# Patient Record
Sex: FEMALE | Race: WHITE | NOT HISPANIC OR LATINO | Employment: UNEMPLOYED | URBAN - METROPOLITAN AREA
[De-identification: names, ages, dates, MRNs, and addresses within clinical notes are randomized per-mention and may not be internally consistent; named-entity substitution may affect disease eponyms.]

---

## 2017-02-23 ENCOUNTER — TELEPHONE (OUTPATIENT)
Dept: NEUROLOGY | Facility: CLINIC | Age: 37
End: 2017-02-23

## 2017-02-23 ENCOUNTER — LAB VISIT (OUTPATIENT)
Dept: LAB | Facility: HOSPITAL | Age: 37
End: 2017-02-23
Attending: NURSE PRACTITIONER
Payer: COMMERCIAL

## 2017-02-23 ENCOUNTER — OFFICE VISIT (OUTPATIENT)
Dept: NEUROLOGY | Facility: CLINIC | Age: 37
End: 2017-02-23
Payer: COMMERCIAL

## 2017-02-23 VITALS
HEIGHT: 67 IN | DIASTOLIC BLOOD PRESSURE: 82 MMHG | BODY MASS INDEX: 39.51 KG/M2 | RESPIRATION RATE: 17 BRPM | HEART RATE: 72 BPM | WEIGHT: 251.75 LBS | SYSTOLIC BLOOD PRESSURE: 118 MMHG

## 2017-02-23 DIAGNOSIS — H47.10 PAPILLEDEMA: Primary | ICD-10-CM

## 2017-02-23 DIAGNOSIS — H47.10 PAPILLEDEMA: ICD-10-CM

## 2017-02-23 LAB
ALBUMIN SERPL BCP-MCNC: 4 G/DL
ALP SERPL-CCNC: 85 U/L
ALT SERPL W/O P-5'-P-CCNC: 15 U/L
ANION GAP SERPL CALC-SCNC: 9 MMOL/L
AST SERPL-CCNC: 12 U/L
BASOPHILS # BLD AUTO: 0.1 K/UL
BASOPHILS NFR BLD: 1.1 %
BILIRUB SERPL-MCNC: 0.6 MG/DL
BUN SERPL-MCNC: 12 MG/DL
CALCIUM SERPL-MCNC: 9.2 MG/DL
CHLORIDE SERPL-SCNC: 104 MMOL/L
CO2 SERPL-SCNC: 25 MMOL/L
CREAT SERPL-MCNC: 0.8 MG/DL
DIFFERENTIAL METHOD: ABNORMAL
EOSINOPHIL # BLD AUTO: 0.3 K/UL
EOSINOPHIL NFR BLD: 3.6 %
ERYTHROCYTE [DISTWIDTH] IN BLOOD BY AUTOMATED COUNT: 14.1 %
EST. GFR  (AFRICAN AMERICAN): >60 ML/MIN/1.73 M^2
EST. GFR  (NON AFRICAN AMERICAN): >60 ML/MIN/1.73 M^2
GLUCOSE SERPL-MCNC: 79 MG/DL
HCT VFR BLD AUTO: 41.4 %
HGB BLD-MCNC: 14.3 G/DL
LYMPHOCYTES # BLD AUTO: 3.6 K/UL
LYMPHOCYTES NFR BLD: 39.5 %
MCH RBC QN AUTO: 31.1 PG
MCHC RBC AUTO-ENTMCNC: 34.5 %
MCV RBC AUTO: 90 FL
MONOCYTES # BLD AUTO: 0.8 K/UL
MONOCYTES NFR BLD: 8.5 %
NEUTROPHILS # BLD AUTO: 4.3 K/UL
NEUTROPHILS NFR BLD: 47.3 %
PLATELET # BLD AUTO: 350 K/UL
PMV BLD AUTO: 9.8 FL
POTASSIUM SERPL-SCNC: 3.6 MMOL/L
PROT SERPL-MCNC: 7.3 G/DL
RBC # BLD AUTO: 4.6 M/UL
SODIUM SERPL-SCNC: 138 MMOL/L
TSH SERPL DL<=0.005 MIU/L-ACNC: 1.87 UIU/ML
WBC # BLD AUTO: 9.1 K/UL

## 2017-02-23 PROCEDURE — 80053 COMPREHEN METABOLIC PANEL: CPT

## 2017-02-23 PROCEDURE — 99204 OFFICE O/P NEW MOD 45 MIN: CPT | Mod: S$GLB,,, | Performed by: NURSE PRACTITIONER

## 2017-02-23 PROCEDURE — 84443 ASSAY THYROID STIM HORMONE: CPT

## 2017-02-23 PROCEDURE — 99999 PR PBB SHADOW E&M-EST. PATIENT-LVL III: CPT | Mod: PBBFAC,,, | Performed by: NURSE PRACTITIONER

## 2017-02-23 PROCEDURE — 1160F RVW MEDS BY RX/DR IN RCRD: CPT | Mod: S$GLB,,, | Performed by: NURSE PRACTITIONER

## 2017-02-23 PROCEDURE — 36415 COLL VENOUS BLD VENIPUNCTURE: CPT

## 2017-02-23 PROCEDURE — 85025 COMPLETE CBC W/AUTO DIFF WBC: CPT

## 2017-02-23 RX ORDER — MINOCYCLINE HYDROCHLORIDE 50 MG/1
50 CAPSULE ORAL 3 TIMES DAILY
COMMUNITY
End: 2017-04-12

## 2017-02-23 NOTE — PROGRESS NOTES
Subjective:      Chief Complaint:  Neurologic Problem (optic nerve swelling)      History of Present Illness  Tabby Veloz is a 36 y.o. female, referred by Dr. Sanjeev Cook, for evaluation of papilledema and headaches. Her medical history is overall unremarkable.     She has had headaches for the past 4-5 months, which occur daily, are located to the bifrontal area, are 5/10 in severity, and are pressure like in quality. Her headaches will last all day unless she takes Aleve; however, her headaches will return the following day. She denies nausea, dizziness, visual disturbances, autonomic complaints, focal weakness, speech difficulty, or paresthesias with her headaches. She also denies photophobia and phonophobia. She denies any aggravating factors.     She denies any visual complaints in general.     She admits to gaining 25 pounds over the past month, after smoking cessation. Her headaches began afterwards.     I have reviewed all of this patient's past medical and surgical histories as well as family and social histories and active allergies and medications as documented in the electronic medical record.    Review of Systems  Review of Systems   Constitutional: Positive for unexpected weight change. Negative for fatigue.   HENT: Negative for ear pain, hearing loss and postnasal drip.    Eyes: Negative for photophobia, pain, redness and visual disturbance.   Respiratory: Negative for shortness of breath.    Cardiovascular: Negative for palpitations and leg swelling.   Gastrointestinal: Negative for nausea.   Musculoskeletal: Negative for neck pain and neck stiffness.   Skin: Negative for rash.   Neurological: Positive for headaches. Negative for dizziness, tremors, speech difficulty, weakness and numbness.   Hematological: Does not bruise/bleed easily.   Psychiatric/Behavioral: Negative for dysphoric mood and sleep disturbance. The patient is not nervous/anxious.        Objective:       Vitals:    02/23/17 1317    BP: 118/82   Pulse: 72   Resp: 17     Exam:  Gen Appearance, well developed/nourished in no apparent distress  CV: 2+ distal pulses with no edema or swelling  Neuro:  MS: Awake, alert, oriented to place, person, time, situation. Sustains attention. Recent/remote memory intact, Language is full to spontaneous speech/repetition/naming/comprehension. Fund of Knowledge is full  CN: papilledema, slightly greater on the right, PERRL, Extraoccular movements and visual fields are full. Normal facial sensation and strength, Hearing symmetric, Tongue and Palate are midline and strong. Shoulder Shrug symmetric and strong.  Motor: Normal bulk, tone, no abnormal movements. 5/5 strength bilateral upper/lower extremities with 2+ reflexes and bilateral plantar response  Sensory: symmetric to light touch, pain, temp, and vibration/proprioception. Romberg negative  Cerebellar: Finger-nose,Heal-shin, Rapid alternating movements intact  Gait: Normal stance, no ataxia    Imaging: none to review  Labs: none to review    Assessment:   Tabby Veloz is a 36 y.o. female, referred by Dr. Sanjeev Cook, for evaluation of papilledema and headaches. Her medical history is overall unremarkable. Her clinical picture is suggestive of pseudotumor; however, I will rule out other causes of her papilledema, before treating her for this.     Plan:   I recommend:   1. MRI Brain with and without contrast to assess for structural abnormalities.   2. MRV to assess for any venous thrombosis.   3. CBC, CMP, TSH to assess for systemic cause.   4. If neuroimaging unremarkable, I will proceed with an LP to assess opening pressure, and if elevated, treat for pseudotumor with Diamox.     Will Call with results of neuroimaging.

## 2017-02-23 NOTE — LETTER
February 23, 2017      Sanjeev Cook Jr., MD  [address]  [address]  [facility] [address]           [facility]  [address] [facility] [address]  Phone: [phone]  Fax: [phone]          Patient: Tabby Veloz   MR Number: [id number]   YOB: 1980   Date of Visit: 2/23/2017       Dear Dr. Sanjeev Cook Jr.:    Thank you for referring Tabby Veloz to me for evaluation. Attached you will find relevant portions of my assessment and plan of care.    If you have questions, please do not hesitate to call me. I look forward to following Tabby Veloz along with you.    Sincerely,    Madhuri Calvin, NP    Enclosure  CC:  No Recipients    If you would like to receive this communication electronically, please contact [email or web address] or [phone] to request more information on [facility] Link access.    For providers and/or their staff who would like to refer a patient to [facility], please contact us through our one-stop-shop provider referral line, [facility], at [phone].    If you feel you have received this communication in error or would no longer like to receive these types of communications, please e-mail [email or web address]

## 2017-02-23 NOTE — MR AVS SNAPSHOT
[facility] [facility]  [address] [facility] [address]  Phone: [phone]  Fax: [phone]                  Tabby Veloz   2017 1:00 PM   Office Visit    Description:  Female : 1980   Provider:  Madhuri Calvin NP   Department:  [facility]           Reason for Visit     Neurologic Problem           Diagnoses this Visit        Comments    Papilledema                To Do List           Goals (5 Years of Data)     None      [facility] On Call     [facility] On Call Nurse Care Line -  Assistance  Registered nurses in the [facility] On Call Center provide clinical advisement, health education, appointment booking, and other advisory services.  Call for this free service at [phone].             Medications           Message regarding Medications     Verify the changes and/or additions to your medication regime listed below are the same as discussed with your clinician today.  If any of these changes or additions are incorrect, please notify your healthcare provider.        STOP taking these medications     docusate sodium (COLACE) 100 MG capsule Take 1 capsule (100 mg total) by mouth 2 (two) times daily.    ferrous sulfate 325 mg (65 mg iron) Tab tablet Take 1 tablet (325 mg total) by mouth 2 (two) times daily.    naproxen (NAPROSYN) 500 MG tablet Take 1 tablet (500 mg total) by mouth every 8 (eight) hours as needed (cramping).           Verify that the below list of medications is an accurate representation of the medications you are currently taking.  If none reported, the list may be blank. If incorrect, please contact your healthcare provider. Carry this list with you in case of emergency.           Current Medications     cetirizine (ZYRTEC) 10 MG tablet Take 10 mg by mouth once daily.    minocycline (MINOCIN,DYNACIN) 50 MG Cap Take 100 mg by mouth 3 (three) times daily.           Clinical Reference Information           Your Vitals Were     BP Pulse Resp  "Height Weight Last Period    118/82 (BP Location: Left arm, Patient Position: Sitting, BP Method: Manual) 72 17 5' 7" (1.702 m) 114.2 kg (251 lb 12.3 oz) 02/01/2017    BMI                39.43 kg/m2          Blood Pressure          Most Recent Value    BP  118/82      Allergies as of 2/23/2017     No Known Allergies      Immunizations Administered on Date of Encounter - 2/23/2017     None      Orders Placed During Today's Visit     Future Labs/Procedures Expected by Expires    CBC auto differential  2/23/2017 4/24/2018    Comprehensive metabolic panel  2/23/2017 2/23/2018    MRI Brain W WO Contrast  2/23/2017 2/23/2018    MRV Brain Without Contrast  2/23/2017 2/23/2018    TSH  2/23/2017 4/24/2018      Smoking Cessation     If you would like to quit smoking:   You may be eligible for free services if you are a [place] resident and started smoking cigarettes before September 1, 1988.  Call the Smoking Cessation Trust (SCT) toll free at (149) 137-0762 or (108) 922-3859.   Call 3-952-QUIT-NOW if you do not meet the above criteria.            Language Assistance Services     ATTENTION: Language assistance services are available, free of charge. Please call [phone].      ATENCIÓN: Si habla español, tiene a rosa disposición servicios gratuitos de asistencia lingüística. Llame al [phone].     CHÚ Ý: N?u b?n nói Ti?ng Vi?t, có các d?ch v? h? tr? ngôn ng? mi?n phí dành cho b?n. G?i s? [phone].         [place] [facility] complies with applicable Federal civil rights laws and does not discriminate on the basis of race, color, national origin, age, disability, or sex.        "

## 2017-03-01 ENCOUNTER — HOSPITAL ENCOUNTER (OUTPATIENT)
Dept: RADIOLOGY | Facility: HOSPITAL | Age: 37
Discharge: HOME | End: 2017-03-01
Attending: NURSE PRACTITIONER
Payer: COMMERCIAL

## 2017-03-01 DIAGNOSIS — H47.10 PAPILLEDEMA: ICD-10-CM

## 2017-03-01 DIAGNOSIS — H47.10 PAPILLEDEMA: Primary | ICD-10-CM

## 2017-03-01 PROCEDURE — 70553 MRI BRAIN STEM W/O & W/DYE: CPT | Mod: 26,,, | Performed by: RADIOLOGY

## 2017-03-01 PROCEDURE — 70553 MRI BRAIN STEM W/O & W/DYE: CPT | Mod: TC

## 2017-03-01 PROCEDURE — 70544 MR ANGIOGRAPHY HEAD W/O DYE: CPT | Mod: 26,,, | Performed by: RADIOLOGY

## 2017-03-01 PROCEDURE — A9585 GADOBUTROL INJECTION: HCPCS | Performed by: NURSE PRACTITIONER

## 2017-03-01 PROCEDURE — 25500020 PHARM REV CODE 255: Performed by: NURSE PRACTITIONER

## 2017-03-01 PROCEDURE — 70544 MR ANGIOGRAPHY HEAD W/O DYE: CPT | Mod: TC

## 2017-03-01 RX ORDER — GADOBUTROL 604.72 MG/ML
10 INJECTION INTRAVENOUS
Status: COMPLETED | OUTPATIENT
Start: 2017-03-01 | End: 2017-03-01

## 2017-03-01 RX ORDER — DIAZEPAM 2 MG/1
2 TABLET ORAL SEE ADMIN INSTRUCTIONS
Qty: 2 TABLET | Refills: 0 | Status: SHIPPED | OUTPATIENT
Start: 2017-03-01 | End: 2017-03-15

## 2017-03-01 RX ADMIN — GADOBUTROL 10 ML: 604.72 INJECTION INTRAVENOUS at 02:03

## 2017-03-09 ENCOUNTER — HOSPITAL ENCOUNTER (OUTPATIENT)
Dept: RADIOLOGY | Facility: HOSPITAL | Age: 37
Discharge: HOME | End: 2017-03-09
Attending: NURSE PRACTITIONER
Payer: COMMERCIAL

## 2017-03-09 DIAGNOSIS — H47.10 PAPILLEDEMA: ICD-10-CM

## 2017-03-09 LAB
CLARITY CSF: CLEAR
COLOR CSF: COLORLESS
GLUCOSE CSF-MCNC: 54 MG/DL
LYMPHOCYTES NFR CSF MANUAL: 88 %
MONOS+MACROS NFR CSF MANUAL: 12 %
PROT CSF-MCNC: 31 MG/DL
RBC # CSF: 1 /CU MM
SPECIMEN VOL CSF: 2.5 ML
WBC # CSF: 1 /CU MM

## 2017-03-09 PROCEDURE — 89051 BODY FLUID CELL COUNT: CPT

## 2017-03-09 PROCEDURE — 84157 ASSAY OF PROTEIN OTHER: CPT

## 2017-03-09 PROCEDURE — 87070 CULTURE OTHR SPECIMN AEROBIC: CPT

## 2017-03-09 PROCEDURE — 62270 DX LMBR SPI PNXR: CPT | Mod: ,,, | Performed by: RADIOLOGY

## 2017-03-09 PROCEDURE — 77003 FLUOROGUIDE FOR SPINE INJECT: CPT | Mod: 26,,, | Performed by: RADIOLOGY

## 2017-03-09 PROCEDURE — 87205 SMEAR GRAM STAIN: CPT

## 2017-03-09 PROCEDURE — 62270 DX LMBR SPI PNXR: CPT | Mod: TC

## 2017-03-09 PROCEDURE — 82945 GLUCOSE OTHER FLUID: CPT

## 2017-03-09 NOTE — H&P
Radiology History & Physical      SUBJECTIVE:     Chief Complaint: Headache    History of Present Illness:  Tabby Veloz is a 36 y.o. female who presents for lumbar puncture.    Past Medical History:   Diagnosis Date    Abnormal Pap smear      Past Surgical History:   Procedure Laterality Date    ABSCESS DRAINAGE      x 3     addenoids      ANKLE ARTHROSCOPY W/ OPEN REPAIR Right     CERVICAL BIOPSY  W/ LOOP ELECTRODE EXCISION      CHOLECYSTECTOMY      tonsilectomy         Home Meds:   Prior to Admission medications    Medication Sig Start Date End Date Taking? Authorizing Provider   cetirizine (ZYRTEC) 10 MG tablet Take 10 mg by mouth once daily.    Historical Provider, MD   diazePAM (VALIUM) 2 MG tablet Take 1 tablet (2 mg total) by mouth As instructed for Anxiety. 45 minutes prior to lumbar puncture; may repeat at time of lumbar puncture if needed. 3/1/17 3/31/17  Madhuri Calvin, NP   minocycline (MINOCIN,DYNACIN) 50 MG Cap Take 100 mg by mouth 3 (three) times daily.    Historical Provider, MD     Anticoagulants/Antiplatelets: no anticoagulation    Allergies: Review of patient's allergies indicates:  No Known Allergies  Sedation History:  no adverse reactions    Review of Systems:   Hematological: no known coagulopathies  Respiratory: no shortness of breath  Cardiovascular: no chest pain  Gastrointestinal: no abdominal pain  Genito-Urinary: no dysuria  Musculoskeletal: negative  Neurological: no TIA or stroke symptoms         OBJECTIVE:     Vital Signs (Most Recent)       Physical Exam:  ASA: III  Mallampati: II    General: no acute distress  Mental Status: alert and oriented to person, place and time  HEENT: normocephalic, atraumatic  Chest: unlabored breathing  Abdomen: nondistended  Extremity: moves all extremities    Laboratory  No results found for: INR    Lab Results   Component Value Date    WBC 9.10 02/23/2017    HGB 14.3 02/23/2017    HCT 41.4 02/23/2017    MCV 90 02/23/2017      02/23/2017      Lab Results   Component Value Date    GLU 79 02/23/2017     02/23/2017    K 3.6 02/23/2017     02/23/2017    CO2 25 02/23/2017    BUN 12 02/23/2017    CREATININE 0.8 02/23/2017    CALCIUM 9.2 02/23/2017    ALT 15 02/23/2017    AST 12 02/23/2017    ALBUMIN 4.0 02/23/2017    BILITOT 0.6 02/23/2017       ASSESSMENT/PLAN:     Sedation Plan: Local anesthesia  Patient will undergo lumbar puncture.    Miguel A Smith MD  PGY-II  Dept of Radiology   Pager: [phone]

## 2017-03-09 NOTE — PROCEDURES
"Radiology Post-Procedure Note    Pre Op Diagnosis: Concern for psuedotumor    Post Op Diagnosis: Same    Procedure: lumbar puncture    Procedure performed by: Devin Morales MD     Written Informed Consent Obtained: Yes    Specimen Removed: 11 mL CSF    Estimated Blood Loss: Minimal    Findings: Following written informed consent and sterile prep and drape, a 22 gauge, 5" spinal needle was inserted at L3-L4 intralaminar space under fluoroscopic surveillance.  11 mL clear CSF removed and sent to the lab for further analysis.  Opening pressure was 46 mmH2O. There were no complications.    Patient tolerated procedure well.    Miguel A Smith MD  PGY-II  Dept of Radiology   Pager: [phone]    "

## 2017-03-14 LAB
CMV SPEC QL SHELL VIAL CULT: NO GROWTH
GRAM STN SPEC: NORMAL

## 2017-03-15 ENCOUNTER — OFFICE VISIT (OUTPATIENT)
Dept: NEUROLOGY | Facility: CLINIC | Age: 37
End: 2017-03-15
Payer: COMMERCIAL

## 2017-03-15 VITALS
DIASTOLIC BLOOD PRESSURE: 74 MMHG | HEART RATE: 84 BPM | BODY MASS INDEX: 38.79 KG/M2 | RESPIRATION RATE: 18 BRPM | HEIGHT: 67 IN | WEIGHT: 247.13 LBS | SYSTOLIC BLOOD PRESSURE: 112 MMHG

## 2017-03-15 DIAGNOSIS — G93.2 PSEUDOTUMOR CEREBRI: ICD-10-CM

## 2017-03-15 DIAGNOSIS — E66.9 OBESITY, UNSPECIFIED OBESITY SEVERITY, UNSPECIFIED OBESITY TYPE: ICD-10-CM

## 2017-03-15 DIAGNOSIS — H47.10 PAPILLEDEMA: Primary | ICD-10-CM

## 2017-03-15 DIAGNOSIS — R51.9 HEADACHE, UNSPECIFIED HEADACHE TYPE: ICD-10-CM

## 2017-03-15 PROCEDURE — 99214 OFFICE O/P EST MOD 30 MIN: CPT | Mod: S$GLB,,, | Performed by: NURSE PRACTITIONER

## 2017-03-15 PROCEDURE — 1160F RVW MEDS BY RX/DR IN RCRD: CPT | Mod: S$GLB,,, | Performed by: NURSE PRACTITIONER

## 2017-03-15 PROCEDURE — 99999 PR PBB SHADOW E&M-EST. PATIENT-LVL III: CPT | Mod: PBBFAC,,, | Performed by: NURSE PRACTITIONER

## 2017-03-15 RX ORDER — ACETAZOLAMIDE 250 MG/1
250 TABLET ORAL 2 TIMES DAILY
Qty: 120 TABLET | Refills: 3 | Status: SHIPPED | OUTPATIENT
Start: 2017-03-15 | End: 2017-07-11 | Stop reason: SDUPTHER

## 2017-03-15 NOTE — MR AVS SNAPSHOT
[facility] [facility]  [address] [facility] [address]  Phone: [phone]  Fax: [phone]                  Tabby Veloz   3/15/2017 2:20 PM   Office Visit    Description:  Female : 1980   Provider:  Madhuri Calvin NP   Department:  [facility] [facility]           Reason for Visit     Follow-up           Diagnoses this Visit        Comments    Papilledema    -  Primary     Pseudotumor cerebri                To Do List           Goals (5 Years of Data)     None      Follow-Up and Disposition     Return in about 4 weeks (around 2017).       These Medications        Disp Refills Start End    acetaZOLAMIDE (DIAMOX) 250 MG tablet 120 tablet 3 3/15/2017 3/15/2018    Take 1 tablet (250 mg total) by mouth 2 (two) times daily. X 2 weeks, then increase to 2 tablets twice a day afterwards - Oral    Pharmacy: [facility]/pharmacy #[id number] - [address] AT [facility] #: [phone]         [facility] On Call     [facility] On Call Nurse Care Line -  Assistance  Registered nurses in the [facility] On Call Center provide clinical advisement, health education, appointment booking, and other advisory services.  Call for this free service at [phone].             Medications           Message regarding Medications     Verify the changes and/or additions to your medication regime listed below are the same as discussed with your clinician today.  If any of these changes or additions are incorrect, please notify your healthcare provider.        START taking these NEW medications        Refills    acetaZOLAMIDE (DIAMOX) 250 MG tablet 3    Sig: Take 1 tablet (250 mg total) by mouth 2 (two) times daily. X 2 weeks, then increase to 2 tablets twice a day afterwards    Class: Normal    Route: Oral      STOP taking these medications     diazePAM (VALIUM) 2 MG tablet Take 1 tablet (2 mg total) by mouth As instructed for Anxiety. 45 minutes prior to  "lumbar puncture; may repeat at time of lumbar puncture if needed.           Verify that the below list of medications is an accurate representation of the medications you are currently taking.  If none reported, the list may be blank. If incorrect, please contact your healthcare provider. Carry this list with you in case of emergency.           Current Medications     cetirizine (ZYRTEC) 10 MG tablet Take 10 mg by mouth once daily.    minocycline (MINOCIN,DYNACIN) 50 MG Cap Take 50 mg by mouth 3 (three) times daily.     acetaZOLAMIDE (DIAMOX) 250 MG tablet Take 1 tablet (250 mg total) by mouth 2 (two) times daily. X 2 weeks, then increase to 2 tablets twice a day afterwards           Clinical Reference Information           Your Vitals Were     BP Pulse Resp Height Weight Last Period    112/74 (BP Location: Left arm, Patient Position: Sitting, BP Method: Manual) 84 18 5' 7" (1.702 m) 112.1 kg (247 lb 2.2 oz) 02/01/2017    BMI                38.71 kg/m2          Blood Pressure          Most Recent Value    BP  112/74      Allergies as of 3/15/2017     No Known Allergies      Immunizations Administered on Date of Encounter - 3/15/2017     None      Smoking Cessation     If you would like to quit smoking:   You may be eligible for free services if you are a [place] resident and started smoking cigarettes before September 1, 1988.  Call the Smoking Cessation Trust ([facility]) toll free at (018) 999-4482 or (596) 753-3819.   Call 1-800-QUIT-NOW if you do not meet the above criteria.            Language Assistance Services     ATTENTION: Language assistance services are available, free of charge. Please call [phone].      ATENCIÓN: Si habla español, tiene a rosa disposición servicios gratuitos de asistencia lingüística. Llame al [phone].     CHÚ Ý: N?u b?n nói Ti?ng Vi?t, có các d?ch v? h? tr? ngôn ng? mi?n phí dành cho b?n. G?i s? [phone].         [place] [facility] complies with applicable " Federal civil rights laws and does not discriminate on the basis of race, color, national origin, age, disability, or sex.

## 2017-03-15 NOTE — PROGRESS NOTES
Subjective:      Chief Complaint:  Follow-up (Discuss test results)      History of Present Illness  Tabby Veloz is a 36 y.o. female, referred by Dr. Sanjeev Cook, for evaluation of papilledema and headaches. Her medical history is overall unremarkable.     She completed a lumbar puncture on 3/9/2017, which demonstrated an opening pressure of 46. She has only had one headache since her lumbar puncture. She was having blurred vision upon bending over prior her LP, which has resolved. She was also experiencing daily headaches prior to her LP.     She denies any visual complaints today. She started a diet program and has lost 4 pounds since her last visit.     I have reviewed all of this patient's past medical and surgical histories as well as family and social histories and active allergies and medications as documented in the electronic medical record.    Review of Systems  Review of Systems   Constitutional: Positive for unexpected weight change. Negative for fatigue.   HENT: Negative for ear pain, hearing loss and postnasal drip.    Eyes: Negative for photophobia, pain, redness and visual disturbance.   Respiratory: Negative for shortness of breath.    Cardiovascular: Negative for palpitations and leg swelling.   Gastrointestinal: Negative for nausea.   Musculoskeletal: Negative for neck pain and neck stiffness.   Skin: Negative for rash.   Neurological: Positive for headaches. Negative for dizziness, tremors, speech difficulty, weakness and numbness.   Hematological: Does not bruise/bleed easily.   Psychiatric/Behavioral: Negative for dysphoric mood and sleep disturbance. The patient is not nervous/anxious.        Objective:       Vitals:    03/15/17 1425   BP: 112/74   Pulse: 84   Resp: 18     Exam:  Gen Appearance, well developed/nourished in no apparent distress  CV: 2+ distal pulses with no edema or swelling  Neuro:  MS: Awake, alert, oriented to place, person, time, situation. Sustains attention.  Recent/remote memory intact, Language is full to spontaneous speech/repetition/naming/comprehension. Fund of Knowledge is full  CN: papilledema, slightly greater on the right, PERRL, Extraoccular movements and visual fields are full. Normal facial sensation and strength, Hearing symmetric, Tongue and Palate are midline and strong. Shoulder Shrug symmetric and strong.  Motor: Normal bulk, tone, no abnormal movements. 5/5 strength bilateral upper/lower extremities with 2+ reflexes and bilateral plantar response  Sensory: symmetric to light touch, pain, temp, and vibration/proprioception. Romberg negative  Cerebellar: Finger-nose,Heal-shin, Rapid alternating movements intact  Gait: Normal stance, no ataxia    Imaging:   MRI Brain 3/1/2017:   Normal MRI brain specifically without evidence for acute infarction or enhancing lesion.    MRV 3/1/2017:  Unremarkable noncontrast MRV specifically without evidence for venous sinus thrombosis.    Labs: CBC, CMP, and TSH unremarkable.     LP 3/9/2017 opening pressure: 46    Assessment:   Tabby Veloz is a 36 y.o. female, referred by Dr. Sanjeev Cook, for evaluation of papilledema and headaches. Her medical history is overall unremarkable.     Her opening pressure was elevated, which is consistent with a diagnosis of pseudotumor cerebri, as her neuroimaging was unremarkable for cause. Her headaches did improve after the LP.     Plan:   I recommend:   1. Start Diamox 250 mg bid x 2 weeks, then increase to 500 mg bid as tolerated.   2. Discussed continued weight loss. She lost 4 pounds since her lat visit with me two weeks ago.   3. Advised to call clinic with any cramping. Check a BMP at her next visit.     Will Call with results of neuroimaging.

## 2017-04-12 ENCOUNTER — LAB VISIT (OUTPATIENT)
Dept: LAB | Facility: HOSPITAL | Age: 37
End: 2017-04-12
Attending: NURSE PRACTITIONER
Payer: COMMERCIAL

## 2017-04-12 ENCOUNTER — OFFICE VISIT (OUTPATIENT)
Dept: NEUROLOGY | Facility: CLINIC | Age: 37
End: 2017-04-12
Payer: COMMERCIAL

## 2017-04-12 VITALS
RESPIRATION RATE: 18 BRPM | HEIGHT: 67 IN | WEIGHT: 240.75 LBS | HEART RATE: 80 BPM | SYSTOLIC BLOOD PRESSURE: 114 MMHG | DIASTOLIC BLOOD PRESSURE: 78 MMHG | BODY MASS INDEX: 37.79 KG/M2

## 2017-04-12 DIAGNOSIS — G93.2 PSEUDOTUMOR CEREBRI: Primary | ICD-10-CM

## 2017-04-12 DIAGNOSIS — G93.2 PSEUDOTUMOR CEREBRI: ICD-10-CM

## 2017-04-12 LAB
ALBUMIN SERPL BCP-MCNC: 3.8 G/DL
ALP SERPL-CCNC: 96 U/L
ALT SERPL W/O P-5'-P-CCNC: 13 U/L
ANION GAP SERPL CALC-SCNC: 7 MMOL/L
AST SERPL-CCNC: 14 U/L
BILIRUB SERPL-MCNC: 0.5 MG/DL
BUN SERPL-MCNC: 14 MG/DL
CALCIUM SERPL-MCNC: 9.2 MG/DL
CHLORIDE SERPL-SCNC: 113 MMOL/L
CO2 SERPL-SCNC: 20 MMOL/L
CREAT SERPL-MCNC: 0.9 MG/DL
EST. GFR  (AFRICAN AMERICAN): >60 ML/MIN/1.73 M^2
EST. GFR  (NON AFRICAN AMERICAN): >60 ML/MIN/1.73 M^2
GLUCOSE SERPL-MCNC: 88 MG/DL
POTASSIUM SERPL-SCNC: 4.8 MMOL/L
PROT SERPL-MCNC: 7.2 G/DL
SODIUM SERPL-SCNC: 140 MMOL/L

## 2017-04-12 PROCEDURE — 36415 COLL VENOUS BLD VENIPUNCTURE: CPT

## 2017-04-12 PROCEDURE — 1160F RVW MEDS BY RX/DR IN RCRD: CPT | Mod: S$GLB,,, | Performed by: NURSE PRACTITIONER

## 2017-04-12 PROCEDURE — 80053 COMPREHEN METABOLIC PANEL: CPT

## 2017-04-12 PROCEDURE — 99999 PR PBB SHADOW E&M-EST. PATIENT-LVL III: CPT | Mod: PBBFAC,,, | Performed by: NURSE PRACTITIONER

## 2017-04-12 PROCEDURE — 99214 OFFICE O/P EST MOD 30 MIN: CPT | Mod: S$GLB,,, | Performed by: NURSE PRACTITIONER

## 2017-04-12 RX ORDER — METFORMIN HYDROCHLORIDE 500 MG/1
500 TABLET ORAL
COMMUNITY
End: 2017-08-10

## 2017-04-12 RX ORDER — MINOCYCLINE HYDROCHLORIDE 100 MG/1
100 TABLET ORAL
COMMUNITY
End: 2018-02-20

## 2017-04-12 NOTE — MR AVS SNAPSHOT
[place] [facility]  [address] [facility] [address]  Phone: [phone]  Fax: [phone]                  Tabby Veloz   2017 2:40 PM   Office Visit    Description:  Female : 1980   Provider:  Madhuri Calvin NP   Department:  [place] [facility]           Reason for Visit     Neurologic Problem           Diagnoses this Visit        Comments    Pseudotumor cerebri    -  Primary            To Do List           Future Appointments        Provider Department Dept Phone    2017 3:40 PM LAB, [facility] [phone]      Goals (5 Years of Data)     None      Follow-Up and Disposition     Return in about 4 months (around 2017).      [facility] On Call     [facility] On Call Nurse Care Line -  Assistance  Unless otherwise directed by your provider, please contact [facility], our nurse care line that is available for  assistance.     Registered nurses in the [facility] On Call Center provide: appointment scheduling, clinical advisement, health education, and other advisory services.  Call: [phone] (toll free)               Medications           Message regarding Medications     Verify the changes and/or additions to your medication regime listed below are the same as discussed with your clinician today.  If any of these changes or additions are incorrect, please notify your healthcare provider.        STOP taking these medications     minocycline (MINOCIN,DYNACIN) 50 MG Cap Take 50 mg by mouth 3 (three) times daily.            Verify that the below list of medications is an accurate representation of the medications you are currently taking.  If none reported, the list may be blank. If incorrect, please contact your healthcare provider. Carry this list with you in case of emergency.           Current Medications     acetaZOLAMIDE (DIAMOX) 250 MG tablet Take 1 tablet (250 mg total) by mouth 2 (two) times  "daily. X 2 weeks, then increase to 2 tablets twice a day afterwards    cetirizine (ZYRTEC) 10 MG tablet Take 10 mg by mouth once daily.    minocycline (DYNACIN) 100 MG tablet Take 100 mg by mouth every 12 (twelve) hours.    metformin (GLUCOPHAGE) 500 MG tablet Take 500 mg by mouth daily with breakfast.           Clinical Reference Information           Your Vitals Were     BP Pulse Resp Height Weight BMI    114/78 (BP Location: Left arm, Patient Position: Sitting, BP Method: Manual) 80 18 5' 7" (1.702 m) 109.2 kg (240 lb 11.9 oz) 37.71 kg/m2      Blood Pressure          Most Recent Value    BP  114/78      Allergies as of 4/12/2017     No Known Allergies      Immunizations Administered on Date of Encounter - 4/12/2017     None      Orders Placed During Today's Visit     Future Labs/Procedures Expected by Expires    Comprehensive metabolic panel  4/12/2017 4/12/2018      Smoking Cessation     If you would like to quit smoking:   You may be eligible for free services if you are a [place] resident and started smoking cigarettes before September 1, 1988.  Call the Smoking Cessation Trust ([facility]) toll free at (998) 710-6124 or (140) 512-8770.   Call 1-800-QUIT-NOW if you do not meet the above criteria.   Contact us via email: [email or web address]   View our website for more information: [email or web address]        Language Assistance Services     ATTENTION: Language assistance services are available, free of charge. Please call [phone].      ATENCIÓN: Si habla español, tiene a rosa disposición servicios gratuitos de asistencia lingüística. Llame al [phone].     CHÚ Ý: N?u b?n nói Ti?ng Vi?t, có các d?ch v? h? tr? ngôn ng? mi?n phí dành cho b?n. G?i s? [phone].         [place] [facility] complies with applicable Federal civil rights laws and does not discriminate on the basis of race, color, national origin, age, disability, or sex.        "

## 2017-04-12 NOTE — PROGRESS NOTES
Subjective:      Chief Complaint:  Neurologic Problem (Hands and feet tingling)      History of Present Illness  Tabby Veloz is a 36 y.o. female, referred by Dr. Sanjeev Cook, for evaluation of papilledema and headaches. Her medical history is overall unremarkable. She completed a lumbar puncture on 3/9/2017, which demonstrated an opening pressure of 46, consistent with a diagnosis of pseudotumor cerebri.     She was placed on Diamox at her last visit, which she is tolerating well, other than some intermittent ringing to her ears and some paresthesias to her hands. She denies any headaches, and only has blurred vision once per week.     She lost 7 pounds since her last visit. She continues on her diet program, which works well for her.     I have reviewed all of this patient's past medical and surgical histories as well as family and social histories and active allergies and medications as documented in the electronic medical record.    Review of Systems  Review of Systems   Constitutional: Negative for fatigue.   HENT: Positive for tinnitus. Negative for ear pain, hearing loss and postnasal drip.    Eyes: Negative for photophobia, pain, redness and visual disturbance.   Respiratory: Negative for shortness of breath.    Cardiovascular: Negative for palpitations and leg swelling.   Gastrointestinal: Negative for nausea.   Musculoskeletal: Negative for neck pain and neck stiffness.   Skin: Negative for rash.   Neurological: Positive for headaches. Negative for dizziness, tremors, speech difficulty, weakness and numbness.        Tingling to hands   Hematological: Does not bruise/bleed easily.   Psychiatric/Behavioral: Negative for dysphoric mood and sleep disturbance. The patient is not nervous/anxious.        Objective:       Vitals:    04/12/17 1451   BP: 114/78   Pulse: 80   Resp: 18     Exam:  Gen Appearance, well developed/nourished in no apparent distress  CV: 2+ distal pulses with no edema or  swelling  Neuro:  MS: Awake, alert, oriented to place, person, time, situation. Sustains attention. Recent/remote memory intact, Language is full to spontaneous speech/repetition/naming/comprehension. Fund of Knowledge is full  CN: papilledema, slightly greater on the right, PERRL, Extraoccular movements and visual fields are full. Normal facial sensation and strength, Hearing symmetric, Tongue and Palate are midline and strong. Shoulder Shrug symmetric and strong.  Motor: Normal bulk, tone, no abnormal movements. 5/5 strength bilateral upper/lower extremities with 2+ reflexes and bilateral plantar response  Sensory: symmetric to light touch, pain, temp, and vibration/proprioception. Romberg negative  Cerebellar: Finger-nose,Heal-shin, Rapid alternating movements intact  Gait: Normal stance, no ataxia    Imaging:   MRI Brain 3/1/2017:   Normal MRI brain specifically without evidence for acute infarction or enhancing lesion.    MRV 3/1/2017:  Unremarkable noncontrast MRV specifically without evidence for venous sinus thrombosis.    Labs: 2/2017 CBC, CMP, and TSH unremarkable.     LP 3/9/2017 opening pressure: 46    Assessment:   Tabby Veloz is a 36 y.o. female, referred by Dr. Sanjeev Cook, for evaluation of papilledema and headaches. Her medical history is overall unremarkable. She completed a lumbar puncture on 3/9/2017, which demonstrated an opening pressure of 46, consistent with a diagnosis of pseudotumor cerebri.     Plan:   I recommend:   1. Continue Diamox for pseudotumor. She does have some intermittent hand paresthesias and tinnitus, both of which are known side effects of Diamox; however, she recently initiated this medication and recently increased her dose. These side effects may resolve with time.   2. Discussed continued weight loss. She lost 11 pounds since she was diagnosed with pseudotumor.   3. Check a CMP at her visit today.   4. Advised to increase hydration while taking Diamox.   5.  Advised to call clinic with worsening of headaches or vision.     Follow up in 4 months.

## 2017-07-11 DIAGNOSIS — H47.10 PAPILLEDEMA: ICD-10-CM

## 2017-07-11 DIAGNOSIS — G93.2 PSEUDOTUMOR CEREBRI: ICD-10-CM

## 2017-07-11 RX ORDER — ACETAZOLAMIDE 250 MG/1
500 TABLET ORAL 2 TIMES DAILY
Qty: 120 TABLET | Refills: 1 | Status: SHIPPED | OUTPATIENT
Start: 2017-07-11 | End: 2017-09-24 | Stop reason: SDUPTHER

## 2017-07-11 NOTE — TELEPHONE ENCOUNTER
----- Message from Yaritza Calvin sent at 2017  3:05 PM CDT -----  Contact: self  Tabby Veloz  MRN: [id number]  : 1980  PCP: Primary Doctor No  Home Phone      [phone]  Work Phone      Not on file.  Mobile          [phone]      MESSAGE: the patient is requesting a refill on acetaZOLAMIDE (DIAMOX) 250 MG tablet to be sent to [facility] in [place].  Phone:[phone]

## 2017-08-03 PROBLEM — K61.1 PERIRECTAL ABSCESS: Status: ACTIVE | Noted: 2017-08-03

## 2017-08-10 ENCOUNTER — OFFICE VISIT (OUTPATIENT)
Dept: NEUROLOGY | Facility: CLINIC | Age: 37
End: 2017-08-10
Payer: COMMERCIAL

## 2017-08-10 ENCOUNTER — LAB VISIT (OUTPATIENT)
Dept: LAB | Facility: HOSPITAL | Age: 37
End: 2017-08-10
Attending: NURSE PRACTITIONER
Payer: COMMERCIAL

## 2017-08-10 VITALS
WEIGHT: 247.25 LBS | SYSTOLIC BLOOD PRESSURE: 114 MMHG | HEART RATE: 84 BPM | RESPIRATION RATE: 18 BRPM | HEIGHT: 67 IN | BODY MASS INDEX: 38.81 KG/M2 | DIASTOLIC BLOOD PRESSURE: 78 MMHG

## 2017-08-10 DIAGNOSIS — G93.2 PSEUDOTUMOR CEREBRI: Primary | ICD-10-CM

## 2017-08-10 DIAGNOSIS — Z79.899 ENCOUNTER FOR LONG-TERM (CURRENT) USE OF MEDICATIONS: ICD-10-CM

## 2017-08-10 DIAGNOSIS — H47.10 PAPILLEDEMA: ICD-10-CM

## 2017-08-10 LAB
ALBUMIN SERPL BCP-MCNC: 3.6 G/DL
ALP SERPL-CCNC: 96 U/L
ALT SERPL W/O P-5'-P-CCNC: 17 U/L
ANION GAP SERPL CALC-SCNC: 8 MMOL/L
AST SERPL-CCNC: 13 U/L
BILIRUB SERPL-MCNC: 0.4 MG/DL
BUN SERPL-MCNC: 12 MG/DL
CALCIUM SERPL-MCNC: 9.3 MG/DL
CHLORIDE SERPL-SCNC: 113 MMOL/L
CO2 SERPL-SCNC: 23 MMOL/L
CREAT SERPL-MCNC: 0.8 MG/DL
EST. GFR  (AFRICAN AMERICAN): >60 ML/MIN/1.73 M^2
EST. GFR  (NON AFRICAN AMERICAN): >60 ML/MIN/1.73 M^2
GLUCOSE SERPL-MCNC: 102 MG/DL
POTASSIUM SERPL-SCNC: 3.8 MMOL/L
PROT SERPL-MCNC: 7.1 G/DL
SODIUM SERPL-SCNC: 144 MMOL/L

## 2017-08-10 PROCEDURE — 99214 OFFICE O/P EST MOD 30 MIN: CPT | Mod: S$GLB,,, | Performed by: NURSE PRACTITIONER

## 2017-08-10 PROCEDURE — 36415 COLL VENOUS BLD VENIPUNCTURE: CPT

## 2017-08-10 PROCEDURE — 80053 COMPREHEN METABOLIC PANEL: CPT

## 2017-08-10 PROCEDURE — 99999 PR PBB SHADOW E&M-EST. PATIENT-LVL III: CPT | Mod: PBBFAC,,, | Performed by: NURSE PRACTITIONER

## 2017-08-10 PROCEDURE — 3008F BODY MASS INDEX DOCD: CPT | Mod: S$GLB,,, | Performed by: NURSE PRACTITIONER

## 2017-08-10 RX ORDER — ECONAZOLE NITRATE 10 MG/G
CREAM TOPICAL
COMMUNITY
Start: 2017-07-14 | End: 2018-02-20

## 2017-08-10 NOTE — PROGRESS NOTES
Subjective:      Chief Complaint:  Neurologic Problem      History of Present Illness  Tabby Veloz is a 36 y.o. female, referred by Dr. Sanjeev Cook, for evaluation of papilledema and headaches. Her medical history is overall unremarkable. She completed a lumbar puncture on 3/9/2017, which demonstrated an opening pressure of 46, consistent with a diagnosis of pseudotumor cerebri.     She denies any current headaches or visual complaints. She continues on Diamox and no longer has hand paresthesias since initiating an over the counter potassium supplement. She continues to have occasional ringing to her ears.     She has no complaints at this visit.     I have reviewed all of this patient's past medical and surgical histories as well as family and social histories and active allergies and medications as documented in the electronic medical record.    Review of Systems  Review of Systems   Constitutional: Negative for fatigue.   HENT: Positive for tinnitus. Negative for ear pain, hearing loss and postnasal drip.    Eyes: Negative for photophobia, pain, redness and visual disturbance.   Respiratory: Negative for shortness of breath.    Cardiovascular: Negative for palpitations and leg swelling.   Gastrointestinal: Negative for nausea.   Musculoskeletal: Negative for neck pain and neck stiffness.   Skin: Negative for rash.   Neurological: Negative for dizziness, tremors, speech difficulty, weakness, numbness and headaches.   Hematological: Does not bruise/bleed easily.   Psychiatric/Behavioral: Negative for dysphoric mood and sleep disturbance. The patient is not nervous/anxious.        Objective:       Vitals:    08/10/17 1430   BP: 114/78   Pulse: 84   Resp: 18     Exam:  Gen Appearance, well developed/nourished in no apparent distress  CV: 2+ distal pulses with no edema or swelling  Neuro:  MS: Awake, alert, oriented to place, person, time, situation. Sustains attention. Recent/remote memory intact,  Language is full to spontaneous speech/repetition/naming/comprehension. Fund of Knowledge is full  CN: no papilledema today, PERRL, Extraoccular movements and visual fields are full. Normal facial sensation and strength, Hearing symmetric, Tongue and Palate are midline and strong. Shoulder Shrug symmetric and strong.  Motor: Normal bulk, tone, no abnormal movements. 5/5 strength bilateral upper/lower extremities with 2+ reflexes and bilateral plantar response  Sensory: symmetric to light touch, pain, temp, and vibration/proprioception. Romberg negative  Cerebellar: Finger-nose,Heal-shin, Rapid alternating movements intact  Gait: Normal stance, no ataxia    Imaging:   MRI Brain 3/1/2017:   Normal MRI brain specifically without evidence for acute infarction or enhancing lesion.    MRV 3/1/2017:  Unremarkable noncontrast MRV specifically without evidence for venous sinus thrombosis.    Labs: 2/2017 CBC, CMP, and TSH unremarkable.     LP 3/9/2017 opening pressure: 46    Assessment:   Tabby Veloz is a 36 y.o. female, referred by Dr. Sanjeev Cook, for evaluation of papilledema and headaches. Her medical history is overall unremarkable. She completed a lumbar puncture on 3/9/2017, which demonstrated an opening pressure of 46, consistent with a diagnosis of pseudotumor cerebri.     Plan:   I recommend:   1. Continue Diamox for pseudotumor. She continues with intermittent tinnitus. Advised to increase fluid intake; however, this is a known side effect of Diamox. Her hand paresthesias resolved with a potassium supplement OTC.   2. Discussed continued weight loss.   3. Check a CMP at her visit today. Last CMP WNL.   4. Advised to call clinic with worsening of headaches or vision.     Follow up in 3 months.

## 2017-09-24 DIAGNOSIS — G93.2 PSEUDOTUMOR CEREBRI: ICD-10-CM

## 2017-09-24 DIAGNOSIS — H47.10 PAPILLEDEMA: ICD-10-CM

## 2017-09-26 RX ORDER — ACETAZOLAMIDE 250 MG/1
500 TABLET ORAL 2 TIMES DAILY
Qty: 120 TABLET | Refills: 2 | Status: SHIPPED | OUTPATIENT
Start: 2017-09-26 | End: 2017-10-19 | Stop reason: SDUPTHER

## 2017-09-28 ENCOUNTER — CLINICAL SUPPORT (OUTPATIENT)
Dept: FAMILY MEDICINE | Facility: CLINIC | Age: 37
End: 2017-09-28
Payer: COMMERCIAL

## 2017-09-28 DIAGNOSIS — Z00.00 ROUTINE GENERAL MEDICAL EXAMINATION AT A HEALTH CARE FACILITY: Primary | ICD-10-CM

## 2017-09-28 PROCEDURE — 80305 DRUG TEST PRSMV DIR OPT OBS: CPT | Mod: S$GLB,,, | Performed by: FAMILY MEDICINE

## 2017-09-28 PROCEDURE — 99201 PR OFFICE/OUTPT VISIT,NEW,LEVL I: CPT | Mod: S$GLB,,, | Performed by: FAMILY MEDICINE

## 2017-10-19 DIAGNOSIS — G93.2 PSEUDOTUMOR CEREBRI: ICD-10-CM

## 2017-10-19 DIAGNOSIS — H47.10 PAPILLEDEMA: ICD-10-CM

## 2017-10-19 RX ORDER — ACETAZOLAMIDE 250 MG/1
500 TABLET ORAL 2 TIMES DAILY
Qty: 360 TABLET | Refills: 1 | Status: SHIPPED | OUTPATIENT
Start: 2017-10-19 | End: 2018-05-18 | Stop reason: SDUPTHER

## 2017-11-06 ENCOUNTER — PATIENT MESSAGE (OUTPATIENT)
Dept: NEUROLOGY | Facility: CLINIC | Age: 37
End: 2017-11-06

## 2017-11-09 ENCOUNTER — PATIENT MESSAGE (OUTPATIENT)
Dept: NEUROLOGY | Facility: CLINIC | Age: 37
End: 2017-11-09

## 2017-11-10 ENCOUNTER — HOSPITAL ENCOUNTER (OUTPATIENT)
Facility: HOSPITAL | Age: 37
Discharge: HOME | End: 2017-11-11
Attending: EMERGENCY MEDICINE | Admitting: SURGERY
Payer: COMMERCIAL

## 2017-11-10 DIAGNOSIS — R10.31 PREGNANCY WITH ABDOMINAL PAIN OF RIGHT LOWER QUADRANT, ANTEPARTUM: Primary | ICD-10-CM

## 2017-11-10 DIAGNOSIS — O26.899 PREGNANCY WITH ABDOMINAL PAIN OF RIGHT LOWER QUADRANT, ANTEPARTUM: Primary | ICD-10-CM

## 2017-11-10 DIAGNOSIS — R10.2 PELVIC PAIN: ICD-10-CM

## 2017-11-10 DIAGNOSIS — R10.9 RIGHT-SIDED ABDOMINAL PAIN OF UNKNOWN CAUSE: ICD-10-CM

## 2017-11-10 LAB
AMORPH CRY UR QL COMP ASSIST: ABNORMAL
ANION GAP SERPL CALC-SCNC: 14 MMOL/L
BACTERIA #/AREA URNS AUTO: ABNORMAL /HPF
BASOPHILS # BLD AUTO: 0.09 K/UL
BASOPHILS NFR BLD: 0.8 %
BILIRUB UR QL STRIP: NEGATIVE
BUN SERPL-MCNC: 8 MG/DL
CALCIUM SERPL-MCNC: 9.4 MG/DL
CHLORIDE SERPL-SCNC: 105 MMOL/L
CLARITY UR REFRACT.AUTO: ABNORMAL
CO2 SERPL-SCNC: 23 MMOL/L
COLOR UR AUTO: YELLOW
CREAT SERPL-MCNC: 0.63 MG/DL
DIFFERENTIAL METHOD: ABNORMAL
EOSINOPHIL # BLD AUTO: 0.1 K/UL
EOSINOPHIL NFR BLD: 1.1 %
ERYTHROCYTE [DISTWIDTH] IN BLOOD BY AUTOMATED COUNT: 13.7 %
EST. GFR  (AFRICAN AMERICAN): >60 ML/MIN/1.73 M^2
EST. GFR  (NON AFRICAN AMERICAN): >60 ML/MIN/1.73 M^2
GLUCOSE SERPL-MCNC: 128 MG/DL
GLUCOSE UR QL STRIP: NEGATIVE
HCT VFR BLD AUTO: 37.3 %
HCT VFR BLD AUTO: 37.3 %
HGB BLD-MCNC: 12.8 G/DL
HGB UR QL STRIP: ABNORMAL
KETONES UR QL STRIP: ABNORMAL
LEUKOCYTE ESTERASE UR QL STRIP: NEGATIVE
LYMPHOCYTES # BLD AUTO: 1.4 K/UL
LYMPHOCYTES NFR BLD: 12.2 %
MCH RBC QN AUTO: 31.3 PG
MCHC RBC AUTO-ENTMCNC: 33.8 G/DL
MCV RBC AUTO: 93 FL
MICROSCOPIC COMMENT: ABNORMAL
MONOCYTES # BLD AUTO: 0.8 K/UL
MONOCYTES NFR BLD: 6.9 %
NEUTROPHILS # BLD AUTO: 8.8 K/UL
NEUTROPHILS NFR BLD: 78.7 %
NITRITE UR QL STRIP: NEGATIVE
PH UR STRIP: 5 [PH] (ref 5–8)
PLATELET # BLD AUTO: 348 K/UL
PMV BLD AUTO: 10.6 FL
POTASSIUM SERPL-SCNC: 3.2 MMOL/L
PROT UR QL STRIP: ABNORMAL
RBC # BLD AUTO: 4.09 M/UL
RBC #/AREA URNS AUTO: 2 /HPF (ref 0–4)
SODIUM SERPL-SCNC: 142 MMOL/L
SP GR UR STRIP: 1.02 (ref 1–1.03)
URN SPEC COLLECT METH UR: ABNORMAL
UROBILINOGEN UR STRIP-ACNC: NEGATIVE EU/DL
WBC # BLD AUTO: 11.19 K/UL
WBC #/AREA URNS AUTO: 0 /HPF (ref 0–5)

## 2017-11-10 PROCEDURE — 25000003 PHARM REV CODE 250: Performed by: EMERGENCY MEDICINE

## 2017-11-10 PROCEDURE — 99285 EMERGENCY DEPT VISIT HI MDM: CPT

## 2017-11-10 PROCEDURE — 80048 BASIC METABOLIC PNL TOTAL CA: CPT

## 2017-11-10 PROCEDURE — 96361 HYDRATE IV INFUSION ADD-ON: CPT

## 2017-11-10 PROCEDURE — 85025 COMPLETE CBC W/AUTO DIFF WBC: CPT

## 2017-11-10 PROCEDURE — G0378 HOSPITAL OBSERVATION PER HR: HCPCS

## 2017-11-10 PROCEDURE — 81000 URINALYSIS NONAUTO W/SCOPE: CPT

## 2017-11-10 PROCEDURE — 96374 THER/PROPH/DIAG INJ IV PUSH: CPT

## 2017-11-10 PROCEDURE — 96375 TX/PRO/DX INJ NEW DRUG ADDON: CPT

## 2017-11-10 PROCEDURE — 63600175 PHARM REV CODE 636 W HCPCS: Performed by: EMERGENCY MEDICINE

## 2017-11-10 PROCEDURE — 96376 TX/PRO/DX INJ SAME DRUG ADON: CPT

## 2017-11-10 RX ORDER — SODIUM CHLORIDE 9 MG/ML
1000 INJECTION, SOLUTION INTRAVENOUS
Status: COMPLETED | OUTPATIENT
Start: 2017-11-10 | End: 2017-11-10

## 2017-11-10 RX ORDER — SODIUM CHLORIDE 9 MG/ML
INJECTION, SOLUTION INTRAVENOUS CONTINUOUS
Status: DISCONTINUED | OUTPATIENT
Start: 2017-11-10 | End: 2017-11-11 | Stop reason: HOSPADM

## 2017-11-10 RX ORDER — MORPHINE SULFATE 10 MG/ML
5 INJECTION INTRAMUSCULAR; INTRAVENOUS; SUBCUTANEOUS
Status: COMPLETED | OUTPATIENT
Start: 2017-11-10 | End: 2017-11-10

## 2017-11-10 RX ORDER — ONDANSETRON 2 MG/ML
4 INJECTION INTRAMUSCULAR; INTRAVENOUS ONCE
Status: COMPLETED | OUTPATIENT
Start: 2017-11-10 | End: 2017-11-10

## 2017-11-10 RX ADMIN — MORPHINE SULFATE 5 MG: 10 INJECTION INTRAVENOUS at 09:11

## 2017-11-10 RX ADMIN — MORPHINE SULFATE 5 MG: 10 INJECTION INTRAVENOUS at 02:11

## 2017-11-10 RX ADMIN — ONDANSETRON 4 MG: 2 INJECTION INTRAMUSCULAR; INTRAVENOUS at 09:11

## 2017-11-10 RX ADMIN — SODIUM CHLORIDE 1000 ML: 0.9 INJECTION, SOLUTION INTRAVENOUS at 09:11

## 2017-11-10 RX ADMIN — SODIUM CHLORIDE: 0.9 INJECTION, SOLUTION INTRAVENOUS at 03:11

## 2017-11-10 NOTE — ED PROVIDER NOTES
Encounter Date: 11/10/2017       History     Chief Complaint   Patient presents with    Abdominal Pain     RLQ pain and vomiting that began this am at 0430. Pt is newly pregnant      This patient is a 37-year-old female.  Presents to the emergency department complaining of right-sided abdominal pain.  She is in her first trimester pregnancy, estimated gestational age 5 weeks.  This morning about 4:30 she woke up, and said she felt like she had a cramp in the urge to urinate in the right side of her abdomen.  She could not urinate, try to go back to sleep, but said that it was a persistent ache.  Then, about 6 AM, although sudden became much more severe, associated with the urge to urinate, but again could not void her bladder at all.  No hematuria.  No vaginal bleeding or discharge.  She has been nauseated and vomited approximately 4 times.  No prior history of kidney stones.  She is  2 para 0101, prior pregnancy was a premature delivery, complicated by premature rupture of membranes, and then delivered at 32 weeks.  No pelvic surgery, has never had a  or appendectomy          Review of patient's allergies indicates:  No Known Allergies  Past Medical History:   Diagnosis Date    Abnormal Pap smear      Past Surgical History:   Procedure Laterality Date    ABSCESS DRAINAGE      x 3     addenoids      ANKLE ARTHROSCOPY W/ OPEN REPAIR Right     CERVICAL BIOPSY  W/ LOOP ELECTRODE EXCISION      CHOLECYSTECTOMY      tonsilectomy       History reviewed. No pertinent family history.  Social History   Substance Use Topics    Smoking status: Current Every Day Smoker     Types: Cigarettes    Smokeless tobacco: Never Used    Alcohol use No     Review of Systems   Constitutional: Negative for chills and fever.   Genitourinary: Positive for difficulty urinating and pelvic pain. Negative for vaginal bleeding and vaginal discharge.   All other systems reviewed and are negative.      Physical Exam      Initial Vitals [11/10/17 0904]   BP Pulse Resp Temp SpO2   129/88 93 20 97.5 °F (36.4 °C) 100 %      MAP       101.67         Physical Exam    Nursing note and vitals reviewed.  Constitutional: She appears well-developed. She is not diaphoretic. No distress.   Obese female in no apparent distress   HENT:   Head: Normocephalic.   Right Ear: External ear normal.   Left Ear: External ear normal.   Nose: Nose normal.   Mouth/Throat: Oropharynx is clear and moist.   Eyes: Conjunctivae are normal. Pupils are equal, round, and reactive to light. No scleral icterus.   Neck: No JVD present.   Cardiovascular: Normal rate, regular rhythm, normal heart sounds and intact distal pulses.   No murmur heard.  Pulmonary/Chest: Breath sounds normal. No stridor. No respiratory distress. She has no wheezes.   Abdominal: Soft. She exhibits no distension. There is tenderness (Tender in the right lower quadrant, no rebound tenderness or guarding).   Genitourinary: There is no rash, tenderness or lesion on the right labia. There is no rash, tenderness or lesion on the left labia. Uterus is not enlarged and not tender. Cervix exhibits no motion tenderness, no discharge and no friability. Right adnexum displays no mass, no tenderness and no fullness. Left adnexum displays no mass, no tenderness and no fullness. No tenderness or bleeding in the vagina. No vaginal discharge found.   Musculoskeletal: She exhibits no edema or tenderness.   Neurological: She is alert. She has normal strength. No sensory deficit.   Skin: Skin is warm and dry. No rash noted.   Psychiatric:   Anxious         ED Course   Procedures  Labs Reviewed   BASIC METABOLIC PANEL - Abnormal; Notable for the following:        Result Value    Potassium 3.2 (*)     Glucose 128 (*)     All other components within normal limits   CBC W/ AUTO DIFFERENTIAL - Abnormal; Notable for the following:     MCH 31.3 (*)     Gran # 8.8 (*)     Gran% 78.7 (*)     Lymph% 12.2 (*)     All  other components within normal limits   URINALYSIS - Abnormal; Notable for the following:     Appearance, UA Cloudy (*)     Protein, UA Trace (*)     Ketones, UA 1+ (*)     Occult Blood UA 3+ (*)     All other components within normal limits   URINALYSIS MICROSCOPIC - Abnormal; Notable for the following:     Bacteria, UA Few (*)     Amorphous, UA Many (*)     All other components within normal limits   HEMATOCRIT   URINALYSIS   CBC W/ AUTO DIFFERENTIAL   BASIC METABOLIC PANEL        Results for orders placed or performed during the hospital encounter of 11/10/17   Hematocrit   Result Value Ref Range    Hematocrit 37.3 37.0 - 48.5 %   Basic metabolic panel   Result Value Ref Range    Sodium 142 136 - 145 mmol/L    Potassium 3.2 (L) 3.5 - 5.1 mmol/L    Chloride 105 95 - 110 mmol/L    CO2 23 23 - 29 mmol/L    Glucose 128 (H) 70 - 110 mg/dL    BUN, Bld 8 7 - 17 mg/dL    Creatinine 0.63 0.50 - 1.40 mg/dL    Calcium 9.4 8.7 - 10.5 mg/dL    Anion Gap 14 8 - 16 mmol/L    eGFR if African American >60.0 >60 mL/min/1.73 m^2    eGFR if non African American >60.0 >60 mL/min/1.73 m^2   CBC auto differential   Result Value Ref Range    WBC 11.19 3.90 - 12.70 K/uL    RBC 4.09 4.00 - 5.40 M/uL    Hemoglobin 12.8 12.0 - 16.0 g/dL    Hematocrit 37.3 37.0 - 48.5 %    MCV 93 82 - 98 fL    MCH 31.3 (H) 27.0 - 31.0 pg    MCHC 33.8 32.0 - 36.0 g/dL    RDW 13.7 11.5 - 14.5 %    Platelets 348 150 - 350 K/uL    MPV 10.6 9.2 - 12.9 fL    Gran # 8.8 (H) 1.8 - 7.7 K/uL    Lymph # 1.4 1.0 - 4.8 K/uL    Mono # 0.8 0.3 - 1.0 K/uL    Eos # 0.1 0.0 - 0.5 K/uL    Baso # 0.09 0.00 - 0.20 K/uL    Gran% 78.7 (H) 38.0 - 73.0 %    Lymph% 12.2 (L) 18.0 - 48.0 %    Mono% 6.9 4.0 - 15.0 %    Eosinophil% 1.1 0.0 - 8.0 %    Basophil% 0.8 0.0 - 1.9 %    Differential Method Automated    Urinalysis   Result Value Ref Range    Specimen UA Urine, Catheterized     Color, UA Yellow Yellow, Straw, Harmony    Appearance, UA Cloudy (A) Clear    pH, UA 5.0 5.0 - 8.0     Specific Gravity, UA 1.025 1.005 - 1.030    Protein, UA Trace (A) Negative    Glucose, UA Negative Negative    Ketones, UA 1+ (A) Negative    Bilirubin (UA) Negative Negative    Occult Blood UA 3+ (A) Negative    Nitrite, UA Negative Negative    Urobilinogen, UA Negative <2.0 EU/dL    Leukocytes, UA Negative Negative   Urinalysis Microscopic   Result Value Ref Range    RBC, UA 2 0 - 4 /hpf    WBC, UA 0 0 - 5 /hpf    Bacteria, UA Few (A) None-Occ /hpf    Amorphous, UA Many (A) None-Moderate    Microscopic Comment SEE COMMENT         Medical Decision Making:   Initial Assessment:   This patient has right lower quadrant abdominal pain.  Started this morning.  Based on her description of crampy pain, radiating into the right flank and abdomen, and associated with the urge to void, it sounded like she was probably experiencing ureteral colic, but she does not have hematuria, and the ultrasound of the kidneys shows only mild hydronephrosis, not convincing evidence of a kidney stone.  She also does not have evidence of pyelonephritis based on her urinalysis.  She has not had a fever associated with this illness.    It does not appear to be gynecologic or obstetric.  She is in her first trimester pregnancy, which she has no tenderness in the adnexa or over the uterus on pelvic exam, and there is normal blood flow to both ovaries, and her intrauterine pregnancy.  This essentially excludes ectopic pregnancy, torsion, ruptured or hemorrhagic cyst says the cause of the pain.    Appendicitis has not been ruled out.  She was given morphine for the pain initially, and had relief, but on reexamination, the morphine is worn off, she has pain again, still no rebound tenderness or guarding, but is tender consistently over the right lower quadrant.    I feel that we prudent to place her in observation status, for serial abdominal examinations.  At this point in time I would try to avoid CT imaging because she is in her first trimester.   If she does have early appendicitis, that her symptoms should progress, and a decision about further imaging or laparoscopy can be made based on whether she improves or not.  I discussed her case with Dr. De Guzman, general surgeon on-call at [place], and he agreed with placing her in observation status on his service.     Imaging Results          US Retroperitoneal Complete (Final result)  Result time 11/10/17 11:06:13   Procedure changed from US Abdomen Pelvis Doppler Study Limited     Final result by HANS Mckeon Sr., MD (11/10/17 11:06:13)                 Impression:           1. There is a mild amount of right hydronephrosis.   2. The left kidney is normal in appearance.  3. The urinary bladder is normal in appearance.       Electronically signed by: HANS MCKEON MD  Date:     11/10/17  Time:    11:06              Narrative:    Complete retroperitoneal ultrasound examination    History:    Unspecified abdominal pain; first trimester in pregnancy     Technique: Multiple static ultrasound images are submitted for interpretation.    Finding: The right kidney measures 12.6 cm in length. There is a mild amount of right hydronephrosis. There is no nephrolithiasis or abnormal perinephric fluid collection. The left kidney is normal in appearance. It measures 12.4 cm in length. There is no hydronephrosis, nephrolithiasis, or abnormal perinephric fluid collection. The urinary bladder is normal in appearance. There is no free fluid visualized within the pelvis.                             US OB Less Than 14 Wks with Transvaginal (xpd) (Final result)  Result time 11/10/17 11:11:44   Procedure changed from US OB Less Than 14 Wks First Gestation     Final result by HANS Mckeon Sr., MD (11/10/17 11:11:44)                 Impression:           1. There is an intrauterine pregnancy with an average fetal heart rate of 104 beats per minute.   2. Based on ultrasound measurements, the calculated gestational age is 6 weeks  and 2 days.  3. I recommend a followup ultrasound examination in 12 weeks for a fetal anatomical survey.      Electronically signed by: HANS RAMON MD  Date:     11/10/17  Time:    11:11              Narrative:    Pregnancy ultrasound examination    Clinical History:     Pregnant 37-year-old female; Pelvic and perineal pain    Technique: Multiple static ultrasound images are submitted for interpretation.    Finding: The maternal uterus measures 7.8 cm in craniocaudal dimension. There is an intrauterine pregnancy with an average fetal heart rate of 104 beats per minute. The gestational sac, yolk sac, and fetal pole are normal in appearance. There is a normal amount of amniotic fluid within the gestational sac. The mean crown-rump length is 0.46 cm. Based on ultrasound measurements, the calculated gestational age is 6 weeks and 2 days.    The maternal ovaries are normal in appearance. The right ovary measures 2.9 cm x 2.0 cm x 1.8 cm. It has arterial flow with a peak systolic velocity of 9 cm/s and a diastolic velocity of 4 cm/s. The left ovary measures 3.4 cm x 1.9 cm x 1.7 cm. It has arterial flow with a peak systolic velocity 25 cm/s and a diastolic velocity of 13 cm/s. The maternal urinary bladder is normal in appearance. There is no significant amount of free fluid visualized within the maternal pelvis.                                           ED Course      Clinical Impression:   The primary encounter diagnosis was Pregnancy with abdominal pain of right lower quadrant, antepartum. Diagnoses of Pelvic pain and Right-sided abdominal pain of unknown cause were also pertinent to this visit.    Disposition:   Disposition: Placed in Observation  Condition: Stable                        Yasir Harmon MD  11/10/17 1811

## 2017-11-11 VITALS
SYSTOLIC BLOOD PRESSURE: 129 MMHG | DIASTOLIC BLOOD PRESSURE: 67 MMHG | HEIGHT: 67 IN | WEIGHT: 215 LBS | TEMPERATURE: 99 F | OXYGEN SATURATION: 97 % | BODY MASS INDEX: 33.74 KG/M2 | HEART RATE: 91 BPM | RESPIRATION RATE: 18 BRPM

## 2017-11-11 LAB
ALBUMIN SERPL BCP-MCNC: 3.1 G/DL
ALP SERPL-CCNC: 72 U/L
ALT SERPL W/O P-5'-P-CCNC: 17 U/L
ANION GAP SERPL CALC-SCNC: 8 MMOL/L
AST SERPL-CCNC: 20 U/L
BASOPHILS # BLD AUTO: 0.06 K/UL
BASOPHILS NFR BLD: 0.7 %
BILIRUB SERPL-MCNC: 0.6 MG/DL
BUN SERPL-MCNC: 5 MG/DL
CALCIUM SERPL-MCNC: 8.4 MG/DL
CHLORIDE SERPL-SCNC: 108 MMOL/L
CO2 SERPL-SCNC: 25 MMOL/L
CREAT SERPL-MCNC: 0.6 MG/DL
DIFFERENTIAL METHOD: ABNORMAL
EOSINOPHIL # BLD AUTO: 0.2 K/UL
EOSINOPHIL NFR BLD: 2.3 %
ERYTHROCYTE [DISTWIDTH] IN BLOOD BY AUTOMATED COUNT: 13.7 %
EST. GFR  (AFRICAN AMERICAN): >60 ML/MIN/1.73 M^2
EST. GFR  (NON AFRICAN AMERICAN): >60 ML/MIN/1.73 M^2
GLUCOSE SERPL-MCNC: 97 MG/DL
HCT VFR BLD AUTO: 34 %
HGB BLD-MCNC: 11.3 G/DL
LYMPHOCYTES # BLD AUTO: 2.1 K/UL
LYMPHOCYTES NFR BLD: 24.7 %
MCH RBC QN AUTO: 30.5 PG
MCHC RBC AUTO-ENTMCNC: 33.2 G/DL
MCV RBC AUTO: 92 FL
MONOCYTES # BLD AUTO: 0.9 K/UL
MONOCYTES NFR BLD: 11.2 %
NEUTROPHILS # BLD AUTO: 5.1 K/UL
NEUTROPHILS NFR BLD: 60.7 %
PLATELET # BLD AUTO: 303 K/UL
PMV BLD AUTO: 9.5 FL
POTASSIUM SERPL-SCNC: 3.3 MMOL/L
PROT SERPL-MCNC: 6 G/DL
RBC # BLD AUTO: 3.71 M/UL
SODIUM SERPL-SCNC: 141 MMOL/L
WBC # BLD AUTO: 8.33 K/UL

## 2017-11-11 PROCEDURE — 90471 IMMUNIZATION ADMIN: CPT | Performed by: SURGERY

## 2017-11-11 PROCEDURE — 80053 COMPREHEN METABOLIC PANEL: CPT

## 2017-11-11 PROCEDURE — 90686 IIV4 VACC NO PRSV 0.5 ML IM: CPT | Performed by: SURGERY

## 2017-11-11 PROCEDURE — 36415 COLL VENOUS BLD VENIPUNCTURE: CPT

## 2017-11-11 PROCEDURE — 25000003 PHARM REV CODE 250: Performed by: STUDENT IN AN ORGANIZED HEALTH CARE EDUCATION/TRAINING PROGRAM

## 2017-11-11 PROCEDURE — 63600175 PHARM REV CODE 636 W HCPCS: Performed by: SURGERY

## 2017-11-11 PROCEDURE — G0378 HOSPITAL OBSERVATION PER HR: HCPCS

## 2017-11-11 PROCEDURE — 85025 COMPLETE CBC W/AUTO DIFF WBC: CPT

## 2017-11-11 RX ORDER — ACETAMINOPHEN 325 MG/1
650 TABLET ORAL
Status: COMPLETED | OUTPATIENT
Start: 2017-11-11 | End: 2017-11-11

## 2017-11-11 RX ADMIN — INFLUENZA A VIRUS A/MICHIGAN/45/2015 X-275 (H1N1) ANTIGEN (FORMALDEHYDE INACTIVATED), INFLUENZA A VIRUS A/HONG KONG/4801/2014 X-263B (H3N2) ANTIGEN (FORMALDEHYDE INACTIVATED), INFLUENZA B VIRUS B/PHUKET/3073/2013 ANTIGEN (FORMALDEHYDE INACTIVATED), AND INFLUENZA B VIRUS B/BRISBANE/60/2008 ANTIGEN (FORMALDEHYDE INACTIVATED) 0.5 ML: 15; 15; 15; 15 INJECTION, SUSPENSION INTRAMUSCULAR at 12:11

## 2017-11-11 RX ADMIN — ACETAMINOPHEN 650 MG: 325 TABLET ORAL at 08:11

## 2017-11-11 NOTE — PLAN OF CARE
Discharge orders noted, no HH or HME ordered.    Future Appointments  Date Time Provider Department Center   11/14/2017 1:30 PM Madhuri Calvin, TONYA [facility] NEURO [facility]       Pt's nurse will go over medications/signs and symptoms prior to discharge       11/11/17 1155   Final Note   Assessment Type Final Discharge Note   Discharge Disposition Home   What phone number can be called within the next 1-3 days to see how you are doing after discharge? [phone]   Hospital Follow Up  Appt(s) scheduled? No  (pt to f/u PRN)   Right Care Referral Info   Post Acute Recommendation No Care     Hetal Hurst, RN Transitional Navigator  [phone]

## 2017-11-11 NOTE — H&P
[facility]  General Surgery  History & Physical    Patient Name: Tabby Veloz  MRN: [id number]  Admission Date: 11/10/2017  Attending Physician: Gege Cavanaugh MD   Primary Care Provider: Abelardo Nielsen NP    Patient information was obtained from patient and ER records.     Subjective:     Chief Complaint/Reason for Admission: Abdominal Pain    History of Present Illness:  Patient is a 37 y.o. female presents with what was said to be right sided abdominal pain. The patient states that she had a cramp like pain in the vicinity of the right quadrant, which resolved. She states the pain came when she was trying to urinate. She has no previous history of kidney stones. She did have a UA that showed some blood and an US that showed mild hydronephrosis. No hematuria. Several episodes of vomiting yesterday that have resolved. Denies fevers and chills. Was transferred here for suspected early appendicitis. Patient is also 6 weeks pregnant.     No current facility-administered medications on file prior to encounter.      Current Outpatient Prescriptions on File Prior to Encounter   Medication Sig    cetirizine (ZYRTEC) 10 MG tablet Take 10 mg by mouth once daily.    PRENATAL VIT CALC,IRON,FOLIC (PRENATAL VITAMIN ORAL) Take by mouth.    acetaZOLAMIDE (DIAMOX) 250 MG tablet Take 2 tablets (500 mg total) by mouth 2 (two) times daily.    econazole nitrate 1 % cream     minocycline (DYNACIN) 100 MG tablet Take 100 mg by mouth every 12 (twelve) hours.    oxycodone-acetaminophen (PERCOCET) 5-325 mg per tablet Take 1 tablet by mouth every 4 (four) hours as needed for Pain.       Review of patient's allergies indicates:  No Known Allergies    Past Medical History:   Diagnosis Date    Abnormal Pap smear      Past Surgical History:   Procedure Laterality Date    ABSCESS DRAINAGE      x 3     addenoids      ANKLE ARTHROSCOPY W/ OPEN REPAIR Right     CERVICAL BIOPSY  W/ LOOP ELECTRODE EXCISION       CHOLECYSTECTOMY      tonsilectomy       Family History     None        Social History Main Topics    Smoking status: Current Every Day Smoker     Types: Cigarettes    Smokeless tobacco: Never Used    Alcohol use No    Drug use: No    Sexual activity: Yes     Partners: Male     Birth control/ protection: None     Review of Systems  Objective:     Vital Signs (Most Recent):  Temp: 98.7 °F (37.1 °C) (11/11/17 0715)  Pulse: 96 (11/11/17 0715)  Resp: 19 (11/11/17 0715)  BP: (!) 120/59 (11/11/17 0715)  SpO2: 97 % (11/11/17 0425) Vital Signs (24h Range):  Temp:  [97.5 °F (36.4 °C)-98.7 °F (37.1 °C)] 98.7 °F (37.1 °C)  Pulse:  [78-96] 96  Resp:  [16-20] 19  SpO2:  [97 %-100 %] 97 %  BP: (115-135)/(57-88) 120/59     Weight: 97.5 kg (215 lb)  Body mass index is 33.67 kg/m².    Physical Exam   General: NAD  HEENT: Normocephalic  Pulmonary: CTAB  Cv: RRR  Abdomen: Soft, nontender, nondistended  Extremities: Full range of motion in all extremities x 4  Skin: Warm and well perfused  Neuro: Alert and oriented x 3    Significant Labs:  CBC:   Recent Labs  Lab 11/11/17  0430   WBC 8.33   RBC 3.71*   HGB 11.3*   HCT 34.0*      MCV 92   MCH 30.5   MCHC 33.2     CMP:   Recent Labs  Lab 11/11/17  0430   GLU 97   CALCIUM 8.4*   ALBUMIN 3.1*   PROT 6.0      K 3.3*   CO2 25      BUN 5*   CREATININE 0.6   ALKPHOS 72   ALT 17   AST 20   BILITOT 0.6       Recent Labs  Lab 11/06/17  1609 11/10/17  1317   COLORU Yellow Yellow   SPECGRAV >=1.030 1.025   PHUR 5.5 5.0   PROTEINUA Negative Trace*   BACTERIA Few* Few*   NITRITE Negative Negative   LEUKOCYTESUR Small* Negative   UROBILINOGEN 0.2 Negative   HYALINECASTS 1  --    Occult blood in urine was 3+.    Significant Diagnostics:  None    Assessment/Plan:     Active Diagnoses:    Diagnosis Date Noted POA    Pregnancy with abdominal pain of right lower quadrant, antepartum [O99.89, R10.31] 11/10/2017 Yes      Problems Resolved During this Admission:    Diagnosis Date  Noted Date Resolved POA     VTE Risk Mitigation         Ordered     Medium Risk of VTE  Once      11/10/17 2011     Place DAVIDE hose  Until discontinued      11/10/17 2011        38 yo F with resolved abdominal pain most likely related to a passed kidney stone  -Patient is doing well this Am; no complaints of pain - does have complaints of a headache which she was given Tylenol for  -If patient tolerates diet, continues to be afebrile, then most likely home today   -Patient knows she is 6 weeks pregnant and states she has proper Ob follow-up    Rosaura Donald MD  General Surgery  [facility]

## 2017-11-11 NOTE — DISCHARGE SUMMARY
[facility]  General Surgery  Discharge Summary      Patient Name: Tabby Veloz  MRN: [id number]  Admission Date: 11/10/2017  Hospital Length of Stay: 0 days  Discharge Date and Time:  11/11/2017 12:02 PM  Attending Physician: Gege Cavanaugh MD   Discharging Provider: Rosaura Donald MD  Primary Care Provider: Abelardo Nielsen NP     HPI: 36 yo F with abdominal pain    * No surgery found *     Hospital Course: Did well following admission to hospital. Most likely pain related to passed kidney stone. Doing well currently with no complaints of pain. Does currently have a migraine; tylenol helped. VSS and afebrile. Tolerating a regular diet. No lab abnormalities. Patient has follow-up with OB for pregnancy.     Consults: None    Significant Diagnostic Studies: Labs:   CMP   Recent Labs  Lab 11/10/17  0934 11/11/17  0430    141   K 3.2* 3.3*    108   CO2 23 25   * 97   BUN 8 5*   CREATININE 0.63 0.6   CALCIUM 9.4 8.4*   PROT  --  6.0   ALBUMIN  --  3.1*   BILITOT  --  0.6   ALKPHOS  --  72   AST  --  20   ALT  --  17   ANIONGAP 14 8   ESTGFRAFRICA >60.0 >60   EGFRNONAA >60.0 >60    and CBC   Recent Labs  Lab 11/10/17  0934 11/11/17  0430   WBC 11.19 8.33   HGB 12.8 11.3*   HCT 37.3  37.3 34.0*    303       Pending Diagnostic Studies:     None        Final Active Diagnoses:    Diagnosis Date Noted POA    PRINCIPAL PROBLEM:  Pregnancy with abdominal pain of right lower quadrant, antepartum [O99.89, R10.31] 11/10/2017 Yes      Problems Resolved During this Admission:    Diagnosis Date Noted Date Resolved POA      Discharged Condition: good    Disposition: Home or Self Care    Follow Up:  Follow-up Information     Abelardo Nielsen NP.    Specialty:  Obstetrics and Gynecology  Why:  As needed  Contact information:  [address] [place] [address] [place] [address] [phone]                 Patient Instructions:     Diet general     Activity as tolerated     Call MD for:  temperature  >100.4     Call MD for:  persistent nausea and vomiting or diarrhea     Call MD for:  severe uncontrolled pain     Call MD for:  redness, tenderness, or signs of infection (pain, swelling, redness, odor or green/yellow discharge around incision site)     Call MD for:  difficulty breathing or increased cough       Medications:  Reconciled Home Medications:   Current Discharge Medication List      CONTINUE these medications which have NOT CHANGED    Details   cetirizine (ZYRTEC) 10 MG tablet Take 10 mg by mouth once daily.      PRENATAL VIT CALC,IRON,FOLIC (PRENATAL VITAMIN ORAL) Take by mouth.      acetaZOLAMIDE (DIAMOX) 250 MG tablet Take 2 tablets (500 mg total) by mouth 2 (two) times daily.  Qty: 360 tablet, Refills: 1    Associated Diagnoses: Pseudotumor cerebri; Papilledema      econazole nitrate 1 % cream       minocycline (DYNACIN) 100 MG tablet Take 100 mg by mouth every 12 (twelve) hours.      oxycodone-acetaminophen (PERCOCET) 5-325 mg per tablet Take 1 tablet by mouth every 4 (four) hours as needed for Pain.  Qty: 30 tablet, Refills: 0             Rosaura Donald MD  General Surgery  [facility]

## 2017-11-11 NOTE — PLAN OF CARE
Problem: Patient Care Overview  Goal: Plan of Care Review  Pt AA&O x 4. No complaints of pain or n/v. Respirations even and unlabored. No evidence of distress noted. Safety maintained. Bed in lowest position, side rails up x 3, call light and personal items within reach. Pt notified to call for assistance if needed. Pt verbalizes understanding. Family at the bedside. Will continue to monitor.

## 2017-11-13 ENCOUNTER — TELEPHONE (OUTPATIENT)
Dept: NEUROLOGY | Facility: CLINIC | Age: 37
End: 2017-11-13

## 2017-11-13 NOTE — TELEPHONE ENCOUNTER
I attempted to call back Dr. Rothman as requested but there was no answer.  Please notify patient: She can stop diamox at this point- I did see her note about Dr. Rothman's concerns. She must re-establish with her eye doc within 2 weeks to monitor her eyes while off this med. Notify us if any headaches or vision changes. Needs follow up with me or Madhuri within 4 weeks  Thanks.

## 2017-11-13 NOTE — TELEPHONE ENCOUNTER
Patient notified, voiced understanding. She states that she will make her eye appointment first then call here to schedule in 4 weeks.

## 2018-02-16 ENCOUNTER — TELEPHONE (OUTPATIENT)
Dept: NEUROLOGY | Facility: CLINIC | Age: 38
End: 2018-02-16

## 2018-02-16 NOTE — TELEPHONE ENCOUNTER
----- Message from Yaritza Calvin sent at 2018  8:05 AM CST -----  Contact: self  Tabby Veloz  MRN: [id number]  : 1980  PCP: Abelardo Nielsen  Home Phone      [phone]  Work Phone      Not on file.  Mobile          Not on file.      MESSAGE: The patient states she is having headaches again and is concerned. She is highly concerned due to being pregnant at this time. She is inquiring if another spinal tap may be needed.  Phone:[phone]

## 2018-02-16 NOTE — TELEPHONE ENCOUNTER
Spoke with patient advised that she has not been seen in six months and that a visit would be appropriate to discuss her headaches, voiced understanding. Appointment made for next week.

## 2018-02-20 ENCOUNTER — LAB VISIT (OUTPATIENT)
Dept: LAB | Facility: HOSPITAL | Age: 38
End: 2018-02-20
Attending: PSYCHIATRY & NEUROLOGY
Payer: COMMERCIAL

## 2018-02-20 ENCOUNTER — OFFICE VISIT (OUTPATIENT)
Dept: NEUROLOGY | Facility: CLINIC | Age: 38
End: 2018-02-20
Payer: COMMERCIAL

## 2018-02-20 VITALS
DIASTOLIC BLOOD PRESSURE: 72 MMHG | SYSTOLIC BLOOD PRESSURE: 110 MMHG | HEART RATE: 102 BPM | BODY MASS INDEX: 40.17 KG/M2 | RESPIRATION RATE: 18 BRPM | HEIGHT: 67 IN | WEIGHT: 255.94 LBS

## 2018-02-20 DIAGNOSIS — G93.2 PSEUDOTUMOR CEREBRI: ICD-10-CM

## 2018-02-20 DIAGNOSIS — G93.2 PSEUDOTUMOR CEREBRI: Primary | ICD-10-CM

## 2018-02-20 LAB
ALBUMIN SERPL BCP-MCNC: 3.2 G/DL
ALP SERPL-CCNC: 84 U/L
ALT SERPL W/O P-5'-P-CCNC: 17 U/L
ANION GAP SERPL CALC-SCNC: 9 MMOL/L
AST SERPL-CCNC: 13 U/L
BILIRUB SERPL-MCNC: 0.2 MG/DL
BUN SERPL-MCNC: 8 MG/DL
CALCIUM SERPL-MCNC: 8.9 MG/DL
CHLORIDE SERPL-SCNC: 112 MMOL/L
CO2 SERPL-SCNC: 17 MMOL/L
CREAT SERPL-MCNC: 0.7 MG/DL
EST. GFR  (AFRICAN AMERICAN): >60 ML/MIN/1.73 M^2
EST. GFR  (NON AFRICAN AMERICAN): >60 ML/MIN/1.73 M^2
GLUCOSE SERPL-MCNC: 87 MG/DL
POTASSIUM SERPL-SCNC: 3.5 MMOL/L
PROT SERPL-MCNC: 6.9 G/DL
SODIUM SERPL-SCNC: 138 MMOL/L

## 2018-02-20 PROCEDURE — 99215 OFFICE O/P EST HI 40 MIN: CPT | Mod: S$GLB,,, | Performed by: PSYCHIATRY & NEUROLOGY

## 2018-02-20 PROCEDURE — 99999 PR PBB SHADOW E&M-EST. PATIENT-LVL III: CPT | Mod: PBBFAC,,, | Performed by: PSYCHIATRY & NEUROLOGY

## 2018-02-20 PROCEDURE — 36415 COLL VENOUS BLD VENIPUNCTURE: CPT

## 2018-02-20 PROCEDURE — 80053 COMPREHEN METABOLIC PANEL: CPT

## 2018-02-20 PROCEDURE — 3008F BODY MASS INDEX DOCD: CPT | Mod: S$GLB,,, | Performed by: PSYCHIATRY & NEUROLOGY

## 2018-02-20 NOTE — PROGRESS NOTES
HPI: Tabby Veloz is a 36 y.o. female, referred by Dr. Sanjeev Cook, for evaluation of papilledema and headaches. Her medical history is overall unremarkable. She completed a lumbar puncture on 3/9/2017, which demonstrated an opening pressure of 46, consistent with a diagnosis of pseudotumor cerebri.   Since the last visit, the patient has been pregnant and OB expressed concerns over her use of Diamox. She discontinued this medication (per OB). She did not follow up here. She suffered swelling at that time in the legs and was started back on the medication at some point since she and I last talked. She did not go to eye MD but is seeing maternal fetal medicine with close monitoring with US. Leg swelling resolved and she improved with headaches greatly.   She states she is having headaches again and difficulty with sensitivity to loud noises which started 2 weeks ago. Headache are annoying and not severe, but she is having ringing in the ears again. She is using 500mg Diamox twice daily.   Patient's vision is not blackening and she does not notice clear changes.     Review of Systems  Review of Systems   Constitutional: Negative for fatigue.   HENT: Positive for tinnitus.    Eyes: Negative for photophobia, pain, redness and visual disturbance.   Cardiovascular: Negative for leg swelling.   Gastrointestinal: Negative for blood in stool.   Genitourinary: Negative for hematuria.   Musculoskeletal: Negative for neck stiffness.   Skin: Negative for rash.   Neurological: Positive for headaches. Negative for numbness.   Psychiatric/Behavioral: Negative for sleep disturbance.       Objective:       There were no vitals filed for this visit.  Exam:  Gen Appearance, well developed/nourished in no apparent distress  CV: 2+ distal pulses with no edema or swelling  Neuro:  MS: Awake, alert,Sustains attention. Recent/remote memory intact, Language is full to spontaneous speech/repetition/naming/comprehension. Fund of  Knowledge is full  CN: At least lateral disc blurring noted on limited optic nerve exam today, PERRL, Extraoccular movements and visual fields are full. Normal facial sensation and strength, Hearing symmetric, Tongue and Palate are midline and strong. Shoulder Shrug symmetric and strong.  Motor: Normal bulk, tone, no abnormal movements. 5/5 strength bilateral upper/lower extremities with 2+ reflexes and bilateral plantar response  Sensory: symmetric to light touch, pain, temp, and vibration/proprioception. Romberg negative  Cerebellar: Finger-nose,Heal-shin, Rapid alternating movements intact  Gait: Normal stance, no ataxia    Imaging:   MRI Brain 3/1/2017:   Normal MRI brain specifically without evidence for acute infarction or enhancing lesion.    MRV 3/1/2017:  Unremarkable noncontrast MRV specifically without evidence for venous sinus thrombosis.    Labs: 11/2017 CMP normal    LP 3/9/2017 opening pressure: 46    Assessment:   Tabby Veloz is a 36 y.o. female, referred by Dr. Sanjeve Cook, for evaluation of papilledema and headaches. Her medical history is overall unremarkable. She completed a lumbar puncture on 3/9/2017, which demonstrated an opening pressure of 46, consistent with a diagnosis of pseudotumor cerebri. Now at 20 weeks IUP with 2 weeks of increased mild headaches and tinnitus    Plan:   I recommend:   1. Will schedule her for a therapeutic lumbar puncture given her likely max dose of Diamox. She is aware I am not available to perform this until next week and she should report to the ER for any worsening in the interim. Will try to avoid fluoroscopy given her pregnancy status.   2. Refer to neuro-opthalmology to look at optic nerve better and to be sure she responds to treatment. May require more aggressive procedures if not.  3. Continue Diamox. She is aware of Pregnancy Class C status and having frequent monitoring with OB. This medication seems medically necessary for her, however. We  "discussed the current recommendation for , "Due to the potential for serious adverse reactions in the nursing infant, the  recommends a decision be made whether to discontinue nursing or to discontinue the drug, taking into account the importance of treatment to the mother"   4. Discussed weight loss after pregnancy.   5. Check a CMP today    RTC for LP    "

## 2018-02-26 PROBLEM — N76.4 VULVAR ABSCESS: Status: RESOLVED | Noted: 2018-02-26 | Resolved: 2018-02-26

## 2018-02-26 PROBLEM — N76.4 VULVAR ABSCESS: Status: ACTIVE | Noted: 2018-02-26

## 2018-02-27 ENCOUNTER — PROCEDURE VISIT (OUTPATIENT)
Dept: NEUROLOGY | Facility: CLINIC | Age: 38
End: 2018-02-27
Payer: COMMERCIAL

## 2018-02-27 DIAGNOSIS — G93.2 PSEUDOTUMOR CEREBRI: ICD-10-CM

## 2018-02-27 LAB
BASOPHILS NFR CSF MANUAL: 0 %
CLARITY CSF: CLEAR
COLOR CSF: COLORLESS
EOSINOPHIL NFR CSF MANUAL: 0 %
GLUCOSE CSF-MCNC: 68 MG/DL
LYMPHOCYTES NFR CSF MANUAL: 0 %
MONOS+MACROS NFR CSF MANUAL: 0 %
NEUTROPHILS NFR CSF MANUAL: 0 %
OTHER CELLS CSF: 0 %
PROT CSF-MCNC: 29 MG/DL
RBC # CSF: 0 /CU MM
SPECIMEN VOL CSF: 6.5 ML
WBC # CSF: 0 /CU MM

## 2018-02-27 PROCEDURE — 62270 DX LMBR SPI PNXR: CPT | Mod: S$GLB,,, | Performed by: PSYCHIATRY & NEUROLOGY

## 2018-02-27 PROCEDURE — 87205 SMEAR GRAM STAIN: CPT

## 2018-02-27 PROCEDURE — 87070 CULTURE OTHR SPECIMN AEROBIC: CPT

## 2018-02-27 PROCEDURE — 89051 BODY FLUID CELL COUNT: CPT

## 2018-02-27 PROCEDURE — 84157 ASSAY OF PROTEIN OTHER: CPT

## 2018-02-27 PROCEDURE — 82945 GLUCOSE OTHER FLUID: CPT

## 2018-02-27 NOTE — PROCEDURES
Lumbar puncture  Date/Time: 2/27/2018 5:31 PM  Location procedure was performed: [facility] NEUROLOGY  Performed by: LEONARD TAVAREZ  Authorized by: LEONARD TAVAREZ   Assisting provider: KINZA DOYLE  Pre-operative diagnosis:  Pseudotumor cerebri  Post-operative diagnosis: pseudotumor cerebri  Consent Done: Yes  Indications: Remove fluid.    Anesthesia:  Local Anesthetic: lidocaine 1% without epinephrine  Anesthetic total: 2 mL  Patient sedated: no  Description of findings: Patient had to be placed in sitting position due to pregnancy/tolerance. OP was not measured   Preparation: Patient was prepped and draped in the usual sterile fashion.  Lumbar space: L4-L5 interspace  Patient's position: sitting  Needle gauge: 22  Needle type: spinal needle - Quincke tip  Needle length: 3.5 in  Number of attempts: 1  Fluid appearance: clear  Tubes of fluid: 4  Total volume: 18 ml  Post-procedure: site cleaned and adhesive bandage applied  Complications: No (Patient told to lay supine for one hour post procedure once home)  Estimated blood loss (mL): 0  Specimens: Yes  Implants: No  Patient tolerance: Patient tolerated the procedure well with no immediate complications      Patient instructed to follow up in 3 weeks. Notify MD sooner for any worsening.

## 2018-03-05 LAB
CMV SPEC QL SHELL VIAL CULT: NO GROWTH
GRAM STN SPEC: NORMAL

## 2018-03-06 ENCOUNTER — TELEPHONE (OUTPATIENT)
Dept: MATERNAL FETAL MEDICINE | Facility: CLINIC | Age: 38
End: 2018-03-06

## 2018-03-06 NOTE — TELEPHONE ENCOUNTER
"Pt was notified of cerclage scheduled for 3/7/18 for 10a, pt to arrive at 8am and nothing to eat or drink after midnight prior to the procedure. Patient concerned that she will not be able to check in for 8am since she is the only person that can drop her children off to school in [place]. Patient informed that I would discuss this with L&D staff but that in cases like this, the procedure will usually get "worked around" other scheduled procedures and then I would not be able to provide an accurate time for surgery.    Pt verbalized understanding of information.      "

## 2018-03-07 ENCOUNTER — ANESTHESIA EVENT (OUTPATIENT)
Dept: OBSTETRICS AND GYNECOLOGY | Facility: OTHER | Age: 38
End: 2018-03-07
Payer: COMMERCIAL

## 2018-03-07 ENCOUNTER — ANESTHESIA (OUTPATIENT)
Dept: OBSTETRICS AND GYNECOLOGY | Facility: OTHER | Age: 38
End: 2018-03-07
Payer: COMMERCIAL

## 2018-03-07 ENCOUNTER — HOSPITAL ENCOUNTER (OUTPATIENT)
Facility: OTHER | Age: 38
Discharge: HOME | End: 2018-03-07
Attending: OBSTETRICS & GYNECOLOGY | Admitting: OBSTETRICS & GYNECOLOGY
Payer: COMMERCIAL

## 2018-03-07 ENCOUNTER — SURGERY (OUTPATIENT)
Age: 38
End: 2018-03-07

## 2018-03-07 VITALS
RESPIRATION RATE: 18 BRPM | BODY MASS INDEX: 40.02 KG/M2 | HEART RATE: 92 BPM | DIASTOLIC BLOOD PRESSURE: 71 MMHG | OXYGEN SATURATION: 100 % | TEMPERATURE: 98 F | HEIGHT: 67 IN | SYSTOLIC BLOOD PRESSURE: 113 MMHG | WEIGHT: 255 LBS

## 2018-03-07 DIAGNOSIS — O26.879 SHORT CERVIX, ANTEPARTUM: Primary | ICD-10-CM

## 2018-03-07 DIAGNOSIS — O34.30 MCDONALD CERCLAGE PRESENT: ICD-10-CM

## 2018-03-07 DIAGNOSIS — O34.32 CERVICAL INSUFFICIENCY DURING PREGNANCY IN SECOND TRIMESTER, ANTEPARTUM: ICD-10-CM

## 2018-03-07 LAB
ABO + RH BLD: NORMAL
BASOPHILS # BLD AUTO: 0.06 K/UL
BASOPHILS NFR BLD: 0.5 %
BLD GP AB SCN CELLS X3 SERPL QL: NORMAL
DIFFERENTIAL METHOD: ABNORMAL
EOSINOPHIL # BLD AUTO: 0.4 K/UL
EOSINOPHIL NFR BLD: 3.5 %
ERYTHROCYTE [DISTWIDTH] IN BLOOD BY AUTOMATED COUNT: 14.4 %
HCT VFR BLD AUTO: 33.9 %
HGB BLD-MCNC: 11.5 G/DL
LYMPHOCYTES # BLD AUTO: 2.4 K/UL
LYMPHOCYTES NFR BLD: 19.2 %
MCH RBC QN AUTO: 31.5 PG
MCHC RBC AUTO-ENTMCNC: 33.9 G/DL
MCV RBC AUTO: 93 FL
MONOCYTES # BLD AUTO: 0.7 K/UL
MONOCYTES NFR BLD: 6 %
NEUTROPHILS # BLD AUTO: 8.7 K/UL
NEUTROPHILS NFR BLD: 69.7 %
PLATELET # BLD AUTO: 369 K/UL
PMV BLD AUTO: 10.1 FL
RBC # BLD AUTO: 3.65 M/UL
WBC # BLD AUTO: 12.41 K/UL

## 2018-03-07 PROCEDURE — 36000683 *HC CERCLAGE PLACEMENT

## 2018-03-07 PROCEDURE — 25000003 PHARM REV CODE 250: Performed by: STUDENT IN AN ORGANIZED HEALTH CARE EDUCATION/TRAINING PROGRAM

## 2018-03-07 PROCEDURE — 63600175 PHARM REV CODE 636 W HCPCS: Performed by: STUDENT IN AN ORGANIZED HEALTH CARE EDUCATION/TRAINING PROGRAM

## 2018-03-07 PROCEDURE — 59320 REVISION OF CERVIX: CPT | Mod: ,,, | Performed by: OBSTETRICS & GYNECOLOGY

## 2018-03-07 PROCEDURE — 51701 INSERT BLADDER CATHETER: CPT

## 2018-03-07 PROCEDURE — 85025 COMPLETE CBC W/AUTO DIFF WBC: CPT

## 2018-03-07 PROCEDURE — 51702 INSERT TEMP BLADDER CATH: CPT

## 2018-03-07 PROCEDURE — 25000003 PHARM REV CODE 250

## 2018-03-07 PROCEDURE — 37000008 HC ANESTHESIA 1ST 15 MINUTES: Performed by: OBSTETRICS & GYNECOLOGY

## 2018-03-07 PROCEDURE — 86850 RBC ANTIBODY SCREEN: CPT

## 2018-03-07 PROCEDURE — 37000009 HC ANESTHESIA EA ADD 15 MINS: Performed by: OBSTETRICS & GYNECOLOGY

## 2018-03-07 PROCEDURE — 27200688 HC TRAY, SPINAL-HYPER/ ISOBARIC: Performed by: STUDENT IN AN ORGANIZED HEALTH CARE EDUCATION/TRAINING PROGRAM

## 2018-03-07 PROCEDURE — S0028 INJECTION, FAMOTIDINE, 20 MG: HCPCS

## 2018-03-07 PROCEDURE — D9220A PRA ANESTHESIA: Mod: ,,, | Performed by: ANESTHESIOLOGY

## 2018-03-07 RX ORDER — CEFAZOLIN SODIUM 1 G/50ML
1 SOLUTION INTRAVENOUS
Status: DISCONTINUED | OUTPATIENT
Start: 2018-03-07 | End: 2018-03-07 | Stop reason: HOSPADM

## 2018-03-07 RX ORDER — SODIUM CHLORIDE, SODIUM LACTATE, POTASSIUM CHLORIDE, CALCIUM CHLORIDE 600; 310; 30; 20 MG/100ML; MG/100ML; MG/100ML; MG/100ML
INJECTION, SOLUTION INTRAVENOUS CONTINUOUS PRN
Status: DISCONTINUED | OUTPATIENT
Start: 2018-03-07 | End: 2018-03-07

## 2018-03-07 RX ORDER — SODIUM CITRATE AND CITRIC ACID MONOHYDRATE 334; 500 MG/5ML; MG/5ML
SOLUTION ORAL
Status: DISCONTINUED
Start: 2018-03-07 | End: 2018-03-07 | Stop reason: HOSPADM

## 2018-03-07 RX ORDER — SODIUM CITRATE AND CITRIC ACID MONOHYDRATE 334; 500 MG/5ML; MG/5ML
30 SOLUTION ORAL ONCE
Status: COMPLETED | OUTPATIENT
Start: 2018-03-07 | End: 2018-03-07

## 2018-03-07 RX ORDER — FENTANYL CITRATE 50 UG/ML
INJECTION, SOLUTION INTRAMUSCULAR; INTRAVENOUS
Status: DISCONTINUED | OUTPATIENT
Start: 2018-03-07 | End: 2018-03-07

## 2018-03-07 RX ORDER — PRENATAL WITH FERROUS FUM AND FOLIC ACID 3080; 920; 120; 400; 22; 1.84; 3; 20; 10; 1; 12; 200; 27; 25; 2 [IU]/1; [IU]/1; MG/1; [IU]/1; MG/1; MG/1; MG/1; MG/1; MG/1; MG/1; UG/1; MG/1; MG/1; MG/1; MG/1
1 TABLET ORAL DAILY
Status: DISCONTINUED | OUTPATIENT
Start: 2018-03-07 | End: 2018-03-07 | Stop reason: HOSPADM

## 2018-03-07 RX ORDER — FAMOTIDINE 10 MG/ML
INJECTION INTRAVENOUS
Status: COMPLETED
Start: 2018-03-07 | End: 2018-03-07

## 2018-03-07 RX ORDER — FAMOTIDINE 10 MG/ML
20 INJECTION INTRAVENOUS
Status: DISCONTINUED | OUTPATIENT
Start: 2018-03-07 | End: 2018-03-07 | Stop reason: HOSPADM

## 2018-03-07 RX ORDER — DIPHENHYDRAMINE HCL 25 MG
25 CAPSULE ORAL EVERY 4 HOURS PRN
Status: DISCONTINUED | OUTPATIENT
Start: 2018-03-07 | End: 2018-03-07 | Stop reason: HOSPADM

## 2018-03-07 RX ORDER — ACETAMINOPHEN 325 MG/1
650 TABLET ORAL EVERY 6 HOURS PRN
Status: DISCONTINUED | OUTPATIENT
Start: 2018-03-07 | End: 2018-03-07 | Stop reason: HOSPADM

## 2018-03-07 RX ORDER — INDOMETHACIN 25 MG/1
25 CAPSULE ORAL ONCE
Status: COMPLETED | OUTPATIENT
Start: 2018-03-07 | End: 2018-03-07

## 2018-03-07 RX ORDER — OXYCODONE HYDROCHLORIDE 5 MG/1
10 TABLET ORAL ONCE
Status: COMPLETED | OUTPATIENT
Start: 2018-03-07 | End: 2018-03-07

## 2018-03-07 RX ORDER — SIMETHICONE 80 MG
1 TABLET,CHEWABLE ORAL EVERY 6 HOURS PRN
Status: DISCONTINUED | OUTPATIENT
Start: 2018-03-07 | End: 2018-03-07 | Stop reason: HOSPADM

## 2018-03-07 RX ORDER — DIPHENHYDRAMINE HYDROCHLORIDE 50 MG/ML
25 INJECTION INTRAMUSCULAR; INTRAVENOUS EVERY 4 HOURS PRN
Status: DISCONTINUED | OUTPATIENT
Start: 2018-03-07 | End: 2018-03-07 | Stop reason: HOSPADM

## 2018-03-07 RX ORDER — NEOMYCIN AND POLYMYXIN B SULFATES 40; 200000 MG/ML; [USP'U]/ML
SOLUTION IRRIGATION
Status: DISCONTINUED | OUTPATIENT
Start: 2018-03-07 | End: 2018-03-07

## 2018-03-07 RX ORDER — INDOMETHACIN 25 MG/1
25 CAPSULE ORAL EVERY 6 HOURS
Qty: 12 CAPSULE | Refills: 0 | Status: SHIPPED | OUTPATIENT
Start: 2018-03-07 | End: 2018-03-10

## 2018-03-07 RX ORDER — AMOXICILLIN 250 MG
1 CAPSULE ORAL NIGHTLY PRN
Status: DISCONTINUED | OUTPATIENT
Start: 2018-03-07 | End: 2018-03-07 | Stop reason: HOSPADM

## 2018-03-07 RX ORDER — ONDANSETRON 8 MG/1
8 TABLET, ORALLY DISINTEGRATING ORAL EVERY 8 HOURS PRN
Status: DISCONTINUED | OUTPATIENT
Start: 2018-03-07 | End: 2018-03-07 | Stop reason: HOSPADM

## 2018-03-07 RX ADMIN — SODIUM CHLORIDE, SODIUM LACTATE, POTASSIUM CHLORIDE, AND CALCIUM CHLORIDE: 600; 310; 30; 20 INJECTION, SOLUTION INTRAVENOUS at 10:03

## 2018-03-07 RX ADMIN — INDOMETHACIN 25 MG: 25 CAPSULE ORAL at 06:03

## 2018-03-07 RX ADMIN — ACETAMINOPHEN 650 MG: 325 TABLET ORAL at 02:03

## 2018-03-07 RX ADMIN — FENTANYL CITRATE 40 MCG: 50 INJECTION, SOLUTION INTRAMUSCULAR; INTRAVENOUS at 10:03

## 2018-03-07 RX ADMIN — SODIUM CITRATE AND CITRIC ACID MONOHYDRATE 30 ML: 500; 334 SOLUTION ORAL at 10:03

## 2018-03-07 RX ADMIN — INDOMETHACIN 25 MG: 25 CAPSULE ORAL at 12:03

## 2018-03-07 RX ADMIN — FAMOTIDINE 20 MG: 10 INJECTION, SOLUTION INTRAVENOUS at 10:03

## 2018-03-07 RX ADMIN — SODIUM CHLORIDE, POTASSIUM CHLORIDE, SODIUM LACTATE AND CALCIUM CHLORIDE 1000 ML: 600; 310; 30; 20 INJECTION, SOLUTION INTRAVENOUS at 09:03

## 2018-03-07 RX ADMIN — FENTANYL CITRATE 10 MCG: 50 INJECTION, SOLUTION INTRAMUSCULAR; INTRAVENOUS at 10:03

## 2018-03-07 RX ADMIN — MEPIVACAINE HYDROCHLORIDE 3 ML: 15 INJECTION, SOLUTION EPIDURAL; INFILTRATION at 10:03

## 2018-03-07 RX ADMIN — OXYCODONE HYDROCHLORIDE 10 MG: 5 TABLET ORAL at 02:03

## 2018-03-07 NOTE — DISCHARGE INSTRUCTIONS
Understanding Cerclage    Cerclage is a type of surgery. It closes up the cervix. The cervix is the narrowest part of the womb (uterus). It links the uterus to the vagina. The surgery stops the cervix from widening (dilating) too early during pregnancy.  How to say it  [id number]   Why cerclage is done  Cerclage is done to prevent the loss or early birth of a child. Its done if you are pregnant and have a weak or short cervix. You may not be able to carry a child to full term.  Your cervix may be weak because of an injury, such as from a past procedure like dilation. Or you may be born with a weak cervix. If you have lost a pregnancy or given birth early in the past, you are more likely to need a cerclage.  How cerclage is done  This procedure is often done on an outpatient basis. That means you can go home afterward. During the procedure:  · You are given medicine so you dont feel pain. You may be awake or asleep.  · The surgeon puts a speculum into your vagina. It helps the surgeon see the cervix better.  · Your bladder is emptied.  · The surgeon puts forceps  on the cervix to hold it in place.  · The surgeon closes up the cervix with stitches, wires, or tape.  Risks of cerclage  These include:  · Bleeding  · Infection  · Injury to the cervix  · Rupture in the uterus  Date Last Reviewed: 6/1/2016  © 0210-0160 [employer]. [address] [place] [address], [place] [address]. All rights reserved. This information is not intended as a substitute for professional medical care. Always follow your healthcare professional's instructions.

## 2018-03-07 NOTE — HOSPITAL COURSE
03/07/2018 Admit to L&D for cerclage placement. Fetal heart tones verified prior to procedure (155). June cerclage placed without complication. Patient tolerated the procedure well. Fetal heart tones verified post-procedure (153). Indocin 25 mg given. RX to pharmacy for 3-day course of indocin. Unable to void post-op. Patient discharged to home in good condition with madrigal leg bag. Primary OB appointment tomorrow. RTC 4/3 for MFM appointment as scheduled.

## 2018-03-07 NOTE — PROGRESS NOTES
Pt ambulated to restroom at 1520, attempted to void but unable to void. Continues with abdominal pain after pain medication. Dr Oliveira notified, bladder scan done (641 cc per scanner). Cotto catheter placed without difficulty per Dr Oliveira. 350 cc urine obtained. Dr Oliveira will notify Dr Willoughby.

## 2018-03-07 NOTE — ASSESSMENT & PLAN NOTE
- Admit to Labor and Delivery unit  - Consents for cerclage and blood transfusion signed and to chart  - Risks, benefits, alternatives and possible complications have been discussed in detail with the patient.   - Epidural per Anesthesia  - Draw CBC, T&S  - Notify Staff  - to OR for planned procedure  - polymyxin-neomycin irrigation OCTOR  - ancef 1g OCTOR  - heart tones verified    Post-Partum Hemorrhage risk - low

## 2018-03-07 NOTE — HPI
"Tabby Veloz is a 37 y.o. [id number] at 22w3d presents for scheduled cerclage placement.   Patient denies contractions, denies vaginal bleeding, denies LOF.   Fetal Movement: normal.      This IUP is complicated by:  History of  delivery: ,  @ 34 weeks; cervical shortening noted @ 20 weeks, then PPROM/PTL;                                                "Yunior" 5#10oz, 5d NICU stay  History of LEEP procedure   Pseudotumor cerebri: associated HA and papilledema; patient on fioricet and diamox         MFM scan this AM, 3/7/18:   g, 55%ile  Cervix: Abnormal  Cervix details: shortened, funneling  Cervical length: 16.0 mm    Surgical history:   LEEP  Cholecystectomy  Tonsillectomy  Vulvar abscess removal 18: 2 cm abscess at the base of her right labia majora and a 1 cm abscess over her left buttock; Gram stain with GPC in clusters  "

## 2018-03-07 NOTE — PROGRESS NOTES
Pt reports abdominal cramping and urge to void, requests medication for cramping, rates pain 8/10. Dr Oliveira notified and will order medication.

## 2018-03-07 NOTE — PROGRESS NOTES
Dr Oliveira called and asked to have catheter removed. Pt immediately felt urge to void after removal, continued to have abdominal cramping. Pt ambulated to restroom to attempt to void.

## 2018-03-07 NOTE — PROGRESS NOTES
MD to bedside for complaint of abdominal pain and cramping s/p cerclage placement. Treated with some tylenol and immediate release oxycodone with minimal improvement. Patient voided her bladder immediately before the procedure, and then put out 75 cc via in-and-out catheter in the OR. Currently reports feeling the need to void but not able. She has had a bowel movement since the procedure.     Bladder scan to bedside showed 641 cc  Madrigal catheter was placed and immediately drained ~350 cc clear, yellow urine  No resistance or concern for occlusion  Discussed with Dr. Willoughby  Discussed with anesthesia  Urinary retention thought to be secondary to spinal anesthesia   Anesthesia to evaluate: patient has motor and sensory function in bilateral lower extremities  Will remove madrigal at this time  Patient must void spontaneously prior to discharge to home later this afternoon    Apolonia Oliveira MD, PhD  OBGYN, PGY-2

## 2018-03-07 NOTE — SUBJECTIVE & OBJECTIVE
Obstetric History       T0      L1     SAB0   TAB0   Ectopic0   Multiple0   Live Births1       # Outcome Date GA Lbr Andrés/2nd Weight Sex Delivery Anes PTL Lv   2 Current            1  / 34w0d 01:00 / 00:15 2.58 kg (5 lb 11 oz) M Vag-Spont Spinal Y URSULA      Name: Yunior      Apgar1:  9                Apgar5: 9        Past Medical History:   Diagnosis Date    Abnormal Pap smear     Pseudotumor cerebri      Past Surgical History:   Procedure Laterality Date    ABSCESS DRAINAGE      x 3     addenoids      ANKLE ARTHROSCOPY W/ OPEN REPAIR Right     CERVICAL BIOPSY  W/ LOOP ELECTRODE EXCISION      CHOLECYSTECTOMY      tonsilectomy         PTA Medications   Medication Sig    acetaZOLAMIDE (DIAMOX) 250 MG tablet Take 2 tablets (500 mg total) by mouth 2 (two) times daily.    butalbital-acetaminophen-caffeine -40 mg (FIORICET, ESGIC) -40 mg per tablet Take 1 tablet by mouth every 4 (four) hours as needed for Pain.    cetirizine (ZYRTEC) 10 MG tablet Take 10 mg by mouth once daily.    oxyCODONE-acetaminophen (PERCOCET) 5-325 mg per tablet Take 1 tablet by mouth every 4 (four) hours as needed for Pain.    PRENATAL VIT CALC,IRON,FOLIC (PRENATAL VITAMIN ORAL) Take by mouth.    progesterone micronized (ENDOMETRIN) 100 mg vaginal insert Place 100 mg vaginally every evening.       Review of patient's allergies indicates:  No Known Allergies     Family History     None        Social History Main Topics    Smoking status: Current Every Day Smoker     Types: Cigarettes    Smokeless tobacco: Never Used    Alcohol use No    Drug use: No    Sexual activity: Yes     Partners: Male     Birth control/ protection: None     Review of Systems   Constitutional: Negative for activity change, chills and fatigue.   Eyes: Negative for visual disturbance.   Respiratory: Negative for shortness of breath.    Cardiovascular: Negative for chest pain and palpitations.   Gastrointestinal:  Negative for abdominal pain, constipation, diarrhea, nausea and vomiting.   Genitourinary: Negative for dysuria, vaginal bleeding, vaginal discharge, vaginal pain and vaginal odor.   Musculoskeletal: Negative for myalgias.   Skin:  Negative for rash.   Neurological: Negative for headaches.   Psychiatric/Behavioral: Negative for depression.      Objective:     Vital Signs (Most Recent):    Vital Signs (24h Range):  Pulse:  [83] 83  BP: (105)/(55) 105/55      There is no height or weight on file to calculate BMI.    FHT: verified prior to the procedure    Physical Exam:   Constitutional: She is oriented to person, place, and time. She appears well-developed and well-nourished.    HENT:   Head: Normocephalic and atraumatic.    Eyes: Conjunctivae and EOM are normal. Pupils are equal, round, and reactive to light.    Neck: Normal range of motion. Neck supple.    Cardiovascular: Normal rate, regular rhythm and normal heart sounds.     Pulmonary/Chest: Effort normal and breath sounds normal. No respiratory distress.        Abdominal: Soft. Bowel sounds are normal. She exhibits no distension. There is no tenderness. There is no rebound and no guarding.     Genitourinary:   Genitourinary Comments: deferred           Musculoskeletal: Normal range of motion.       Neurological: She is alert and oriented to person, place, and time.    Skin: Skin is warm and dry. No rash noted.    Psychiatric: She has a normal mood and affect. Her behavior is normal. Judgment and thought content normal.     Cervix: Deferred     Significant Labs:  Lab Results   Component Value Date    GROUPTRH A POS 11/06/2017    HEPBSAG Non-reactive 11/06/2017     I have personallly reviewed all pertinent lab results from the last 24 hours.

## 2018-03-07 NOTE — H&P
"[facility]  Obstetrics  History & Physical    Patient Name: Tabby Veloz  MRN: [id number]  Admission Date: 3/7/2018  Primary Care Provider: Primary Doctor No    Subjective:     Principal Problem:Cervical insufficiency during pregnancy in second trimester, antepartum    History of Present Illness:  Tabby Veloz is a 37 y.o. [id number] at 22w3d presents for scheduled cerclage placement.   Patient denies contractions, denies vaginal bleeding, denies LOF.   Fetal Movement: normal.      This IUP is complicated by:  History of  delivery: 2013,  @ 34 weeks; cervical shortening noted @ 20 weeks, then PPROM/PTL; "Yunior" 5#10oz, 5d NICU stay  History of LEEP procedure   Pseudotumor cerebri: associated HA and papilledema; patient on fioricet and diamox; recent therapeutic spinal tap on           MFM scan this AM, 3/7/18:   g, 55%ile  Cervix: Abnormal  Cervix details: shortened, funneling  Cervical length: 16.0 mm    Surgical history:   LEEP  Cholecystectomy  Tonsillectomy  Vulvar abscess removal 18: 2 cm abscess at the base of her right labia majora and a 1 cm abscess over her left buttock; Gram stain with GPC in clusters    Obstetric History       T0      L1     SAB0   TAB0   Ectopic0   Multiple0   Live Births1       # Outcome Date GA Lbr Andrés/2nd Weight Sex Delivery Anes PTL Lv   2 Current            1  / 34w0d 01:00 / 00:15 2.58 kg (5 lb 11 oz) M Vag-Spont Spinal Y URSULA      Name: Yunior      Apgar1:  9                Apgar5: 9        Past Medical History:   Diagnosis Date    Abnormal Pap smear     Pseudotumor cerebri 2016     Past Surgical History:   Procedure Laterality Date    ABSCESS DRAINAGE      x 3     addenoids      ANKLE ARTHROSCOPY W/ OPEN REPAIR Right     CERVICAL BIOPSY  W/ LOOP ELECTRODE EXCISION      CHOLECYSTECTOMY      tonsilectomy         PTA Medications   Medication Sig    acetaZOLAMIDE (DIAMOX) 250 MG tablet Take " 2 tablets (500 mg total) by mouth 2 (two) times daily.    butalbital-acetaminophen-caffeine -40 mg (FIORICET, ESGIC) -40 mg per tablet Take 1 tablet by mouth every 4 (four) hours as needed for Pain.    cetirizine (ZYRTEC) 10 MG tablet Take 10 mg by mouth once daily.    oxyCODONE-acetaminophen (PERCOCET) 5-325 mg per tablet Take 1 tablet by mouth every 4 (four) hours as needed for Pain.    PRENATAL VIT CALC,IRON,FOLIC (PRENATAL VITAMIN ORAL) Take by mouth.    progesterone micronized (ENDOMETRIN) 100 mg vaginal insert Place 100 mg vaginally every evening.       Review of patient's allergies indicates:  No Known Allergies     Family History     None        Social History Main Topics    Smoking status: Current Every Day Smoker     Types: Cigarettes    Smokeless tobacco: Never Used    Alcohol use No    Drug use: No    Sexual activity: Yes     Partners: Male     Birth control/ protection: None     Review of Systems   Constitutional: Negative for activity change, chills and fatigue.   Eyes: Negative for visual disturbance.   Respiratory: Negative for shortness of breath.    Cardiovascular: Negative for chest pain and palpitations.   Gastrointestinal: Negative for abdominal pain, constipation, diarrhea, nausea and vomiting.   Genitourinary: Negative for dysuria, vaginal bleeding, vaginal discharge, vaginal pain and vaginal odor.   Musculoskeletal: Negative for myalgias.   Skin:  Negative for rash.   Neurological: Negative for headaches.   Psychiatric/Behavioral: Negative for depression.      Objective:     Vital Signs (Most Recent):    Vital Signs (24h Range):  Pulse:  [83] 83  BP: (105)/(55) 105/55      There is no height or weight on file to calculate BMI.    FHT: verified prior to the procedure, 153 bpm    Physical Exam:   Constitutional: She is oriented to person, place, and time. She appears well-developed and well-nourished.    HENT:   Head: Normocephalic and atraumatic.    Eyes: Conjunctivae and  EOM are normal. Pupils are equal, round, and reactive to light.    Neck: Normal range of motion. Neck supple.    Cardiovascular: Normal rate, regular rhythm and normal heart sounds.     Pulmonary/Chest: Effort normal and breath sounds normal. No respiratory distress.      Abdominal: Soft. Bowel sounds are normal. She exhibits no distension. There is no tenderness. There is no rebound and no guarding.     Genitourinary:   Genitourinary Comments: Post-surgical site appears well-healed s/p abscess removal  Musculoskeletal: Normal range of motion.       Neurological: She is alert and oriented to person, place, and time.    Skin: Skin is warm and dry. No rash noted.    Psychiatric: She has a normal mood and affect. Her behavior is normal. Judgment and thought content normal.     Cervix: Deferred     Significant Labs:  Lab Results   Component Value Date    GROUPTRH A POS 2017    HEPBSAG Non-reactive 2017     I have personallly reviewed all pertinent lab results from the last 24 hours.    Assessment/Plan:     37 y.o. female  at 22w3d for:    * Cervical insufficiency during pregnancy in second trimester, antepartum    - Admit to Labor and Delivery unit  - Consents for cerclage and blood transfusion signed and to chart  - Risks, benefits, alternatives and possible complications have been discussed in detail with the patient.   - Epidural per Anesthesia  - Draw CBC, T&S  - Notify Staff  - to OR for planned procedure  - polymyxin-neomycin irrigation OCTOR  - ancef 1g OCTOR  - heart tones verified    Post-Partum Hemorrhage risk - low         Pseudotumor cerebri    - s/p lumbar puncture on             Apolonia Oliveira MD  Obstetrics  [facility]

## 2018-03-07 NOTE — ANESTHESIA POSTPROCEDURE EVALUATION
"Anesthesia Post Evaluation    Patient: Tabby Veloz    Procedure(s) Performed: Procedure(s) (LRB):  ENCERCLAGE (N/A)    Final Anesthesia Type: spinal  Patient location during evaluation: PACU  Patient participation: Yes- Able to Participate  Level of consciousness: awake and alert and oriented  Post-procedure vital signs: reviewed and stable  Pain management: adequate  Airway patency: patent  PONV status at discharge: No PONV  Anesthetic complications: no      Cardiovascular status: blood pressure returned to baseline  Respiratory status: unassisted, spontaneous ventilation and room air  Hydration status: euvolemic  Follow-up not needed.        Visit Vitals  BP (!) 107/54   Pulse 79   Temp 36 °C (96.8 °F)   Resp 18   Ht 5' 7" (1.702 m)   Wt 115.7 kg (255 lb)   LMP 10/18/2017 (Approximate)   SpO2 99%   Breastfeeding? No   BMI 39.94 kg/m²       Pain/Susan Score: Pain Rating Prior to Med Admin: 0 (3/7/2018 12:00 PM)      "

## 2018-03-07 NOTE — PROGRESS NOTES
Patient ambulated to the bathroom and walked with little assistance. Unable to void more than a few drops. Feeling some fullness and pressure in lower abdomen. Given pitcher of water to drink. Walked back to bed.

## 2018-03-07 NOTE — ANESTHESIA PREPROCEDURE EVALUATION
2018  [facility]  Anesthesia Pre-Operative Evaluation         Patient Name: Tabby Veloz  YOB: 1980  MRN: [id number]    SUBJECTIVE:     Pre-operative evaluation for Procedure(s) (LRB):  ENCERCLAGE (N/A)     2018    Tabby Veloz is a 37 y.o. female  with PPROM w/ a significant PMHx of allergic vasculitis and pseudotumor cerebri who presents for the above procedure.    LDA:   Right antecubital 18g PIV    Prev airway:   Present Prior to Hospital Arrival?: No; Placement Date: 17; Placement Time: 1332; Method of Intubation: Direct laryngoscopy; Inserted by: CRNA; Airway Device: LMA; Mask Ventilation: Easy; Intubated: Postinduction; Airway Device Size: 4.0; Style: Cuffed; Cuff Inflation: Minimal occlusive pressure; Inflation Amount: 10; Placement Verified By: Auscultation, Capnometry, ETT Condensation; Complicating Factors: None; Intubation Findings: Positive EtCO2, Bilateral breath sounds, Atraumatic/Condition of teeth unchanged; Securment: Lips; Complications: None; Breath Sounds: Equal Bilateral; Insertion Attempts: 1; Removal Date: 17;  Removal Time: 1359    Drips: None documented.    Patient Active Problem List   Diagnosis     premature rupture of membranes    Former smoker    Allergic vasculitis    Short cervix, antepartum    Obesity    Thyroid nodule     (spontaneous vaginal delivery)    Papilledema    Pseudotumor cerebri    Headache    Perirectal abscess    Encounter for long-term (current) use of medications    Pregnancy with abdominal pain of right lower quadrant, antepartum    Cervical insufficiency during pregnancy in second trimester, antepartum       Review of patient's allergies indicates:  No Known Allergies    Current Inpatient Medications:   prenatal vitamin  1 tablet Oral Daily       No current  facility-administered medications on file prior to encounter.      Current Outpatient Prescriptions on File Prior to Encounter   Medication Sig Dispense Refill    acetaZOLAMIDE (DIAMOX) 250 MG tablet Take 2 tablets (500 mg total) by mouth 2 (two) times daily. 360 tablet 1    butalbital-acetaminophen-caffeine -40 mg (FIORICET, ESGIC) -40 mg per tablet Take 1 tablet by mouth every 4 (four) hours as needed for Pain.      cetirizine (ZYRTEC) 10 MG tablet Take 10 mg by mouth once daily.      oxyCODONE-acetaminophen (PERCOCET) 5-325 mg per tablet Take 1 tablet by mouth every 4 (four) hours as needed for Pain. 14 tablet 0    PRENATAL VIT CALC,IRON,FOLIC (PRENATAL VITAMIN ORAL) Take by mouth.      progesterone micronized (ENDOMETRIN) 100 mg vaginal insert Place 100 mg vaginally every evening.         Past Surgical History:   Procedure Laterality Date    ABSCESS DRAINAGE      x 3     addenoids      ANKLE ARTHROSCOPY W/ OPEN REPAIR Right     CERVICAL BIOPSY  W/ LOOP ELECTRODE EXCISION      CHOLECYSTECTOMY      tonsilectomy         Social History     Social History    Marital status:      Spouse name: N/A    Number of children: N/A    Years of education: N/A     Occupational History    Not on file.     Social History Main Topics    Smoking status: Current Every Day Smoker     Types: Cigarettes    Smokeless tobacco: Never Used    Alcohol use No    Drug use: No    Sexual activity: Yes     Partners: Male     Birth control/ protection: None     Other Topics Concern    Not on file     Social History Narrative    No narrative on file       OBJECTIVE:     Vital Signs Range (Last 24H):  Pulse:  [83]   BP: (105)/(55)       CBC:   No results for input(s): WBC, RBC, HGB, HCT, PLT, MCV, MCH, MCHC in the last 72 hours.    CMP: No results for input(s): NA, K, CL, CO2, BUN, CREATININE, GLU, MG, PHOS, CALCIUM, ALBUMIN, PROT, ALKPHOS, ALT, AST, BILITOT in the last 72 hours.    INR:  No results for  input(s): PT, INR, PROTIME, APTT in the last 72 hours.    Diagnostic Studies: No relevant studies.    EKG: No recent studies available.    2D ECHO:  No results found for this or any previous visit.      ASSESSMENT/PLAN:     Anesthesia Evaluation    I have reviewed the Patient Summary Reports.     I have reviewed the Medications.     Review of Systems  Anesthesia Hx:  Denies Hx of Anesthetic complications  History of prior surgery of interest to airway management or planning: Previous anesthesia: General, Spinal, Epidural Denies Family Hx of Anesthesia complications.   Denies Personal Hx of Anesthesia complications.   Social:  Former Smoker    Hematology/Oncology:  Hematology Normal   Oncology Normal     EENT/Dental:EENT/Dental Normal   Cardiovascular:  Cardiovascular Normal  Denies Hypertension.  Denies Valvular problems/Murmurs.  Denies CAD.       Pulmonary:  Pulmonary Normal  Denies COPD.  Denies Asthma.    Renal/:  Renal/ Normal  Denies Chronic Renal Disease.     Hepatic/GI:  Hepatic/GI Normal  Denies GERD.    Musculoskeletal:  Musculoskeletal Normal    Neurological:   Headaches Pseudotumor cerebri   Endocrine:  Endocrine Normal Denies Diabetes.    Dermatological:  Skin Normal    Psych:  Psychiatric Normal           Physical Exam  General:  Well nourished    Airway/Jaw/Neck:  Airway Findings: Mouth Opening: Normal Tongue: Normal  General Airway Assessment: Adult  Mallampati: IV  Improves to III with phonation.  TM Distance: Normal, at least 6 cm  Jaw/Neck Findings:         Dental:  Dental Findings: In tact   Chest/Lungs:  Chest/Lungs Clear    Heart/Vascular:  Heart Findings: Normal       Mental Status:  Mental Status Findings:  Cooperative, Alert and Oriented         Anesthesia Plan  Type of Anesthesia, risks & benefits discussed:  Anesthesia Type:  general, spinal  Patient's Preference:   Intra-op Monitoring Plan: standard ASA monitors  Intra-op Monitoring Plan Comments:   Post Op Pain Control Plan:  multimodal analgesia  Post Op Pain Control Plan Comments:   Induction:   IV  Beta Blocker:  Patient is not currently on a Beta-Blocker (No further documentation required).       Informed Consent: Patient understands risks and agrees with Anesthesia plan.  Questions answered. Anesthesia consent signed with patient.  ASA Score: 2     Day of Surgery Review of History & Physical:            Ready For Surgery From Anesthesia Perspective.

## 2018-03-07 NOTE — DISCHARGE SUMMARY
"[facility]  Obstetrics  Discharge Summary      Patient Name: Tabby Veloz  MRN: [id number]  Admission Date: 3/7/2018  Hospital Length of Stay: 0 days  Discharge Date and Time:  2018 11:58 AM  Attending Physician: Mk Willoughby III,*   Discharging Provider: Apolonia Oliveira MD  Primary Care Provider: Primary Doctor No    HPI: Tabby Veloz is a 37 y.o. [id number] at 22w3d presents for scheduled cerclage placement.   Patient denies contractions, denies vaginal bleeding, denies LOF.   Fetal Movement: normal.      This IUP is complicated by:  History of  delivery: ,  @ 34 weeks; cervical shortening noted @ 20 weeks, then PPROM/PTL;                                                "Yunior" 5#10oz, 5d NICU stay  History of LEEP procedure   Pseudotumor cerebri: associated HA and papilledema; patient on fioricet and diamox         MFM scan this AM, 3/7/18:   g, 55%ile  Cervix: Abnormal  Cervix details: shortened, funneling  Cervical length: 16.0 mm    Surgical history:   LEEP  Cholecystectomy  Tonsillectomy  Vulvar abscess removal 18: 2 cm abscess at the base of her right labia majora and a 1 cm abscess over her left buttock; Gram stain with GPC in clusters    Procedure(s) (LRB):  ENCERCLAGE (N/A)     Hospital Course:   2018 Admit to L&D for cerclage placement. Fetal heart tones verified prior to procedure (155). June cerclage placed without complication. Patient tolerated the procedure well. Fetal heart tones verified post-procedure (153). Indocin 25 mg given. RX to pharmacy for 3-day course of indocin. Patient discharged to home in good condition. RTC 4/3 for MFM appointment as scheduled.      Final Active Diagnoses:    Diagnosis Date Noted POA    PRINCIPAL PROBLEM:  Cervical insufficiency during pregnancy in second trimester, antepartum [O34.32] 2018 Yes    June cerclage placed 3/7/18 [O34.30] 2018 No    Perirectal abscess " [K61.1] 08/03/2017 Yes    Headache [R51] 03/15/2017 Yes    Pseudotumor cerebri [G93.2] 03/15/2017 Yes    Papilledema [H47.10] 02/23/2017 Yes    Former smoker [Z87.891] 06/16/2013 Not Applicable    Short cervix, antepartum [O26.879] 06/16/2013 Yes    Obesity [E66.9] 06/16/2013 Yes      Problems Resolved During this Admission:    Diagnosis Date Noted Date Resolved POA      Labs:   CBC   Recent Labs  Lab 03/07/18  0930   WBC 12.41   HGB 11.5*   HCT 33.9*   *     Immunizations     None          This patient has no babies on file.  Pending Diagnostic Studies:     None        Discharged Condition: good    Disposition: Home or Self Care    Follow Up:  Follow-up Information     Mk Willoughby Iii, MD On 4/3/2018.    Specialty:  Maternal and Fetal Medicine  Why:  9:40 am prenatal appointment as scheduled  Contact information:  [address] [facility] [address]  [phone]             Melody Rothman MD.    Specialty:  Obstetrics and Gynecology  Why:  prenatal appointment as scheduled  Contact information:  [address] [facility] [address]  [phone]                 Patient Instructions:     Diet Adult Regular     Activity as tolerated     Notify your health care provider if you experience any of the following:  increased confusion or weakness     Notify your health care provider if you experience any of the following:  persistent dizziness, light-headedness, or visual disturbances     Notify your health care provider if you experience any of the following:  worsening rash     Notify your health care provider if you experience any of the following:  severe persistent headache     Notify your health care provider if you experience any of the following:  difficulty breathing or increased cough     Notify your health care provider if you experience any of the following:  severe uncontrolled pain     Notify your health care provider if you experience any of the following:  persistent nausea and  vomiting or diarrhea     Notify your health care provider if you experience any of the following:  temperature >100.4     Notify your health care provider if you experience any of the following:  redness, tenderness, or signs of infection (pain, swelling, redness, odor or green/yellow discharge around incision site)     Notify your health care provider if you experience any of the following:   Scheduling Instructions: Severe cramping, leakage of fluid from the vagina, heavy bleeding, decreased fetal movement, or onset of labor.     Begin pelvic rest now until the cerclage is removed: nothing in the vagina including intercourse, tampons, and douching.       Medications:  Current Discharge Medication List      START taking these medications    Details   indomethacin (INDOCIN) 25 MG capsule Take 1 capsule (25 mg total) by mouth every 6 (six) hours.  Qty: 12 capsule, Refills: 0         CONTINUE these medications which have NOT CHANGED    Details   acetaZOLAMIDE (DIAMOX) 250 MG tablet Take 2 tablets (500 mg total) by mouth 2 (two) times daily.  Qty: 360 tablet, Refills: 1    Associated Diagnoses: Pseudotumor cerebri; Papilledema      cetirizine (ZYRTEC) 10 MG tablet Take 10 mg by mouth once daily.      progesterone micronized (ENDOMETRIN) 100 mg vaginal insert Place 100 mg vaginally every evening.      butalbital-acetaminophen-caffeine -40 mg (FIORICET, ESGIC) -40 mg per tablet Take 1 tablet by mouth every 4 (four) hours as needed for Pain.      oxyCODONE-acetaminophen (PERCOCET) 5-325 mg per tablet Take 1 tablet by mouth every 4 (four) hours as needed for Pain.  Qty: 14 tablet, Refills: 0      PRENATAL VIT CALC,IRON,FOLIC (PRENATAL VITAMIN ORAL) Take by mouth.           Apolonia Oliveira MD  Obstetrics  [facility]

## 2018-03-07 NOTE — ANESTHESIA PROCEDURE NOTES
Spinal    Diagnosis: cerclage  Patient location during procedure: OR  Start time: 3/7/2018 10:20 AM  Timeout: 3/7/2018 10:20 AM  End time: 3/7/2018 10:27 AM  Staffing  Anesthesiologist: MARIANNE HOFFMAN  Resident/CRNA: BAYLEE GOMES  Performed: resident/CRNA   Preanesthetic Checklist  Completed: patient identified, surgical consent, pre-op evaluation, timeout performed, IV checked, risks and benefits discussed and monitors and equipment checked  Spinal Block  Patient position: sitting  Prep: ChloraPrep  Patient monitoring: heart rate, cardiac monitor and continuous pulse ox  Approach: midline  Location: L4-5  Injection technique: single shot  CSF Fluid: clear free-flowing CSF  Needle  Needle type: Elizabeth   Needle gauge: 25 G  Needle length: 5 in  Additional Documentation: incremental injection, negative aspiration for heme and no paresthesia on injection  Needle localization: anatomical landmarks  Medications:  Bolus administered: 3 mL of 1.5 mepivacaine  Opioid administered: 0.2 mcg of   fentanyl

## 2018-03-07 NOTE — PROGRESS NOTES
Anesthesia resident at bedside to examen pt. MD started 500 cc bolus of LR. Will continue to monitor pt.

## 2018-03-07 NOTE — PROGRESS NOTES
Patient ambulated to the bathroom. Reports having a bowel movement but still has not urinated. More water and juice given to patient. Patient reports cramping better after bowel movement.

## 2018-03-07 NOTE — PROGRESS NOTES
Patient called out stating she needed to void. Placed on a bed pan due to inability to ambulate at this time due to spinal. Voided a few drops but nothing more. Will try again once legs are more mobile.

## 2018-03-08 NOTE — PROGRESS NOTES
VSS. Patient discharged to home with madrigal leg bag draining, will be removed during OB follow up appointment tomorrow. Pt given discharge instructions, verbally and written. Pt verbalized understanding. All questions answered, no further questions at this time. Pt discharged to home.

## 2018-03-08 NOTE — OP NOTE
DATE OF PROCEDURE:  2018     REFERRING PHYSICIAN:  Melody Rothman MD    PREOPERATIVE DIAGNOSES:  1.  Intrauterine pregnancy at 22 and 3/7 weeks.  2.  Ultrasound indicated cerclage.  3.  Prior  delivery.  4.  Pseudotumor cerebri.  5.  Morbid obesity.    POSTOPERATIVE DIAGNOSES:  1.  Intrauterine pregnancy at 22 and 3/7 weeks.  2.  Ultrasound indicated cerclage.  3.  Prior  delivery.  4.  Pseudotumor cerebri.  5.  Morbid obesity.  6.  Advanced cervical effacement.    OPERATION:  Urgent June cerclage.    :  Mk Willoughby III, M.D.    ASSISTANT:  Antonella Ibarra MD. (RES)    ANESTHESIA:  Spinal.    ESTIMATED BLOOD LOSS:  15 mL.    OPERATIVE DICTATION:  This is a 37-year-old white female,  2, para   0-1-0-1 with an LAMONT of 07/10/2018.    PAST OBSTETRICAL HISTORY:  In , she delivered a 5 pounds 10   ounce male named, Yunior, at 34 weeks after  premature rupture of the   membranes.  The patient reports that during this pregnancy, she had cervical   shortening, which started at 20 weeks.  For that reason, the patient was placed   on weekly 17-hydroxyprogesterone during this pregnancy.    The patient also has a history of pseudotumor, and she is on Diamox.  She gets   therapeutic spinal taps periodically and had a therapeutic spinal tap on   2018.    The patient also reports having a spinal anesthetic on 2018 for I and D of   a 2 cm right vulvar abscess.    The management options of cerclage versus progesterone only were discussed and   the patient requested a cerclage.  Consent form was reviewed and signed and all   questions were answered.    The patient was taken to the Operating Room where she received a spinal   anesthetic.    She was changed to the dorsal lithotomy position and the vulva and vagina were   prepped and draped.  A small scar from her and I and D was visible, but there   was absolutely no evidence of an infectious process at the time of  our   procedure.  Retractors were placed into the vagina and the cervix was   visualized.  There was what I at first thought was a large cervical ectropion, but   after further observation may have been an endocervical polyp that was   presenting at the external os.  The external os was about 1 cm dilated and the   cervix was about 80% effaced and almost flush with the vaginal fornix.    The cervix was grasped anteriorly with a long Allis and using a #1 Prolene, we   went up as high as we could go, which was about 10-15 mm up the cervical canal   and started our cerclage at 11:30 and went in a counterclockwise direction   completing the cerclage at 12:30 and then tying tightly anteriorly.  As we tied   down on the cerclage, the polyp delivered itself and was removed.  The cervix   appeared closed tightly.  There was no bleeding at the conclusion of the   procedure.  The cerclage as we said was tied tightly and the tails were left   about 4 cm in length.    The patient did receive preoperative Ancef because of her history of the vulva   abscess.  She is going to be discharged home on 12 doses of Indocin 25 mg every 6   hours, and she will be discharged with 8 tablets of Percocet for the immediate   postoperative pain.    I am going to call Dr. Rothman, and the patient will resume followup care with   Dr. Rothman.      ANGELY/SAPPHIRE  dd: 03/07/2018 11:10:13 (CST)  td: 03/07/2018 19:21:26 (CST)  Doc ID   #[id number]  Job ID #[id number]    CC:

## 2018-03-08 NOTE — DISCHARGE SUMMARY
"[facility]  Obstetrics  Discharge Summary      Patient Name: Tabby Veloz  MRN: [id number]  Admission Date: 3/7/2018  Hospital Length of Stay: 0 days  Discharge Date and Time: No discharge date for patient encounter.  Attending Physician: Mk Willoughby III,*   Discharging Provider: Viri Lynn MD  Primary Care Provider: Primary Doctor No    HPI: Tabby Veloz is a 37 y.o. [id number] at 22w3d presents for scheduled cerclage placement.   Patient denies contractions, denies vaginal bleeding, denies LOF.   Fetal Movement: normal.      This IUP is complicated by:  History of  delivery: ,  @ 34 weeks; cervical shortening noted @ 20 weeks, then PPROM/PTL;                                                "Yunior" 5#10oz, 5d NICU stay  History of LEEP procedure   Pseudotumor cerebri: associated HA and papilledema; patient on fioricet and diamox         MFM scan this AM, 3/7/18:   g, 55%ile  Cervix: Abnormal  Cervix details: shortened, funneling  Cervical length: 16.0 mm    Surgical history:   LEEP  Cholecystectomy  Tonsillectomy  Vulvar abscess removal 18: 2 cm abscess at the base of her right labia majora and a 1 cm abscess over her left buttock; Gram stain with GPC in clusters    Procedure(s) (LRB):  ENCERCLAGE (N/A)     Hospital Course:   2018 Admit to L&D for cerclage placement. Fetal heart tones verified prior to procedure (155). June cerclage placed without complication. Patient tolerated the procedure well. Fetal heart tones verified post-procedure (153). Indocin 25 mg given. RX to pharmacy for 3-day course of indocin. Unable to void post-op. Patient discharged to home in good condition with madrigal leg bag. Primary OB appointment tomorrow. RTC 4/3 for MFM appointment as scheduled.        Final Active Diagnoses:    Diagnosis Date Noted POA    PRINCIPAL PROBLEM:  Cervical insufficiency during pregnancy in second trimester, antepartum [O34.32] " 03/07/2018 Yes    June cerclage placed 3/7/18 [O34.30] 03/07/2018 No    Perirectal abscess [K61.1] 08/03/2017 Yes    Headache [R51] 03/15/2017 Yes    Pseudotumor cerebri [G93.2] 03/15/2017 Yes    Papilledema [H47.10] 02/23/2017 Yes    Former smoker [Z87.891] 06/16/2013 Not Applicable    Short cervix, antepartum [O26.879] 06/16/2013 Yes    Obesity [E66.9] 06/16/2013 Yes      Problems Resolved During this Admission:    Diagnosis Date Noted Date Resolved POA        Lab Results   Component Value Date    WBC 12.41 03/07/2018    HGB 11.5 (L) 03/07/2018    HCT 33.9 (L) 03/07/2018    MCV 93 03/07/2018     (H) 03/07/2018   Discharged Condition: good    Disposition: Home or Self Care    Follow Up:  Follow-up Information     Mk Willoughby Iii, MD On 4/3/2018.    Specialty:  Maternal and Fetal Medicine  Why:  9:40 am prenatal appointment as scheduled  Contact information:  [address] [facility] [address]  [phone]             Melody Rothman MD.    Specialty:  Obstetrics and Gynecology  Why:  prenatal appointment as scheduled  Contact information:  [address] [facility] [address]  [phone]                 Patient Instructions:     Diet Adult Regular     Activity as tolerated     Notify your health care provider if you experience any of the following:  increased confusion or weakness     Notify your health care provider if you experience any of the following:  persistent dizziness, light-headedness, or visual disturbances     Notify your health care provider if you experience any of the following:  worsening rash     Notify your health care provider if you experience any of the following:  severe persistent headache     Notify your health care provider if you experience any of the following:  difficulty breathing or increased cough     Notify your health care provider if you experience any of the following:  severe uncontrolled pain     Notify your health care provider if you  experience any of the following:  persistent nausea and vomiting or diarrhea     Notify your health care provider if you experience any of the following:  temperature >100.4     Notify your health care provider if you experience any of the following:  redness, tenderness, or signs of infection (pain, swelling, redness, odor or green/yellow discharge around incision site)     Notify your health care provider if you experience any of the following:   Scheduling Instructions: Severe cramping, leakage of fluid from the vagina, heavy bleeding, decreased fetal movement, or onset of labor.     Begin pelvic rest now until the cerclage is removed: nothing in the vagina including intercourse, tampons, and douching.       Medications:  Current Discharge Medication List      START taking these medications    Details   indomethacin (INDOCIN) 25 MG capsule Take 1 capsule (25 mg total) by mouth every 6 (six) hours.  Qty: 12 capsule, Refills: 0         CONTINUE these medications which have NOT CHANGED    Details   acetaZOLAMIDE (DIAMOX) 250 MG tablet Take 2 tablets (500 mg total) by mouth 2 (two) times daily.  Qty: 360 tablet, Refills: 1    Associated Diagnoses: Pseudotumor cerebri; Papilledema      cetirizine (ZYRTEC) 10 MG tablet Take 10 mg by mouth once daily.      progesterone micronized (ENDOMETRIN) 100 mg vaginal insert Place 100 mg vaginally every evening.      butalbital-acetaminophen-caffeine -40 mg (FIORICET, ESGIC) -40 mg per tablet Take 1 tablet by mouth every 4 (four) hours as needed for Pain.      oxyCODONE-acetaminophen (PERCOCET) 5-325 mg per tablet Take 1 tablet by mouth every 4 (four) hours as needed for Pain.  Qty: 14 tablet, Refills: 0      PRENATAL VIT CALC,IRON,FOLIC (PRENATAL VITAMIN ORAL) Take by mouth.             Viri Lynn MD  Obstetrics  [facility]

## 2018-03-08 NOTE — PROGRESS NOTES
Patient unable to void. Leg bag/madrigal placed, drained 800cc. Will DC with madrigal in place. Patient has follow up appt tomorrow. D/w Dr. Willoughby and patient who are in agreement with plan.    Viri Lynn M.D.  PGY-4 OB/GYN

## 2018-03-16 ENCOUNTER — INITIAL CONSULT (OUTPATIENT)
Dept: OPHTHALMOLOGY | Facility: CLINIC | Age: 38
End: 2018-03-16
Payer: COMMERCIAL

## 2018-03-16 ENCOUNTER — CLINICAL SUPPORT (OUTPATIENT)
Dept: OPHTHALMOLOGY | Facility: CLINIC | Age: 38
End: 2018-03-16
Payer: COMMERCIAL

## 2018-03-16 DIAGNOSIS — G93.2 IIH (IDIOPATHIC INTRACRANIAL HYPERTENSION): ICD-10-CM

## 2018-03-16 DIAGNOSIS — H47.11 PAPILLEDEMA ASSOCIATED WITH INCREASED INTRACRANIAL PRESSURE: Primary | ICD-10-CM

## 2018-03-16 PROCEDURE — 92250 FUNDUS PHOTOGRAPHY W/I&R: CPT | Mod: S$GLB,,, | Performed by: OPHTHALMOLOGY

## 2018-03-16 PROCEDURE — 92004 COMPRE OPH EXAM NEW PT 1/>: CPT | Mod: S$GLB,,, | Performed by: OPHTHALMOLOGY

## 2018-03-16 PROCEDURE — 99999 PR PBB SHADOW E&M-EST. PATIENT-LVL II: CPT | Mod: PBBFAC,,, | Performed by: OPHTHALMOLOGY

## 2018-03-16 PROCEDURE — 92083 EXTENDED VISUAL FIELD XM: CPT | Mod: S$GLB,,, | Performed by: OPHTHALMOLOGY

## 2018-03-16 NOTE — PROGRESS NOTES
HPI     Ms. Tabby Veloz is referred by Dr. Rios for Pseudotumor   cerebri  The patients reports she is also 23 weeks pregnant   The only ocular complaint she has today is that her vision seems to jump   or twitch     (-)drops  (-)flashes  (-)floaters  (-)diplopia    Diabetic no  Hemoglobin A1C       Date                     Value               Ref Range             Status                11/06/2017               5.3                 4.0 - 6.0 %           Final            ----------    OCULAR HISTORY  Last Eye Exam 1 year ago [facility]   (-)eye surgery   (-)diagnosed or treated for any eye conditions or diseases none    FAMILY HISTORY  (-)Glaucoma none     I have personally interviewed the patient, reviewed the history and   examined the patient and agree with the technician's exam.    Last edited by Abdirahman Lee MD on 3/16/2018 11:48 AM. (History)            Assessment /Plan     For exam results, see Encounter Report.    Papilledema associated with increased intracranial pressure  -     Color Fundus Photography - OU - Both Eyes    IIH (idiopathic intracranial hypertension)  -     [place] Visual Field - OU - Extended - Both Eyes  -     Color Fundus Photography - OU - Both Eyes      Ms. Veloz has extremely mild optic disc edema with no visual field changes. She should do well. I will repeat her exam and visual field testing in two months.

## 2018-03-16 NOTE — LETTER
March 16, 2018      Lukas Castillo MD  [address] [address]           [facility] - Ophthalmology  [address] [facility] [address]  [facility] [address]  Phone: [phone]  Fax: [phone]          Patient: Tabby Veloz   MR Number: [id number]   YOB: 1980   Date of Visit: 3/16/2018       Dear Dr. Lukas Castillo:    Thank you for referring Tabby Veloz to me for evaluation. Attached you will find relevant portions of my assessment and plan of care.    If you have questions, please do not hesitate to call me. I look forward to following Tabby Veloz along with you.    Sincerely,    Abdirahman Lee MD    Enclosure  CC:  No Recipients    If you would like to receive this communication electronically, please contact [email or web address] or [phone] to request more information on [facility] Link access.    For providers and/or their staff who would like to refer a patient to [facility], please contact us through our one-stop-shop provider referral line, [facility], at [phone].    If you feel you have received this communication in error or would no longer like to receive these types of communications, please e-mail [email or web address]

## 2018-03-22 ENCOUNTER — OFFICE VISIT (OUTPATIENT)
Dept: NEUROLOGY | Facility: CLINIC | Age: 38
End: 2018-03-22
Payer: COMMERCIAL

## 2018-03-22 VITALS
RESPIRATION RATE: 18 BRPM | HEIGHT: 67 IN | HEART RATE: 90 BPM | SYSTOLIC BLOOD PRESSURE: 104 MMHG | DIASTOLIC BLOOD PRESSURE: 62 MMHG | BODY MASS INDEX: 38.92 KG/M2 | WEIGHT: 248 LBS

## 2018-03-22 DIAGNOSIS — G93.2 PSEUDOTUMOR CEREBRI: Primary | ICD-10-CM

## 2018-03-22 DIAGNOSIS — H47.10 PAPILLEDEMA: ICD-10-CM

## 2018-03-22 DIAGNOSIS — Z34.82 SUPERVISION OF NORMAL INTRAUTERINE PREGNANCY IN MULTIGRAVIDA IN SECOND TRIMESTER: ICD-10-CM

## 2018-03-22 PROCEDURE — 99999 PR PBB SHADOW E&M-EST. PATIENT-LVL III: CPT | Mod: PBBFAC,,, | Performed by: PSYCHIATRY & NEUROLOGY

## 2018-03-22 PROCEDURE — 99214 OFFICE O/P EST MOD 30 MIN: CPT | Mod: S$GLB,,, | Performed by: PSYCHIATRY & NEUROLOGY

## 2018-03-22 NOTE — PROGRESS NOTES
"HPI: Tabby Veloz is a 36 y.o. female, referred by Dr. Sanjeev Cook, for evaluation of papilledema and headaches. Her medical history is overall unremarkable. She completed a lumbar puncture on 3/9/2017, which demonstrated an opening pressure of 46, consistent with a diagnosis of pseudotumor cerebri. Now with IUP and had  increased mild headaches and tinnitu    Patient is 3 weeks since her LP with me due to headaches/ pseudotumor.  She has seen neuro-opathalmology who noted "extremely mild optic disc edema with no visual field changes" and patient has follow up planned with this provider in 2 months  Patient states her headaches have been overall improved since LP.  She did go to urgent care this week with a headache in light of some URI symptoms.   She does not have positional headaches. Her headache improved after URI treatment.      Review of Systems  Review of Systems   Constitutional: Negative for fatigue.   HENT: Positive for tinnitus.    Eyes: Negative for visual disturbance.   Cardiovascular: Negative for leg swelling.   Gastrointestinal: Negative for blood in stool.   Genitourinary: Negative for hematuria.   Musculoskeletal: Negative for neck stiffness.   Skin: Negative for rash.   Neurological: Negative for headaches.   Psychiatric/Behavioral: Negative for confusion and sleep disturbance.       Objective:       There were no vitals filed for this visit.  Exam:  Gen Appearance, well developed/nourished in no apparent distress  CV: 2+ distal pulses with no edema or swelling  Neuro:  MS: Awake, alert,Sustains attention. Recent/remote memory intact, Language is full to spontaneous speech/repetition/naming/comprehension. Fund of Knowledge is full  CN: At least mild lateral disc blurring noted on limited optic nerve exam today-stable from prior, PERRL, Extraoccular movements and visual fields are full. Normal facial sensation and strength, Hearing symmetric, Tongue and Palate are midline and strong. " "Shoulder Shrug symmetric and strong.  Motor: Normal bulk, tone, no abnormal movements. 5/5 strength bilateral upper/lower extremities with 2+ reflexes and bilateral plantar response  Sensory: symmetric to light touch, pain, temp, and vibration/proprioception. Romberg negative  Cerebellar: Finger-nose,Heal-shin, Rapid alternating movements intact  Gait: Normal stance, no ataxia    Imaging:   MRI Brain 3/1/2017:   Normal MRI brain specifically without evidence for acute infarction or enhancing lesion.    MRV 3/1/2017:  Unremarkable noncontrast MRV specifically without evidence for venous sinus thrombosis.    Labs: 2/2018 CMP with changes not unexpected for diamox use.  CSF studies unremarkable and culture negative    LP 3/9/2017 opening pressure: 46      Assessment:   Tabby Veloz is a 36 y.o. female, referred by Dr. Sanjeev Cook, for evaluation of papilledema and headaches. Her medical history is overall unremarkable. She completed a lumbar puncture on 3/9/2017, which demonstrated an opening pressure of 46, consistent with a diagnosis of pseudotumor cerebri. Now with IUP and had  increased mild headaches and tinnitus    Plan:   I recommend:   1. Can consider another therapeutic lumbar puncture given her likely max dose of Diamox should she she have further worsening or breakthrough symptoms  2. Reassuring is "extremely mild optic disc edema with no visual field changes" by eye exam and she will follow with neuro-opathlmology as planned.   3. Continue Diamox. She is aware of Pregnancy Class C status and having frequent monitoring with OB. This medication seems medically necessary for her, however. We reviewed the current recommendation for , "Due to the potential for serious adverse reactions in the nursing infant, the  recommends a decision be made whether to discontinue nursing or to discontinue the drug, taking into account the importance of treatment to the mother." I don't feel she can " safely discontinue the medication long term, and she states she will avoid breastfeeding.   4. Discussed weight loss after pregnancy as a necessity to prevent further worsening of her pseudotumor cerebri. She will consider gastric surgery post parterm.   5. Re-check CMP at the next visit    RTC 12 weeks

## 2018-05-18 DIAGNOSIS — G93.2 PSEUDOTUMOR CEREBRI: ICD-10-CM

## 2018-05-18 DIAGNOSIS — H47.10 PAPILLEDEMA: ICD-10-CM

## 2018-05-22 PROBLEM — O60.00 PRETERM LABOR: Status: ACTIVE | Noted: 2018-05-22

## 2018-05-22 RX ORDER — ACETAZOLAMIDE 250 MG/1
500 TABLET ORAL 2 TIMES DAILY
Qty: 360 TABLET | Refills: 1 | Status: ON HOLD | OUTPATIENT
Start: 2018-05-22 | End: 2018-06-01 | Stop reason: HOSPADM

## 2018-05-23 PROBLEM — O34.33 CERVICAL INCOMPETENCE DURING PREGNANCY IN THIRD TRIMESTER: Chronic | Status: ACTIVE | Noted: 2018-05-23

## 2018-05-31 PROBLEM — O36.8390 NON-REASSURING ELECTRONIC FETAL MONITORING TRACING: Status: ACTIVE | Noted: 2018-05-31

## 2018-06-11 PROBLEM — O34.33 INCOMPETENT CERVIX IN PREGNANCY, ANTEPARTUM, THIRD TRIMESTER: Status: ACTIVE | Noted: 2018-06-11

## 2018-06-12 PROBLEM — N97.9 FEMALE STERILITY: Chronic | Status: ACTIVE | Noted: 2018-06-12

## 2018-06-14 PROBLEM — O34.33 INCOMPETENT CERVIX IN PREGNANCY, ANTEPARTUM, THIRD TRIMESTER: Status: RESOLVED | Noted: 2018-06-11 | Resolved: 2018-06-14

## 2018-06-14 PROBLEM — N97.9 FEMALE STERILITY: Chronic | Status: RESOLVED | Noted: 2018-06-12 | Resolved: 2018-06-14

## 2018-10-25 ENCOUNTER — CLINICAL SUPPORT (OUTPATIENT)
Dept: FAMILY MEDICINE | Facility: CLINIC | Age: 38
End: 2018-10-25
Payer: COMMERCIAL

## 2018-10-30 PROCEDURE — 90686 IIV4 VACC NO PRSV 0.5 ML IM: CPT | Mod: S$GLB,,, | Performed by: FAMILY MEDICINE

## 2018-10-30 PROCEDURE — 90686 FLU VACCINE (QUAD) GREATER THAN OR EQUAL TO 3YO PRESERVATIVE FREE IM: ICD-10-PCS | Mod: S$GLB,,, | Performed by: FAMILY MEDICINE

## 2018-10-30 PROCEDURE — 90471 IMMUNIZATION ADMIN: CPT | Mod: S$GLB,,, | Performed by: FAMILY MEDICINE

## 2018-10-30 PROCEDURE — 90471 FLU VACCINE (QUAD) GREATER THAN OR EQUAL TO 3YO PRESERVATIVE FREE IM: ICD-10-PCS | Mod: S$GLB,,, | Performed by: FAMILY MEDICINE

## 2018-11-29 DIAGNOSIS — G93.2 PSEUDOTUMOR CEREBRI: ICD-10-CM

## 2018-11-29 DIAGNOSIS — H47.10 PAPILLEDEMA: ICD-10-CM

## 2018-12-05 ENCOUNTER — OFFICE VISIT (OUTPATIENT)
Dept: NEUROLOGY | Facility: CLINIC | Age: 38
End: 2018-12-05
Payer: COMMERCIAL

## 2018-12-05 ENCOUNTER — LAB VISIT (OUTPATIENT)
Dept: LAB | Facility: HOSPITAL | Age: 38
End: 2018-12-05
Attending: NURSE PRACTITIONER
Payer: COMMERCIAL

## 2018-12-05 VITALS
SYSTOLIC BLOOD PRESSURE: 116 MMHG | WEIGHT: 255.31 LBS | HEART RATE: 75 BPM | DIASTOLIC BLOOD PRESSURE: 86 MMHG | RESPIRATION RATE: 18 BRPM | BODY MASS INDEX: 40.07 KG/M2 | HEIGHT: 67 IN

## 2018-12-05 DIAGNOSIS — G93.2 PSEUDOTUMOR CEREBRI: Primary | ICD-10-CM

## 2018-12-05 DIAGNOSIS — G93.2 IIH (IDIOPATHIC INTRACRANIAL HYPERTENSION): ICD-10-CM

## 2018-12-05 DIAGNOSIS — R51.9 NONINTRACTABLE HEADACHE, UNSPECIFIED CHRONICITY PATTERN, UNSPECIFIED HEADACHE TYPE: ICD-10-CM

## 2018-12-05 DIAGNOSIS — G93.2 PSEUDOTUMOR CEREBRI: ICD-10-CM

## 2018-12-05 DIAGNOSIS — E66.09 CLASS 2 OBESITY DUE TO EXCESS CALORIES WITH BODY MASS INDEX (BMI) OF 39.0 TO 39.9 IN ADULT, UNSPECIFIED WHETHER SERIOUS COMORBIDITY PRESENT: ICD-10-CM

## 2018-12-05 PROBLEM — O34.32 CERVICAL INSUFFICIENCY DURING PREGNANCY IN SECOND TRIMESTER, ANTEPARTUM: Status: RESOLVED | Noted: 2018-03-07 | Resolved: 2018-12-05

## 2018-12-05 PROBLEM — R10.31 PREGNANCY WITH ABDOMINAL PAIN OF RIGHT LOWER QUADRANT, ANTEPARTUM: Status: RESOLVED | Noted: 2017-11-10 | Resolved: 2018-12-05

## 2018-12-05 PROBLEM — O34.33 CERVICAL INCOMPETENCE DURING PREGNANCY IN THIRD TRIMESTER: Chronic | Status: RESOLVED | Noted: 2018-05-23 | Resolved: 2018-12-05

## 2018-12-05 PROBLEM — O60.00 PRETERM LABOR: Status: RESOLVED | Noted: 2018-05-22 | Resolved: 2018-12-05

## 2018-12-05 PROBLEM — O34.30 MCDONALD CERCLAGE PRESENT: Status: RESOLVED | Noted: 2018-03-07 | Resolved: 2018-12-05

## 2018-12-05 PROBLEM — O36.8390 NON-REASSURING ELECTRONIC FETAL MONITORING TRACING: Status: RESOLVED | Noted: 2018-05-31 | Resolved: 2018-12-05

## 2018-12-05 PROBLEM — K61.1 PERIRECTAL ABSCESS: Status: RESOLVED | Noted: 2017-08-03 | Resolved: 2018-12-05

## 2018-12-05 PROBLEM — O26.899 PREGNANCY WITH ABDOMINAL PAIN OF RIGHT LOWER QUADRANT, ANTEPARTUM: Status: RESOLVED | Noted: 2017-11-10 | Resolved: 2018-12-05

## 2018-12-05 LAB
ALBUMIN SERPL BCP-MCNC: 3.9 G/DL
ALP SERPL-CCNC: 89 U/L
ALT SERPL W/O P-5'-P-CCNC: 13 U/L
ANION GAP SERPL CALC-SCNC: 8 MMOL/L
AST SERPL-CCNC: 12 U/L
BILIRUB SERPL-MCNC: 0.3 MG/DL
BUN SERPL-MCNC: 11 MG/DL
CALCIUM SERPL-MCNC: 9 MG/DL
CHLORIDE SERPL-SCNC: 112 MMOL/L
CO2 SERPL-SCNC: 20 MMOL/L
CREAT SERPL-MCNC: 0.8 MG/DL
EST. GFR  (AFRICAN AMERICAN): >60 ML/MIN/1.73 M^2
EST. GFR  (NON AFRICAN AMERICAN): >60 ML/MIN/1.73 M^2
GLUCOSE SERPL-MCNC: 88 MG/DL
POTASSIUM SERPL-SCNC: 4 MMOL/L
PROT SERPL-MCNC: 7.2 G/DL
SODIUM SERPL-SCNC: 140 MMOL/L

## 2018-12-05 PROCEDURE — 99214 OFFICE O/P EST MOD 30 MIN: CPT | Mod: S$GLB,,, | Performed by: NURSE PRACTITIONER

## 2018-12-05 PROCEDURE — 3008F BODY MASS INDEX DOCD: CPT | Mod: CPTII,S$GLB,, | Performed by: NURSE PRACTITIONER

## 2018-12-05 PROCEDURE — 80053 COMPREHEN METABOLIC PANEL: CPT

## 2018-12-05 PROCEDURE — 99999 PR PBB SHADOW E&M-EST. PATIENT-LVL III: CPT | Mod: PBBFAC,,, | Performed by: NURSE PRACTITIONER

## 2018-12-05 PROCEDURE — 36415 COLL VENOUS BLD VENIPUNCTURE: CPT

## 2018-12-05 RX ORDER — ACETAZOLAMIDE 250 MG/1
500 TABLET ORAL 2 TIMES DAILY
Qty: 360 TABLET | Refills: 1 | OUTPATIENT
Start: 2018-12-05 | End: 2019-12-05

## 2018-12-05 RX ORDER — ACETAZOLAMIDE 500 MG/1
500 CAPSULE, EXTENDED RELEASE ORAL 2 TIMES DAILY
Qty: 60 CAPSULE | Refills: 5 | Status: SHIPPED | OUTPATIENT
Start: 2018-12-05 | End: 2019-06-28 | Stop reason: SDUPTHER

## 2018-12-05 NOTE — PROGRESS NOTES
HPI: Tabby Veloz is a 38 y.o. female, referred by Dr. Sanjeev Cook, for evaluation of papilledema and headaches. Her medical history is overall unremarkable. She completed a lumbar puncture on 3/9/2017, which demonstrated an opening pressure of 46, consistent with a diagnosis of pseudotumor cerebri.    She presents today for a follow up. She is now 6 months post partal. She continues on Diamox. No headaches or blurred vision currently.     Last eye exam was in 9/2018 at [facility], and there was no papilledema per patient.    Review of Systems  Review of Systems   Constitutional: Negative for fatigue.   HENT: Negative for tinnitus.    Eyes: Negative for visual disturbance.   Cardiovascular: Negative for leg swelling.   Gastrointestinal: Negative for blood in stool.   Genitourinary: Negative for hematuria.   Musculoskeletal: Negative for neck stiffness.   Skin: Negative for rash.   Neurological: Negative for headaches.   Psychiatric/Behavioral: Negative for confusion and sleep disturbance.       Objective:       Vitals:    12/05/18 0844   BP: 116/86   Pulse: 75   Resp: 18     Exam:  Gen Appearance, well developed/nourished in no apparent distress  CV: 2+ distal pulses with no edema or swelling  Neuro:  MS: Awake, alert,Sustains attention. Recent/remote memory intact, Language is full to spontaneous speech/repetition/naming/comprehension. Fund of Knowledge is full  CN: no papilledema, PERRL, Extraoccular movements and visual fields are full. Normal facial sensation and strength, Hearing symmetric, Tongue and Palate are midline and strong. Shoulder Shrug symmetric and strong.  Motor: Normal bulk, tone, no abnormal movements. 5/5 strength bilateral upper/lower extremities with 2+ reflexes and bilateral plantar response  Sensory: symmetric to light touch, pain, temp, and vibration/proprioception. Romberg negative  Cerebellar: Finger-nose, Heal-shin, Rapid alternating movements intact  Gait: Normal stance, no  ataxia    Imaging:   MRI Brain 3/1/2017:   Normal MRI brain specifically without evidence for acute infarction or enhancing lesion.    MRV 3/1/2017:  Unremarkable noncontrast MRV specifically without evidence for venous sinus thrombosis.    Labs: 2/2018 CMP with changes not unexpected for diamox use.  CSF studies unremarkable and culture negative    LP 3/9/2017 opening pressure: 46      Assessment:   Tabby Veloz is a 38 y.o. female, referred by Dr. Sanjeev Cook, for evaluation of papilledema and headaches. Her medical history is overall unremarkable. She completed a lumbar puncture on 3/9/2017, which demonstrated an opening pressure of 46, consistent with a diagnosis of pseudotumor cerebri.     Plan:   I recommend:   1. Can consider another therapeutic lumbar puncture given her likely max dose of Diamox should she she have further worsening or breakthrough symptoms. Last LP during pregnancy.   2. No papilledema seen on eye exam today. Recent eye exam per [facility] per patient is unremarkable.    3. Continue Diamox. She is 6 months post partal and is not breastfeeding.   4. Discussed postpartal weight loss after pregnancy as a necessity to prevent further worsening of her pseudotumor cerebri.    5. Re-check CMP at the next visit.     FU 3 months or sooner with visual changes; to ER with blacking out of vision.

## 2019-04-09 ENCOUNTER — PATIENT MESSAGE (OUTPATIENT)
Dept: NEUROLOGY | Facility: CLINIC | Age: 39
End: 2019-04-09

## 2019-06-28 DIAGNOSIS — G93.2 PSEUDOTUMOR CEREBRI: ICD-10-CM

## 2019-07-02 RX ORDER — ACETAZOLAMIDE 500 MG/1
500 CAPSULE, EXTENDED RELEASE ORAL 2 TIMES DAILY
Qty: 60 CAPSULE | Refills: 0 | Status: SHIPPED | OUTPATIENT
Start: 2019-07-02 | End: 2019-08-06 | Stop reason: SDUPTHER

## 2019-07-02 NOTE — TELEPHONE ENCOUNTER
Please ensure that patient schedules an appointment to be seen in the next month. This will be her last refill of Diamox otherwise, as she did not comply with a 3 month follow up visit after 12/2018.

## 2019-08-01 DIAGNOSIS — G93.2 PSEUDOTUMOR CEREBRI: ICD-10-CM

## 2019-08-01 RX ORDER — ACETAZOLAMIDE 500 MG/1
500 CAPSULE, EXTENDED RELEASE ORAL 2 TIMES DAILY
Qty: 60 CAPSULE | Refills: 0 | Status: SHIPPED | OUTPATIENT
Start: 2019-08-01 | End: 2019-08-06 | Stop reason: SDUPTHER

## 2019-08-06 ENCOUNTER — LAB VISIT (OUTPATIENT)
Dept: LAB | Facility: HOSPITAL | Age: 39
End: 2019-08-06
Attending: NURSE PRACTITIONER
Payer: COMMERCIAL

## 2019-08-06 ENCOUNTER — OFFICE VISIT (OUTPATIENT)
Dept: NEUROLOGY | Facility: CLINIC | Age: 39
End: 2019-08-06
Payer: COMMERCIAL

## 2019-08-06 VITALS
RESPIRATION RATE: 18 BRPM | BODY MASS INDEX: 42.11 KG/M2 | DIASTOLIC BLOOD PRESSURE: 78 MMHG | HEART RATE: 70 BPM | HEIGHT: 66 IN | WEIGHT: 262 LBS | SYSTOLIC BLOOD PRESSURE: 102 MMHG

## 2019-08-06 DIAGNOSIS — R51.9 NONINTRACTABLE HEADACHE, UNSPECIFIED CHRONICITY PATTERN, UNSPECIFIED HEADACHE TYPE: ICD-10-CM

## 2019-08-06 DIAGNOSIS — G93.2 IIH (IDIOPATHIC INTRACRANIAL HYPERTENSION): ICD-10-CM

## 2019-08-06 DIAGNOSIS — H47.11 PAPILLEDEMA ASSOCIATED WITH INCREASED INTRACRANIAL PRESSURE: ICD-10-CM

## 2019-08-06 DIAGNOSIS — Z79.899 ENCOUNTER FOR LONG-TERM CURRENT USE OF MEDICATION: ICD-10-CM

## 2019-08-06 DIAGNOSIS — G93.2 PSEUDOTUMOR CEREBRI: Primary | ICD-10-CM

## 2019-08-06 DIAGNOSIS — E66.01 CLASS 3 SEVERE OBESITY DUE TO EXCESS CALORIES WITH SERIOUS COMORBIDITY AND BODY MASS INDEX (BMI) OF 40.0 TO 44.9 IN ADULT: ICD-10-CM

## 2019-08-06 LAB
ALBUMIN SERPL BCP-MCNC: 4 G/DL (ref 3.5–5.2)
ALP SERPL-CCNC: 86 U/L (ref 55–135)
ALT SERPL W/O P-5'-P-CCNC: 12 U/L (ref 10–44)
ANION GAP SERPL CALC-SCNC: 10 MMOL/L (ref 8–16)
AST SERPL-CCNC: 10 U/L (ref 10–40)
BILIRUB SERPL-MCNC: 0.2 MG/DL (ref 0.1–1)
BUN SERPL-MCNC: 10 MG/DL (ref 6–20)
CALCIUM SERPL-MCNC: 9.6 MG/DL (ref 8.7–10.5)
CHLORIDE SERPL-SCNC: 110 MMOL/L (ref 95–110)
CO2 SERPL-SCNC: 21 MMOL/L (ref 23–29)
CREAT SERPL-MCNC: 0.8 MG/DL (ref 0.5–1.4)
EST. GFR  (AFRICAN AMERICAN): >60 ML/MIN/1.73 M^2
EST. GFR  (NON AFRICAN AMERICAN): >60 ML/MIN/1.73 M^2
GLUCOSE SERPL-MCNC: 91 MG/DL (ref 70–110)
POTASSIUM SERPL-SCNC: 4.2 MMOL/L (ref 3.5–5.1)
PROT SERPL-MCNC: 7.4 G/DL (ref 6–8.4)
SODIUM SERPL-SCNC: 141 MMOL/L (ref 136–145)

## 2019-08-06 PROCEDURE — 3008F PR BODY MASS INDEX (BMI) DOCUMENTED: ICD-10-PCS | Mod: CPTII,S$GLB,, | Performed by: NURSE PRACTITIONER

## 2019-08-06 PROCEDURE — 3008F BODY MASS INDEX DOCD: CPT | Mod: CPTII,S$GLB,, | Performed by: NURSE PRACTITIONER

## 2019-08-06 PROCEDURE — 99999 PR PBB SHADOW E&M-EST. PATIENT-LVL III: ICD-10-PCS | Mod: PBBFAC,,, | Performed by: NURSE PRACTITIONER

## 2019-08-06 PROCEDURE — 99214 OFFICE O/P EST MOD 30 MIN: CPT | Mod: S$GLB,,, | Performed by: NURSE PRACTITIONER

## 2019-08-06 PROCEDURE — 36415 COLL VENOUS BLD VENIPUNCTURE: CPT

## 2019-08-06 PROCEDURE — 80053 COMPREHEN METABOLIC PANEL: CPT

## 2019-08-06 PROCEDURE — 99999 PR PBB SHADOW E&M-EST. PATIENT-LVL III: CPT | Mod: PBBFAC,,, | Performed by: NURSE PRACTITIONER

## 2019-08-06 PROCEDURE — 99214 PR OFFICE/OUTPT VISIT, EST, LEVL IV, 30-39 MIN: ICD-10-PCS | Mod: S$GLB,,, | Performed by: NURSE PRACTITIONER

## 2019-08-06 RX ORDER — ACETAZOLAMIDE 500 MG/1
500 CAPSULE, EXTENDED RELEASE ORAL 2 TIMES DAILY
Qty: 60 CAPSULE | Refills: 5 | Status: SHIPPED | OUTPATIENT
Start: 2019-08-06 | End: 2020-03-04

## 2019-08-06 NOTE — PROGRESS NOTES
HPI: Tabby Veloz is a 38 y.o. female, originally referred by Dr. Sanjeev Cook, for evaluation of papilledema and headaches. Her medical history is overall unremarkable. She completed a lumbar puncture on 3/9/2017, which demonstrated an opening pressure of 46, consistent with a diagnosis of pseudotumor cerebri.    She presents today to reestablish care for Diamox refills. She did not comply with follow up after 12/2018 visit. She continues on Diamox. No headaches or blurred vision currently.     Last eye exam was in 9/2018 at [facility], and there was no papilledema per patient.    She gained 7 pounds since her last visit.     Review of Systems  Review of Systems   Constitutional: Negative for fatigue.   HENT: Negative for tinnitus.    Eyes: Negative for visual disturbance.   Cardiovascular: Negative for leg swelling.   Gastrointestinal: Negative for blood in stool.   Genitourinary: Negative for hematuria.   Musculoskeletal: Negative for neck stiffness.   Skin: Negative for rash.   Neurological: Negative for headaches.   Psychiatric/Behavioral: Negative for confusion and sleep disturbance.       Objective:       Vitals:    08/06/19 1415   BP: 102/78   Pulse: 70   Resp: 18     Exam:  Gen Appearance, well developed/nourished in no apparent distress  CV: 2+ distal pulses with no edema or swelling  Neuro:  MS: Awake, alert,Sustains attention. Recent/remote memory intact, Language is full to spontaneous speech/repetition/naming/comprehension. Fund of Knowledge is full  CN: no papilledema, PERRL, Extraoccular movements and visual fields are full. Normal facial sensation and strength, Hearing symmetric, Tongue and Palate are midline and strong. Shoulder Shrug symmetric and strong.  Motor: Normal bulk, tone, no abnormal movements. 5/5 strength bilateral upper/lower extremities with 2+ reflexes and bilateral plantar response  Sensory: symmetric to light touch, pain, temp, and vibration/proprioception. Romberg  negative  Cerebellar: Finger-nose, Heal-shin, Rapid alternating movements intact  Gait: Normal stance, no ataxia    Imaging:   MRI Brain 3/1/2017:   Normal MRI brain specifically without evidence for acute infarction or enhancing lesion.    MRV 3/1/2017:  Unremarkable noncontrast MRV specifically without evidence for venous sinus thrombosis.    Labs: 2/2018 CMP with changes not unexpected for diamox use.  CSF studies unremarkable and culture negative    LP 3/9/2017 opening pressure: 46    Assessment:   Tabby Veloz is a 38 y.o. female, originally referred by Dr. Sanjeev Cook, for evaluation of papilledema and headaches. Her medical history is overall unremarkable. She completed a lumbar puncture on 3/9/2017, which demonstrated an opening pressure of 46, consistent with a diagnosis of pseudotumor cerebri.     Plan:   I recommend:   1. Can consider another therapeutic lumbar puncture given her likely max dose of Diamox should she she have further worsening or breakthrough symptoms. Last LP during pregnancy.   2. No papilledema seen on eye exam today. Recent eye exam per [facility] per patient is unremarkable.    3. Continue Diamox. She is s/p tubal ligation.   4. Discussed that weight loss after pregnancy as a necessity to prevent further worsening of her pseudotumor cerebri.  Advised to consult with a bariatric surgeon.   5. Re-check CMP today.   6. She must return for follow up to receive medication refills and check labs.   7. Obtain last eye exam, and have her forward upcoming eye exam to me.     FU 6 months or sooner with visual changes; to ER with blacking out of vision.

## 2019-09-19 ENCOUNTER — PATIENT MESSAGE (OUTPATIENT)
Dept: NEUROLOGY | Facility: CLINIC | Age: 39
End: 2019-09-19

## 2019-09-19 NOTE — TELEPHONE ENCOUNTER
I cannot evaluate her over a phone note for this. I need to see her in clinic. Please schedule her for next week-work in next available/cancellation if needed.     To ER with any visual loss.

## 2019-09-23 ENCOUNTER — OFFICE VISIT (OUTPATIENT)
Dept: NEUROLOGY | Facility: CLINIC | Age: 39
End: 2019-09-23
Payer: COMMERCIAL

## 2019-09-23 ENCOUNTER — PATIENT MESSAGE (OUTPATIENT)
Dept: NEUROLOGY | Facility: CLINIC | Age: 39
End: 2019-09-23

## 2019-09-23 VITALS
HEART RATE: 62 BPM | DIASTOLIC BLOOD PRESSURE: 70 MMHG | WEIGHT: 260.13 LBS | HEIGHT: 66 IN | RESPIRATION RATE: 10 BRPM | SYSTOLIC BLOOD PRESSURE: 120 MMHG | BODY MASS INDEX: 41.8 KG/M2

## 2019-09-23 DIAGNOSIS — R51.9 INTRACTABLE HEADACHE, UNSPECIFIED CHRONICITY PATTERN, UNSPECIFIED HEADACHE TYPE: ICD-10-CM

## 2019-09-23 DIAGNOSIS — E66.01 CLASS 3 SEVERE OBESITY DUE TO EXCESS CALORIES WITH SERIOUS COMORBIDITY AND BODY MASS INDEX (BMI) OF 40.0 TO 44.9 IN ADULT: ICD-10-CM

## 2019-09-23 DIAGNOSIS — G93.2 PSEUDOTUMOR CEREBRI: Primary | ICD-10-CM

## 2019-09-23 PROCEDURE — 99999 PR PBB SHADOW E&M-EST. PATIENT-LVL III: ICD-10-PCS | Mod: PBBFAC,,, | Performed by: PSYCHIATRY & NEUROLOGY

## 2019-09-23 PROCEDURE — 3008F PR BODY MASS INDEX (BMI) DOCUMENTED: ICD-10-PCS | Mod: CPTII,S$GLB,, | Performed by: PSYCHIATRY & NEUROLOGY

## 2019-09-23 PROCEDURE — 3008F BODY MASS INDEX DOCD: CPT | Mod: CPTII,S$GLB,, | Performed by: PSYCHIATRY & NEUROLOGY

## 2019-09-23 PROCEDURE — 99999 PR PBB SHADOW E&M-EST. PATIENT-LVL III: CPT | Mod: PBBFAC,,, | Performed by: PSYCHIATRY & NEUROLOGY

## 2019-09-23 PROCEDURE — 99214 OFFICE O/P EST MOD 30 MIN: CPT | Mod: S$GLB,,, | Performed by: PSYCHIATRY & NEUROLOGY

## 2019-09-23 PROCEDURE — 99214 PR OFFICE/OUTPT VISIT, EST, LEVL IV, 30-39 MIN: ICD-10-PCS | Mod: S$GLB,,, | Performed by: PSYCHIATRY & NEUROLOGY

## 2019-09-23 NOTE — PROGRESS NOTES
"HPI: Tabby Veloz is a 36 y.o. female, referred by Dr. Sanjeev Cook, for evaluation of papilledema and headaches. Her medical history is overall unremarkable. She completed a lumbar puncture on 3/9/2017, which demonstrated an opening pressure of 46, consistent with a diagnosis of pseudotumor cerebri. Now with IUP and had  increased mild headaches and tinnitu    Since the last visit with me, the patient has been seeing NP, Madhuri Calvin, in Neurology in this clinic  She is seen today urgent with complaint of "cloudiness"    She states she has visual blurring at times (not constant)/ but feels like her contacts are out of place.  She has some rare tinnitus and states "I am really irritable."   Headaches are mild pressure and persistent.    Her mood is described as "irritbale" and she states has some anxiety, but not depression.   She has lost weight since pregnancy but is still obese      Review of Systems  Review of Systems   Constitutional: Negative for fatigue.   HENT: Positive for tinnitus.    Eyes: Negative for visual disturbance.   Cardiovascular: Negative for leg swelling.   Gastrointestinal: Negative for blood in stool.   Genitourinary: Negative for hematuria.   Musculoskeletal: Negative for neck stiffness.   Skin: Negative for rash.   Neurological: Positive for headaches.   Psychiatric/Behavioral: Negative for sleep disturbance. The patient is nervous/anxious.        Objective:       There were no vitals filed for this visit.  Exam:  Gen Appearance, well developed/nourished in no apparent distress  CV: 2+ distal pulses with no edema or swelling  Neuro:  MS: Awake, alert,Sustains attention. Recent/remote memory intact, Language is full to spontaneous speech/repetition/naming/comprehension. Fund of Knowledge is full  CN: No clear disc edema noted today, PERRL, Extraoccular movements and visual fields are full. Normal facial sensation and strength, Hearing symmetric, Tongue and Palate are midline and " strong. Shoulder Shrug symmetric and strong.  Motor: Normal bulk, tone, no abnormal movements. 5/5 strength bilateral upper/lower extremities with 2+ reflexes and no clonus  Sensory: symmetric to light touch, pain, temp, and vibration/proprioception. Romberg negative  Cerebellar: Finger-nose,Heal-shin, Rapid alternating movements intact  Gait: Normal stance, no ataxia    Imaging:   MRI Brain 3/1/2017:   Normal MRI brain specifically without evidence for acute infarction or enhancing lesion.    MRV 3/1/2017:  Unremarkable noncontrast MRV specifically without evidence for venous sinus thrombosis.    Labs: 2/2018 CMP with changes not unexpected for diamox use.  CSF studies unremarkable and culture negative    8/2016 CMP reviewed    LP 3/9/2017 opening pressure: 46      Assessment:   Tabby Veloz is a 36 y.o. female, referred by Dr. Sanjeev Cook, for evaluation of papilledema and headaches. Her medical history is overall unremarkable. She completed a lumbar puncture on 3/9/2017, which demonstrated an opening pressure of 46, consistent with a diagnosis of pseudotumor cerebri.     Plan:   I recommend:   1. Arrange follow up with neuro-ophthalmology (had only mild changes prior) as she reports vague symptoms and headaches  -looking for residual papilledema (not found by me today)  -If papilledema  is found, Can consider another therapeutic lumbar puncture given her likely max dose of Diamox. Her last LP was during pregnancy and she is s/p tubal ligation  -Discussed that weight loss is a  necessity to prevent further worsening of her pseudotumor cerebri.  Advised to consult with a bariatric medicine per orders  -Consider treatment of TCA (lower dose to avoid weight gain) for anxiety and persistent headache which may represent migraine syndrome currently instead of pseudotumor  (had migraines as a child) if papilledema is not found    2. . Re-check CMP at the next visit       RTC as scheduled but patient was asked  to call for further recommendations after seeing opthalmology

## 2019-11-01 ENCOUNTER — CLINICAL SUPPORT (OUTPATIENT)
Dept: OPHTHALMOLOGY | Facility: CLINIC | Age: 39
End: 2019-11-01
Payer: COMMERCIAL

## 2019-11-01 ENCOUNTER — OFFICE VISIT (OUTPATIENT)
Dept: OPHTHALMOLOGY | Facility: CLINIC | Age: 39
End: 2019-11-01
Payer: COMMERCIAL

## 2019-11-01 DIAGNOSIS — H47.11 PAPILLEDEMA ASSOCIATED WITH INCREASED INTRACRANIAL PRESSURE: ICD-10-CM

## 2019-11-01 DIAGNOSIS — G93.2 IIH (IDIOPATHIC INTRACRANIAL HYPERTENSION): Primary | ICD-10-CM

## 2019-11-01 PROCEDURE — 99999 PR PBB SHADOW E&M-EST. PATIENT-LVL III: ICD-10-PCS | Mod: PBBFAC,,, | Performed by: OPHTHALMOLOGY

## 2019-11-01 PROCEDURE — 92083 EXTENDED VISUAL FIELD XM: CPT | Mod: S$GLB,,, | Performed by: OPHTHALMOLOGY

## 2019-11-01 PROCEDURE — 99999 PR PBB SHADOW E&M-EST. PATIENT-LVL I: CPT | Mod: PBBFAC,,,

## 2019-11-01 PROCEDURE — 92014 COMPRE OPH EXAM EST PT 1/>: CPT | Mod: S$GLB,,, | Performed by: OPHTHALMOLOGY

## 2019-11-01 PROCEDURE — 99999 PR PBB SHADOW E&M-EST. PATIENT-LVL I: ICD-10-PCS | Mod: PBBFAC,,,

## 2019-11-01 PROCEDURE — 92014 PR EYE EXAM, EST PATIENT,COMPREHESV: ICD-10-PCS | Mod: S$GLB,,, | Performed by: OPHTHALMOLOGY

## 2019-11-01 PROCEDURE — 99999 PR PBB SHADOW E&M-EST. PATIENT-LVL III: CPT | Mod: PBBFAC,,, | Performed by: OPHTHALMOLOGY

## 2019-11-01 PROCEDURE — 92083 HUMPHREY VISUAL FIELD - OU - BOTH EYES: ICD-10-PCS | Mod: S$GLB,,, | Performed by: OPHTHALMOLOGY

## 2019-11-01 NOTE — LETTER
[facility] Ophthal[facility]  [address]  [facility] [address]  Phone: [phone]  Fax: [phone]   November 1, 2019    Lukas Castillo MD  [address] [address]    Patient: Tabby Veloz   MR Number: [id number]   YOB: 1980   Date of Visit: 11/1/2019       Dear Dr. Castillo:    Thank you for referring Tabby Veloz to me for evaluation. Here is my assessment and plan of care:    Assessment:  /Plan     For exam results, see Encounter Report.    IIH (idiopathic intracranial hypertension)  -     [place] Visual Field - OU - Extended - Both Eyes    Papilledema associated with increased intracranial pressure  -     [place] Visual Field - OU - Extended - Both Eyes      I found no evidence of residual papilledema or of injury to the optic nerves. It may be possible to start tapering off acetazolamide. I will relay t his information to Dr. Castillo. I will repeat her exam and visual field testing in one year or sooner if requested.          Plan:  For exam results, see Encounter Report.    IIH (idiopathic intracranial hypertension)  -     Hel[place] Visual Field - OU - Extended - Both Eyes    Papilledema associated with increased intracranial pressure  -     [place] Visual Field - OU - Extended - Both Eyes      I found no evidence of residual papilledema or of injury to the optic nerves. It may be possible to start tapering off acetazolamide. I will relay t his information to Dr. Castillo. I will repeat her exam and visual field testing in one year or sooner if requested.            Below you will find my full exam findings. If you have questions, please do not hesitate to call me. I look forward to following Ms. Tabby Veloz along with you.    Sincerely,          Abdirahman Lee MD       CC  No Recipients             Base Eye Exam     Visual Acuity (Snellen - Linear)       Right Left    Dist cc 20/25 20/20 -1    Correction:  Contacts          Tonometry (Applanation,  4:30 PM)       Right Left    Pressure 15 15          Pupils       Dark Light Shape React APD    Right 6 4 Round Brisk None    Left 6 4 Round Brisk None          Visual Fields    See HVF report           Extraocular Movement       Right Left     Full, Ortho Full, Ortho          Neuro/Psych     Oriented x3:  Yes    Mood/Affect:  Normal          Dilation     Both eyes:  1% Mydriacyl, 2.5% Phenylephrine @ 4:31 PM            Slit Lamp and Fundus Exam     External Exam       Right Left    External Normal Normal          Slit Lamp Exam       Right Left    Lids/Lashes Normal Normal    Conjunctiva/Sclera White and quiet White and quiet    Cornea Clear Clear    Anterior Chamber Deep and quiet Deep and quiet    Iris Round and reactive Round and reactive    Lens Clear Clear    Vitreous Normal Normal          Fundus Exam       Right Left    Disc No edema. No edema.    C/D Ratio 0.2 0.2    Macula Normal Normal    Vessels Normal Normal    Periphery Normal Normal

## 2019-11-01 NOTE — PATIENT INSTRUCTIONS
Consider tapering acetazolamide.  Repeat exam and visual field testing in one year or sooner if requested.

## 2019-11-01 NOTE — PROGRESS NOTES
HPI     Concerns About Ocular Health      Additional comments: Pseudotumor cerebri              Comments     Referred by Dr.Jamie KURT aCstillo  Dx w/Pseudotumor cerebri x 2-3 years.  Patient states OU vision seem stable. Occasional blurriness.  No eye pain.  Review HVF    I have personally interviewed the patient, reviewed the history and   examined the patient and agree with the technician's exam.          Last edited by Abdirahman Lee MD on 11/1/2019  4:25 PM. (History)            Assessment /Plan     For exam results, see Encounter Report.    IIH (idiopathic intracranial hypertension)  -     Hel[place] Visual Field - OU - Extended - Both Eyes    Papilledema associated with increased intracranial pressure  -     [place] Visual Field - OU - Extended - Both Eyes      I found no evidence of residual papilledema or of injury to the optic nerves. It may be possible to start tapering off acetazolamide. I will relay t his information to Dr. Castillo. I will repeat her exam and visual field testing in one year or sooner if requested.

## 2019-11-04 ENCOUNTER — TELEPHONE (OUTPATIENT)
Dept: NEUROLOGY | Facility: CLINIC | Age: 39
End: 2019-11-04

## 2019-11-04 NOTE — TELEPHONE ENCOUNTER
----- Message from Lukas Castillo MD sent at 11/4/2019 12:46 PM CST -----  Notify patient:  I reviewed her not from Dr Lee.  I would not recommend making any changes to her current pseudotumor treatment at this time. Keep scheduled follow up as planned  Lukas Castillo MD    ----- Message -----  From: Abdirahman Lee MD  Sent: 11/1/2019   4:55 PM CST  To: Lukas Castillo MD

## 2019-12-06 ENCOUNTER — CLINICAL SUPPORT (OUTPATIENT)
Dept: FAMILY MEDICINE | Facility: CLINIC | Age: 39
End: 2019-12-06

## 2019-12-06 DIAGNOSIS — Z00.00 ROUTINE GENERAL MEDICAL EXAMINATION AT A HEALTH CARE FACILITY: ICD-10-CM

## 2019-12-06 PROCEDURE — 80305 DRUG TEST PRSMV DIR OPT OBS: CPT | Mod: S$GLB,,, | Performed by: FAMILY MEDICINE

## 2019-12-06 PROCEDURE — 80305 PR NON-DOT DRUG SCREENS: ICD-10-PCS | Mod: S$GLB,,, | Performed by: FAMILY MEDICINE

## 2019-12-06 NOTE — PROGRESS NOTES
Tabby has presented today on behalf of [facility]. Tabby Veloz has completed Non-DOT Drug Screen .    Total charge is 55.00    Sarah Garza

## 2020-01-25 NOTE — PROGRESS NOTES
Pt to ED for left lower dental pain. Pt with broken tooth and decay. Pt declined dental appointment, states has appointment in March with oral surgeon for removal of tooth.      George Becerra RN  01/25/20 0569 Tabby has presented today on behalf of [facility]. Tabby Veloz has completed Non-DOT Physical and Non-DOT Drug Screen .        $90 total    Ruby Wheat

## 2020-03-04 ENCOUNTER — TELEPHONE (OUTPATIENT)
Dept: NEUROLOGY | Facility: CLINIC | Age: 40
End: 2020-03-04

## 2020-03-04 DIAGNOSIS — G93.2 PSEUDOTUMOR CEREBRI: ICD-10-CM

## 2020-03-04 RX ORDER — ACETAZOLAMIDE 500 MG/1
500 CAPSULE, EXTENDED RELEASE ORAL 2 TIMES DAILY
Qty: 60 CAPSULE | Refills: 0 | Status: SHIPPED | OUTPATIENT
Start: 2020-03-04 | End: 2020-04-01 | Stop reason: SDUPTHER

## 2020-03-04 NOTE — TELEPHONE ENCOUNTER
Please let her know that I refilled her Diamox once but cannot refill again without an appointment.

## 2020-04-01 ENCOUNTER — OFFICE VISIT (OUTPATIENT)
Dept: NEUROLOGY | Facility: CLINIC | Age: 40
End: 2020-04-01
Payer: COMMERCIAL

## 2020-04-01 DIAGNOSIS — R51.9 NONINTRACTABLE HEADACHE, UNSPECIFIED CHRONICITY PATTERN, UNSPECIFIED HEADACHE TYPE: ICD-10-CM

## 2020-04-01 DIAGNOSIS — E66.01 CLASS 3 SEVERE OBESITY DUE TO EXCESS CALORIES WITH SERIOUS COMORBIDITY AND BODY MASS INDEX (BMI) OF 40.0 TO 44.9 IN ADULT: ICD-10-CM

## 2020-04-01 DIAGNOSIS — G93.2 PSEUDOTUMOR CEREBRI: Primary | ICD-10-CM

## 2020-04-01 PROCEDURE — 99213 OFFICE O/P EST LOW 20 MIN: CPT | Mod: 95,S$GLB,, | Performed by: NURSE PRACTITIONER

## 2020-04-01 PROCEDURE — 99213 PR OFFICE/OUTPT VISIT, EST, LEVL III, 20-29 MIN: ICD-10-PCS | Mod: 95,S$GLB,, | Performed by: NURSE PRACTITIONER

## 2020-04-01 RX ORDER — ACETAZOLAMIDE 500 MG/1
500 CAPSULE, EXTENDED RELEASE ORAL 2 TIMES DAILY
Qty: 60 CAPSULE | Refills: 3 | Status: SHIPPED | OUTPATIENT
Start: 2020-04-01 | End: 2020-07-07

## 2020-04-01 NOTE — PROGRESS NOTES
HPI: Tabby Veloz is a 39 y.o. female, referred by Dr. Sanjeev Cook, for evaluation of papilledema and headaches. Her medical history is overall unremarkable. She completed a lumbar puncture on 3/9/2017, which demonstrated an opening pressure of 46, consistent with a diagnosis of pseudotumor cerebri. Now with IUP and had  increased mild headaches and tinnitus. She works in emergency management as the Emergency Preparedness .     The patient location is: home via telemed  The chief complaint leading to consultation is: follow up visit  Visit type: Virtual visit with synchronous audio and video  Total time spent with patient: 15 minutes  Each patient to whom he or she provides medical services by telemedicine is:  (1) informed of the relationship between the physician and patient and the respective role of any other health care provider with respect to management of the patient; and (2) notified that he or she may decline to receive medical services by telemedicine and may withdraw from such care at any time.    Notes:      She presents today for a follow up visit. She saw Dr. Lee for an updated eye exam after complaint of visual change. Eye exam did not show an papilledema at that time. She was having headaches at that time, but was also having some stress.     She called clinic to ask about stopping Diamox, given her normal eye exam, but Dr. Castillo advised to continue Diamox.     She has more job stress lately, as she is the Emergency Preparedness Coordinator for the area. She does experience episodic, occasional headaches with stress.     She does believe that she has lost 10 pounds since her last visit.     Review of Systems  Review of Systems   Constitutional: Negative for fatigue.   HENT: Positive for tinnitus.    Eyes: Negative for visual disturbance.   Cardiovascular: Negative for leg swelling.   Gastrointestinal: Negative for blood in stool.   Genitourinary: Negative for hematuria.    Musculoskeletal: Negative for neck stiffness.   Skin: Negative for rash.   Neurological: Positive for headaches.   Psychiatric/Behavioral: Negative for sleep disturbance. The patient is nervous/anxious.        Objective:       There were no vitals filed for this visit.  Exam:  Gen Appearance, well developed/nourished in no apparent distress  CV: not evaluated  Neuro:  MS: Awake, alert, oriented to place, person, time, situation. Sustains attention. Recent/remote memory intact, Language is full to spontaneous speech/repetition/naming/comprehension. Fund of Knowledge is full  CN: Optic discs not observed, PERRL not done, Extraoccular movements and visual fields are full. Normal facial strength, Hearing adequate for telemed visit, Tongue is midline, palate not evaluated. Shoulder Shrug symmetric.  Motor: deferred due to telemed  Sensory: deferred due to telemed  Cerebellar: deferred due to telemed  Gait: deferred due to telemed    Imaging:   MRI Brain 3/1/2017:   Normal MRI brain specifically without evidence for acute infarction or enhancing lesion.    MRV 3/1/2017:  Unremarkable noncontrast MRV specifically without evidence for venous sinus thrombosis.    Labs: 2/2018 CMP with changes not unexpected for diamox use.  CSF studies unremarkable and culture negative    8/2016 CMP reviewed    LP 3/9/2017 opening pressure: 46    Labs:   8/2019 CMP WNL      Assessment:   Tabby Veloz is a 39 y.o. female, referred by Dr. Sanjeev Cook, for evaluation of papilledema and headaches. Her medical history is overall unremarkable. She completed a lumbar puncture on 3/9/2017, which demonstrated an opening pressure of 46, consistent with a diagnosis of pseudotumor cerebri.     Plan:   I recommend:   1. Eye exam per Neuro-ophthalmology in 11/2019 was unrevealing; however, Diamox was continue to treat for pseudotumor, as she had not lost a significant amount of weight since her diagnosis of pseudotumor.   -Weight loss is still  needed, which was discussed today.   -Continue current dose of Diamox, and check CMP at next visit. She is unable to complete this currently, due to Corona virus outbreak, but is not having any related side effects.   -Her last therapeutic LP was during pregnancy and she is s/p tubal ligation  -Advised to consult with a bariatric medicine prior.   2. She does have some episodic stress related headaches. No need for preventative therapy at this time.     TO ER with any acute visual changes or call clinic with any mild changes    FU 3 months

## 2020-07-23 ENCOUNTER — OFFICE VISIT (OUTPATIENT)
Dept: NEUROLOGY | Facility: CLINIC | Age: 40
End: 2020-07-23
Payer: COMMERCIAL

## 2020-07-23 ENCOUNTER — LAB VISIT (OUTPATIENT)
Dept: LAB | Facility: HOSPITAL | Age: 40
End: 2020-07-23
Attending: NURSE PRACTITIONER
Payer: COMMERCIAL

## 2020-07-23 VITALS
HEART RATE: 80 BPM | WEIGHT: 256.75 LBS | DIASTOLIC BLOOD PRESSURE: 80 MMHG | RESPIRATION RATE: 13 BRPM | BODY MASS INDEX: 41.26 KG/M2 | HEIGHT: 66 IN | SYSTOLIC BLOOD PRESSURE: 124 MMHG

## 2020-07-23 DIAGNOSIS — Z79.899 ENCOUNTER FOR LONG-TERM (CURRENT) USE OF OTHER MEDICATIONS: ICD-10-CM

## 2020-07-23 DIAGNOSIS — G93.2 PSEUDOTUMOR CEREBRI: Primary | ICD-10-CM

## 2020-07-23 LAB
ALBUMIN SERPL BCP-MCNC: 4 G/DL (ref 3.5–5.2)
ALP SERPL-CCNC: 89 U/L (ref 55–135)
ALT SERPL W/O P-5'-P-CCNC: 11 U/L (ref 10–44)
ANION GAP SERPL CALC-SCNC: 10 MMOL/L (ref 8–16)
AST SERPL-CCNC: 11 U/L (ref 10–40)
BILIRUB SERPL-MCNC: 0.4 MG/DL (ref 0.1–1)
BUN SERPL-MCNC: 12 MG/DL (ref 6–20)
CALCIUM SERPL-MCNC: 9 MG/DL (ref 8.7–10.5)
CHLORIDE SERPL-SCNC: 112 MMOL/L (ref 95–110)
CO2 SERPL-SCNC: 20 MMOL/L (ref 23–29)
CREAT SERPL-MCNC: 0.8 MG/DL (ref 0.5–1.4)
EST. GFR  (AFRICAN AMERICAN): >60 ML/MIN/1.73 M^2
EST. GFR  (NON AFRICAN AMERICAN): >60 ML/MIN/1.73 M^2
GLUCOSE SERPL-MCNC: 83 MG/DL (ref 70–110)
POTASSIUM SERPL-SCNC: 3.9 MMOL/L (ref 3.5–5.1)
PROT SERPL-MCNC: 7.1 G/DL (ref 6–8.4)
SODIUM SERPL-SCNC: 142 MMOL/L (ref 136–145)

## 2020-07-23 PROCEDURE — 99214 OFFICE O/P EST MOD 30 MIN: CPT | Mod: S$GLB,,, | Performed by: NURSE PRACTITIONER

## 2020-07-23 PROCEDURE — 36415 COLL VENOUS BLD VENIPUNCTURE: CPT

## 2020-07-23 PROCEDURE — 99999 PR PBB SHADOW E&M-EST. PATIENT-LVL III: CPT | Mod: PBBFAC,,, | Performed by: NURSE PRACTITIONER

## 2020-07-23 PROCEDURE — 99214 PR OFFICE/OUTPT VISIT, EST, LEVL IV, 30-39 MIN: ICD-10-PCS | Mod: S$GLB,,, | Performed by: NURSE PRACTITIONER

## 2020-07-23 PROCEDURE — 99999 PR PBB SHADOW E&M-EST. PATIENT-LVL III: ICD-10-PCS | Mod: PBBFAC,,, | Performed by: NURSE PRACTITIONER

## 2020-07-23 PROCEDURE — 3008F PR BODY MASS INDEX (BMI) DOCUMENTED: ICD-10-PCS | Mod: CPTII,S$GLB,, | Performed by: NURSE PRACTITIONER

## 2020-07-23 PROCEDURE — 3008F BODY MASS INDEX DOCD: CPT | Mod: CPTII,S$GLB,, | Performed by: NURSE PRACTITIONER

## 2020-07-23 PROCEDURE — 80053 COMPREHEN METABOLIC PANEL: CPT

## 2020-07-23 RX ORDER — ACETAZOLAMIDE 500 MG/1
500 CAPSULE, EXTENDED RELEASE ORAL 2 TIMES DAILY
Qty: 180 CAPSULE | Refills: 1 | Status: SHIPPED | OUTPATIENT
Start: 2020-07-23 | End: 2021-04-15

## 2020-07-23 NOTE — PROGRESS NOTES
HPI: Tabby Veloz is a 39 y.o. female, referred by Dr. Sanjeev Cook, for evaluation of papilledema and headaches. Her medical history is overall unremarkable. She completed a lumbar puncture on 3/9/2017, which demonstrated an opening pressure of 46, consistent with a diagnosis of pseudotumor cerebri. Now with IUP and had  increased mild headaches and tinnitus. She works in emergency management as the Emergency Preparedness .     She presents today for a follow up visit. No visual complaints or headaches at this time.     She plans to have an eye exam in 9/2020.     She saw Dr. Lee in 11/2019 for an updated eye exam after complaint of visual change. Eye exam did not show an papilledema at that time. She was having headaches at that time, but was also having some stress. She called clinic to ask about stopping Diamox, given her normal eye exam, but she was advised to continue this. No significant weight loss since diagnosis of pseudotumor.     She has more job stress lately, as she is the Emergency Preparedness Coordinator for the area. She does experience episodic, occasional headaches with stress.     She lost 6 pounds since her last visit.     Review of Systems  Review of Systems   Constitutional: Negative for fatigue.   HENT: Negative for tinnitus.    Eyes: Negative for visual disturbance.   Cardiovascular: Negative for leg swelling.   Gastrointestinal: Negative for blood in stool.   Genitourinary: Negative for hematuria.   Musculoskeletal: Negative for neck stiffness.   Skin: Negative for rash.   Neurological: Negative for headaches.   Psychiatric/Behavioral: Negative for sleep disturbance. The patient is not nervous/anxious.        Objective:       Vitals:    07/23/20 1304   BP: 124/80   Pulse: 80   Resp: 13     Exam:  Gen Appearance, well developed/nourished in no apparent distress  CV: 2+ distal pulses with no edema or swelling  Neuro:  MS: Awake, alert,Sustains attention. Recent/remote memory  intact, Language is full to spontaneous speech/repetition/naming/comprehension. Fund of Knowledge is full  CN: Optic disc not visualized today, PERRL, Extraoccular movements and visual fields are full. Normal facial sensation and strength, Hearing symmetric, Tongue and Palate are midline and strong. Shoulder Shrug symmetric and strong.  Motor: Normal bulk, tone, no abnormal movements. 5/5 strength bilateral upper/lower extremities with 2+ reflexes and bilateral plantar response  Sensory: symmetric to light touch, pain, temp, and vibration/proprioception. Romberg negative  Cerebellar: Finger-nose, Heal-shin, Rapid alternating movements intact  Gait: Normal stance, no ataxia    Imaging:   MRI Brain 3/1/2017:   Normal MRI brain specifically without evidence for acute infarction or enhancing lesion.    MRV 3/1/2017:  Unremarkable noncontrast MRV specifically without evidence for venous sinus thrombosis.    Labs: 2/2018 CMP with changes not unexpected for diamox use.  CSF studies unremarkable and culture negative    8/2016 CMP reviewed    LP 3/9/2017 opening pressure: 46    Labs:   8/2019 CMP WNL      Assessment:   Tabby Veloz is a 39 y.o. female with papilledema and headaches. Her medical history is overall unremarkable. She completed a lumbar puncture on 3/9/2017, which demonstrated an opening pressure of 46, consistent with a diagnosis of pseudotumor cerebri.     Plan:   I recommend:   1. Have her Ophthalmologist fax this clinic her upcoming eye exam from 9/2020.   -Eye exam per Neuro-ophthalmology in 11/2019 was unrevealing; however, Diamox was continue to treat for pseudotumor, as she had not lost a significant amount of weight since her diagnosis of pseudotumor.   -Weight loss is still needed, which was discussed today.   -Continue current dose of Diamox, and check CMP at next visit.   -Her last therapeutic LP was during pregnancy and she is s/p tubal ligation.  -Advised to consult with a bariatric medicine  prior.   2. She does have some episodic stress related headaches. No need for preventative therapy at this time.     TO ER with any acute visual changes or call clinic with any mild changes    FU 4 months

## 2021-08-05 ENCOUNTER — OFFICE VISIT (OUTPATIENT)
Dept: NEUROLOGY | Facility: CLINIC | Age: 41
End: 2021-08-05
Payer: COMMERCIAL

## 2021-08-05 ENCOUNTER — LAB VISIT (OUTPATIENT)
Dept: LAB | Facility: HOSPITAL | Age: 41
End: 2021-08-05
Attending: NURSE PRACTITIONER
Payer: COMMERCIAL

## 2021-08-05 VITALS
BODY MASS INDEX: 41.8 KG/M2 | SYSTOLIC BLOOD PRESSURE: 110 MMHG | WEIGHT: 260.13 LBS | HEART RATE: 85 BPM | HEIGHT: 66 IN | DIASTOLIC BLOOD PRESSURE: 82 MMHG | OXYGEN SATURATION: 99 %

## 2021-08-05 DIAGNOSIS — R51.9 NONINTRACTABLE HEADACHE, UNSPECIFIED CHRONICITY PATTERN, UNSPECIFIED HEADACHE TYPE: ICD-10-CM

## 2021-08-05 DIAGNOSIS — Z79.899 ENCOUNTER FOR LONG-TERM (CURRENT) USE OF MEDICATIONS: Primary | ICD-10-CM

## 2021-08-05 DIAGNOSIS — E66.01 CLASS 3 SEVERE OBESITY DUE TO EXCESS CALORIES WITH SERIOUS COMORBIDITY AND BODY MASS INDEX (BMI) OF 40.0 TO 44.9 IN ADULT: ICD-10-CM

## 2021-08-05 DIAGNOSIS — G93.2 PSEUDOTUMOR CEREBRI: ICD-10-CM

## 2021-08-05 DIAGNOSIS — Z79.899 ENCOUNTER FOR LONG-TERM (CURRENT) USE OF MEDICATIONS: ICD-10-CM

## 2021-08-05 LAB
ALBUMIN SERPL BCP-MCNC: 3.8 G/DL (ref 3.5–5.2)
ALP SERPL-CCNC: 80 U/L (ref 55–135)
ALT SERPL W/O P-5'-P-CCNC: 14 U/L (ref 10–44)
ANION GAP SERPL CALC-SCNC: 11 MMOL/L (ref 8–16)
AST SERPL-CCNC: 12 U/L (ref 10–40)
BILIRUB SERPL-MCNC: 0.4 MG/DL (ref 0.1–1)
BUN SERPL-MCNC: 11 MG/DL (ref 6–20)
CALCIUM SERPL-MCNC: 9.3 MG/DL (ref 8.7–10.5)
CHLORIDE SERPL-SCNC: 113 MMOL/L (ref 95–110)
CO2 SERPL-SCNC: 16 MMOL/L (ref 23–29)
CREAT SERPL-MCNC: 0.8 MG/DL (ref 0.5–1.4)
EST. GFR  (AFRICAN AMERICAN): >60 ML/MIN/1.73 M^2
EST. GFR  (NON AFRICAN AMERICAN): >60 ML/MIN/1.73 M^2
GLUCOSE SERPL-MCNC: 100 MG/DL (ref 70–110)
POTASSIUM SERPL-SCNC: 3.6 MMOL/L (ref 3.5–5.1)
PROT SERPL-MCNC: 7.1 G/DL (ref 6–8.4)
SODIUM SERPL-SCNC: 140 MMOL/L (ref 136–145)

## 2021-08-05 PROCEDURE — 3074F PR MOST RECENT SYSTOLIC BLOOD PRESSURE < 130 MM HG: ICD-10-PCS | Mod: CPTII,S$GLB,, | Performed by: NURSE PRACTITIONER

## 2021-08-05 PROCEDURE — 99999 PR PBB SHADOW E&M-EST. PATIENT-LVL III: ICD-10-PCS | Mod: PBBFAC,,, | Performed by: NURSE PRACTITIONER

## 2021-08-05 PROCEDURE — 3008F BODY MASS INDEX DOCD: CPT | Mod: CPTII,S$GLB,, | Performed by: NURSE PRACTITIONER

## 2021-08-05 PROCEDURE — 1160F RVW MEDS BY RX/DR IN RCRD: CPT | Mod: CPTII,S$GLB,, | Performed by: NURSE PRACTITIONER

## 2021-08-05 PROCEDURE — 3008F PR BODY MASS INDEX (BMI) DOCUMENTED: ICD-10-PCS | Mod: CPTII,S$GLB,, | Performed by: NURSE PRACTITIONER

## 2021-08-05 PROCEDURE — 3074F SYST BP LT 130 MM HG: CPT | Mod: CPTII,S$GLB,, | Performed by: NURSE PRACTITIONER

## 2021-08-05 PROCEDURE — 1159F MED LIST DOCD IN RCRD: CPT | Mod: CPTII,S$GLB,, | Performed by: NURSE PRACTITIONER

## 2021-08-05 PROCEDURE — 1160F PR REVIEW ALL MEDS BY PRESCRIBER/CLIN PHARMACIST DOCUMENTED: ICD-10-PCS | Mod: CPTII,S$GLB,, | Performed by: NURSE PRACTITIONER

## 2021-08-05 PROCEDURE — 80053 COMPREHEN METABOLIC PANEL: CPT | Performed by: NURSE PRACTITIONER

## 2021-08-05 PROCEDURE — 99214 OFFICE O/P EST MOD 30 MIN: CPT | Mod: S$GLB,,, | Performed by: NURSE PRACTITIONER

## 2021-08-05 PROCEDURE — 1159F PR MEDICATION LIST DOCUMENTED IN MEDICAL RECORD: ICD-10-PCS | Mod: CPTII,S$GLB,, | Performed by: NURSE PRACTITIONER

## 2021-08-05 PROCEDURE — 99999 PR PBB SHADOW E&M-EST. PATIENT-LVL III: CPT | Mod: PBBFAC,,, | Performed by: NURSE PRACTITIONER

## 2021-08-05 PROCEDURE — 36415 COLL VENOUS BLD VENIPUNCTURE: CPT | Performed by: NURSE PRACTITIONER

## 2021-08-05 PROCEDURE — 99214 PR OFFICE/OUTPT VISIT, EST, LEVL IV, 30-39 MIN: ICD-10-PCS | Mod: S$GLB,,, | Performed by: NURSE PRACTITIONER

## 2021-08-05 PROCEDURE — 3079F PR MOST RECENT DIASTOLIC BLOOD PRESSURE 80-89 MM HG: ICD-10-PCS | Mod: CPTII,S$GLB,, | Performed by: NURSE PRACTITIONER

## 2021-08-05 PROCEDURE — 3079F DIAST BP 80-89 MM HG: CPT | Mod: CPTII,S$GLB,, | Performed by: NURSE PRACTITIONER

## 2021-08-05 RX ORDER — ACETAZOLAMIDE 500 MG/1
500 CAPSULE, EXTENDED RELEASE ORAL 2 TIMES DAILY
Qty: 180 CAPSULE | Refills: 3 | Status: SHIPPED | OUTPATIENT
Start: 2021-08-05 | End: 2022-04-21

## 2022-04-19 DIAGNOSIS — G93.2 PSEUDOTUMOR CEREBRI: ICD-10-CM

## 2022-04-21 RX ORDER — ACETAZOLAMIDE 500 MG/1
500 CAPSULE, EXTENDED RELEASE ORAL 2 TIMES DAILY
Qty: 60 CAPSULE | Refills: 0 | Status: SHIPPED | OUTPATIENT
Start: 2022-04-21 | End: 2022-04-26 | Stop reason: SDUPTHER

## 2022-04-25 ENCOUNTER — PATIENT MESSAGE (OUTPATIENT)
Dept: NEUROLOGY | Facility: CLINIC | Age: 42
End: 2022-04-25
Payer: COMMERCIAL

## 2022-04-26 DIAGNOSIS — G93.2 PSEUDOTUMOR CEREBRI: ICD-10-CM

## 2022-04-26 RX ORDER — ACETAZOLAMIDE 500 MG/1
500 CAPSULE, EXTENDED RELEASE ORAL 2 TIMES DAILY
Qty: 180 CAPSULE | Refills: 0 | Status: SHIPPED | OUTPATIENT
Start: 2022-04-26 | End: 2022-06-28 | Stop reason: SDUPTHER

## 2022-04-26 NOTE — TELEPHONE ENCOUNTER
----- Message from Xin Arnold MA sent at 2022  7:43 AM CDT -----  Contact: self    ----- Message -----  From: Alex Martini  Sent: 2022   3:47 PM CDT  To: Marixa Dhaliwal Staff    Tabby Veloz  MRN: [id number]  : 1980  PCP: Vishal Fish  Home Phone      [phone]  Work Phone      Not on file.  Mobile          [phone]      MESSAGE:   Patient is needing to speak to nurse regarding refill on rx. Please return call [phone]

## 2022-06-28 ENCOUNTER — LAB VISIT (OUTPATIENT)
Dept: LAB | Facility: HOSPITAL | Age: 42
End: 2022-06-28
Attending: NURSE PRACTITIONER
Payer: COMMERCIAL

## 2022-06-28 ENCOUNTER — OFFICE VISIT (OUTPATIENT)
Dept: NEUROLOGY | Facility: CLINIC | Age: 42
End: 2022-06-28
Payer: COMMERCIAL

## 2022-06-28 VITALS
RESPIRATION RATE: 16 BRPM | SYSTOLIC BLOOD PRESSURE: 116 MMHG | HEIGHT: 66 IN | WEIGHT: 260.13 LBS | DIASTOLIC BLOOD PRESSURE: 78 MMHG | HEART RATE: 107 BPM | BODY MASS INDEX: 41.8 KG/M2

## 2022-06-28 DIAGNOSIS — G93.2 PSEUDOTUMOR CEREBRI: ICD-10-CM

## 2022-06-28 DIAGNOSIS — G93.2 PSEUDOTUMOR: Primary | ICD-10-CM

## 2022-06-28 DIAGNOSIS — E66.01 CLASS 3 SEVERE OBESITY DUE TO EXCESS CALORIES WITH SERIOUS COMORBIDITY AND BODY MASS INDEX (BMI) OF 40.0 TO 44.9 IN ADULT: ICD-10-CM

## 2022-06-28 DIAGNOSIS — G93.2 PSEUDOTUMOR: ICD-10-CM

## 2022-06-28 LAB
ALBUMIN SERPL BCP-MCNC: 3.8 G/DL (ref 3.5–5.2)
ALP SERPL-CCNC: 86 U/L (ref 55–135)
ALT SERPL W/O P-5'-P-CCNC: 17 U/L (ref 10–44)
ANION GAP SERPL CALC-SCNC: 10 MMOL/L (ref 8–16)
AST SERPL-CCNC: 14 U/L (ref 10–40)
BILIRUB SERPL-MCNC: 0.5 MG/DL (ref 0.1–1)
BUN SERPL-MCNC: 9 MG/DL (ref 6–20)
CALCIUM SERPL-MCNC: 9.1 MG/DL (ref 8.7–10.5)
CHLORIDE SERPL-SCNC: 111 MMOL/L (ref 95–110)
CO2 SERPL-SCNC: 20 MMOL/L (ref 23–29)
CREAT SERPL-MCNC: 0.8 MG/DL (ref 0.5–1.4)
EST. GFR  (AFRICAN AMERICAN): >60 ML/MIN/1.73 M^2
EST. GFR  (NON AFRICAN AMERICAN): >60 ML/MIN/1.73 M^2
GLUCOSE SERPL-MCNC: 131 MG/DL (ref 70–110)
POTASSIUM SERPL-SCNC: 3.6 MMOL/L (ref 3.5–5.1)
PROT SERPL-MCNC: 7.3 G/DL (ref 6–8.4)
SODIUM SERPL-SCNC: 141 MMOL/L (ref 136–145)

## 2022-06-28 PROCEDURE — 99214 PR OFFICE/OUTPT VISIT, EST, LEVL IV, 30-39 MIN: ICD-10-PCS | Mod: S$GLB,,, | Performed by: NURSE PRACTITIONER

## 2022-06-28 PROCEDURE — 3074F SYST BP LT 130 MM HG: CPT | Mod: CPTII,S$GLB,, | Performed by: NURSE PRACTITIONER

## 2022-06-28 PROCEDURE — 36415 COLL VENOUS BLD VENIPUNCTURE: CPT | Performed by: NURSE PRACTITIONER

## 2022-06-28 PROCEDURE — 99214 OFFICE O/P EST MOD 30 MIN: CPT | Mod: S$GLB,,, | Performed by: NURSE PRACTITIONER

## 2022-06-28 PROCEDURE — 3008F PR BODY MASS INDEX (BMI) DOCUMENTED: ICD-10-PCS | Mod: CPTII,S$GLB,, | Performed by: NURSE PRACTITIONER

## 2022-06-28 PROCEDURE — 3078F DIAST BP <80 MM HG: CPT | Mod: CPTII,S$GLB,, | Performed by: NURSE PRACTITIONER

## 2022-06-28 PROCEDURE — 99999 PR PBB SHADOW E&M-EST. PATIENT-LVL III: CPT | Mod: PBBFAC,,, | Performed by: NURSE PRACTITIONER

## 2022-06-28 PROCEDURE — 1159F PR MEDICATION LIST DOCUMENTED IN MEDICAL RECORD: ICD-10-PCS | Mod: CPTII,S$GLB,, | Performed by: NURSE PRACTITIONER

## 2022-06-28 PROCEDURE — 1160F RVW MEDS BY RX/DR IN RCRD: CPT | Mod: CPTII,S$GLB,, | Performed by: NURSE PRACTITIONER

## 2022-06-28 PROCEDURE — 1159F MED LIST DOCD IN RCRD: CPT | Mod: CPTII,S$GLB,, | Performed by: NURSE PRACTITIONER

## 2022-06-28 PROCEDURE — 3074F PR MOST RECENT SYSTOLIC BLOOD PRESSURE < 130 MM HG: ICD-10-PCS | Mod: CPTII,S$GLB,, | Performed by: NURSE PRACTITIONER

## 2022-06-28 PROCEDURE — 3078F PR MOST RECENT DIASTOLIC BLOOD PRESSURE < 80 MM HG: ICD-10-PCS | Mod: CPTII,S$GLB,, | Performed by: NURSE PRACTITIONER

## 2022-06-28 PROCEDURE — 80053 COMPREHEN METABOLIC PANEL: CPT | Performed by: NURSE PRACTITIONER

## 2022-06-28 PROCEDURE — 3008F BODY MASS INDEX DOCD: CPT | Mod: CPTII,S$GLB,, | Performed by: NURSE PRACTITIONER

## 2022-06-28 PROCEDURE — 1160F PR REVIEW ALL MEDS BY PRESCRIBER/CLIN PHARMACIST DOCUMENTED: ICD-10-PCS | Mod: CPTII,S$GLB,, | Performed by: NURSE PRACTITIONER

## 2022-06-28 PROCEDURE — 99999 PR PBB SHADOW E&M-EST. PATIENT-LVL III: ICD-10-PCS | Mod: PBBFAC,,, | Performed by: NURSE PRACTITIONER

## 2022-06-28 RX ORDER — ACETAZOLAMIDE 500 MG/1
500 CAPSULE, EXTENDED RELEASE ORAL 2 TIMES DAILY
Qty: 180 CAPSULE | Refills: 1 | Status: SHIPPED | OUTPATIENT
Start: 2022-06-28 | End: 2023-01-04 | Stop reason: SDUPTHER

## 2022-06-28 NOTE — PROGRESS NOTES
HPI: Tabby Veloz is a 41 y.o. female, referred by Dr. Sanjeev Cook, for evaluation of papilledema and headaches. Her medical history is overall unremarkable. She completed a lumbar puncture on 3/9/2017, which demonstrated an opening pressure of 46, consistent with a diagnosis of pseudotumor cerebri. Now with IUP and had  increased mild headaches and tinnitus. She works in emergency management as the Emergency Preparedness .     She presents today for a follow up visit. No visual complaints or headaches at this time.     She has an eye exam scheduled for next week.     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 She does experience episodic, occasional headaches with stress.     Weight is stable since her last visit.     She continues with occasional stress induced headaches.     Review of Systems  Review of Systems   Constitutional: Negative for fatigue.   HENT: Negative for tinnitus.    Eyes: Negative for visual disturbance.   Cardiovascular: Negative for leg swelling.   Gastrointestinal: Negative for blood in stool.   Genitourinary: Negative for hematuria.   Musculoskeletal: Negative for neck stiffness.   Skin: Negative for rash.   Neurological: Negative for headaches.   Psychiatric/Behavioral: Negative for sleep disturbance. The patient is not nervous/anxious.        Objective:       Vitals:    06/28/22 1453   BP: 116/78   Pulse: 107   Resp: 16     Exam:  Gen Appearance, well developed/nourished in no apparent distress  CV: 2+ distal pulses with no edema or swelling  Neuro:  MS: Awake, alert,Sustains attention. Recent/remote memory intact, Language is full to spontaneous speech/repetition/naming/comprehension. Fund of Knowledge is full  CN: Optic disc not visualized today, PERRL, Extraoccular movements and visual fields are full. Normal facial sensation and strength, Hearing symmetric, Tongue and Palate are midline and strong. Shoulder Shrug symmetric and strong.  Motor: Normal  bulk, tone, no abnormal movements. 5/5 strength bilateral upper/lower extremities with 2+ reflexes and bilateral plantar response  Sensory: symmetric to light touch, pain, temp, and vibration/proprioception. Romberg negative  Cerebellar: Finger-nose, Heal-shin, Rapid alternating movements intact  Gait: Normal stance, no ataxia    Imaging:   MRI Brain 3/1/2017:   Normal MRI brain specifically without evidence for acute infarction or enhancing lesion.    MRV 3/1/2017:  Unremarkable noncontrast MRV specifically without evidence for venous sinus thrombosis.    Labs: 2/2018 CMP with changes not unexpected for diamox use.  CSF studies unremarkable and culture negative    8/2016 CMP reviewed    LP 3/9/2017 opening pressure: 46    Labs:   8/2019 CMP WNL    Assessment:   Tabby Veloz is a 41 y.o. female with papilledema and headaches. Her medical history is overall unremarkable. She completed a lumbar puncture on 3/9/2017, which demonstrated an opening pressure of 46, consistent with a diagnosis of pseudotumor cerebri.     Plan:   I recommend:   1. Have her Ophthalmologist fax this clinic her upcoming eye exam from 8/2021.    -Eye exam per Neuro-ophthalmology in 11/2019 was unrevealing; however, Diamox was continue to treat for pseudotumor, as she had not lost a significant amount of weight since her diagnosis of pseudotumor. Weight is stable at this visit; however.     -Weight loss is still needed, which was discussed. She will consider diet vs bariatric surgery.      -Continue current dose of Diamox, and check CMP at this time.  -Her last therapeutic LP was during pregnancy and she is s/p tubal ligation.  -Advised to consult with a bariatric medicine prior.     2. She does have some episodic stress related headaches at times. No need for preventative therapy at this time.     TO ER with any acute visual changes or call clinic with any mild changes    FU 6 months

## 2022-06-29 ENCOUNTER — TELEPHONE (OUTPATIENT)
Dept: NEUROLOGY | Facility: CLINIC | Age: 42
End: 2022-06-29
Payer: COMMERCIAL

## 2022-07-07 NOTE — ED NOTES
[facility] here for transport   Qbrexza Counseling:  I discussed with the patient the risks of Qbrexza including but not limited to headache, mydriasis, blurred vision, dry eyes, nasal dryness, dry mouth, dry throat, dry skin, urinary hesitation, and constipation.  Local skin reactions including erythema, burning, stinging, and itching can also occur.

## 2022-07-26 ENCOUNTER — CLINICAL SUPPORT (OUTPATIENT)
Dept: FAMILY MEDICINE | Facility: CLINIC | Age: 42
End: 2022-07-26
Payer: COMMERCIAL

## 2022-07-26 DIAGNOSIS — Z00.00 ROUTINE GENERAL MEDICAL EXAMINATION AT A HEALTH CARE FACILITY: Primary | ICD-10-CM

## 2022-07-26 PROCEDURE — 80305 DRUG TEST PRSMV DIR OPT OBS: CPT | Mod: S$GLB,,, | Performed by: FAMILY MEDICINE

## 2022-07-26 PROCEDURE — 80305 PR NON-DOT DRUG SCREENS: ICD-10-PCS | Mod: S$GLB,,, | Performed by: FAMILY MEDICINE

## 2022-07-26 NOTE — PROGRESS NOTES
Tabby has presented today on behalf of [facility]. Tabby Veloz has completed Non-DOT Drug Screen . Total charge $55.    Sabrina Bonner

## 2022-08-10 PROBLEM — R51.9 HEADACHE: Status: RESOLVED | Noted: 2017-03-15 | Resolved: 2022-08-10

## 2023-01-04 ENCOUNTER — OFFICE VISIT (OUTPATIENT)
Dept: NEUROLOGY | Facility: CLINIC | Age: 43
End: 2023-01-04
Payer: COMMERCIAL

## 2023-01-04 VITALS
BODY MASS INDEX: 41.02 KG/M2 | DIASTOLIC BLOOD PRESSURE: 68 MMHG | WEIGHT: 261.38 LBS | HEIGHT: 67 IN | RESPIRATION RATE: 14 BRPM | HEART RATE: 96 BPM | SYSTOLIC BLOOD PRESSURE: 120 MMHG

## 2023-01-04 DIAGNOSIS — G93.2 PSEUDOTUMOR CEREBRI: Primary | ICD-10-CM

## 2023-01-04 DIAGNOSIS — E66.01 CLASS 3 SEVERE OBESITY DUE TO EXCESS CALORIES WITH SERIOUS COMORBIDITY AND BODY MASS INDEX (BMI) OF 40.0 TO 44.9 IN ADULT: ICD-10-CM

## 2023-01-04 PROBLEM — H47.11 PAPILLEDEMA ASSOCIATED WITH INCREASED INTRACRANIAL PRESSURE: Status: RESOLVED | Noted: 2017-02-23 | Resolved: 2023-01-04

## 2023-01-04 PROCEDURE — 1160F RVW MEDS BY RX/DR IN RCRD: CPT | Mod: CPTII,S$GLB,, | Performed by: NURSE PRACTITIONER

## 2023-01-04 PROCEDURE — 3074F PR MOST RECENT SYSTOLIC BLOOD PRESSURE < 130 MM HG: ICD-10-PCS | Mod: CPTII,S$GLB,, | Performed by: NURSE PRACTITIONER

## 2023-01-04 PROCEDURE — 1159F MED LIST DOCD IN RCRD: CPT | Mod: CPTII,S$GLB,, | Performed by: NURSE PRACTITIONER

## 2023-01-04 PROCEDURE — 3074F SYST BP LT 130 MM HG: CPT | Mod: CPTII,S$GLB,, | Performed by: NURSE PRACTITIONER

## 2023-01-04 PROCEDURE — 1159F PR MEDICATION LIST DOCUMENTED IN MEDICAL RECORD: ICD-10-PCS | Mod: CPTII,S$GLB,, | Performed by: NURSE PRACTITIONER

## 2023-01-04 PROCEDURE — 1160F PR REVIEW ALL MEDS BY PRESCRIBER/CLIN PHARMACIST DOCUMENTED: ICD-10-PCS | Mod: CPTII,S$GLB,, | Performed by: NURSE PRACTITIONER

## 2023-01-04 PROCEDURE — 99999 PR PBB SHADOW E&M-EST. PATIENT-LVL III: CPT | Mod: PBBFAC,,, | Performed by: NURSE PRACTITIONER

## 2023-01-04 PROCEDURE — 3008F PR BODY MASS INDEX (BMI) DOCUMENTED: ICD-10-PCS | Mod: CPTII,S$GLB,, | Performed by: NURSE PRACTITIONER

## 2023-01-04 PROCEDURE — 3078F PR MOST RECENT DIASTOLIC BLOOD PRESSURE < 80 MM HG: ICD-10-PCS | Mod: CPTII,S$GLB,, | Performed by: NURSE PRACTITIONER

## 2023-01-04 PROCEDURE — 3008F BODY MASS INDEX DOCD: CPT | Mod: CPTII,S$GLB,, | Performed by: NURSE PRACTITIONER

## 2023-01-04 PROCEDURE — 99999 PR PBB SHADOW E&M-EST. PATIENT-LVL III: ICD-10-PCS | Mod: PBBFAC,,, | Performed by: NURSE PRACTITIONER

## 2023-01-04 PROCEDURE — 99214 OFFICE O/P EST MOD 30 MIN: CPT | Mod: S$GLB,,, | Performed by: NURSE PRACTITIONER

## 2023-01-04 PROCEDURE — 99214 PR OFFICE/OUTPT VISIT, EST, LEVL IV, 30-39 MIN: ICD-10-PCS | Mod: S$GLB,,, | Performed by: NURSE PRACTITIONER

## 2023-01-04 PROCEDURE — 3078F DIAST BP <80 MM HG: CPT | Mod: CPTII,S$GLB,, | Performed by: NURSE PRACTITIONER

## 2023-01-04 RX ORDER — ACETAZOLAMIDE 500 MG/1
500 CAPSULE, EXTENDED RELEASE ORAL 2 TIMES DAILY
Qty: 180 CAPSULE | Refills: 1 | Status: SHIPPED | OUTPATIENT
Start: 2023-01-04 | End: 2023-01-10

## 2023-01-04 RX ORDER — MINOCYCLINE HYDROCHLORIDE 100 MG/1
100 CAPSULE ORAL DAILY
COMMUNITY
Start: 2022-12-24

## 2023-01-04 RX ORDER — BUPROPION HYDROCHLORIDE 100 MG/1
100 TABLET ORAL 2 TIMES DAILY
COMMUNITY
Start: 2022-11-08

## 2023-01-04 NOTE — PROGRESS NOTES
HPI: Tabby Veloz is a 42 y.o. female, referred by Dr. Sanjeev Cook, for evaluation of papilledema and headaches. Her medical history is overall unremarkable. She completed a lumbar puncture on 3/9/2017, which demonstrated an opening pressure of 46, consistent with a diagnosis of pseudotumor cerebri. Now with IUP and had  increased mild headaches and tinnitus. She works in emergency management as the Emergency Preparedness .     She presents today for a follow up visit. She has occasional headaches, which she feels are stress triggered from being busy with remodeling.     She did have an eye exam in 7/2022. Her exam was normal-no papilledema.     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 She does experience episodic, occasional headaches with stress.     Weight is stable since her last visit.     Review of Systems  Review of Systems   Constitutional:  Negative for fatigue.   HENT:  Negative for tinnitus.    Eyes:  Negative for visual disturbance.   Cardiovascular:  Negative for leg swelling.   Gastrointestinal:  Negative for blood in stool.   Genitourinary:  Negative for hematuria.   Musculoskeletal:  Negative for neck stiffness.   Skin:  Negative for rash.   Neurological:  Negative for headaches.   Psychiatric/Behavioral:  Negative for sleep disturbance. The patient is not nervous/anxious.      Objective:       Vitals:    01/04/23 1403   BP: 120/68   Pulse: 96   Resp: 14     Exam:  Gen Appearance, well developed/nourished in no apparent distress  CV: 2+ distal pulses with no edema or swelling  Neuro:  MS: Awake, alert,Sustains attention. Recent/remote memory intact, Language is full to spontaneous speech/repetition/naming/comprehension. Fund of Knowledge is full  CN: Optic disc not visualized today, PERRL, Extraoccular movements and visual fields are full. Normal facial sensation and strength, Hearing symmetric, Tongue and Palate are midline and strong. Shoulder Shrug symmetric  and strong.  Motor: Normal bulk, tone, no abnormal movements. 5/5 strength bilateral upper/lower extremities with 2+ reflexes and bilateral plantar response  Sensory: symmetric to light touch, pain, temp, and vibration/proprioception. Romberg negative  Cerebellar: Finger-nose, Heal-shin, Rapid alternating movements intact  Gait: Normal stance, no ataxia    Imaging:   MRI Brain 3/1/2017:   Normal MRI brain specifically without evidence for acute infarction or enhancing lesion.    MRV 3/1/2017:  Unremarkable noncontrast MRV specifically without evidence for venous sinus thrombosis.    Labs: 2/2018 CMP with changes not unexpected for diamox use.  CSF studies unremarkable and culture negative    8/2016 CMP reviewed    LP 3/9/2017 opening pressure: 46    Labs:   8/2019 CMP WNL  6/2022 CMP unremarkable-was non-fasting    Assessment:   Tabby Veloz is a 42 y.o. female with papilledema and headaches. Her medical history is overall unremarkable. She completed a lumbar puncture on 3/9/2017, which demonstrated an opening pressure of 46, consistent with a diagnosis of pseudotumor cerebri.     Plan:   I recommend:   1. Have her Ophthalmologist fax this clinic her upcoming eye exam from 6/2022.She reports this as normal with no papilledema.   -Eye exam per Neuro-ophthalmology in 11/2019 was unrevealing; however, Diamox was continue to treat for pseudotumor, as she had not lost a significant amount of weight since her diagnosis of pseudotumor. Weight is stable at this visit; however.     -Weight loss is still needed, which was discussed. She will consider diet vs bariatric surgery.      -Continue current dose of Diamox, and check CMP at her next visit.   -Her last therapeutic LP was during pregnancy and she is s/p tubal ligation.  -Advised to consult with a bariatric medicine prior and again today.     2. She does have some episodic stress related headaches at times. No need for preventative therapy at this time.     TO ER  with any acute visual changes or call clinic with any mild changes    FU 6 months

## 2023-07-05 ENCOUNTER — OFFICE VISIT (OUTPATIENT)
Dept: NEUROLOGY | Facility: CLINIC | Age: 43
End: 2023-07-05
Payer: COMMERCIAL

## 2023-07-05 ENCOUNTER — LAB VISIT (OUTPATIENT)
Dept: LAB | Facility: HOSPITAL | Age: 43
End: 2023-07-05
Attending: NURSE PRACTITIONER
Payer: COMMERCIAL

## 2023-07-05 VITALS
HEART RATE: 94 BPM | SYSTOLIC BLOOD PRESSURE: 124 MMHG | DIASTOLIC BLOOD PRESSURE: 74 MMHG | WEIGHT: 272.63 LBS | RESPIRATION RATE: 16 BRPM | HEIGHT: 67 IN | BODY MASS INDEX: 42.79 KG/M2 | OXYGEN SATURATION: 98 %

## 2023-07-05 DIAGNOSIS — G93.2 IIH (IDIOPATHIC INTRACRANIAL HYPERTENSION): ICD-10-CM

## 2023-07-05 DIAGNOSIS — G93.2 PSEUDOTUMOR CEREBRI: ICD-10-CM

## 2023-07-05 DIAGNOSIS — Z79.899 ENCOUNTER FOR LONG-TERM (CURRENT) USE OF MEDICATIONS: ICD-10-CM

## 2023-07-05 DIAGNOSIS — E66.01 CLASS 3 SEVERE OBESITY DUE TO EXCESS CALORIES WITH SERIOUS COMORBIDITY AND BODY MASS INDEX (BMI) OF 40.0 TO 44.9 IN ADULT: ICD-10-CM

## 2023-07-05 DIAGNOSIS — G43.709 CHRONIC MIGRAINE WITHOUT AURA WITHOUT STATUS MIGRAINOSUS, NOT INTRACTABLE: Primary | ICD-10-CM

## 2023-07-05 LAB
ALBUMIN SERPL BCP-MCNC: 3.9 G/DL (ref 3.5–5.2)
ALP SERPL-CCNC: 94 U/L (ref 55–135)
ALT SERPL W/O P-5'-P-CCNC: 33 U/L (ref 10–44)
ANION GAP SERPL CALC-SCNC: 8 MMOL/L (ref 8–16)
AST SERPL-CCNC: 21 U/L (ref 10–40)
BILIRUB SERPL-MCNC: 0.5 MG/DL (ref 0.1–1)
BUN SERPL-MCNC: 14 MG/DL (ref 6–20)
CALCIUM SERPL-MCNC: 9.1 MG/DL (ref 8.7–10.5)
CHLORIDE SERPL-SCNC: 112 MMOL/L (ref 95–110)
CO2 SERPL-SCNC: 20 MMOL/L (ref 23–29)
CREAT SERPL-MCNC: 0.8 MG/DL (ref 0.5–1.4)
EST. GFR  (NO RACE VARIABLE): >60 ML/MIN/1.73 M^2
GLUCOSE SERPL-MCNC: 90 MG/DL (ref 70–110)
POTASSIUM SERPL-SCNC: 4.1 MMOL/L (ref 3.5–5.1)
PROT SERPL-MCNC: 7.1 G/DL (ref 6–8.4)
SODIUM SERPL-SCNC: 140 MMOL/L (ref 136–145)

## 2023-07-05 PROCEDURE — 99214 OFFICE O/P EST MOD 30 MIN: CPT | Mod: S$GLB,,, | Performed by: NURSE PRACTITIONER

## 2023-07-05 PROCEDURE — 3008F BODY MASS INDEX DOCD: CPT | Mod: CPTII,S$GLB,, | Performed by: NURSE PRACTITIONER

## 2023-07-05 PROCEDURE — 1159F PR MEDICATION LIST DOCUMENTED IN MEDICAL RECORD: ICD-10-PCS | Mod: CPTII,S$GLB,, | Performed by: NURSE PRACTITIONER

## 2023-07-05 PROCEDURE — 1160F RVW MEDS BY RX/DR IN RCRD: CPT | Mod: CPTII,S$GLB,, | Performed by: NURSE PRACTITIONER

## 2023-07-05 PROCEDURE — 99999 PR PBB SHADOW E&M-EST. PATIENT-LVL III: ICD-10-PCS | Mod: PBBFAC,,, | Performed by: NURSE PRACTITIONER

## 2023-07-05 PROCEDURE — 3078F DIAST BP <80 MM HG: CPT | Mod: CPTII,S$GLB,, | Performed by: NURSE PRACTITIONER

## 2023-07-05 PROCEDURE — 99214 PR OFFICE/OUTPT VISIT, EST, LEVL IV, 30-39 MIN: ICD-10-PCS | Mod: S$GLB,,, | Performed by: NURSE PRACTITIONER

## 2023-07-05 PROCEDURE — 3074F PR MOST RECENT SYSTOLIC BLOOD PRESSURE < 130 MM HG: ICD-10-PCS | Mod: CPTII,S$GLB,, | Performed by: NURSE PRACTITIONER

## 2023-07-05 PROCEDURE — 80053 COMPREHEN METABOLIC PANEL: CPT | Performed by: NURSE PRACTITIONER

## 2023-07-05 PROCEDURE — 99999 PR PBB SHADOW E&M-EST. PATIENT-LVL III: CPT | Mod: PBBFAC,,, | Performed by: NURSE PRACTITIONER

## 2023-07-05 PROCEDURE — 1159F MED LIST DOCD IN RCRD: CPT | Mod: CPTII,S$GLB,, | Performed by: NURSE PRACTITIONER

## 2023-07-05 PROCEDURE — 36415 COLL VENOUS BLD VENIPUNCTURE: CPT | Performed by: NURSE PRACTITIONER

## 2023-07-05 PROCEDURE — 3078F PR MOST RECENT DIASTOLIC BLOOD PRESSURE < 80 MM HG: ICD-10-PCS | Mod: CPTII,S$GLB,, | Performed by: NURSE PRACTITIONER

## 2023-07-05 PROCEDURE — 1160F PR REVIEW ALL MEDS BY PRESCRIBER/CLIN PHARMACIST DOCUMENTED: ICD-10-PCS | Mod: CPTII,S$GLB,, | Performed by: NURSE PRACTITIONER

## 2023-07-05 PROCEDURE — 3074F SYST BP LT 130 MM HG: CPT | Mod: CPTII,S$GLB,, | Performed by: NURSE PRACTITIONER

## 2023-07-05 PROCEDURE — 3008F PR BODY MASS INDEX (BMI) DOCUMENTED: ICD-10-PCS | Mod: CPTII,S$GLB,, | Performed by: NURSE PRACTITIONER

## 2023-07-05 RX ORDER — ACETAZOLAMIDE 500 MG/1
500 CAPSULE, EXTENDED RELEASE ORAL 2 TIMES DAILY
Qty: 180 CAPSULE | Refills: 1 | Status: SHIPPED | OUTPATIENT
Start: 2023-07-05 | End: 2023-11-02 | Stop reason: SDUPTHER

## 2023-07-05 RX ORDER — TOPIRAMATE 50 MG/1
50 TABLET, FILM COATED ORAL 2 TIMES DAILY
Qty: 60 TABLET | Refills: 5 | Status: SHIPPED | OUTPATIENT
Start: 2023-07-05 | End: 2023-08-03 | Stop reason: SDUPTHER

## 2023-07-05 NOTE — PATIENT INSTRUCTIONS
To start Topamax, take 1/2 tablet twice a day for 1 week, then increase to 1 tablet twice a day afterwards.     Send me a portal message in 3 weeks and let me know how your headaches are and how you are tolerating it.

## 2023-07-05 NOTE — PROGRESS NOTES
HPI: Tabby Veloz is a 42 y.o. female, referred by Dr. Sanjeev Cook, for evaluation of papilledema and headaches. Her medical history is overall unremarkable. She completed a lumbar puncture on 3/9/2017, which demonstrated an opening pressure of 46, consistent with a diagnosis of pseudotumor cerebri. Now with IUP and had  increased mild headaches and tinnitus. She works in emergency management as the Emergency Preparedness .     She presents today for a follow up visit. Her headaches are more frequent lately. They occur every other day for the past month No medication changes. She feels that heat can sometimes trigger her headaches. Location is left supra-orbital or mid frontal. Duration is an hour. She takes Aleve, which is effective. No symptoms with her headaches. No blurred vision with headaches, and she has no visual complaints at this time.     She is nearly done remodeling her home post [place].     She did have an eye exam in 7/2022. Her exam was normal-no papilledema. She will return for her yearly eye exam in 8/2023.      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 She does experience episodic, occasional headaches with stress.     He gained 11 pounds since her last visit. Her meal schedule has changed, and she is eating meals later. She did speak to her PCP about starting Ozempic, and he didn't feel that her insurance would cover this.     Review of Systems  Review of Systems   Constitutional:  Negative for fatigue.   HENT:  Negative for tinnitus.    Eyes:  Negative for visual disturbance.   Cardiovascular:  Negative for leg swelling.   Gastrointestinal:  Negative for blood in stool.   Genitourinary:  Negative for hematuria.   Musculoskeletal:  Negative for neck stiffness.   Skin:  Negative for rash.   Neurological:  Negative for headaches.   Psychiatric/Behavioral:  Negative for sleep disturbance. The patient is not nervous/anxious.      Objective:       Vitals:     07/05/23 1300   BP: 124/74   Pulse: 94   Resp: 16     Exam:  Gen Appearance, well developed/nourished in no apparent distress  CV: 2+ distal pulses with no edema or swelling  Neuro:  MS: Awake, alert,Sustains attention. Recent/remote memory intact, Language is full to spontaneous speech/repetition/naming/comprehension. Fund of Knowledge is full  CN: PERRL, Extraoccular movements and visual fields are full. Normal facial sensation and strength, Hearing symmetric, Tongue and Palate are midline and strong. Shoulder Shrug symmetric and strong.  Motor: Normal bulk, tone, no abnormal movements. 5/5 strength bilateral upper/lower extremities with 2+ reflexes and bilateral plantar response  Sensory: symmetric to light touch, pain, temp, and vibration/proprioception. Romberg negative  Cerebellar: Finger-nose, Heal-shin, Rapid alternating movements intact  Gait: Normal stance, no ataxia    Imaging:   MRI Brain 3/1/2017:   Normal MRI brain specifically without evidence for acute infarction or enhancing lesion.    MRV 3/1/2017:  Unremarkable noncontrast MRV specifically without evidence for venous sinus thrombosis.    Labs: 2/2018 CMP with changes not unexpected for diamox use.  CSF studies unremarkable and culture negative    8/2016 CMP reviewed    LP 3/9/2017 opening pressure: 46    Labs:   8/2019 CMP WNL  6/2022 CMP unremarkable-was non-fasting    Assessment:   Tabby Veloz is a 42 y.o. female with papilledema and headaches. Her medical history is overall unremarkable. She completed a lumbar puncture on 3/9/2017, which demonstrated an opening pressure of 46, consistent with a diagnosis of pseudotumor cerebri.     Plan:   I recommend:   1. Have her Ophthalmologist fax this clinic her upcoming eye exam. Last exam was normal.   -Eye exam per Neuro-ophthalmology in 11/2019 was unrevealing; however, Diamox was continue to treat for pseudotumor, as she had not lost a significant amount of weight since her diagnosis of  pseudotumor. Weight is stable at this visit; however.     -Weight loss is still needed, which was discussed. She will consider diet vs bariatric surgery.      -Continue current dose of Diamox, and check CMP at this time.   -Her last therapeutic LP was during pregnancy and she is s/p tubal ligation.  -Advised to consult with a bariatric medicine prior and again today.     2. She does have more headaches over the past month.   -start trial of Topamax for headache prevention. This may help with weight loss as well.   -advised to keep a headache log until her next visit.    -notify me with response in three weeks. Will increase at that point if needed, depending on tolerance.     -Advised to increase fluid intake with Topamax and Diamox use. Notify me with any development of renal stones.     -recheck CMP again at her next visit.     TO ER with any acute visual changes or call clinic with any mild changes.     FU 3 months

## 2023-07-26 ENCOUNTER — PATIENT MESSAGE (OUTPATIENT)
Dept: NEUROLOGY | Facility: CLINIC | Age: 43
End: 2023-07-26
Payer: COMMERCIAL

## 2023-07-27 NOTE — TELEPHONE ENCOUNTER
Can we schedule her for a follow up to discuss further? We can offer her virtual if that is easier.

## 2023-08-03 ENCOUNTER — OFFICE VISIT (OUTPATIENT)
Dept: NEUROLOGY | Facility: CLINIC | Age: 43
End: 2023-08-03
Payer: COMMERCIAL

## 2023-08-03 DIAGNOSIS — Z79.899 ENCOUNTER FOR LONG-TERM (CURRENT) USE OF MEDICATIONS: ICD-10-CM

## 2023-08-03 DIAGNOSIS — G93.2 PSEUDOTUMOR CEREBRI: Primary | ICD-10-CM

## 2023-08-03 DIAGNOSIS — G43.709 CHRONIC MIGRAINE WITHOUT AURA WITHOUT STATUS MIGRAINOSUS, NOT INTRACTABLE: ICD-10-CM

## 2023-08-03 DIAGNOSIS — G93.2 IIH (IDIOPATHIC INTRACRANIAL HYPERTENSION): ICD-10-CM

## 2023-08-03 DIAGNOSIS — E66.01 CLASS 3 SEVERE OBESITY DUE TO EXCESS CALORIES WITH SERIOUS COMORBIDITY AND BODY MASS INDEX (BMI) OF 40.0 TO 44.9 IN ADULT: ICD-10-CM

## 2023-08-03 PROCEDURE — 99214 OFFICE O/P EST MOD 30 MIN: CPT | Mod: 95,,, | Performed by: NURSE PRACTITIONER

## 2023-08-03 PROCEDURE — 1159F MED LIST DOCD IN RCRD: CPT | Mod: CPTII,95,, | Performed by: NURSE PRACTITIONER

## 2023-08-03 PROCEDURE — 1159F PR MEDICATION LIST DOCUMENTED IN MEDICAL RECORD: ICD-10-PCS | Mod: CPTII,95,, | Performed by: NURSE PRACTITIONER

## 2023-08-03 PROCEDURE — 99214 PR OFFICE/OUTPT VISIT, EST, LEVL IV, 30-39 MIN: ICD-10-PCS | Mod: 95,,, | Performed by: NURSE PRACTITIONER

## 2023-08-03 RX ORDER — TOPIRAMATE 100 MG/1
100 TABLET, FILM COATED ORAL 2 TIMES DAILY
Qty: 60 TABLET | Refills: 5 | Status: SHIPPED | OUTPATIENT
Start: 2023-08-03 | End: 2023-08-24 | Stop reason: SDUPTHER

## 2023-08-03 NOTE — PROGRESS NOTES
HPI: Tabby Veloz is a 42 y.o. female, referred by Dr. Sanjeev Cook, for evaluation of papilledema and headaches. Her medical history is overall unremarkable. She completed a lumbar puncture on 3/9/2017, which demonstrated an opening pressure of 46, consistent with a diagnosis of pseudotumor cerebri. Now with IUP and had  increased mild headaches and tinnitus. She works in emergency management as the Emergency Preparedness .     The patient location is: home  The chief complaint leading to consultation is: follow up for abnormal eye exam    Visit type: audiovisual    45 minutes of total time spent on the encounter, which includes face to face time and non-face to face time preparing to see the patient (eg, review of tests), Obtaining and/or reviewing separately obtained history, Documenting clinical information in the electronic or other health record, Independently interpreting results (not separately reported) and communicating results to the patient/family/caregiver, or Care coordination (not separately reported).     Each patient to whom he or she provides medical services by telemedicine is:  (1) informed of the relationship between the physician and patient and the respective role of any other health care provider with respect to management of the patient; and (2) notified that he or she may decline to receive medical services by telemedicine and may withdraw from such care at any time.    Notes:   She presents today for a follow up visit. At her last visit on 7/5/23, she reported more frequent headache, she was placed on Topamax for migraine prevention. She has titrated up to 50 mg bid, and is tolerating this well. Headaches resolved once she increased her Topamax. Denies visual complaints.    She had an eye exam two weeks ago, which showed return of her papilledema.     Her  notes that she has lack of focus lately and that she has short term memory loss. Memory complaints predated  Topamax. She is more stressed lately.     She feels as if she has to pop her ears lately.     She has lower back pain lately. This started 3 weeks ago. Severity has waxed and waned. This sometimes radiates into her buttock into her left leg.     She weighed herself at home, and weight was 266. She lost 6 pounds.      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 She does experience episodic, occasional headaches with stress.     Review of Systems  Review of Systems   Constitutional:  Negative for fatigue.   HENT:  Negative for tinnitus.    Eyes:  Negative for visual disturbance.   Cardiovascular:  Negative for leg swelling.   Gastrointestinal:  Negative for blood in stool.   Genitourinary:  Negative for hematuria.   Musculoskeletal:  Positive for back pain. Negative for neck stiffness.   Skin:  Negative for rash.   Neurological:  Negative for headaches.   Psychiatric/Behavioral:  Negative for sleep disturbance. The patient is not nervous/anxious.        Objective:       Vitals:     Exam:  Gen Appearance, well developed/nourished in no apparent distress  CV: not evaluated  Neuro:  MS: Awake, alert, oriented to place, person, time, situation. Sustains attention. Recent/remote memory intact, Language is full to spontaneous speech/repetition/naming/comprehension. Fund of Knowledge is full  CN: Optic discs not observed, PERRL not done, Extraoccular movements and visual fields are full. Normal facial strength, Hearing adequate for telemed visit, Tongue is midline, palate not evaluated. Shoulder Shrug symmetric.  Motor: deferred due to telemed  Sensory:deferred due to telemed  Cerebellar: deferred due to telemed  Gait: deferred due to telemed    Imaging:   MRI Brain 3/1/2017:   Normal MRI brain specifically without evidence for acute infarction or enhancing lesion.    MRV 3/1/2017:  Unremarkable noncontrast MRV specifically without evidence for venous sinus thrombosis.    Labs: 2/2018 CMP with changes  not unexpected for diamox use.  CSF studies unremarkable and culture negative    8/2016 CMP reviewed    LP 3/9/2017 opening pressure: 46    Labs:   8/2019 CMP WNL  6/2022 CMP unremarkable-was non-fasting  7/2023 CMP overall unremarkable  Assessment:   Tabby Veloz is a 42 y.o. female with papilledema and headaches. Her medical history is overall unremarkable. She completed a lumbar puncture on 3/9/2017, which demonstrated an opening pressure of 46, consistent with a diagnosis of pseudotumor cerebri.   Plan:   I recommend:   1. 7/2023 eye exam showed papilledema. She did gain 10 pounds between her last two visits, 6 months apart, which may have triggered her papilledema to return.   -Weight loss is still needed, which was discussed.      -Continue current dose of Diamox, and follow CMP over time. Rather to increase her Diamox, as she did have some side effects on initiation of her current dose, Topamax was started. No current visual complaints, and she has lost 6 pounds in the past month.     -Her last therapeutic LP was during pregnancy and she is s/p tubal ligation.    2. Increase Topamax to 100 mg bid for headache prevention and for pseudotumor, given papilledema on recent eye exam.   -advised to keep a headache log until her next visit.      -Advised to increase fluid intake with Topamax and Diamox use. Notify me with any development of renal stones.     TO ER with any acute visual changes or call clinic with any mild changes.     3. Offered PT for lumbar/sciatica complaitns. She declines formal PT currently and will do home exercises for now.     4. Her stress could be contributing to her cognitive complaints, which started prior to starting Topamax.   -stress reduction techniques discussed today.     FU 3 weeks virtually

## 2023-08-24 ENCOUNTER — OFFICE VISIT (OUTPATIENT)
Dept: NEUROLOGY | Facility: CLINIC | Age: 43
End: 2023-08-24
Payer: COMMERCIAL

## 2023-08-24 DIAGNOSIS — G93.2 PSEUDOTUMOR CEREBRI: ICD-10-CM

## 2023-08-24 DIAGNOSIS — G93.2 IIH (IDIOPATHIC INTRACRANIAL HYPERTENSION): ICD-10-CM

## 2023-08-24 DIAGNOSIS — G43.709 CHRONIC MIGRAINE WITHOUT AURA WITHOUT STATUS MIGRAINOSUS, NOT INTRACTABLE: Primary | ICD-10-CM

## 2023-08-24 DIAGNOSIS — E66.01 CLASS 3 SEVERE OBESITY DUE TO EXCESS CALORIES WITH SERIOUS COMORBIDITY AND BODY MASS INDEX (BMI) OF 40.0 TO 44.9 IN ADULT: ICD-10-CM

## 2023-08-24 PROCEDURE — 1160F RVW MEDS BY RX/DR IN RCRD: CPT | Mod: CPTII,95,, | Performed by: NURSE PRACTITIONER

## 2023-08-24 PROCEDURE — 99214 OFFICE O/P EST MOD 30 MIN: CPT | Mod: 95,,, | Performed by: NURSE PRACTITIONER

## 2023-08-24 PROCEDURE — 99214 PR OFFICE/OUTPT VISIT, EST, LEVL IV, 30-39 MIN: ICD-10-PCS | Mod: 95,,, | Performed by: NURSE PRACTITIONER

## 2023-08-24 PROCEDURE — 1160F PR REVIEW ALL MEDS BY PRESCRIBER/CLIN PHARMACIST DOCUMENTED: ICD-10-PCS | Mod: CPTII,95,, | Performed by: NURSE PRACTITIONER

## 2023-08-24 PROCEDURE — 1159F MED LIST DOCD IN RCRD: CPT | Mod: CPTII,95,, | Performed by: NURSE PRACTITIONER

## 2023-08-24 PROCEDURE — 1159F PR MEDICATION LIST DOCUMENTED IN MEDICAL RECORD: ICD-10-PCS | Mod: CPTII,95,, | Performed by: NURSE PRACTITIONER

## 2023-08-24 RX ORDER — TOPIRAMATE 100 MG/1
100 TABLET, FILM COATED ORAL 2 TIMES DAILY
Qty: 180 TABLET | Refills: 1 | Status: SHIPPED | OUTPATIENT
Start: 2023-08-24 | End: 2023-12-19 | Stop reason: SDUPTHER

## 2023-08-24 NOTE — PROGRESS NOTES
HPI: Tabby Veloz is a 42 y.o. female, referred by Dr. Sanjeev Cook, for evaluation of papilledema and headaches. Her medical history is overall unremarkable. She completed a lumbar puncture on 3/9/2017, which demonstrated an opening pressure of 46, consistent with a diagnosis of pseudotumor cerebri. Now with IUP and had  increased mild headaches and tinnitus. She works in emergency management as the Emergency Preparedness .     The patient location is: home  The chief complaint leading to consultation is: follow up for abnormal eye exam    Visit type: audiovisual    45 minutes of total time spent on the encounter, which includes face to face time and non-face to face time preparing to see the patient (eg, review of tests), Obtaining and/or reviewing separately obtained history, Documenting clinical information in the electronic or other health record, Independently interpreting results (not separately reported) and communicating results to the patient/family/caregiver, or Care coordination (not separately reported).     Each patient to whom he or she provides medical services by telemedicine is:  (1) informed of the relationship between the physician and patient and the respective role of any other health care provider with respect to management of the patient; and (2) notified that he or she may decline to receive medical services by telemedicine and may withdraw from such care at any time.    Notes:   She presents today for a follow up visit. Her Topamax was increased to 100 mg bid at her last visit. Her migraines are reduced in frequency. She has one migraine per week, which appears to be triggered by stress. She is tolerating Topamax well, and continues on Diamox.     No visual complaints. Eye exam one month ago showed papilledema. She still has to pop her ears.     She still has issues with focus, but she continues to feel overwhelmed. This is helped with Wellbutrin.     Her lumbar pain is  improved since her last visit. She has been doing L-spine exercises/stretches at home.     She weighs 265. She lost one more pound since her last visit. She has been consuming fluids often.      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 She does experience episodic, occasional headaches with stress.     Review of Systems  Review of Systems   Constitutional:  Negative for fatigue.   HENT:  Negative for tinnitus.    Eyes:  Negative for visual disturbance.   Cardiovascular:  Negative for leg swelling.   Gastrointestinal:  Negative for blood in stool.   Genitourinary:  Negative for hematuria.   Musculoskeletal:  Positive for back pain. Negative for neck stiffness.   Skin:  Negative for rash.   Neurological:  Negative for headaches.   Psychiatric/Behavioral:  Negative for sleep disturbance. The patient is not nervous/anxious.        Objective:       There were no vitals filed for this visit.    Exam:  Gen Appearance, well developed/nourished in no apparent distress  CV: not evaluated  Neuro:  MS: Awake, alert, oriented to place, person, time, situation. Sustains attention. Recent/remote memory intact, Language is full to spontaneous speech/repetition/naming/comprehension. Fund of Knowledge is full  CN: Optic discs not observed, due to telemed, PERRL not done, Extraoccular movements and visual fields are full. Normal facial strength, Hearing adequate for telemed visit, Tongue is midline, palate not evaluated. Shoulder Shrug symmetric.  Motor: deferred due to telemed  Sensory:deferred due to telemed  Cerebellar: deferred due to telemed  Gait: deferred due to telemed    Imaging:   MRI Brain 3/1/2017:   Normal MRI brain specifically without evidence for acute infarction or enhancing lesion.    MRV 3/1/2017:  Unremarkable noncontrast MRV specifically without evidence for venous sinus thrombosis.    Labs: 2/2018 CMP with changes not unexpected for diamox use.  CSF studies unremarkable and culture  negative    8/2016 CMP reviewed    LP 3/9/2017 opening pressure: 46    Labs:   8/2019 CMP WNL  6/2022 CMP unremarkable-was non-fasting  7/2023 CMP overall unremarkable  Assessment:   Tabby Veloz is a 42 y.o. female with papilledema and headaches. Her medical history is overall unremarkable. She completed a lumbar puncture on 3/9/2017, which demonstrated an opening pressure of 46, consistent with a diagnosis of pseudotumor cerebri.   Plan:   I recommend:   1. 7/2023 eye exam showed papilledema. She did gain 10 pounds between her last two visits, 6 months apart, which may have triggered her papilledema to return.   -Weight loss is still needed, which was discussed.      -Continue current dose of Diamox, and follow CMP over time. Rather to increase her Diamox, as she did have some side effects on initiation of her current dose, Topamax was started. No current visual complaints, and she has lost 7 pounds in the past month.     -Her last therapeutic LP was during pregnancy and she is s/p tubal ligation.    2. Continue Topamax 100 mg bid for headache prevention and for pseudotumor, given papilledema on recent eye exam.   -needs a repeat eye exam in one month, two months from last exam, and 6 weeks from Topamax increase.   -advised to keep a headache log until her next visit.      -Advised to increase fluid intake with Topamax and Diamox use. Notify me with any development of renal stones.     TO ER with any acute visual changes or call clinic with any mild changes.     3. Offered PT for lumbar/sciatica complaitns. She declines formal PT currently and will do home exercises for now. Lumbar pain improved with exercises.     4. Her stress could be contributing to her cognitive complaints, which started prior to starting Topamax.   -stress reduction techniques discussed today. Wellbutrin helping with focus.     FU 6 weeks as scheduled. Notify me with any visual changes. To ER with any acute visual loss.

## 2023-09-15 ENCOUNTER — HOSPITAL ENCOUNTER (EMERGENCY)
Facility: HOSPITAL | Age: 43
Discharge: HOME | End: 2023-09-16
Attending: EMERGENCY MEDICINE
Payer: COMMERCIAL
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
## 2023-09-15 DIAGNOSIS — S42.351A CLOSED DISPLACED COMMINUTED FRACTURE OF SHAFT OF RIGHT HUMERUS, INITIAL ENCOUNTER: ICD-10-CM

## 2023-09-15 PROBLEM — S42.301A CLOSED FRACTURE OF SHAFT OF RIGHT HUMERUS: Status: ACTIVE | Noted: 2023-09-15

## 2023-09-15 LAB
ANION GAP SERPL CALC-SCNC: 10 MMOL/L (ref 8–16)
ANION GAP SERPL CALC-SCNC: NORMAL MMOL/L (ref 8–16)
BASOPHILS # BLD AUTO: 0.1 K/UL (ref 0–0.2)
BASOPHILS NFR BLD: 0.6 % (ref 0–1.9)
BUN SERPL-MCNC: 14 MG/DL (ref 6–20)
BUN SERPL-MCNC: 14 MG/DL (ref 6–30)
BUN SERPL-MCNC: NORMAL MG/DL (ref 6–20)
CALCIUM SERPL-MCNC: 8.8 MG/DL (ref 8.7–10.5)
CALCIUM SERPL-MCNC: NORMAL MG/DL (ref 8.7–10.5)
CHLORIDE SERPL-SCNC: 111 MMOL/L (ref 95–110)
CHLORIDE SERPL-SCNC: 111 MMOL/L (ref 95–110)
CHLORIDE SERPL-SCNC: NORMAL MMOL/L (ref 95–110)
CO2 SERPL-SCNC: 19 MMOL/L (ref 23–29)
CO2 SERPL-SCNC: NORMAL MMOL/L (ref 23–29)
CREAT SERPL-MCNC: 0.6 MG/DL (ref 0.5–1.4)
CREAT SERPL-MCNC: 0.7 MG/DL (ref 0.5–1.4)
CREAT SERPL-MCNC: NORMAL MG/DL (ref 0.5–1.4)
DIFFERENTIAL METHOD: ABNORMAL
EOSINOPHIL # BLD AUTO: 0.1 K/UL (ref 0–0.5)
EOSINOPHIL NFR BLD: 0.7 % (ref 0–8)
ERYTHROCYTE [DISTWIDTH] IN BLOOD BY AUTOMATED COUNT: 14.5 % (ref 11.5–14.5)
EST. GFR  (AFRICAN AMERICAN): NORMAL ML/MIN/1.73 M^2
EST. GFR  (NO RACE VARIABLE): >60 ML/MIN/1.73 M^2
EST. GFR  (NO RACE VARIABLE): NORMAL ML/MIN/1.73 M^2
EST. GFR  (NON AFRICAN AMERICAN): NORMAL ML/MIN/1.73 M^2
GLUCOSE SERPL-MCNC: 114 MG/DL (ref 70–110)
GLUCOSE SERPL-MCNC: 114 MG/DL (ref 70–110)
GLUCOSE SERPL-MCNC: NORMAL MG/DL (ref 70–110)
HCG INTACT+B SERPL-ACNC: NORMAL MIU/ML
HCT VFR BLD AUTO: 37.8 % (ref 37–48.5)
HCT VFR BLD CALC: 42 %PCV (ref 36–54)
HCV AB SERPL QL IA: NORMAL
HGB BLD-MCNC: 12.8 G/DL (ref 12–16)
HIV 1+2 AB+HIV1 P24 AG SERPL QL IA: NORMAL
IMM GRANULOCYTES # BLD AUTO: 0.12 K/UL (ref 0–0.04)
IMM GRANULOCYTES NFR BLD AUTO: 0.7 % (ref 0–0.5)
LYMPHOCYTES # BLD AUTO: 1.3 K/UL (ref 1–4.8)
LYMPHOCYTES NFR BLD: 8.2 % (ref 18–48)
MAGNESIUM SERPL-MCNC: 1.8 MG/DL (ref 1.6–2.6)
MCH RBC QN AUTO: 30.7 PG (ref 27–31)
MCHC RBC AUTO-ENTMCNC: 33.9 G/DL (ref 32–36)
MCV RBC AUTO: 91 FL (ref 82–98)
MONOCYTES # BLD AUTO: 1.1 K/UL (ref 0.3–1)
MONOCYTES NFR BLD: 6.7 % (ref 4–15)
NEUTROPHILS # BLD AUTO: 13.3 K/UL (ref 1.8–7.7)
NEUTROPHILS NFR BLD: 83.1 % (ref 38–73)
NRBC BLD-RTO: 0 /100 WBC
PLATELET # BLD AUTO: 315 K/UL (ref 150–450)
PMV BLD AUTO: 10.3 FL (ref 9.2–12.9)
POC IONIZED CALCIUM: 1.06 MMOL/L (ref 1.06–1.42)
POC TCO2 (MEASURED): 17 MMOL/L (ref 23–29)
POTASSIUM BLD-SCNC: 3.6 MMOL/L (ref 3.5–5.1)
POTASSIUM SERPL-SCNC: 3.6 MMOL/L (ref 3.5–5.1)
POTASSIUM SERPL-SCNC: NORMAL MMOL/L (ref 3.5–5.1)
RBC # BLD AUTO: 4.17 M/UL (ref 4–5.4)
SAMPLE: ABNORMAL
SODIUM BLD-SCNC: 140 MMOL/L (ref 136–145)
SODIUM SERPL-SCNC: 140 MMOL/L (ref 136–145)
SODIUM SERPL-SCNC: NORMAL MMOL/L (ref 136–145)
WBC # BLD AUTO: 16.01 K/UL (ref 3.9–12.7)

## 2023-09-15 PROCEDURE — 96375 TX/PRO/DX INJ NEW DRUG ADDON: CPT

## 2023-09-15 PROCEDURE — 99285 EMERGENCY DEPT VISIT HI MDM: CPT | Mod: 25

## 2023-09-15 PROCEDURE — 87389 HIV-1 AG W/HIV-1&-2 AB AG IA: CPT | Performed by: PHYSICIAN ASSISTANT

## 2023-09-15 PROCEDURE — 63600175 PHARM REV CODE 636 W HCPCS: Performed by: PHYSICIAN ASSISTANT

## 2023-09-15 PROCEDURE — 80048 BASIC METABOLIC PNL TOTAL CA: CPT | Performed by: PHYSICIAN ASSISTANT

## 2023-09-15 PROCEDURE — 96376 TX/PRO/DX INJ SAME DRUG ADON: CPT

## 2023-09-15 PROCEDURE — 85025 COMPLETE CBC W/AUTO DIFF WBC: CPT | Performed by: PHYSICIAN ASSISTANT

## 2023-09-15 PROCEDURE — 80048 BASIC METABOLIC PNL TOTAL CA: CPT

## 2023-09-15 PROCEDURE — 83735 ASSAY OF MAGNESIUM: CPT | Performed by: PHYSICIAN ASSISTANT

## 2023-09-15 PROCEDURE — 86803 HEPATITIS C AB TEST: CPT | Performed by: PHYSICIAN ASSISTANT

## 2023-09-15 PROCEDURE — 96374 THER/PROPH/DIAG INJ IV PUSH: CPT

## 2023-09-15 RX ORDER — NALOXONE HCL 0.4 MG/ML
0.4 VIAL (ML) INJECTION
Status: DISCONTINUED | OUTPATIENT
Start: 2023-09-15 | End: 2023-09-16 | Stop reason: HOSPADM

## 2023-09-15 RX ORDER — FENTANYL CITRATE 50 UG/ML
50 INJECTION, SOLUTION INTRAMUSCULAR; INTRAVENOUS EVERY 30 MIN PRN
Status: COMPLETED | OUTPATIENT
Start: 2023-09-15 | End: 2023-09-15

## 2023-09-15 RX ORDER — ONDANSETRON 2 MG/ML
4 INJECTION INTRAMUSCULAR; INTRAVENOUS
Status: COMPLETED | OUTPATIENT
Start: 2023-09-15 | End: 2023-09-15

## 2023-09-15 RX ORDER — HYDROMORPHONE HYDROCHLORIDE 1 MG/ML
0.5 INJECTION, SOLUTION INTRAMUSCULAR; INTRAVENOUS; SUBCUTANEOUS
Status: COMPLETED | OUTPATIENT
Start: 2023-09-15 | End: 2023-09-15

## 2023-09-15 RX ORDER — IBUPROFEN 800 MG/1
800 TABLET ORAL EVERY 6 HOURS PRN
Qty: 20 TABLET | Refills: 0 | Status: ON HOLD | OUTPATIENT
Start: 2023-09-15 | End: 2023-09-19 | Stop reason: HOSPADM

## 2023-09-15 RX ORDER — HYDROMORPHONE HYDROCHLORIDE 1 MG/ML
1 INJECTION, SOLUTION INTRAMUSCULAR; INTRAVENOUS; SUBCUTANEOUS
Status: COMPLETED | OUTPATIENT
Start: 2023-09-15 | End: 2023-09-15

## 2023-09-15 RX ORDER — OXYCODONE AND ACETAMINOPHEN 5; 325 MG/1; MG/1
1 TABLET ORAL EVERY 4 HOURS PRN
Qty: 18 TABLET | Refills: 0 | Status: ON HOLD | OUTPATIENT
Start: 2023-09-15 | End: 2023-09-19 | Stop reason: HOSPADM

## 2023-09-15 RX ORDER — FENTANYL CITRATE 50 UG/ML
100 INJECTION, SOLUTION INTRAMUSCULAR; INTRAVENOUS
Status: DISCONTINUED | OUTPATIENT
Start: 2023-09-15 | End: 2023-09-15

## 2023-09-15 RX ORDER — METHOCARBAMOL 750 MG/1
1500 TABLET, FILM COATED ORAL EVERY 8 HOURS PRN
Qty: 24 TABLET | Refills: 0 | Status: ON HOLD | OUTPATIENT
Start: 2023-09-15 | End: 2023-09-19 | Stop reason: HOSPADM

## 2023-09-15 RX ORDER — FENTANYL CITRATE 50 UG/ML
50 INJECTION, SOLUTION INTRAMUSCULAR; INTRAVENOUS
Status: COMPLETED | OUTPATIENT
Start: 2023-09-15 | End: 2023-09-15

## 2023-09-15 RX ADMIN — HYDROMORPHONE HYDROCHLORIDE 1 MG: 1 INJECTION, SOLUTION INTRAMUSCULAR; INTRAVENOUS; SUBCUTANEOUS at 04:09

## 2023-09-15 RX ADMIN — ONDANSETRON 4 MG: 2 INJECTION INTRAMUSCULAR; INTRAVENOUS at 03:09

## 2023-09-15 RX ADMIN — FENTANYL CITRATE 50 MCG: 50 INJECTION INTRAMUSCULAR; INTRAVENOUS at 08:09

## 2023-09-15 RX ADMIN — HYDROMORPHONE HYDROCHLORIDE 0.5 MG: 1 INJECTION, SOLUTION INTRAMUSCULAR; INTRAVENOUS; SUBCUTANEOUS at 06:09

## 2023-09-15 RX ADMIN — HYDROMORPHONE HYDROCHLORIDE 0.5 MG: 1 INJECTION, SOLUTION INTRAMUSCULAR; INTRAVENOUS; SUBCUTANEOUS at 03:09

## 2023-09-15 NOTE — ED NOTES
I-STAT Chem-8+ Results:   Value Reference Range   Sodium 140 136-145 mmol/L   Potassium  3.6 3.5-5.1 mmol/L   Chloride 111  mmol/L   Ionized Calcium 1.05 1.06-1.42 mmol/L   CO2 (measured) 17 23-29 mmol/L   Glucose 114  mg/dL   BUN 14 6-30 mg/dL   Creatinine 0.6 0.5-1.4 mg/dL   Hematocrit 42 36-54%

## 2023-09-15 NOTE — ED PROVIDER NOTES
Encounter Date: 9/15/2023       History     Chief Complaint   Patient presents with    Arm Injury     Right arm. Trip and fall. Possible dislocation.      3:08 PM  Patient is a 42-year-old female with a history of migraines, pseudotumor cerebri, who presents to [facility] ED via EMS for emergent evaluation of right elbow pain status post fall.    Trip and fall at approximately 1315 today.  She denies prodromal symptoms. States that she hit counter and then the ground.  Landed on her right side/ Denies any head trauma.  Endorses most pain to her right upper arm and elbow with decreased range of motion. EMS gave patient fentanyl IM and IV. She denies blood thinner use. Denies other pain elsewhere including neck, back, head, chest, abd, and other extremities. Denies paresthesias.   She's left handed.         Review of patient's allergies indicates:  No Known Allergies  Past Medical History:   Diagnosis Date    Abnormal Pap smear     Migraines     Pneumonia     as  a  child     Pseudotumor cerebri 2016     Past Surgical History:   Procedure Laterality Date    ABSCESS DRAINAGE      x 3     addenoids      ANKLE ARTHROSCOPY W/ OPEN REPAIR Right     CERVICAL BIOPSY  W/ LOOP ELECTRODE EXCISION      CHOLECYSTECTOMY      INCISION AND DRAINAGE OF ABSCESS N/A 8/10/2022    Procedure: INCISION AND DRAINAGE, ABSCESS VULVAR;  Surgeon: Melody Rothman MD;  Location: [facility] OR;  Service: OB/GYN;  Laterality: N/A;    POSTPARTUM LIGATION OF FALLOPIAN TUBE Bilateral 6/12/2018    Procedure: LIGATION, FALLOPIAN TUBE, POSTPARTUM;  Surgeon: Melody Rothman MD;  Location: [facility] OR;  Service: OB/GYN;  Laterality: Bilateral;    tonsilectomy       Family History   Problem Relation Age of Onset    Fibromyalgia Mother     Cancer Maternal Grandmother         breast    Hyperlipidemia Sister     Hypertension Sister      Social History     Tobacco Use    Smoking status: Every Day     Current packs/day: 0.50     Average packs/day: 0.5  packs/day for 24.0 years (12.0 ttl pk-yrs)     Types: Cigarettes    Smokeless tobacco: Never   Substance Use Topics    Alcohol use: Yes     Comment: occas.    Drug use: No     Review of Systems   Constitutional:  Positive for activity change. Negative for appetite change and fever.   HENT:  Negative for sore throat.    Respiratory:  Negative for shortness of breath.    Cardiovascular:  Negative for chest pain.   Gastrointestinal:  Negative for nausea.   Genitourinary:  Negative for dysuria.   Musculoskeletal:  Positive for arthralgias. Negative for back pain.   Skin:  Negative for rash.   Neurological:  Negative for weakness.   Hematological:  Does not bruise/bleed easily.       Physical Exam     Initial Vitals [09/15/23 1440]   BP Pulse Resp Temp SpO2   128/78 69 18 98.5 °F (36.9 °C) 95 %      MAP       --         Physical Exam    Vitals reviewed.  Constitutional: She appears well-developed and well-nourished. She is not diaphoretic. She is cooperative.  Non-toxic appearance. She does not have a sickly appearance. She does not appear ill. No distress.   HENT:   Head: Normocephalic and atraumatic.   Nose: Nose normal.   Mouth/Throat: No trismus in the jaw.   Eyes: Conjunctivae and EOM are normal.   Neck:   Normal range of motion.  Cardiovascular:  Normal rate.           Pulses:       Radial pulses are 2+ on the right side.   Pulmonary/Chest: No accessory muscle usage. No tachypnea. No respiratory distress.   Abdominal: She exhibits no distension.   Musculoskeletal:      Right shoulder: Tenderness present. Decreased range of motion.      Right elbow: Decreased range of motion. Tenderness present.      Right forearm: Tenderness present.      Right wrist: No tenderness or bony tenderness. Normal range of motion.      Right hand: No tenderness or bony tenderness. Normal range of motion. Normal strength.      Cervical back: Normal range of motion.     Neurological: She is alert. She has normal strength.   Recalls events  from today. Provides full history. Follows commands.    Skin: Skin is warm and dry. No erythema. No pallor.   Skin intact. No blood noted anywhere.          ED Course   Procedures  Labs Reviewed   CBC W/ AUTO DIFFERENTIAL - Abnormal; Notable for the following components:       Result Value    WBC 16.01 (*)     Immature Granulocytes 0.7 (*)     Gran # (ANC) 13.3 (*)     Immature Grans (Abs) 0.12 (*)     Mono # 1.1 (*)     Gran % 83.1 (*)     Lymph % 8.2 (*)     All other components within normal limits    Narrative:     Release to patient->Immediate   BASIC METABOLIC PANEL - Abnormal; Notable for the following components:    Chloride 111 (*)     CO2 19 (*)     Glucose 114 (*)     All other components within normal limits   ISTAT PROCEDURE - Abnormal; Notable for the following components:    POC Glucose 114 (*)     POC Chloride 111 (*)     POC TCO2 (MEASURED) 17 (*)     All other components within normal limits   HIV 1 / 2 ANTIBODY    Narrative:     Release to patient->Immediate   HEPATITIS C ANTIBODY    Narrative:     Release to patient->Immediate   BASIC METABOLIC PANEL    Narrative:     Release to patient->Immediate   HCG, QUANTITATIVE    Narrative:     Release to patient->Immediate   MAGNESIUM          Imaging Results               CT Arm (Humerus) Without Contrast Right (Final result)  Result time 09/16/23 00:58:19      Final result by Tony Leal MD (09/16/23 00:58:19)                   Impression:      Multipart comminuted proximal and distal is right humeral fracture with butterfly end vertical component.    No evidence of hematoma.    No evidence of glenohumeral or elbow involvement.    This report was flagged in Epic as abnormal.      Electronically signed by: Tony Leal  Date:    09/16/2023  Time:    00:58               Narrative:    EXAMINATION:  CT ARM (HUMERUS) WITHOUT CONTRAST RIGHT    CLINICAL HISTORY:  Fracture, humerus;    TECHNIQUE:  Axial CT images of the right humerus with sagittal  and coronal reformatted images were performed.  No IV contrast was administered.  3D reconstructions were performed on a workstation and are presented for interpretation.    COMPARISON:  None    FINDINGS:  Comminuted proximal and distal diaphyseal fracture with vertical butterfly component of the right humerus is noted.  There is no humeral head or condylar involvement is seen proximal or distally respectively.  No dislocation at the glenohumeral or elbow joint evident as imaged.  Angulation with valgus is configuration at the proximal fracture component.  No significant surrounding hematoma.  Is no muscular enlargement.  No subcutaneous edema.                                       X-Ray Humerus 2 View Right (Final result)  Result time 09/15/23 21:49:24      Final result by Albert Victor MD (09/15/23 21:49:24)                   Impression:      As above.      Electronically signed by: Albert Victor MD  Date:    09/15/2023  Time:    21:49               Narrative:    EXAMINATION:  XR HUMERUS 2 VIEW RIGHT    CLINICAL HISTORY:  Post splint placement;    TECHNIQUE:  Frontal and lateral radiographs of the right humerus.    COMPARISON:  09/15/2023.    FINDINGS:  The bone mineralization is within normal limits.  There is an acute comminuted and displaced spiral fracture of the humeral diaphysis of the right humerus.  There is mild improvement in the fracture displacement on this post reduction radiographs.  No additional fractures identified.                                       X-Ray Forearm Right (Final result)  Result time 09/15/23 18:32:53      Final result by Chuck Cormier MD (09/15/23 18:32:53)                   Impression:      1. No convincing acute displaced fracture or dislocation of the forearm allowing for positioning.  Please see above.      Electronically signed by: Chuck Cormier MD  Date:    09/15/2023  Time:    18:32               Narrative:    EXAMINATION:  XR FOREARM RIGHT    CLINICAL  HISTORY:  Unspecified fall, initial encounter    TECHNIQUE:  AP and lateral views of the right forearm were performed.    COMPARISON:  None    FINDINGS:  Three views right forearm.    Please note, positioning is limited secondary to humeral fracture.  Allowing for this, no convincing acute displaced fracture or dislocation of the forearm.  The elbow appears intact given positioning.                                       X-Ray Elbow Complete Right (Final result)  Result time 09/15/23 18:23:53      Final result by Chuck Cormier MD (09/15/23 18:23:53)                   Impression:      1. Suboptimal positioning secondary to distal humeral fracture.  No definite acute displaced fracture or dislocation of the elbow however if concern remains for the same, consider reimaging with appropriate positioning once patient is able to tolerate the exam.  Please see separately dictated report for details of the shoulder and right humerus.      Electronically signed by: Chuck Cormier MD  Date:    09/15/2023  Time:    18:23               Narrative:    EXAMINATION:  XR ELBOW COMPLETE 3 VIEW RIGHT    CLINICAL HISTORY:  . Unspecified fall, initial encounter    TECHNIQUE:  AP, lateral, and oblique views of the right elbow were performed.    COMPARISON:  None    FINDINGS:  Three views right elbow.    Please note, positioning is suboptimal secondary to humeral fracture, the humeral fracture is described in separate report.  Allowing for current positioning, no convincing acute displaced fracture or dislocation of the elbow.  No radiopaque foreign body.                                       X-Ray Humerus 2 View Right (Final result)  Result time 09/15/23 18:25:25      Final result by Chuck Cormier MD (09/15/23 18:25:25)                   Impression:      1. Complex fracture involving the humerus as described.  No acute displaced fracture or dislocation of the shoulder.      Electronically signed by: Chuck Cormier  MD  Date:    09/15/2023  Time:    18:25               Narrative:    EXAMINATION:  XR SHOULDER COMPLETE 2 OR MORE VIEWS RIGHT; XR HUMERUS 2 VIEW RIGHT    CLINICAL HISTORY:  Unspecified fall, initial encounter    TECHNIQUE:  Two or three views of the right shoulder were performed.  Two views right humerus.    COMPARISON:  None    FINDINGS:  Three views right shoulder, two views right humerus.    The right humeral head maintains appropriate relationship with the glenoid.  The acromioclavicular joint is intact.  No acute displaced right rib fracture.  The right lung zones are clear.  There is a comminuted displaced and angulated fracture involving the humeral diaphysis.                                       X-Ray Shoulder Complete 2 View Right (Final result)  Result time 09/15/23 18:25:25      Final result by Chuck Cormier MD (09/15/23 18:25:25)                   Impression:      1. Complex fracture involving the humerus as described.  No acute displaced fracture or dislocation of the shoulder.      Electronically signed by: Chuck Cormier MD  Date:    09/15/2023  Time:    18:25               Narrative:    EXAMINATION:  XR SHOULDER COMPLETE 2 OR MORE VIEWS RIGHT; XR HUMERUS 2 VIEW RIGHT    CLINICAL HISTORY:  Unspecified fall, initial encounter    TECHNIQUE:  Two or three views of the right shoulder were performed.  Two views right humerus.    COMPARISON:  None    FINDINGS:  Three views right shoulder, two views right humerus.    The right humeral head maintains appropriate relationship with the glenoid.  The acromioclavicular joint is intact.  No acute displaced right rib fracture.  The right lung zones are clear.  There is a comminuted displaced and angulated fracture involving the humeral diaphysis.                                       Medications   HYDROmorphone injection 0.5 mg (0.5 mg Intravenous Given 9/15/23 1534)   ondansetron injection 4 mg (4 mg Intravenous Given 9/15/23 1536)   HYDROmorphone injection  1 mg (1 mg Intravenous Given 9/15/23 1640)   HYDROmorphone injection 0.5 mg (0.5 mg Intravenous Given 9/15/23 1823)   fentaNYL 50 mcg/mL injection 50 mcg (50 mcg Intravenous Given 9/15/23 2032)   fentaNYL 50 mcg/mL injection 50 mcg (50 mcg Intravenous Given 9/15/23 2052)   oxyCODONE immediate release tablet 10 mg (10 mg Oral Given 9/16/23 0103)     Medical Decision Making  Patient is a 42-year-old female with a history of migraines, pseudotumor cerebri, who presents to [facility] ED via EMS for emergent evaluation of right elbow pain status post fall.    DDx includes but is not limited to mechanical fall given no preceding symptoms, fractures, dislocations, like closed since no open skin. C spine, face, chest wall, and abd wo TTP. Dagoberto LE with intact ROM and no bony tenderness. Doubt injury to these areas.    Since high suspicion of upper arm fracture, we will obtain basis labs, continue to give medication for acute pain, and continue to monitor.     Amount and/or Complexity of Data Reviewed  Labs: ordered. Decision-making details documented in ED Course.  Radiology: ordered. Decision-making details documented in ED Course.    Risk  Prescription drug management.  Parenteral controlled substances.               ED Course as of 09/16/23 1229   Fri Sep 15, 2023   1614 WBC(!): 16.01 [CL]   1614 Hemoglobin: 12.8 [CL]   1614 Platelets: 315 [CL]   1656 Nurse notified me of abnormal CMP. Will resend labs to confirm. [CL]   1830 X-Ray Shoulder Complete 2 View Right [CL]   1830 X-Ray Humerus 2 View Right  There is a comminuted displaced and angulated fracture involving the humeral diaphysis. [CL]   1830 X-Ray Elbow Complete Right  No definite acute displaced fracture or dislocation of the elbow however if concern remains for the same, consider reimaging with appropriate positioning once patient is able to tolerate the exam.   [CL]   1845 Ortho consulted. They will evaluated and give recommendations.  [CL]   1845 Creatinine: 0.7 [CL]    1845 Calcium: 8.8 [CL]   1845 Potassium: 3.6 [CL]   1845 Labs on istatchem 8 and BMP wnl. Likely CMP erroneous.  [CL]   1846 Magnesium : 1.8 [CL]   2050 Patient did have moderate to severe pain during the procedure. Fetanyl 50 mcg x3 given for splint placement. SpO2 sats maintained >97% the entire time. She tolerated the procedure well. See Ortho's consult and procedure note. [CL]      ED Course User Index  [CL] Amanda Lopez PA-C          11:00 PM  Patient will be signed out to remaining ED team pending CT scan. I discussed pain regiment with patient thoroughly. Rx sent to pharmacy of choice. I will call her tomorrow to see if her medication is helping her. See progress note.       12:30 PM on 9/16/2023  I called patient this afternoon. She is doing well. Was able to sleep OK after discharge. Picked up medications this morning but didn't have to use any of them yet as she has no pain at rest. I will touch base with her again tomorrow to make sure medication Rx helps for refractory pain.         I have reviewed patient's chart and discussed this case with my supervising MD.     Amanda Lopez PA-C  Emergent Department  [facility]  Spectralink #[id number] or #[id number]    Clinical Impression:   Final diagnoses:  [W19.XXXA] Fall (Primary)  [S42.351A] Closed displaced comminuted fracture of shaft of right humerus, initial encounter        ED Disposition Condition    Discharge Stable          ED Prescriptions       Medication Sig Dispense Start Date End Date Auth. Provider    ibuprofen (ADVIL,MOTRIN) 800 MG tablet Take 1 tablet (800 mg total) by mouth every 6 (six) hours as needed for Pain. 20 tablet 9/15/2023 -- Amanda Lopez PA-C    methocarbamoL (ROBAXIN) 750 MG Tab Take 2 tablets (1,500 mg total) by mouth every 8 (eight) hours as needed. 24 tablet 9/15/2023 -- Amanda Lopez PA-C    oxyCODONE-acetaminophen (PERCOCET) 5-325 mg per tablet Take 1 tablet by mouth every 4 (four) hours as needed for Pain. 18 tablet  9/15/2023 -- Amanda Lopez PA-C          Follow-up Information       Follow up With Specialties Details Why Contact Info Additional Information    [facility] 5th Fl Orthopedics Schedule an appointment as soon as possible for a visit   [address], [address]  [facility] [address] [phone] [facility] - Main Building, 5th Floor Please park in [facility] and take Atrium elevator    [facility] - Emergency Dept Emergency Medicine  If symptoms worsen [address]  [facility] [id number] [phone]           Future Appointments   Date Time Provider Department Center   10/5/2023 11:15 AM Madhuri Calvin NP [facility] NEURO [facility]          Amanda Lopez PA-C  09/16/23 1232

## 2023-09-15 NOTE — ED NOTES
Patient identifiers verified and correct for  Tabby Veloz  LOC: The patient is awake, alert and aware of environment with an appropriate affect, the patient is oriented x 3 and speaking appropriately.   APPEARANCE: Patient appears comfortable and in no acute distress, patient is clean and well groomed.  SKIN: The skin is warm and dry, color consistent with ethnicity, patient has normal skin turgor and moist mucus membranes, skin intact, no breakdown or bruising noted.   MUSCULOSKELETAL: Patient moving all extremities spontaneously, swelling noted to right upper arm. Pt reports pain to right elbow  RESPIRATORY: Airway is open and patent, respirations are spontaneous, patient has a normal effort and rate, no accessory muscle use noted, pt placed on continuous pulse ox with O2 sats noted at 97% on room air.  CARDIAC: Pt placed on cardiac monitor. Patient has a normal rate and regular rhythm, no edema noted, capillary refill < 3 seconds.   GASTRO: Soft and non tender to palpation, no distention noted, normoactive bowel sounds present in all four quadrants. Pt states bowel movements have been regular.  : Pt denies any pain or frequency with urination.  NEURO: Pt opens eyes spontaneously, behavior appropriate to situation, follows commands, facial expression symmetrical, bilateral hand grasp equal and even, purposeful motor response noted, normal sensation in all extremities when touched with a finger.

## 2023-09-16 VITALS
DIASTOLIC BLOOD PRESSURE: 65 MMHG | OXYGEN SATURATION: 99 % | RESPIRATION RATE: 18 BRPM | SYSTOLIC BLOOD PRESSURE: 111 MMHG | HEART RATE: 68 BPM | WEIGHT: 272 LBS | TEMPERATURE: 99 F | BODY MASS INDEX: 42.6 KG/M2

## 2023-09-16 PROCEDURE — 25000003 PHARM REV CODE 250: Performed by: STUDENT IN AN ORGANIZED HEALTH CARE EDUCATION/TRAINING PROGRAM

## 2023-09-16 RX ORDER — OXYCODONE HYDROCHLORIDE 5 MG/1
10 TABLET ORAL
Status: COMPLETED | OUTPATIENT
Start: 2023-09-16 | End: 2023-09-16

## 2023-09-16 RX ADMIN — OXYCODONE HYDROCHLORIDE 10 MG: 5 TABLET ORAL at 01:09

## 2023-09-16 NOTE — PROVIDER PROGRESS NOTES - EMERGENCY DEPT.
ED [place] of Care Note  1:00 AM 9/16/2023  Tabby Veloz is a 42 y.o. female who presented to the ED on 9/15/2023 and has been managed by Adilene Weiss, who reports patient C/O arm injury. I assumed care of patient from off-going ED physician team at 1:00 AM pending CT.    On my evaluation, Tabby Veloz appears well and is hemodynamically stable. Thus far, Tabby Veloz has received:  Medications   naloxone 0.4 mg/mL injection 0.4 mg (has no administration in time range)   oxyCODONE immediate release tablet 10 mg (has no administration in time range)   HYDROmorphone injection 0.5 mg (0.5 mg Intravenous Given 9/15/23 1534)   ondansetron injection 4 mg (4 mg Intravenous Given 9/15/23 1536)   HYDROmorphone injection 1 mg (1 mg Intravenous Given 9/15/23 1640)   HYDROmorphone injection 0.5 mg (0.5 mg Intravenous Given 9/15/23 1823)   fentaNYL 50 mcg/mL injection 50 mcg (50 mcg Intravenous Given 9/15/23 2032)   fentaNYL 50 mcg/mL injection 50 mcg (50 mcg Intravenous Given 9/15/23 2052)       On my exam, I appreciate:  /79   Pulse 76   Temp 98.2 °F (36.8 °C) (Oral)   Resp 18   Wt 123.4 kg (272 lb)   SpO2 99%   BMI 42.60 kg/m²     ED Course as of 09/16/23 0100   Fri Sep 15, 2023   1614 WBC(!): 16.01 [CL]   1614 Hemoglobin: 12.8 [CL]   1614 Platelets: 315 [CL]   1656 Nurse notified me of abnormal CMP. Will resend labs to confirm. [CL]   1830 X-Ray Shoulder Complete 2 View Right [CL]   1830 X-Ray Humerus 2 View Right  There is a comminuted displaced and angulated fracture involving the humeral diaphysis. [CL]   1830 X-Ray Elbow Complete Right  No definite acute displaced fracture or dislocation of the elbow however if concern remains for the same, consider reimaging with appropriate positioning once patient is able to tolerate the exam.   [CL]   1845 Ortho consulted. They will evaluated and give recommendations.  [CL]   1845 Creatinine: 0.7 [CL]   1845 Calcium: 8.8 [CL]   1845 Potassium: 3.6 [CL]   1845  Labs on istatchem 8 and BMP wnl. Likely CMP erroneous.  [CL]   1846 Magnesium : 1.8 [CL]   2050 Patient did have moderate to severe pain during the procedure. Fetanyl 50 mcg x3 given for splint placement. SpO2 sats maintained >97% the entire time.  [CL]      ED Course User Index  [CL] Amanda Lopez PA-C        Additional ED course:  Orthopedics evaluated patient, brace was applied to the affected extremity. Patient alert. Patient discharged with multimodal pain regimen, ortho follow up    Disposition: Discharged  I have discussed and counseled Tabby Veloz regarding exam, results, diagnosis, treatment, and plan.  ______________________  Ian Toribio MD   Emergency Medicine Physician  9/16/2023

## 2023-09-16 NOTE — H&P (VIEW-ONLY)
[facility]  Orthopedics  Consult Note    Patient Name: Tabby Veloz  MRN: [id number]  Admission Date: 9/15/2023  Hospital Length of Stay: 0 days  Attending Provider: Lakshmi Rueda,*  Primary Care Provider: Vishal Fish MD      Inpatient consult to Orthopedic Surgery  Consult performed by: Murtaza Stephens MD  Consult ordered by: Amanda Lopez PA-C        Subjective:     Principal Problem:<principal problem not specified>    Chief Complaint:   Chief Complaint   Patient presents with    Arm Injury     Right arm. Trip and fall. Possible dislocation.         HPI: Tabby Veloz is a 42 y.o. female with PMH significant for obesity presenting with right arm pain after she fell at home. She states she tripped and hit the counter and landed on her right arm. She had severe arm pain at that time. Patient denies any head trauma or LOC. The patient denies prior hx of falls. Patient denies numbness and tingling. Denies any other musculoskeletal pain or injuries. No known history of prior right arm injury or surgery. Xray of right humerus consistent with right humeral shaft fracture. Orthopaedics consulted for fracture and recommendations.      Past Medical History:   Diagnosis Date    Abnormal Pap smear     Migraines     Pneumonia     as  a  child     Pseudotumor cerebri 2016       Past Surgical History:   Procedure Laterality Date    ABSCESS DRAINAGE      x 3     addenoids      ANKLE ARTHROSCOPY W/ OPEN REPAIR Right     CERVICAL BIOPSY  W/ LOOP ELECTRODE EXCISION      CHOLECYSTECTOMY      INCISION AND DRAINAGE OF ABSCESS N/A 8/10/2022    Procedure: INCISION AND DRAINAGE, ABSCESS VULVAR;  Surgeon: Melody Rothman MD;  Location: [facility] OR;  Service: OB/GYN;  Laterality: N/A;    POSTPARTUM LIGATION OF FALLOPIAN TUBE Bilateral 6/12/2018    Procedure: LIGATION, FALLOPIAN TUBE, POSTPARTUM;  Surgeon: Melody Rothman MD;  Location: [facility] OR;  Service: OB/GYN;   Laterality: Bilateral;    tonsilectomy         Review of patient's allergies indicates:  No Known Allergies    Current Facility-Administered Medications   Medication    naloxone 0.4 mg/mL injection 0.4 mg     Current Outpatient Medications   Medication Sig    acetaZOLAMIDE (DIAMOX) 500 mg CpSR Take 1 capsule (500 mg total) by mouth 2 (two) times daily.    buPROPion (WELLBUTRIN) 100 MG tablet Take 100 mg by mouth 2 (two) times daily.    cetirizine (ZYRTEC) 10 MG tablet Take 10 mg by mouth once daily.    cranberry fruit extract (CRANBERRY ORAL) Take 2 tablets by mouth once daily.    ibuprofen (ADVIL,MOTRIN) 800 MG tablet Take 1 tablet (800 mg total) by mouth every 6 (six) hours as needed for Pain.    methocarbamoL (ROBAXIN) 750 MG Tab Take 2 tablets (1,500 mg total) by mouth every 8 (eight) hours as needed.    minocycline (MINOCIN,DYNACIN) 100 MG capsule Take 100 mg by mouth once daily.    oxyCODONE-acetaminophen (PERCOCET) 5-325 mg per tablet Take 1 tablet by mouth every 4 (four) hours as needed for Pain.    topiramate (TOPAMAX) 100 MG tablet Take 1 tablet (100 mg total) by mouth 2 (two) times daily.     Family History       Problem Relation (Age of Onset)    Cancer Maternal Grandmother    Fibromyalgia Mother    Hyperlipidemia Sister    Hypertension Sister          Tobacco Use    Smoking status: Every Day     Current packs/day: 0.50     Average packs/day: 0.5 packs/day for 24.0 years (12.0 ttl pk-yrs)     Types: Cigarettes    Smokeless tobacco: Never   Substance and Sexual Activity    Alcohol use: Yes     Comment: occas.    Drug use: No    Sexual activity: Yes     Partners: Male     Birth control/protection: None, See Surgical Hx     ROS  Constitutional: negative for fevers  Eyes: negative visual changes  ENT: negative for hearing loss  Respiratory: negative for dyspnea  Cardiovascular: negative for chest pain  Gastrointestinal: negative for abdominal pain  Genitourinary: negative for  dysuria  Neurological: negative for headaches  Behavioral/Psych: negative for hallucinations  Endocrine: negative for temperature intolerance  Objective:     Vital Signs (Most Recent):  Temp: 98.5 °F (36.9 °C) (09/15/23 1440)  Pulse: 86 (09/15/23 2050)  Resp: (!) 22 (09/15/23 2052)  BP: 133/74 (09/15/23 2050)  SpO2: 99 % (09/15/23 2050) Vital Signs (24h Range):  Temp:  [98.5 °F (36.9 °C)] 98.5 °F (36.9 °C)  Pulse:  [69-86] 86  Resp:  [18-22] 22  SpO2:  [95 %-99 %] 99 %  BP: (111-135)/(62-78) 133/74     Weight: 123.4 kg (272 lb)     Body mass index is 42.6 kg/m².    No intake or output data in the 24 hours ending 09/15/23 2249     Ortho/SPM ExamGeneral:  no acute distress, appears stated age   Neuro: alert and oriented x3  Psych: normal mood  Head: normocephalic, atraumatic.  Eyes: no scleral icterus  Mouth: moist mucous membranes  Cardiovascular: extremities warm and well perfused  Lungs: breathing comfortably, equal chest rise bilat  Skin: clean, dry, intact (any exceptions noted in below musculoskeletal exam)     MSK:  RUE:  - Skin intact throughout, no open wounds  - Moderate swelling right arm  - No ecchymosis, erythema, or signs of cellulitis  - Very TTP over humerus   - AROM and PROM of the wrist, and hand intact without pain  - ROM of the shoulder and elbow deferred due to known fracture  - Axillary/AIN/PIN/Radial/Median/Ulnar Nerves assessed in isolation without deficit  - SILT throughout  - Compartments soft  - Radial artery palpated   - Capillary Refill <3s     LUE:  - Skin intact throughout, no open wounds  - No swelling  - No ecchymosis, erythema, or signs of cellulitis  - NonTTP throughout  - AROM and PROM of the shoulder, elbow, wrist, and hand intact without pain  - Axillary/AIN/PIN/Radial/Median/Ulnar Nerves assessed in isolation without deficit  - SILT throughout  - Compartments soft  - Radial artery palpated   - Capillary Refill <3s     RLE:  - Skin intact throughout, no open wounds  - No  swelling  - No ecchymosis, erythema, or signs of cellulitis  - NonTTP throughout  - AROM and PROM of the hip, knee, ankle, and foot intact without pain  - TA/EHL/Gastroc/FHL assessed in isolation without deficit  - SILT throughout  - Compartments soft  - DP and PT palpated  - Capillary Refill <3s  - Negative [facility]  - Negative [facility]     LLE:  - Skin intact throughout, no open wounds  - No swelling  - No ecchymosis, erythema, or signs of cellulitis  - NonTTP throughout  - AROM and PROM of the hip, knee, ankle, and foot intact without pain  - TA/EHL/Gastroc/FHL assessed in isolation without deficit  - SILT throughout  - Compartments soft  - DP and PT palpated  - Capillary Refill <3s  - Negative Log roll  - [facility]     Significant Labs: All pertinent labs within the past 24 hours have been reviewed.     Significant Imaging: I have reviewed and interpreted all pertinent imaging results/findings.  XR right humerus: spiral comminuted humeral shaft fracture in valgus       Assessment/Plan:     Closed fracture of shaft of right humerus  Tabby Veloz is a 42 y.o. female with PMH of obesity presenting with right humeral shaft fracture after a ground level fall. Fracture closed, NVI. She was placed in a well padded coapt splint and sling with abduction pillow. CT scan pending to evaluate fracture. Will likely discharge home following CT scan.     - No acute orthopaedic intervention  - Pain control: MM  - MURTAZA RUE  - FU CT scan before she discharges  - FU in trauma clinic next week (she will be contacted with appointment details)        Murtaza Stephens MD  Orthopedics  [facility]

## 2023-09-16 NOTE — SUBJECTIVE & OBJECTIVE
Past Medical History:   Diagnosis Date    Abnormal Pap smear     Migraines     Pneumonia     as  a  child     Pseudotumor cerebri 2016       Past Surgical History:   Procedure Laterality Date    ABSCESS DRAINAGE      x 3     addenoids      ANKLE ARTHROSCOPY W/ OPEN REPAIR Right     CERVICAL BIOPSY  W/ LOOP ELECTRODE EXCISION      CHOLECYSTECTOMY      INCISION AND DRAINAGE OF ABSCESS N/A 8/10/2022    Procedure: INCISION AND DRAINAGE, ABSCESS VULVAR;  Surgeon: Melody Rothman MD;  Location: [facility] OR;  Service: OB/GYN;  Laterality: N/A;    POSTPARTUM LIGATION OF FALLOPIAN TUBE Bilateral 6/12/2018    Procedure: LIGATION, FALLOPIAN TUBE, POSTPARTUM;  Surgeon: Melody Rothman MD;  Location: [facility] OR;  Service: OB/GYN;  Laterality: Bilateral;    tonsilectomy         Review of patient's allergies indicates:  No Known Allergies    Current Facility-Administered Medications   Medication    naloxone 0.4 mg/mL injection 0.4 mg     Current Outpatient Medications   Medication Sig    acetaZOLAMIDE (DIAMOX) 500 mg CpSR Take 1 capsule (500 mg total) by mouth 2 (two) times daily.    buPROPion (WELLBUTRIN) 100 MG tablet Take 100 mg by mouth 2 (two) times daily.    cetirizine (ZYRTEC) 10 MG tablet Take 10 mg by mouth once daily.    cranberry fruit extract (CRANBERRY ORAL) Take 2 tablets by mouth once daily.    ibuprofen (ADVIL,MOTRIN) 800 MG tablet Take 1 tablet (800 mg total) by mouth every 6 (six) hours as needed for Pain.    methocarbamoL (ROBAXIN) 750 MG Tab Take 2 tablets (1,500 mg total) by mouth every 8 (eight) hours as needed.    minocycline (MINOCIN,DYNACIN) 100 MG capsule Take 100 mg by mouth once daily.    oxyCODONE-acetaminophen (PERCOCET) 5-325 mg per tablet Take 1 tablet by mouth every 4 (four) hours as needed for Pain.    topiramate (TOPAMAX) 100 MG tablet Take 1 tablet (100 mg total) by mouth 2 (two) times daily.     Family History       Problem Relation (Age of Onset)    Cancer Maternal Grandmother     Fibromyalgia Mother    Hyperlipidemia Sister    Hypertension Sister          Tobacco Use    Smoking status: Every Day     Current packs/day: 0.50     Average packs/day: 0.5 packs/day for 24.0 years (12.0 ttl pk-yrs)     Types: Cigarettes    Smokeless tobacco: Never   Substance and Sexual Activity    Alcohol use: Yes     Comment: occas.    Drug use: No    Sexual activity: Yes     Partners: Male     Birth control/protection: None, See Surgical Hx     ROS  Constitutional: negative for fevers  Eyes: negative visual changes  ENT: negative for hearing loss  Respiratory: negative for dyspnea  Cardiovascular: negative for chest pain  Gastrointestinal: negative for abdominal pain  Genitourinary: negative for dysuria  Neurological: negative for headaches  Behavioral/Psych: negative for hallucinations  Endocrine: negative for temperature intolerance  Objective:     Vital Signs (Most Recent):  Temp: 98.5 °F (36.9 °C) (09/15/23 1440)  Pulse: 86 (09/15/23 2050)  Resp: (!) 22 (09/15/23 2052)  BP: 133/74 (09/15/23 2050)  SpO2: 99 % (09/15/23 2050) Vital Signs (24h Range):  Temp:  [98.5 °F (36.9 °C)] 98.5 °F (36.9 °C)  Pulse:  [69-86] 86  Resp:  [18-22] 22  SpO2:  [95 %-99 %] 99 %  BP: (111-135)/(62-78) 133/74     Weight: 123.4 kg (272 lb)     Body mass index is 42.6 kg/m².    No intake or output data in the 24 hours ending 09/15/23 2249     Ortho/SPM ExamGeneral:  no acute distress, appears stated age   Neuro: alert and oriented x3  Psych: normal mood  Head: normocephalic, atraumatic.  Eyes: no scleral icterus  Mouth: moist mucous membranes  Cardiovascular: extremities warm and well perfused  Lungs: breathing comfortably, equal chest rise bilat  Skin: clean, dry, intact (any exceptions noted in below musculoskeletal exam)     MSK:  RUE:  - Skin intact throughout, no open wounds  - Moderate swelling right arm  - No ecchymosis, erythema, or signs of cellulitis  - Very TTP over humerus   - AROM and PROM of the wrist, and hand intact  without pain  - ROM of the shoulder and elbow deferred due to known fracture  - Axillary/AIN/PIN/Radial/Median/Ulnar Nerves assessed in isolation without deficit  - SILT throughout  - Compartments soft  - Radial artery palpated   - Capillary Refill <3s     LUE:  - Skin intact throughout, no open wounds  - No swelling  - No ecchymosis, erythema, or signs of cellulitis  - NonTTP throughout  - AROM and PROM of the shoulder, elbow, wrist, and hand intact without pain  - Axillary/AIN/PIN/Radial/Median/Ulnar Nerves assessed in isolation without deficit  - SILT throughout  - Compartments soft  - Radial artery palpated   - Capillary Refill <3s     RLE:  - Skin intact throughout, no open wounds  - No swelling  - No ecchymosis, erythema, or signs of cellulitis  - NonTTP throughout  - AROM and PROM of the hip, knee, ankle, and foot intact without pain  - TA/EHL/Gastroc/FHL assessed in isolation without deficit  - SILT throughout  - Compartments soft  - DP and PT palpated  - Capillary Refill <3s  - Negative Log roll  - Negative [facility]     LLE:  - Skin intact throughout, no open wounds  - No swelling  - No ecchymosis, erythema, or signs of cellulitis  - NonTTP throughout  - AROM and PROM of the hip, knee, ankle, and foot intact without pain  - TA/EHL/Gastroc/FHL assessed in isolation without deficit  - SILT throughout  - Compartments soft  - DP and PT palpated  - Capillary Refill <3s  - Negative Log roll  - Negative [facility]     Significant Labs: All pertinent labs within the past 24 hours have been reviewed.     Significant Imaging: I have reviewed and interpreted all pertinent imaging results/findings.  XR right humerus: spiral comminuted humeral shaft fracture in valgus

## 2023-09-16 NOTE — CONSULTS
[facility]  Orthopedics  Consult Note    Patient Name: Tabby Veloz  MRN: [id number]  Admission Date: 9/15/2023  Hospital Length of Stay: 0 days  Attending Provider: Lakshmi Rueda,*  Primary Care Provider: Vishal Fish MD      Inpatient consult to Orthopedic Surgery  Consult performed by: Murtaza Stephens MD  Consult ordered by: Amanda Lopez PA-C        Subjective:     Principal Problem:<principal problem not specified>    Chief Complaint:   Chief Complaint   Patient presents with    Arm Injury     Right arm. Trip and fall. Possible dislocation.         HPI: Tabby Veloz is a 42 y.o. female with PMH significant for obesity presenting with right arm pain after she fell at home. She states she tripped and hit the counter and landed on her right arm. She had severe arm pain at that time. Patient denies any head trauma or LOC. The patient denies prior hx of falls. Patient denies numbness and tingling. Denies any other musculoskeletal pain or injuries. No known history of prior right arm injury or surgery. Xray of right humerus consistent with right humeral shaft fracture. Orthopaedics consulted for fracture and recommendations.      Past Medical History:   Diagnosis Date    Abnormal Pap smear     Migraines     Pneumonia     as  a  child     Pseudotumor cerebri 2016       Past Surgical History:   Procedure Laterality Date    ABSCESS DRAINAGE      x 3     addenoids      ANKLE ARTHROSCOPY W/ OPEN REPAIR Right     CERVICAL BIOPSY  W/ LOOP ELECTRODE EXCISION      CHOLECYSTECTOMY      INCISION AND DRAINAGE OF ABSCESS N/A 8/10/2022    Procedure: INCISION AND DRAINAGE, ABSCESS VULVAR;  Surgeon: Melody Rothman MD;  Location: [facility] OR;  Service: OB/GYN;  Laterality: N/A;    POSTPARTUM LIGATION OF FALLOPIAN TUBE Bilateral 6/12/2018    Procedure: LIGATION, FALLOPIAN TUBE, POSTPARTUM;  Surgeon: Melody Rothman MD;  Location: [facility] OR;  Service: OB/GYN;   Laterality: Bilateral;    tonsilectomy         Review of patient's allergies indicates:  No Known Allergies    Current Facility-Administered Medications   Medication    naloxone 0.4 mg/mL injection 0.4 mg     Current Outpatient Medications   Medication Sig    acetaZOLAMIDE (DIAMOX) 500 mg CpSR Take 1 capsule (500 mg total) by mouth 2 (two) times daily.    buPROPion (WELLBUTRIN) 100 MG tablet Take 100 mg by mouth 2 (two) times daily.    cetirizine (ZYRTEC) 10 MG tablet Take 10 mg by mouth once daily.    cranberry fruit extract (CRANBERRY ORAL) Take 2 tablets by mouth once daily.    ibuprofen (ADVIL,MOTRIN) 800 MG tablet Take 1 tablet (800 mg total) by mouth every 6 (six) hours as needed for Pain.    methocarbamoL (ROBAXIN) 750 MG Tab Take 2 tablets (1,500 mg total) by mouth every 8 (eight) hours as needed.    minocycline (MINOCIN,DYNACIN) 100 MG capsule Take 100 mg by mouth once daily.    oxyCODONE-acetaminophen (PERCOCET) 5-325 mg per tablet Take 1 tablet by mouth every 4 (four) hours as needed for Pain.    topiramate (TOPAMAX) 100 MG tablet Take 1 tablet (100 mg total) by mouth 2 (two) times daily.     Family History       Problem Relation (Age of Onset)    Cancer Maternal Grandmother    Fibromyalgia Mother    Hyperlipidemia Sister    Hypertension Sister          Tobacco Use    Smoking status: Every Day     Current packs/day: 0.50     Average packs/day: 0.5 packs/day for 24.0 years (12.0 ttl pk-yrs)     Types: Cigarettes    Smokeless tobacco: Never   Substance and Sexual Activity    Alcohol use: Yes     Comment: occas.    Drug use: No    Sexual activity: Yes     Partners: Male     Birth control/protection: None, See Surgical Hx     ROS  Constitutional: negative for fevers  Eyes: negative visual changes  ENT: negative for hearing loss  Respiratory: negative for dyspnea  Cardiovascular: negative for chest pain  Gastrointestinal: negative for abdominal pain  Genitourinary: negative for  dysuria  Neurological: negative for headaches  Behavioral/Psych: negative for hallucinations  Endocrine: negative for temperature intolerance  Objective:     Vital Signs (Most Recent):  Temp: 98.5 °F (36.9 °C) (09/15/23 1440)  Pulse: 86 (09/15/23 2050)  Resp: (!) 22 (09/15/23 2052)  BP: 133/74 (09/15/23 2050)  SpO2: 99 % (09/15/23 2050) Vital Signs (24h Range):  Temp:  [98.5 °F (36.9 °C)] 98.5 °F (36.9 °C)  Pulse:  [69-86] 86  Resp:  [18-22] 22  SpO2:  [95 %-99 %] 99 %  BP: (111-135)/(62-78) 133/74     Weight: 123.4 kg (272 lb)     Body mass index is 42.6 kg/m².    No intake or output data in the 24 hours ending 09/15/23 2249     Ortho/SPM ExamGeneral:  no acute distress, appears stated age   Neuro: alert and oriented x3  Psych: normal mood  Head: normocephalic, atraumatic.  Eyes: no scleral icterus  Mouth: moist mucous membranes  Cardiovascular: extremities warm and well perfused  Lungs: breathing comfortably, equal chest rise bilat  Skin: clean, dry, intact (any exceptions noted in below musculoskeletal exam)     MSK:  RUE:  - Skin intact throughout, no open wounds  - Moderate swelling right arm  - No ecchymosis, erythema, or signs of cellulitis  - Very TTP over humerus   - AROM and PROM of the wrist, and hand intact without pain  - ROM of the shoulder and elbow deferred due to known fracture  - Axillary/AIN/PIN/Radial/Median/Ulnar Nerves assessed in isolation without deficit  - SILT throughout  - Compartments soft  - Radial artery palpated   - Capillary Refill <3s     LUE:  - Skin intact throughout, no open wounds  - No swelling  - No ecchymosis, erythema, or signs of cellulitis  - NonTTP throughout  - AROM and PROM of the shoulder, elbow, wrist, and hand intact without pain  - Axillary/AIN/PIN/Radial/Median/Ulnar Nerves assessed in isolation without deficit  - SILT throughout  - Compartments soft  - Radial artery palpated   - Capillary Refill <3s     RLE:  - Skin intact throughout, no open wounds  - No  swelling  - No ecchymosis, erythema, or signs of cellulitis  - NonTTP throughout  - AROM and PROM of the hip, knee, ankle, and foot intact without pain  - TA/EHL/Gastroc/FHL assessed in isolation without deficit  - SILT throughout  - Compartments soft  - DP and PT palpated  - Capillary Refill <3s  - Negative Log roll  - Negative [facility]     LLE:  - Skin intact throughout, no open wounds  - No swelling  - No ecchymosis, erythema, or signs of cellulitis  - NonTTP throughout  - AROM and PROM of the hip, knee, ankle, and foot intact without pain  - TA/EHL/Gastroc/FHL assessed in isolation without deficit  - SILT throughout  - Compartments soft  - DP and PT palpated  - Capillary Refill <3s  - Negative Log roll  - [facility]     Significant Labs: All pertinent labs within the past 24 hours have been reviewed.     Significant Imaging: I have reviewed and interpreted all pertinent imaging results/findings.  XR right humerus: spiral comminuted humeral shaft fracture in valgus       Assessment/Plan:     Closed fracture of shaft of right humerus  Tabby Veloz is a 42 y.o. female with PMH of obesity presenting with right humeral shaft fracture after a ground level fall. Fracture closed, NVI. She was placed in a well padded coapt splint and sling with abduction pillow. CT scan pending to evaluate fracture. Will likely discharge home following CT scan.     - No acute orthopaedic intervention  - Pain control: MM  - MURTAZA RUE  - FU CT scan before she discharges  - FU in trauma clinic next week (she will be contacted with appointment details)        Murtaza Stephens MD  Orthopedics  [facility]

## 2023-09-16 NOTE — ASSESSMENT & PLAN NOTE
Tabby Veloz is a 42 y.o. female with PMH of obesity presenting with right humeral shaft fracture after a ground level fall. Fracture closed, NVI. She was placed in a well padded coapt splint and sling with abduction pillow. CT scan pending to evaluate fracture. Will likely discharge home following CT scan.     - No acute orthopaedic intervention  - Pain control: JADA  - MURTAZA RUE  - FU CT scan before she discharges  - FU in trauma clinic next week (she will be contacted with appointment details)

## 2023-09-16 NOTE — SUBJECTIVE & OBJECTIVE
Past Medical History:   Diagnosis Date    Abnormal Pap smear     Migraines     Pneumonia     as  a  child     Pseudotumor cerebri 2016       Past Surgical History:   Procedure Laterality Date    ABSCESS DRAINAGE      x 3     addenoids      ANKLE ARTHROSCOPY W/ OPEN REPAIR Right     CERVICAL BIOPSY  W/ LOOP ELECTRODE EXCISION      CHOLECYSTECTOMY      INCISION AND DRAINAGE OF ABSCESS N/A 8/10/2022    Procedure: INCISION AND DRAINAGE, ABSCESS VULVAR;  Surgeon: Melody Rothman MD;  Location: [facility] OR;  Service: OB/GYN;  Laterality: N/A;    POSTPARTUM LIGATION OF FALLOPIAN TUBE Bilateral 6/12/2018    Procedure: LIGATION, FALLOPIAN TUBE, POSTPARTUM;  Surgeon: Melody Rothman MD;  Location: [facility] OR;  Service: OB/GYN;  Laterality: Bilateral;    tonsilectomy         Review of patient's allergies indicates:  No Known Allergies    Current Facility-Administered Medications   Medication    naloxone 0.4 mg/mL injection 0.4 mg     Current Outpatient Medications   Medication Sig    acetaZOLAMIDE (DIAMOX) 500 mg CpSR Take 1 capsule (500 mg total) by mouth 2 (two) times daily.    buPROPion (WELLBUTRIN) 100 MG tablet Take 100 mg by mouth 2 (two) times daily.    cetirizine (ZYRTEC) 10 MG tablet Take 10 mg by mouth once daily.    cranberry fruit extract (CRANBERRY ORAL) Take 2 tablets by mouth once daily.    ibuprofen (ADVIL,MOTRIN) 800 MG tablet Take 1 tablet (800 mg total) by mouth every 6 (six) hours as needed for Pain.    methocarbamoL (ROBAXIN) 750 MG Tab Take 2 tablets (1,500 mg total) by mouth every 8 (eight) hours as needed.    minocycline (MINOCIN,DYNACIN) 100 MG capsule Take 100 mg by mouth once daily.    oxyCODONE-acetaminophen (PERCOCET) 5-325 mg per tablet Take 1 tablet by mouth every 4 (four) hours as needed for Pain.    topiramate (TOPAMAX) 100 MG tablet Take 1 tablet (100 mg total) by mouth 2 (two) times daily.     Family History       Problem Relation (Age of Onset)    Cancer Maternal Grandmother     Fibromyalgia Mother    Hyperlipidemia Sister    Hypertension Sister          Tobacco Use    Smoking status: Every Day     Current packs/day: 0.50     Average packs/day: 0.5 packs/day for 24.0 years (12.0 ttl pk-yrs)     Types: Cigarettes    Smokeless tobacco: Never   Substance and Sexual Activity    Alcohol use: Yes     Comment: occas.    Drug use: No    Sexual activity: Yes     Partners: Male     Birth control/protection: None, See Surgical Hx     ROS  Constitutional: negative for fevers  Eyes: negative visual changes  ENT: negative for hearing loss  Respiratory: negative for dyspnea  Cardiovascular: negative for chest pain  Gastrointestinal: negative for abdominal pain  Genitourinary: negative for dysuria  Neurological: negative for headaches  Behavioral/Psych: negative for hallucinations  Endocrine: negative for temperature intolerance    Objective:     Vital Signs (Most Recent):  Temp: 98.5 °F (36.9 °C) (09/15/23 1440)  Pulse: 86 (09/15/23 2050)  Resp: (!) 22 (09/15/23 2052)  BP: 133/74 (09/15/23 2050)  SpO2: 99 % (09/15/23 2050) Vital Signs (24h Range):  Temp:  [98.5 °F (36.9 °C)] 98.5 °F (36.9 °C)  Pulse:  [69-86] 86  Resp:  [18-22] 22  SpO2:  [95 %-99 %] 99 %  BP: (111-135)/(62-78) 133/74     Weight: 123.4 kg (272 lb)     Body mass index is 42.6 kg/m².    No intake or output data in the 24 hours ending 09/15/23 2235     Ortho/SPM Exam  General:  no acute distress, appears stated age   Neuro: alert and oriented x3  Psych: normal mood  Head: normocephalic, atraumatic.  Eyes: no scleral icterus  Mouth: moist mucous membranes  Cardiovascular: extremities warm and well perfused  Lungs: breathing comfortably, equal chest rise bilat  Skin: clean, dry, intact (any exceptions noted in below musculoskeletal exam)    MSK:  RUE:  - Skin intact throughout, no open wounds  - Moderate swelling right arm  - No ecchymosis, erythema, or signs of cellulitis  - Very TTP over humerus   - AROM and PROM of the wrist, and hand  intact without pain  - ROM of the shoulder and elbow deferred due to known fracture  - Axillary/AIN/PIN/Radial/Median/Ulnar Nerves assessed in isolation without deficit  - SILT throughout  - Compartments soft  - Radial artery palpated   - Capillary Refill <3s    LUE:  - Skin intact throughout, no open wounds  - No swelling  - No ecchymosis, erythema, or signs of cellulitis  - NonTTP throughout  - AROM and PROM of the shoulder, elbow, wrist, and hand intact without pain  - Axillary/AIN/PIN/Radial/Median/Ulnar Nerves assessed in isolation without deficit  - SILT throughout  - Compartments soft  - Radial artery palpated   - Capillary Refill <3s    RLE:  - Skin intact throughout, no open wounds  - No swelling  - No ecchymosis, erythema, or signs of cellulitis  - NonTTP throughout  - AROM and PROM of the hip, knee, ankle, and foot intact without pain  - TA/EHL/Gastroc/FHL assessed in isolation without deficit  - SILT throughout  - Compartments soft  - DP and PT palpated  - Capillary Refill <3s  - Negative Log roll  - Negative [facility]    LLE:  - Skin intact throughout, no open wounds  - No swelling  - No ecchymosis, erythema, or signs of cellulitis  - NonTTP throughout  - AROM and PROM of the hip, knee, ankle, and foot intact without pain  - TA/EHL/Gastroc/FHL assessed in isolation without deficit  - SILT throughout  - Compartments soft  - DP and PT palpated  - Capillary Refill <3s  - Negative Log roll  - [facility]     Significant Labs: All pertinent labs within the past 24 hours have been reviewed.    Significant Imaging: I have reviewed and interpreted all pertinent imaging results/findings.  XR right humerus: spiral comminuted humeral shaft fracture in valgus

## 2023-09-16 NOTE — PROGRESS NOTES
[facility] - Emergency Dept  Orthopedics  Progress Note    Patient Name: Tabby Veloz  MRN: [id number]  Admission Date: 9/15/2023  Hospital Length of Stay: 0 days  Attending Provider: Lakshmi Rueda,*  Primary Care Provider: Vishal Fish MD    Subjective:     Past Medical History:   Diagnosis Date    Abnormal Pap smear     Migraines     Pneumonia     as  a  child     Pseudotumor cerebri 2016       Past Surgical History:   Procedure Laterality Date    ABSCESS DRAINAGE      x 3     addenoids      ANKLE ARTHROSCOPY W/ OPEN REPAIR Right     CERVICAL BIOPSY  W/ LOOP ELECTRODE EXCISION      CHOLECYSTECTOMY      INCISION AND DRAINAGE OF ABSCESS N/A 8/10/2022    Procedure: INCISION AND DRAINAGE, ABSCESS VULVAR;  Surgeon: Melody Rothman MD;  Location: [facility] OR;  Service: OB/GYN;  Laterality: N/A;    POSTPARTUM LIGATION OF FALLOPIAN TUBE Bilateral 6/12/2018    Procedure: LIGATION, FALLOPIAN TUBE, POSTPARTUM;  Surgeon: Melody Rothman MD;  Location: [facility] OR;  Service: OB/GYN;  Laterality: Bilateral;    tonsilectomy         Review of patient's allergies indicates:  No Known Allergies    Current Facility-Administered Medications   Medication    naloxone 0.4 mg/mL injection 0.4 mg     Current Outpatient Medications   Medication Sig    acetaZOLAMIDE (DIAMOX) 500 mg CpSR Take 1 capsule (500 mg total) by mouth 2 (two) times daily.    buPROPion (WELLBUTRIN) 100 MG tablet Take 100 mg by mouth 2 (two) times daily.    cetirizine (ZYRTEC) 10 MG tablet Take 10 mg by mouth once daily.    cranberry fruit extract (CRANBERRY ORAL) Take 2 tablets by mouth once daily.    ibuprofen (ADVIL,MOTRIN) 800 MG tablet Take 1 tablet (800 mg total) by mouth every 6 (six) hours as needed for Pain.    methocarbamoL (ROBAXIN) 750 MG Tab Take 2 tablets (1,500 mg total) by mouth every 8 (eight) hours as needed.    minocycline (MINOCIN,DYNACIN) 100 MG capsule Take 100 mg by mouth once daily.     oxyCODONE-acetaminophen (PERCOCET) 5-325 mg per tablet Take 1 tablet by mouth every 4 (four) hours as needed for Pain.    topiramate (TOPAMAX) 100 MG tablet Take 1 tablet (100 mg total) by mouth 2 (two) times daily.     Family History       Problem Relation (Age of Onset)    Cancer Maternal Grandmother    Fibromyalgia Mother    Hyperlipidemia Sister    Hypertension Sister          Tobacco Use    Smoking status: Every Day     Current packs/day: 0.50     Average packs/day: 0.5 packs/day for 24.0 years (12.0 ttl pk-yrs)     Types: Cigarettes    Smokeless tobacco: Never   Substance and Sexual Activity    Alcohol use: Yes     Comment: occas.    Drug use: No    Sexual activity: Yes     Partners: Male     Birth control/protection: None, See Surgical Hx     ROS  Constitutional: negative for fevers  Eyes: negative visual changes  ENT: negative for hearing loss  Respiratory: negative for dyspnea  Cardiovascular: negative for chest pain  Gastrointestinal: negative for abdominal pain  Genitourinary: negative for dysuria  Neurological: negative for headaches  Behavioral/Psych: negative for hallucinations  Endocrine: negative for temperature intolerance    Objective:     Vital Signs (Most Recent):  Temp: 98.5 °F (36.9 °C) (09/15/23 1440)  Pulse: 86 (09/15/23 2050)  Resp: (!) 22 (09/15/23 2052)  BP: 133/74 (09/15/23 2050)  SpO2: 99 % (09/15/23 2050) Vital Signs (24h Range):  Temp:  [98.5 °F (36.9 °C)] 98.5 °F (36.9 °C)  Pulse:  [69-86] 86  Resp:  [18-22] 22  SpO2:  [95 %-99 %] 99 %  BP: (111-135)/(62-78) 133/74     Weight: 123.4 kg (272 lb)     Body mass index is 42.6 kg/m².    No intake or output data in the 24 hours ending 09/15/23 2235     Ortho/SPM Exam  General:  no acute distress, appears stated age   Neuro: alert and oriented x3  Psych: normal mood  Head: normocephalic, atraumatic.  Eyes: no scleral icterus  Mouth: moist mucous membranes  Cardiovascular: extremities warm and well perfused  Lungs: breathing  comfortably, equal chest rise bilat  Skin: clean, dry, intact (any exceptions noted in below musculoskeletal exam)    MSK:  RUE:  - Skin intact throughout, no open wounds  - Moderate swelling right arm  - No ecchymosis, erythema, or signs of cellulitis  - Very TTP over humerus   - AROM and PROM of the wrist, and hand intact without pain  - ROM of the shoulder and elbow deferred due to known fracture  - Axillary/AIN/PIN/Radial/Median/Ulnar Nerves assessed in isolation without deficit  - SILT throughout  - Compartments soft  - Radial artery palpated   - Capillary Refill <3s    LUE:  - Skin intact throughout, no open wounds  - No swelling  - No ecchymosis, erythema, or signs of cellulitis  - NonTTP throughout  - AROM and PROM of the shoulder, elbow, wrist, and hand intact without pain  - Axillary/AIN/PIN/Radial/Median/Ulnar Nerves assessed in isolation without deficit  - SILT throughout  - Compartments soft  - Radial artery palpated   - Capillary Refill <3s    RLE:  - Skin intact throughout, no open wounds  - No swelling  - No ecchymosis, erythema, or signs of cellulitis  - NonTTP throughout  - AROM and PROM of the hip, knee, ankle, and foot intact without pain  - TA/EHL/Gastroc/FHL assessed in isolation without deficit  - SILT throughout  - Compartments soft  - DP and PT palpated  - Capillary Refill <3s  - Negative Log roll  - [facility]    LLE:  - Skin intact throughout, no open wounds  - No swelling  - No ecchymosis, erythema, or signs of cellulitis  - NonTTP throughout  - AROM and PROM of the hip, knee, ankle, and foot intact without pain  - TA/EHL/Gastroc/FHL assessed in isolation without deficit  - SILT throughout  - Compartments soft  - DP and PT palpated  - Capillary Refill <3s  - Negative Log roll  - Negative [facility]     Significant Labs: All pertinent labs within the past 24 hours have been reviewed.    Significant Imaging: I have reviewed and interpreted all pertinent imaging  results/findings.  XR right humerus: spiral comminuted humeral shaft fracture in valgus    Assessment/Plan:     Closed fracture of shaft of right humerus  Tabby Veloz is a 42 y.o. female with PMH of obesity presenting with right humeral shaft fracture after a ground level fall. Fracture closed, NVI. She was placed in a well padded coapt splint and sling with abduction pillow. CT scan pending to evaluate fracture. Will likely discharge home following CT scan.     - No acute orthopaedic intervention  - Pain control: MM  - NWB RUE  - FU CT scan before she discharges  - FU in trauma clinic next week (she will be contacted with appointment details)            Murtaza Stephens MD  Orthopedics  [facility] - Emergency Dept

## 2023-09-16 NOTE — DISCHARGE INSTRUCTIONS
Take ibuprofen 800 mg every 6 hours as needed with food for mild to moderate pain.  You can take acetaminophen/tylenol 650 mg every 6 hours or 1000 mg every 8 hours for added relief.  Take robaxin 1500 mg every 8 hours as needed for muscle spasms or tension. This medication may be sedating.   Take percocet every 4 hours as needed FOR SEVERE pain only.   Keep arm in splint.   Follow up with Orthopedics. They will call you and make an appointment, but feel free to call them if you do not hear from them in the next few days.  Return to the ER for new or worsening symptoms.  Future Appointments   Date Time Provider Department Center   10/5/2023 11:15 AM Madhuri Calvin NP [facility] NEURO [facility]

## 2023-09-16 NOTE — HPI
Tabby Veloz is a 42 y.o. female with PMH significant for obesity presenting with right arm pain after she fell at home. She states she tripped and hit the counter and landed on her right arm. She had severe arm pain at that time. Patient denies any head trauma or LOC. The patient denies prior hx of falls. Patient denies numbness and tingling. Denies any other musculoskeletal pain or injuries. No known history of prior right arm injury or surgery. Xray of right humerus consistent with right humeral shaft fracture. Orthopaedics consulted for fracture and recommendations.

## 2023-09-18 ENCOUNTER — TELEPHONE (OUTPATIENT)
Dept: ORTHOPEDICS | Facility: CLINIC | Age: 43
End: 2023-09-18
Payer: COMMERCIAL

## 2023-09-18 DIAGNOSIS — S42.351A CLOSED DISPLACED COMMINUTED FRACTURE OF SHAFT OF RIGHT HUMERUS, INITIAL ENCOUNTER: Primary | ICD-10-CM

## 2023-09-18 NOTE — TELEPHONE ENCOUNTER
Spoke with pt.  Scheduled her to see Luis Del Valle NP tomorrow at 3:00 pm.  Pt verbalized understanding.

## 2023-09-18 NOTE — TELEPHONE ENCOUNTER
----- Message from Murtaza Stephens MD sent at 9/16/2023  9:02 AM CDT -----  Regarding: ED FU right humeral shaft  Hi there, this patient seen in the ED for right humeral shaft fracture. Placed in coapt. Will need fu in clinic early next week for discussion regarding operative treatment. Thanks!

## 2023-09-19 ENCOUNTER — ANESTHESIA EVENT (OUTPATIENT)
Dept: SURGERY | Facility: HOSPITAL | Age: 43
End: 2023-09-19
Payer: COMMERCIAL

## 2023-09-19 ENCOUNTER — HOSPITAL ENCOUNTER (OUTPATIENT)
Facility: HOSPITAL | Age: 43
Discharge: HOME | End: 2023-09-19
Attending: ORTHOPAEDIC SURGERY | Admitting: ORTHOPAEDIC SURGERY
Payer: COMMERCIAL

## 2023-09-19 ENCOUNTER — ANESTHESIA (OUTPATIENT)
Dept: SURGERY | Facility: HOSPITAL | Age: 43
End: 2023-09-19
Payer: COMMERCIAL

## 2023-09-19 VITALS
HEART RATE: 99 BPM | TEMPERATURE: 98 F | DIASTOLIC BLOOD PRESSURE: 60 MMHG | RESPIRATION RATE: 21 BRPM | BODY MASS INDEX: 41.12 KG/M2 | WEIGHT: 262 LBS | OXYGEN SATURATION: 95 % | HEIGHT: 67 IN | SYSTOLIC BLOOD PRESSURE: 112 MMHG

## 2023-09-19 DIAGNOSIS — S42.301A FRACTURE OF HUMERAL SHAFT, RIGHT, CLOSED: ICD-10-CM

## 2023-09-19 PROBLEM — S42.351A CLOSED DISPLACED COMMINUTED FRACTURE OF SHAFT OF RIGHT HUMERUS: Status: ACTIVE | Noted: 2023-09-15

## 2023-09-19 LAB
ABO + RH BLD: NORMAL
B-HCG UR QL: NEGATIVE
BLD GP AB SCN CELLS X3 SERPL QL: NORMAL
CTP QC/QA: YES
SPECIMEN OUTDATE: NORMAL

## 2023-09-19 PROCEDURE — 63600175 PHARM REV CODE 636 W HCPCS: Performed by: NURSE ANESTHETIST, CERTIFIED REGISTERED

## 2023-09-19 PROCEDURE — 36000710: Performed by: ORTHOPAEDIC SURGERY

## 2023-09-19 PROCEDURE — 27201423 OPTIME MED/SURG SUP & DEVICES STERILE SUPPLY: Performed by: ORTHOPAEDIC SURGERY

## 2023-09-19 PROCEDURE — 71000015 HC POSTOP RECOV 1ST HR: Performed by: ORTHOPAEDIC SURGERY

## 2023-09-19 PROCEDURE — 64416 NJX AA&/STRD BRCH PL NFS IMG: CPT | Performed by: STUDENT IN AN ORGANIZED HEALTH CARE EDUCATION/TRAINING PROGRAM

## 2023-09-19 PROCEDURE — 25000003 PHARM REV CODE 250: Performed by: NURSE ANESTHETIST, CERTIFIED REGISTERED

## 2023-09-19 PROCEDURE — 64416 R INTERSCALENE PNC: ICD-10-PCS | Mod: 59,RT,, | Performed by: ANESTHESIOLOGY

## 2023-09-19 PROCEDURE — 64416 NJX AA&/STRD BRCH PL NFS IMG: CPT | Mod: 59,RT,, | Performed by: ANESTHESIOLOGY

## 2023-09-19 PROCEDURE — 24516 TX HUMRL SHAFT FX IMED IMPLT: CPT | Mod: RT,,, | Performed by: ORTHOPAEDIC SURGERY

## 2023-09-19 PROCEDURE — 36000711: Performed by: ORTHOPAEDIC SURGERY

## 2023-09-19 PROCEDURE — 37000009 HC ANESTHESIA EA ADD 15 MINS: Performed by: ORTHOPAEDIC SURGERY

## 2023-09-19 PROCEDURE — D9220A PRA ANESTHESIA: ICD-10-PCS | Mod: CRNA,,, | Performed by: NURSE ANESTHETIST, CERTIFIED REGISTERED

## 2023-09-19 PROCEDURE — 25000003 PHARM REV CODE 250: Performed by: STUDENT IN AN ORGANIZED HEALTH CARE EDUCATION/TRAINING PROGRAM

## 2023-09-19 PROCEDURE — 63600175 PHARM REV CODE 636 W HCPCS: Performed by: ANESTHESIOLOGY

## 2023-09-19 PROCEDURE — 86901 BLOOD TYPING SEROLOGIC RH(D): CPT | Performed by: STUDENT IN AN ORGANIZED HEALTH CARE EDUCATION/TRAINING PROGRAM

## 2023-09-19 PROCEDURE — 25000003 PHARM REV CODE 250: Performed by: ORTHOPAEDIC SURGERY

## 2023-09-19 PROCEDURE — D9220A PRA ANESTHESIA: Mod: ANES,,, | Performed by: ANESTHESIOLOGY

## 2023-09-19 PROCEDURE — 81025 URINE PREGNANCY TEST: CPT | Performed by: ORTHOPAEDIC SURGERY

## 2023-09-19 PROCEDURE — 25000003 PHARM REV CODE 250: Performed by: ANESTHESIOLOGY

## 2023-09-19 PROCEDURE — C1769 GUIDE WIRE: HCPCS | Performed by: ORTHOPAEDIC SURGERY

## 2023-09-19 PROCEDURE — 37000008 HC ANESTHESIA 1ST 15 MINUTES: Performed by: ORTHOPAEDIC SURGERY

## 2023-09-19 PROCEDURE — 63600175 PHARM REV CODE 636 W HCPCS: Performed by: STUDENT IN AN ORGANIZED HEALTH CARE EDUCATION/TRAINING PROGRAM

## 2023-09-19 PROCEDURE — 24516 PR OPEN ROD FIXATN HUMERAL SHAFT FX: ICD-10-PCS | Mod: RT,,, | Performed by: ORTHOPAEDIC SURGERY

## 2023-09-19 PROCEDURE — C1713 ANCHOR/SCREW BN/BN,TIS/BN: HCPCS | Performed by: ORTHOPAEDIC SURGERY

## 2023-09-19 PROCEDURE — 71000016 HC POSTOP RECOV ADDL HR: Performed by: ORTHOPAEDIC SURGERY

## 2023-09-19 PROCEDURE — D9220A PRA ANESTHESIA: ICD-10-PCS | Mod: ANES,,, | Performed by: ANESTHESIOLOGY

## 2023-09-19 PROCEDURE — 63600175 PHARM REV CODE 636 W HCPCS: Performed by: ORTHOPAEDIC SURGERY

## 2023-09-19 PROCEDURE — D9220A PRA ANESTHESIA: Mod: CRNA,,, | Performed by: NURSE ANESTHETIST, CERTIFIED REGISTERED

## 2023-09-19 PROCEDURE — 71000044 HC DOSC ROUTINE RECOVERY FIRST HOUR: Performed by: ORTHOPAEDIC SURGERY

## 2023-09-19 PROCEDURE — 63600175 PHARM REV CODE 636 W HCPCS

## 2023-09-19 DEVICE — SCREW LOCKING BONE T2 5X40MM: Type: IMPLANTABLE DEVICE | Site: HUMERUS | Status: FUNCTIONAL

## 2023-09-19 DEVICE — IMPLANTABLE DEVICE: Type: IMPLANTABLE DEVICE | Site: HUMERUS | Status: FUNCTIONAL

## 2023-09-19 DEVICE — SCREW LOCKING BONE T2 5X37MM: Type: IMPLANTABLE DEVICE | Site: HUMERUS | Status: FUNCTIONAL

## 2023-09-19 RX ORDER — LIDOCAINE HYDROCHLORIDE 10 MG/ML
1 INJECTION, SOLUTION EPIDURAL; INFILTRATION; INTRACAUDAL; PERINEURAL ONCE AS NEEDED
Status: DISCONTINUED | OUTPATIENT
Start: 2023-09-19 | End: 2023-09-19 | Stop reason: HOSPADM

## 2023-09-19 RX ORDER — PHENYLEPHRINE HYDROCHLORIDE 10 MG/ML
INJECTION INTRAVENOUS
Status: DISCONTINUED | OUTPATIENT
Start: 2023-09-19 | End: 2023-09-19

## 2023-09-19 RX ORDER — ONDANSETRON 2 MG/ML
4 INJECTION INTRAMUSCULAR; INTRAVENOUS EVERY 12 HOURS PRN
Status: DISCONTINUED | OUTPATIENT
Start: 2023-09-19 | End: 2023-09-19 | Stop reason: HOSPADM

## 2023-09-19 RX ORDER — FAMOTIDINE 10 MG/ML
INJECTION INTRAVENOUS
Status: DISCONTINUED | OUTPATIENT
Start: 2023-09-19 | End: 2023-09-19

## 2023-09-19 RX ORDER — ACETAMINOPHEN 10 MG/ML
INJECTION, SOLUTION INTRAVENOUS
Status: DISCONTINUED | OUTPATIENT
Start: 2023-09-19 | End: 2023-09-19

## 2023-09-19 RX ORDER — SODIUM CHLORIDE 0.9 % (FLUSH) 0.9 %
10 SYRINGE (ML) INJECTION
Status: DISCONTINUED | OUTPATIENT
Start: 2023-09-19 | End: 2023-09-19 | Stop reason: HOSPADM

## 2023-09-19 RX ORDER — CEFAZOLIN SODIUM 1 G/3ML
INJECTION, POWDER, FOR SOLUTION INTRAMUSCULAR; INTRAVENOUS
Status: DISCONTINUED | OUTPATIENT
Start: 2023-09-19 | End: 2023-09-19

## 2023-09-19 RX ORDER — HYDROMORPHONE HYDROCHLORIDE 1 MG/ML
INJECTION, SOLUTION INTRAMUSCULAR; INTRAVENOUS; SUBCUTANEOUS
Status: COMPLETED
Start: 2023-09-19 | End: 2023-09-19

## 2023-09-19 RX ORDER — FENTANYL CITRATE 50 UG/ML
INJECTION, SOLUTION INTRAMUSCULAR; INTRAVENOUS
Status: DISCONTINUED | OUTPATIENT
Start: 2023-09-19 | End: 2023-09-19

## 2023-09-19 RX ORDER — MIDAZOLAM HYDROCHLORIDE 1 MG/ML
INJECTION INTRAMUSCULAR; INTRAVENOUS
Status: DISCONTINUED | OUTPATIENT
Start: 2023-09-19 | End: 2023-09-19

## 2023-09-19 RX ORDER — HYDROCODONE BITARTRATE AND ACETAMINOPHEN 5; 325 MG/1; MG/1
1 TABLET ORAL EVERY 4 HOURS PRN
Status: DISCONTINUED | OUTPATIENT
Start: 2023-09-19 | End: 2023-09-19 | Stop reason: HOSPADM

## 2023-09-19 RX ORDER — SODIUM CHLORIDE 0.9 % (FLUSH) 0.9 %
3 SYRINGE (ML) INJECTION
Status: DISCONTINUED | OUTPATIENT
Start: 2023-09-19 | End: 2023-09-19 | Stop reason: HOSPADM

## 2023-09-19 RX ORDER — METOCLOPRAMIDE HYDROCHLORIDE 5 MG/ML
5 INJECTION INTRAMUSCULAR; INTRAVENOUS EVERY 6 HOURS PRN
Status: DISCONTINUED | OUTPATIENT
Start: 2023-09-19 | End: 2023-09-19 | Stop reason: HOSPADM

## 2023-09-19 RX ORDER — PROPOFOL 10 MG/ML
VIAL (ML) INTRAVENOUS
Status: DISCONTINUED | OUTPATIENT
Start: 2023-09-19 | End: 2023-09-19

## 2023-09-19 RX ORDER — HALOPERIDOL 5 MG/ML
0.5 INJECTION INTRAMUSCULAR EVERY 10 MIN PRN
Status: DISCONTINUED | OUTPATIENT
Start: 2023-09-19 | End: 2023-09-19 | Stop reason: HOSPADM

## 2023-09-19 RX ORDER — ROPIVACAINE HYDROCHLORIDE 2 MG/ML
INJECTION, SOLUTION EPIDURAL; INFILTRATION; PERINEURAL CONTINUOUS
Status: DISCONTINUED | OUTPATIENT
Start: 2023-09-19 | End: 2023-09-19 | Stop reason: HOSPADM

## 2023-09-19 RX ORDER — MUPIROCIN 20 MG/G
OINTMENT TOPICAL
Status: DISCONTINUED | OUTPATIENT
Start: 2023-09-19 | End: 2023-09-19 | Stop reason: HOSPADM

## 2023-09-19 RX ORDER — ONDANSETRON 2 MG/ML
INJECTION INTRAMUSCULAR; INTRAVENOUS
Status: DISCONTINUED | OUTPATIENT
Start: 2023-09-19 | End: 2023-09-19

## 2023-09-19 RX ORDER — TRANEXAMIC ACID 100 MG/ML
INJECTION, SOLUTION INTRAVENOUS
Status: DISCONTINUED | OUTPATIENT
Start: 2023-09-19 | End: 2023-09-19

## 2023-09-19 RX ORDER — BUPIVACAINE HYDROCHLORIDE AND EPINEPHRINE 2.5; 5 MG/ML; UG/ML
INJECTION, SOLUTION EPIDURAL; INFILTRATION; INTRACAUDAL; PERINEURAL
Status: COMPLETED | OUTPATIENT
Start: 2023-09-19 | End: 2023-09-19

## 2023-09-19 RX ORDER — DEXTROMETHORPHAN HYDROBROMIDE, GUAIFENESIN 5; 100 MG/5ML; MG/5ML
650 LIQUID ORAL EVERY 8 HOURS
Qty: 42 TABLET | Refills: 0 | Status: SHIPPED | OUTPATIENT
Start: 2023-09-19 | End: 2023-10-03 | Stop reason: SDUPTHER

## 2023-09-19 RX ORDER — HYDROMORPHONE HYDROCHLORIDE 1 MG/ML
0.2 INJECTION, SOLUTION INTRAMUSCULAR; INTRAVENOUS; SUBCUTANEOUS EVERY 5 MIN PRN
Status: COMPLETED | OUTPATIENT
Start: 2023-09-19 | End: 2023-09-19

## 2023-09-19 RX ORDER — LIDOCAINE HYDROCHLORIDE 10 MG/ML
INJECTION, SOLUTION EPIDURAL; INFILTRATION; INTRACAUDAL; PERINEURAL
Status: DISCONTINUED | OUTPATIENT
Start: 2023-09-19 | End: 2023-09-19

## 2023-09-19 RX ORDER — SODIUM CHLORIDE 9 MG/ML
INJECTION, SOLUTION INTRAVENOUS CONTINUOUS
Status: DISCONTINUED | OUTPATIENT
Start: 2023-09-19 | End: 2023-09-19 | Stop reason: HOSPADM

## 2023-09-19 RX ORDER — OXYCODONE AND ACETAMINOPHEN 5; 325 MG/1; MG/1
1 TABLET ORAL
Status: DISCONTINUED | OUTPATIENT
Start: 2023-09-19 | End: 2023-09-19 | Stop reason: HOSPADM

## 2023-09-19 RX ORDER — ROPIVACAINE HYDROCHLORIDE 2 MG/ML
INJECTION, SOLUTION EPIDURAL; INFILTRATION; PERINEURAL
Status: DISCONTINUED
Start: 2023-09-19 | End: 2023-09-19 | Stop reason: HOSPADM

## 2023-09-19 RX ORDER — DEXMEDETOMIDINE HYDROCHLORIDE 100 UG/ML
INJECTION, SOLUTION INTRAVENOUS
Status: DISCONTINUED | OUTPATIENT
Start: 2023-09-19 | End: 2023-09-19

## 2023-09-19 RX ORDER — KETAMINE HCL IN 0.9 % NACL 50 MG/5 ML
SYRINGE (ML) INTRAVENOUS
Status: DISCONTINUED | OUTPATIENT
Start: 2023-09-19 | End: 2023-09-19

## 2023-09-19 RX ORDER — ASPIRIN 81 MG/1
81 TABLET ORAL 2 TIMES DAILY
Qty: 84 TABLET | Refills: 0 | Status: SHIPPED | OUTPATIENT
Start: 2023-09-19 | End: 2023-12-12 | Stop reason: ALTCHOICE

## 2023-09-19 RX ORDER — BUPIVACAINE HYDROCHLORIDE 2.5 MG/ML
INJECTION, SOLUTION EPIDURAL; INFILTRATION; INTRACAUDAL
Status: DISCONTINUED | OUTPATIENT
Start: 2023-09-19 | End: 2023-09-19 | Stop reason: HOSPADM

## 2023-09-19 RX ORDER — OXYCODONE HYDROCHLORIDE 5 MG/1
5 TABLET ORAL EVERY 4 HOURS PRN
Qty: 42 TABLET | Refills: 0 | Status: SHIPPED | OUTPATIENT
Start: 2023-09-19 | End: 2023-11-02

## 2023-09-19 RX ORDER — ACETAMINOPHEN 325 MG/1
650 TABLET ORAL EVERY 4 HOURS PRN
Status: DISCONTINUED | OUTPATIENT
Start: 2023-09-19 | End: 2023-09-19 | Stop reason: HOSPADM

## 2023-09-19 RX ORDER — DEXAMETHASONE SODIUM PHOSPHATE 4 MG/ML
INJECTION, SOLUTION INTRA-ARTICULAR; INTRALESIONAL; INTRAMUSCULAR; INTRAVENOUS; SOFT TISSUE
Status: DISCONTINUED | OUTPATIENT
Start: 2023-09-19 | End: 2023-09-19

## 2023-09-19 RX ORDER — ROCURONIUM BROMIDE 10 MG/ML
INJECTION, SOLUTION INTRAVENOUS
Status: DISCONTINUED | OUTPATIENT
Start: 2023-09-19 | End: 2023-09-19

## 2023-09-19 RX ORDER — ONDANSETRON 2 MG/ML
4 INJECTION INTRAMUSCULAR; INTRAVENOUS DAILY PRN
Status: DISCONTINUED | OUTPATIENT
Start: 2023-09-19 | End: 2023-09-19 | Stop reason: HOSPADM

## 2023-09-19 RX ORDER — VANCOMYCIN HYDROCHLORIDE 1 G/20ML
INJECTION, POWDER, LYOPHILIZED, FOR SOLUTION INTRAVENOUS
Status: DISCONTINUED | OUTPATIENT
Start: 2023-09-19 | End: 2023-09-19 | Stop reason: HOSPADM

## 2023-09-19 RX ORDER — GABAPENTIN 300 MG/1
300 CAPSULE ORAL 3 TIMES DAILY
Qty: 42 CAPSULE | Refills: 0 | Status: SHIPPED | OUTPATIENT
Start: 2023-09-19 | End: 2023-10-03 | Stop reason: SDUPTHER

## 2023-09-19 RX ORDER — CELECOXIB 200 MG/1
200 CAPSULE ORAL DAILY
Qty: 28 CAPSULE | Refills: 0 | Status: SHIPPED | OUTPATIENT
Start: 2023-09-19 | End: 2023-11-02

## 2023-09-19 RX ORDER — METHOCARBAMOL 750 MG/1
750 TABLET, FILM COATED ORAL 3 TIMES DAILY PRN
Qty: 42 TABLET | Refills: 0 | Status: SHIPPED | OUTPATIENT
Start: 2023-09-19 | End: 2023-10-03 | Stop reason: SDUPTHER

## 2023-09-19 RX ORDER — HYDROCODONE BITARTRATE AND ACETAMINOPHEN 10; 325 MG/1; MG/1
1 TABLET ORAL EVERY 4 HOURS PRN
Status: DISCONTINUED | OUTPATIENT
Start: 2023-09-19 | End: 2023-09-19 | Stop reason: HOSPADM

## 2023-09-19 RX ADMIN — Medication 150 MG: at 07:09

## 2023-09-19 RX ADMIN — MUPIROCIN: 20 OINTMENT TOPICAL at 07:09

## 2023-09-19 RX ADMIN — MIDAZOLAM HYDROCHLORIDE 2 MG: 2 INJECTION, SOLUTION INTRAMUSCULAR; INTRAVENOUS at 07:09

## 2023-09-19 RX ADMIN — DEXMEDETOMIDINE 8 MCG: 100 INJECTION, SOLUTION, CONCENTRATE INTRAVENOUS at 10:09

## 2023-09-19 RX ADMIN — DEXMEDETOMIDINE 8 MCG: 100 INJECTION, SOLUTION, CONCENTRATE INTRAVENOUS at 08:09

## 2023-09-19 RX ADMIN — ROCURONIUM BROMIDE 25 MG: 10 INJECTION, SOLUTION INTRAVENOUS at 08:09

## 2023-09-19 RX ADMIN — VANCOMYCIN HYDROCHLORIDE 1000 MG: 1 INJECTION, POWDER, LYOPHILIZED, FOR SOLUTION INTRAVENOUS at 07:09

## 2023-09-19 RX ADMIN — HYDROMORPHONE HYDROCHLORIDE 0.2 MG: 1 INJECTION, SOLUTION INTRAMUSCULAR; INTRAVENOUS; SUBCUTANEOUS at 11:09

## 2023-09-19 RX ADMIN — Medication 25 MG: at 09:09

## 2023-09-19 RX ADMIN — PHENYLEPHRINE HYDROCHLORIDE 100 MCG: 10 INJECTION INTRAVENOUS at 07:09

## 2023-09-19 RX ADMIN — HYDROMORPHONE HYDROCHLORIDE 0.2 MG: 1 INJECTION, SOLUTION INTRAMUSCULAR; INTRAVENOUS; SUBCUTANEOUS at 12:09

## 2023-09-19 RX ADMIN — HYDROCODONE BITARTRATE AND ACETAMINOPHEN 1 TABLET: 10; 325 TABLET ORAL at 12:09

## 2023-09-19 RX ADMIN — Medication 50 MG: at 07:09

## 2023-09-19 RX ADMIN — FENTANYL CITRATE 50 MCG: 50 INJECTION, SOLUTION INTRAMUSCULAR; INTRAVENOUS at 07:09

## 2023-09-19 RX ADMIN — TRANEXAMIC ACID 1000 MG: 100 INJECTION, SOLUTION INTRAVENOUS at 10:09

## 2023-09-19 RX ADMIN — SODIUM CHLORIDE: 9 INJECTION, SOLUTION INTRAVENOUS at 07:09

## 2023-09-19 RX ADMIN — Medication 25 MG: at 10:09

## 2023-09-19 RX ADMIN — DEXMEDETOMIDINE 12 MCG: 100 INJECTION, SOLUTION, CONCENTRATE INTRAVENOUS at 10:09

## 2023-09-19 RX ADMIN — TRANEXAMIC ACID 1000 MG: 100 INJECTION, SOLUTION INTRAVENOUS at 08:09

## 2023-09-19 RX ADMIN — LIDOCAINE HYDROCHLORIDE 50 MG: 10 INJECTION, SOLUTION EPIDURAL; INFILTRATION; INTRACAUDAL; PERINEURAL at 07:09

## 2023-09-19 RX ADMIN — SODIUM CHLORIDE: 0.9 INJECTION, SOLUTION INTRAVENOUS at 07:09

## 2023-09-19 RX ADMIN — ROCURONIUM BROMIDE 50 MG: 10 INJECTION, SOLUTION INTRAVENOUS at 07:09

## 2023-09-19 RX ADMIN — ACETAMINOPHEN 1000 MG: 10 INJECTION, SOLUTION INTRAVENOUS at 08:09

## 2023-09-19 RX ADMIN — ONDANSETRON 4 MG: 2 INJECTION INTRAMUSCULAR; INTRAVENOUS at 10:09

## 2023-09-19 RX ADMIN — DEXAMETHASONE SODIUM PHOSPHATE 4 MG: 4 INJECTION, SOLUTION INTRAMUSCULAR; INTRAVENOUS at 07:09

## 2023-09-19 RX ADMIN — BUPIVACAINE HYDROCHLORIDE AND EPINEPHRINE BITARTRATE 20 ML: 2.5; .0091 INJECTION, SOLUTION EPIDURAL; INFILTRATION; INTRACAUDAL; PERINEURAL at 11:09

## 2023-09-19 RX ADMIN — FAMOTIDINE 20 MG: 10 INJECTION, SOLUTION INTRAVENOUS at 07:09

## 2023-09-19 RX ADMIN — CEFAZOLIN 2 G: 330 INJECTION, POWDER, FOR SOLUTION INTRAMUSCULAR; INTRAVENOUS at 07:09

## 2023-09-19 RX ADMIN — SUGAMMADEX 200 MG: 100 INJECTION, SOLUTION INTRAVENOUS at 10:09

## 2023-09-19 NOTE — INTERVAL H&P NOTE
The patient has been examined and the H&P has been reviewed:    I concur with the findings and no changes have occurred since H&P was written.    Surgery risks, benefits and alternative options discussed and understood by patient/family.    Discussed options with the patient including immobilization vs operative fixation. Risk of immobilization is going onto nonunion/malunion given suboptimal reduction in coaptation splint ultimately requiring surgical intervention or left with residual rotation/length discrepancy and poor function. Operative fixation could improve early weightbearing and improve rotation/length discrepancy as well as decrease risk of nonunion/malunion. Patient elected to proceed with operative fixation.     Risks and complications were discussed including but not limited to the risks of anesthetic complications, infection, wound healing complications, non-union, mal-union, hardware failure, pain, stiffness, DVT, arthritis, rotation/length discrepancy, decreased ROM/strength, radial/median nerve injury, brachial artery injury, pulmonary embolism, perioperative medical risks (cardiac, pulmonary, renal, neurologic), and death among others were discussed. No guarantees were made and the patient elects to proceed. All questions were answered.  No guarantees were implied or stated.  Informed consent was obtained.    There are no hospital problems to display for this patient.

## 2023-09-19 NOTE — ANESTHESIA PROCEDURE NOTES
R Interscalene PNC    Patient location during procedure: pre-op   Block not for primary anesthetic.  Reason for block: at surgeon's request and post-op pain management   Post-op Pain Location: right shoulder pain   Start time: 9/19/2023 11:15 AM  Timeout: 9/19/2023 11:15 AM   End time: 9/19/2023 11:30 AM    Staffing  Authorizing Provider: Radha Jnoes MD  Performing Provider: Earline Yarbrough MD    Staffing  Performed by: Earline Yarbrough MD  Authorized by: Radha Jones MD    Preanesthetic Checklist  Completed: patient identified, IV checked, site marked, risks and benefits discussed, surgical consent, monitors and equipment checked, pre-op evaluation and timeout performed  Peripheral Block  Patient position: sitting  Prep: ChloraPrep and site prepped and draped  Patient monitoring: heart rate, cardiac monitor, continuous pulse ox, continuous capnometry and frequent blood pressure checks  Block type: interscalene  Laterality: right  Injection technique: continuous  Needle  Needle type: Tuohy   Needle gauge: 18 G  Needle length: 2 in  Needle localization: anatomical landmarks and ultrasound guidance  Catheter type: non-stimulating  Catheter size: 20 G  Test dose: lidocaine 1.5% with Epi 1-to-200,000 and negative   -ultrasound image captured on disc.  Assessment  Injection assessment: negative aspiration, negative parasthesia and local visualized surrounding nerve  Paresthesia pain: none  Heart rate change: no  Slow fractionated injection: yes    Medications:    Medications: bupivacaine 0.25%-EPINEPHrine (PF) 1:200,000 injection - Other   20 mL - 9/19/2023 11:30:00 AM    Additional Notes  VSS.  [facility] RN monitoring vitals throughout procedure.  Patient tolerated procedure well.

## 2023-09-19 NOTE — ANESTHESIA PREPROCEDURE EVALUATION
09/19/2023  Tabby Veloz is a 42 y.o., female.      Slip and fall, no LOC or other injuries    Procedure: ORIF, FRACTURE, HUMERUS SHAFT (nail) - diving board backwards, supine, plexiglass. [place] prox humerus nail. (Right: Chest) - postop catheter   Anesthesia type: Choice   Diagnosis: Closed displaced comminuted fracture of shaft of right humerus, initial encounter [S42.661A]         Pre-operative evaluation for Procedure(s) (LRB):  ORIF, FRACTURE, HUMERUS SHAFT (nail) - diving board backwards, supine, plexiglass. [place] prox humerus nail. (Right)    @[id number]    Encounter Diagnosis   Name Primary?    Fracture of humeral shaft, right, closed        Review of patient's allergies indicates:  No Known Allergies    Medications Prior to Admission   Medication Sig Dispense Refill Last Dose    acetaZOLAMIDE (DIAMOX) 500 mg CpSR Take 1 capsule (500 mg total) by mouth 2 (two) times daily. 180 capsule 1 9/19/2023 at 0400    buPROPion (WELLBUTRIN) 100 MG tablet Take 100 mg by mouth 2 (two) times daily.   9/18/2023 at 0900    cetirizine (ZYRTEC) 10 MG tablet Take 10 mg by mouth once daily.   9/19/2023 at 0400    cranberry fruit extract (CRANBERRY ORAL) Take 2 tablets by mouth once daily.   9/18/2023 at 0900    ibuprofen (ADVIL,MOTRIN) 800 MG tablet Take 1 tablet (800 mg total) by mouth every 6 (six) hours as needed for Pain. 20 tablet 0 9/18/2023 at 2200    methocarbamoL (ROBAXIN) 750 MG Tab Take 2 tablets (1,500 mg total) by mouth every 8 (eight) hours as needed. 24 tablet 0 9/18/2023 at 2200    minocycline (MINOCIN,DYNACIN) 100 MG capsule Take 100 mg by mouth once daily.   9/18/2023 at 0900    oxyCODONE-acetaminophen (PERCOCET) 5-325 mg per tablet Take 1 tablet by mouth every 4 (four) hours as needed for Pain. 18 tablet 0 9/19/2023 at 0400    topiramate (TOPAMAX) 100 MG tablet Take 1 tablet (100  mg total) by mouth 2 (two) times daily. 180 tablet 1 9/19/2023 at 0400         sodium chloride 0.9%         No current facility-administered medications on file prior to encounter.     Current Outpatient Medications on File Prior to Encounter   Medication Sig Dispense Refill    acetaZOLAMIDE (DIAMOX) 500 mg CpSR Take 1 capsule (500 mg total) by mouth 2 (two) times daily. 180 capsule 1    buPROPion (WELLBUTRIN) 100 MG tablet Take 100 mg by mouth 2 (two) times daily.      cetirizine (ZYRTEC) 10 MG tablet Take 10 mg by mouth once daily.      cranberry fruit extract (CRANBERRY ORAL) Take 2 tablets by mouth once daily.      ibuprofen (ADVIL,MOTRIN) 800 MG tablet Take 1 tablet (800 mg total) by mouth every 6 (six) hours as needed for Pain. 20 tablet 0    methocarbamoL (ROBAXIN) 750 MG Tab Take 2 tablets (1,500 mg total) by mouth every 8 (eight) hours as needed. 24 tablet 0    minocycline (MINOCIN,DYNACIN) 100 MG capsule Take 100 mg by mouth once daily.      oxyCODONE-acetaminophen (PERCOCET) 5-325 mg per tablet Take 1 tablet by mouth every 4 (four) hours as needed for Pain. 18 tablet 0    topiramate (TOPAMAX) 100 MG tablet Take 1 tablet (100 mg total) by mouth 2 (two) times daily. 180 tablet 1       Past Medical History:  No date: Abnormal Pap smear  No date: Migraines  No date: Pneumonia      Comment:  as  a  child   2016: Pseudotumor cerebri    Past Surgical History:   Procedure Laterality Date    ABSCESS DRAINAGE      x 3     addenoids      ANKLE ARTHROSCOPY W/ OPEN REPAIR Right     CERVICAL BIOPSY  W/ LOOP ELECTRODE EXCISION      CHOLECYSTECTOMY      INCISION AND DRAINAGE OF ABSCESS N/A 8/10/2022    Procedure: INCISION AND DRAINAGE, ABSCESS VULVAR;  Surgeon: Melody Rothman MD;  [facility]  Service: OB/GYN;  Laterality: N/A;    POSTPARTUM LIGATION OF FALLOPIAN TUBE Bilateral 6/12/2018    Procedure: LIGATION, FALLOPIAN TUBE, POSTPARTUM;  Surgeon: Melody Rothman MD;  [facility]:  [facility] OR;  Service: OB/GYN;  Laterality: Bilateral;    tonsilectomy         Social History     Tobacco Use   Smoking Status Every Day    Current packs/day: 0.50    Average packs/day: 0.5 packs/day for 24.0 years (12.0 ttl pk-yrs)    Types: Cigarettes   Smokeless Tobacco Never       Social History     Substance and Sexual Activity   Alcohol Use Yes    Comment: occas.       Physical Activity: Not on file         No results for input(s): "HCT" in the last 72 hours.  No results for input(s): "PLT" in the last 72 hours.  No results for input(s): "K" in the last 72 hours.  No results for input(s): "CREATININE" in the last 72 hours.  No results for input(s): "GLU" in the last 72 hours.  No results for input(s): "PT" in the last 72 hours.                    Pre-op Assessment          Review of Systems  Anesthesia Hx:  No problems with previous Anesthesia    Hematology/Oncology:  Hematology Normal   Oncology Normal     Cardiovascular:  Cardiovascular Normal  Denies Hypertension.  Denies MI.    Denies Angina.    Pulmonary:  Pulmonary Normal  Denies COPD.  Denies Asthma.  Denies Shortness of breath.    Renal/:   Denies Chronic Renal Disease.     Hepatic/GI:   Denies Liver Disease.    Neurological:   Denies TIA. Denies CVA. Headaches (migraines) Denies Seizures. Pseudotumor cerebrii on topamax   Endocrine:   Denies Diabetes.        Physical Exam  General: Well nourished, Cooperative, Alert and Oriented    Airway:  Mallampati: II   Mouth Opening: Normal  TM Distance: Normal  Tongue: Normal  Neck ROM: Normal ROM        Anesthesia Plan  Type of Anesthesia, risks & benefits discussed:    Anesthesia Type: Gen ETT, Regional  Intra-op Monitoring Plan: Standard ASA Monitors  Post Op Pain Control Plan: multimodal analgesia and IV/PO Opioids PRN  Induction:  IV  Informed Consent: Informed consent signed with the Patient and all parties understand the risks and agree with anesthesia plan.  All questions answered.   ASA Score: " 2  Day of Surgery Review of History & Physical: H&P Update referred to the surgeon/provider.    Ready For Surgery From Anesthesia Perspective.     .    Date/Time: 8/10/2022 1:09 PM  Performed by: Eric Storey CRNA  Authorized by: Amol Sanchez DO      Intubation:     Induction:  Intravenous    Intubated:  Postinduction    Mask Ventilation:  Easy mask    Attempts:  1    Attempted By:  CRNA    Difficult Airway Encountered?: No      Complications:  None    Airway Device:  Supraglottic airway/LMA    Airway Device Size:  4.0    Style/Cuff Inflation:  Cuffed (inflated to minimal occlusive pressure)    Placement Verified By:  Capnometry    Complicating Factors:  None    Findings Post-Intubation:  Atraumatic/condition of teeth unchanged

## 2023-09-19 NOTE — PROGRESS NOTES
Time Out- Pt name, , and right arm block verified. JO Yarbrough MD, Andreas Werner MD, Dr. Jones present.

## 2023-09-19 NOTE — TRANSFER OF CARE
"Anesthesia Transfer of Care Note    Patient: Tabby Veloz    Procedure(s) Performed: Procedure(s) (LRB):  INSERTION, INTRAMEDULLARY JAEL, HUMERUS (Right)    Patient location: [facility]    Anesthesia Type: general    Transport from OR: Transported from OR on 6-10 L/min O2 by face mask with adequate spontaneous ventilation    Post pain: adequate analgesia    Post assessment: no apparent anesthetic complications    Post vital signs: stable    Level of consciousness: alert and awake    Nausea/Vomiting: no nausea/vomiting    Complications: none    Transfer of care protocol was followed      Last vitals:   Visit Vitals  BP (!) 116/55   Pulse 82   Temp 37.1 °C (98.7 °F) (Oral)   Resp 16   Ht 5' 7" (1.702 m)   Wt 118.8 kg (262 lb)   LMP 09/02/2023 (Exact Date)   SpO2 99%   Breastfeeding No   BMI 41.04 kg/m²     "

## 2023-09-19 NOTE — ANESTHESIA PROCEDURE NOTES
Intubation    Date/Time: 9/19/2023 7:28 AM    Performed by: Felix Ferrari CRNA  Authorized by: Farhat Borrego Jr., MD    Intubation:     Induction:  Intravenous    Intubated:  Postinduction    Mask Ventilation:  Easy mask    Attempts:  1    Attempted By:  CRNA    Method of Intubation:  Video laryngoscopy    Blade:  Med[place] 3    Laryngeal View Grade: Grade I - full view of cords      Difficult Airway Encountered?: No      Complications:  None    Airway Device:  Oral endotracheal tube    Airway Device Size:  7.5    Style/Cuff Inflation:  Cuffed (inflated to minimal occlusive pressure)    Inflation Amount (mL):  8    Tube secured:  21    Secured at:  The lips    Placement Verified By:  Capnometry and Fiber optic visualization    Complicating Factors:  None    Findings Post-Intubation:  BS equal bilateral and atraumatic/condition of teeth unchanged

## 2023-09-19 NOTE — OP NOTE
OPERATIVE NOTE    DATE OF PROCEDURE:  09/19/2023    PREOPERATIVE DIAGNOSIS:   Right humeral shaft fracture, closed, segmental, comminuted, displaced, initial encounter   Morbid obesity   Fall from standing    POSTOPERATIVE DIAGNOSIS:   Right humeral shaft fracture, closed, segmental, comminuted, displaced, initial encounter   Morbid obesity   Fall from standing    PROCEDURE:   Open reduction internal fixation right humeral shaft fracture with intramedullary nail    SURGEON:   Andrey Stanley MD    ASSISTANT:    Eyal Beltran MD    ANESTHESIA:   General    EBL:    150mL    COMPLICATIONS:  none    IMPLANTS:   Tyree  Proximal humeral nail, 8 mm x 240 mm  5.0 mm proximal interlocking screw, x3   4.0 mm distal interlocking screw, x2   Vancomycin 1 g    SPECIMENS:   None    INDICATIONS FOR PROCEDURE:  42-year-old female, right-hand dominant, housewife, 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  No numbness no tingling.  No other areas of pain.      Discussed diagnosis of comminuted, segmental, humeral shaft fracture.  Discussed both non operative operative management options.  Non operative management consist of splinting, bracing.  Given the segmental, comminuted nature of the fracture, as well as her body habitus,, current alignment, I feel that there is a high risk for malunion or nonunion, as well as difficulty with use of her dominant arm during the healing process.  She is concern that she would have difficulty caring for her small children at home.  Discussed operative intervention the form open reduction internal fixation, either  with plate and screws or intramedullary nail.  Discussed risks and benefits of both.  Given the comminuted, segmental nature of the fracture, and her body habitus, I favor intramedullary nail in order to decrease the soft tissue trauma required for surgical dissection of the whole fracture that would be required with a plate.    The risks, benefits, and alternatives to surgery were discussed with the patient and/or family.    Specific risks discussed included, but were not limited to:  Damage to radial nerve, wrist drop, malunion, nonunion, malrotation, limb lengthening or shortening, painful/prominent hardware, shoulder pain, rotator cuff tear, rotator cuff pain, weakness, damage to nearby structures, including neurovascular structures leading to loss of function or loss of limb, bleeding, need for blood transfusion, pain, stiffness, scarring, numbness, tingling, weakness, compartment syndrome, malunion/nonunion, hardware failure, hardware prominence, infection, need for multiple staged procedures, prolonged antibiotics, iatrogenic fracture, heterotopic ossification, arthritis, a variety of medical complications including but not limited to heart attack, stroke, deep venous thrombosis, pulmonary embolism, prolonged hospitalization, prolonged intubation, and death.   Patient and/or family expressed an understanding and desires to proceed with surgery.   All questions were answered.  No guarantees were implied or stated.  Informed consent was obtained.      OPERATIVE PROCEDURE:  Patient met in the preoperative hold area and the correct site and side of surgery being the right upper extremity were marked and verified.  Patient brought back to the operative suite.  General anesthesia smoothly induced.  Patient transferred over to operative table.  Placed in supine position. All bony prominences were appropriately padded.  Patient received 3 g Ancef for preoperative antibiotics.  The right upper extremity was then  prepped and draped in normal sterile fashion.    Time-out was performed verifying the correct patient, site/side of surgery, surgical consent, radiographs as applicable, preop antibiotics, necessary equipment, anticipated blood loss, length of procedure, postoperative disposition.      We utilized manual reduction maneuvers, traction evaluated the right humerus with fluoroscopy and determined that we could approximate the limb length, alignment rotation well with closed means, and plan for intramedullary nail fixation.     We made a longitudinal incision over the anterior lateral border the acromion, incised down through skin, subcutaneous tissue.  Split the deltoid fascia.  Used electrocautery for hemostasis.  Placed a guidewire based on fluoroscopic imaging through the rotator cuff in into the proximal humerus in the appropriate start point.  We then incised the cuff muscles and placed tag sutures.  Once appropriate start point was confirmed, we used an awl to open up the proximal humeral canal.  We then placed a ball-tipped guidewire.  We then performed manual reduction maneuvers with traction, bumps, used a finger tool, advanced the ball-tipped guidewire in the intramedullary canal, across the segmental fractures, into the distal canal.  This ball-tip guidewire was then seated at the distal aspect of the humeral canal.  Once appropriate limb length, alignment, rotation was obtained, we measured.  We then chose the appropriate size nail.  We passed a 9 mm Reamer on hand, only engaging power function when in the distal aspect, past the comminuted/segmental aspects.  Reamer was then removed.  We then used an exchange tube to exchange out the ball-tipped guidewire for a smooth tip wire.      Nail was then assembled, it was placed over the wire, with the jig.  It was confirmed to be placed in the proximal humerus, advanced across the fracture site based on fluoroscopy, and seated into the distal humeral shaft.  We  then reassessed our limb length, alignment, rotation as well as proximal distal placement of the nail.  We confirmed the nail was appropriately countersunk.  Once were satisfied, we made percutaneous stab incisions, used a proximal outrigger guide, cannula, and placed proximal interlocking screws x3 utilizing this technique.  Fluoroscopy confirmed appropriate screw placement.  Next we turned our attention to distal interlocking screw placement.  For this we used perfect [place] technique in a more open type approach.  We made a anterolateral incision longitudinal fashion over the distal anterior arm.  We then sharply and bluntly dissected down through the anterior musculature and identified the humerus.  We temporary placed soft tissue retractors in order to seat the drill on the bone.  Then using perfect [place] technique we placed distal interlocking screws from anterior to posterior, x2.  Fluoroscopy confirmed appropriate interlocking screw placement.    We then reassessed our fracture reduction using fluoroscopy as well as visual inspection of the limb.  We were satisfied with the limb length, alignment, rotation and hardware placement.    Wounds irrigated with saline.  Hemostasis achieved as needed with electrocautery.  1 g vancomycin placed deep.  Fascia closed with 0 Vicryl.  Deep tissue closed with 2-0 Vicryl.  Subcutaneous tissue closed with 3-0 Vicryl.  Skin closed with 3-0 Monocryl.  Dermabond, Aquacel, gauze, Tegaderm dressing applied.  Sling applied    At the conclusion of procedure the patient had soft and compressible compartments, brisk cap refill, palpable radial pulse in the operative extremity.    Prior to final closure all counts were confirmed to be correct.  Patient tolerated the procedure well without any complications, was awoken from anesthesia, transferred PACU for further recovery.    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 x6 weeks for DVT prophylaxis  Sling right upper extremity x2 weeks  Range of motion as tolerated right upper extremity  Nonweightbearing right upper extremity x2 months postop    Calcium, vitamin-D    X-rays at subsequent followups:  Right humerus    Follow-up postop 2 weeks, 6 weeks, 3 months, 6 months, 1 year    =====================  Andrey Stanley MD  Orthopaedic Surgery

## 2023-09-19 NOTE — PLAN OF CARE
1233  [facility] instructions/teaching done using several venues to Asael herr's - shown using pump itself, provided written instructions and taught QR code Voiced understanding.Extra dsg's provided.Stressed to NOT remove original dsg if wet, but only reinforce Voiced understanding started pum-p at 7q3+5  after negative aspiration.Pump infusiong without diff prior to my departure.  All questions answered.

## 2023-09-19 NOTE — ANESTHESIA POSTPROCEDURE EVALUATION
Anesthesia Post Evaluation    Patient: Tabby Veloz    Procedure(s) Performed: Procedure(s) (LRB):  INSERTION, INTRAMEDULLARY JAEL, HUMERUS (Right)    Final Anesthesia Type: general      Patient location during evaluation: PACU  Patient participation: Yes- Able to Participate  Level of consciousness: awake and alert and oriented  Post-procedure vital signs: reviewed and stable  Pain management: adequate  Airway patency: patent    PONV status at discharge: No PONV  Anesthetic complications: no      Cardiovascular status: stable  Respiratory status: unassisted, spontaneous ventilation, room air and nasal cannula  Hydration status: euvolemic  Follow-up not needed.          Vitals Value Taken Time   /53 09/19/23 1202   Temp 36.9 °C (98.4 °F) 09/19/23 1105   Pulse 98 09/19/23 1204   Resp 20 09/19/23 1204   SpO2 97 % 09/19/23 1204   Vitals shown include unvalidated device data.      No case tracking events are documented in the log.      Pain/Susan Score: Pain Rating Prior to Med Admin: 9 (9/19/2023 11:40 AM)

## 2023-09-19 NOTE — BRIEF OP NOTE
[facility] ([facility])  Brief Operative Note    Surgery Date: 9/19/2023     Surgeon(s) and Role:     * Andrey Stanley MD - Primary     * Eyal Beltran MD - Resident - Assisting        Pre-op Diagnosis:  Closed displaced comminuted fracture of shaft of right humerus, initial encounter [S42.351A]    Post-op Diagnosis:  Post-Op Diagnosis Codes:     * Closed displaced comminuted fracture of shaft of right humerus, initial encounter [S42.351A]    Procedure(s) (LRB):  INSERTION, INTRAMEDULLARY JAEL, HUMERUS (Right)    Anesthesia: General    Operative Findings: see op note    Estimated Blood Loss: 150 mL         Specimens:   Specimen (24h ago, onward)      None              Discharge Note    OUTCOME: Patient tolerated treatment/procedure well without complication and is now ready for discharge.    DISPOSITION: Home or Self Care    FINAL DIAGNOSIS:  Closed displaced comminuted fracture of shaft of right humerus    FOLLOWUP: In clinic    DISCHARGE INSTRUCTIONS:    Discharge Procedure Orders   Diet general     Sponge bath only until clinic visit     Keep surgical extremity elevated     Lifting restrictions   Order Comments: NWB RUE in sling. Able to come out of sling for cleaning care, but otherwise wear sling at all times.     No driving, operating heavy equipment or signing legal documents while taking pain medication.     Leave dressing on - Keep it clean, dry, and intact until clinic visit     Call MD for:  temperature >100.4     Call MD for:  persistent nausea and vomiting     Call MD for:  severe uncontrolled pain     Call MD for:  difficulty breathing, headache or visual disturbances     Call MD for:  redness, tenderness, or signs of infection (pain, swelling, redness, odor or green/yellow discharge around incision site)     Call MD for:  hives     Call MD for:  persistent dizziness or light-headedness     Call MD for:  extreme fatigue     Shower on day dressing removed (No bath)

## 2023-09-19 NOTE — PLAN OF CARE
D/C instructions given to pt. and family. Nimbus pump teaching done by block nurse. Pt and spouse verb. Understanding. RX for pain given and next time and dose explained. Pt. Tolerating PO fluids. VSS. IV dc'd. Pain level tolerable. Pt. Denies N/V at this time. Family at bs for d/c. All consent in chart at time of d/c.

## 2023-09-19 NOTE — CARE UPDATE
S/p R humeral nail. On exam in PACU her M/R/U/AIN/PIN nerves were motor intact. Unable to assess axillary N 2/2 pain. SILT M/R/U. 2+ cap refill. Okay for block by APS from ortho perspective. NWB RUE in sling. Allowed to come out for sponge baths, but minimize elbow and shoulder ROM. ASA. F/u 2 weeks for incision check.

## 2023-09-20 NOTE — CARE UPDATE
09/20/2023    Called and spoke to Tabby Veloz about the Nimbus pump and PNC.  Pain has been well controlled.  Denies tinnitus, metallic taste in mouth, weakness in extremity.  Denies erythema, warmth, tenderness, bleeding at site of catheter entry.  Dressing c/d/i.  All questions answered.  Encouraged them to call the provided number if any issues arise.  Will follow up.    Donald Nur MD  [facility]

## 2023-09-23 NOTE — ANESTHESIA POST-OP PAIN MANAGEMENT
09/23/2023    Called and spoke to Tabby Veloz about the Nimbus pump and PNC.  Pain has been well controlled.  Denies tinnitus, metallic taste in mouth, weakness in extremity.  Denies erythema, warmth, tenderness, bleeding at site of catheter entry.  Dressing c/d/i.  All questions answered.  Encouraged them to call the provided number if any issues arise.  Will remove catheter tomorrow. Will call for any further questions. APS will sign off.       Tj Weller DO, PGY4  Department of Anesthesiology

## 2023-09-23 NOTE — ADDENDUM NOTE
Addendum  created 09/23/23 1151 by Tj Weller,     Clinical Note Signed, Intraprocedure Event edited

## 2023-10-03 ENCOUNTER — HOSPITAL ENCOUNTER (OUTPATIENT)
Dept: RADIOLOGY | Facility: HOSPITAL | Age: 43
Discharge: HOME | End: 2023-10-03
Attending: NURSE PRACTITIONER
Payer: COMMERCIAL

## 2023-10-03 ENCOUNTER — OFFICE VISIT (OUTPATIENT)
Dept: ORTHOPEDICS | Facility: CLINIC | Age: 43
End: 2023-10-03
Payer: COMMERCIAL

## 2023-10-03 DIAGNOSIS — M89.8X2 PAIN OF RIGHT HUMERUS: ICD-10-CM

## 2023-10-03 DIAGNOSIS — Z98.890 POST-OPERATIVE STATE: ICD-10-CM

## 2023-10-03 DIAGNOSIS — M89.8X2 PAIN OF RIGHT HUMERUS: Primary | ICD-10-CM

## 2023-10-03 DIAGNOSIS — S42.351D CLOSED DISPLACED COMMINUTED FRACTURE OF SHAFT OF RIGHT HUMERUS WITH ROUTINE HEALING, SUBSEQUENT ENCOUNTER: Primary | ICD-10-CM

## 2023-10-03 PROCEDURE — 1160F RVW MEDS BY RX/DR IN RCRD: CPT | Mod: CPTII,S$GLB,, | Performed by: NURSE PRACTITIONER

## 2023-10-03 PROCEDURE — 99024 POSTOP FOLLOW-UP VISIT: CPT | Mod: S$GLB,,, | Performed by: NURSE PRACTITIONER

## 2023-10-03 PROCEDURE — 73060 X-RAY EXAM OF HUMERUS: CPT | Mod: TC,RT

## 2023-10-03 PROCEDURE — 1160F PR REVIEW ALL MEDS BY PRESCRIBER/CLIN PHARMACIST DOCUMENTED: ICD-10-PCS | Mod: CPTII,S$GLB,, | Performed by: NURSE PRACTITIONER

## 2023-10-03 PROCEDURE — 73060 XR HUMERUS 2 VIEW RIGHT: ICD-10-PCS | Mod: 26,RT,, | Performed by: RADIOLOGY

## 2023-10-03 PROCEDURE — 73060 X-RAY EXAM OF HUMERUS: CPT | Mod: 26,RT,, | Performed by: RADIOLOGY

## 2023-10-03 PROCEDURE — 99999 PR PBB SHADOW E&M-EST. PATIENT-LVL III: ICD-10-PCS | Mod: PBBFAC,,, | Performed by: NURSE PRACTITIONER

## 2023-10-03 PROCEDURE — 99999 PR PBB SHADOW E&M-EST. PATIENT-LVL III: CPT | Mod: PBBFAC,,, | Performed by: NURSE PRACTITIONER

## 2023-10-03 PROCEDURE — 1159F MED LIST DOCD IN RCRD: CPT | Mod: CPTII,S$GLB,, | Performed by: NURSE PRACTITIONER

## 2023-10-03 PROCEDURE — 99024 PR POST-OP FOLLOW-UP VISIT: ICD-10-PCS | Mod: S$GLB,,, | Performed by: NURSE PRACTITIONER

## 2023-10-03 PROCEDURE — 1159F PR MEDICATION LIST DOCUMENTED IN MEDICAL RECORD: ICD-10-PCS | Mod: CPTII,S$GLB,, | Performed by: NURSE PRACTITIONER

## 2023-10-03 RX ORDER — METHOCARBAMOL 750 MG/1
750 TABLET, FILM COATED ORAL 3 TIMES DAILY PRN
Qty: 42 TABLET | Refills: 0 | Status: SHIPPED | OUTPATIENT
Start: 2023-10-03 | End: 2023-10-23 | Stop reason: SDUPTHER

## 2023-10-03 RX ORDER — DEXTROMETHORPHAN HYDROBROMIDE, GUAIFENESIN 5; 100 MG/5ML; MG/5ML
650 LIQUID ORAL EVERY 8 HOURS
Qty: 42 TABLET | Refills: 0 | Status: SHIPPED | OUTPATIENT
Start: 2023-10-03

## 2023-10-03 RX ORDER — GABAPENTIN 300 MG/1
300 CAPSULE ORAL 3 TIMES DAILY
Qty: 42 CAPSULE | Refills: 0 | Status: SHIPPED | OUTPATIENT
Start: 2023-10-03 | End: 2023-10-23 | Stop reason: SDUPTHER

## 2023-10-03 NOTE — PROGRESS NOTES
Ms. Veloz is here today for a post-operative visit after undergoing ORIF for her right humeral shaft fracture with IM nail by Dr. Stanley on 9/19/2023.    Interval History:  She reports that she is doing ok.  Pain is tolerable.  She is taking pain medication.  She endorses some numbness to the el surface of her thumb only.  She denies falls or injuries since her surgery.  She has followed her weight bearing restrictions.  She denies fever, chills, and sweats since the time of the surgery.     Physical exam:  Dressing taken down.  Incision is clean, dry and intact. No signs of infection noted.   Skin was closed with Dermabond and Stratifix ends were clipped.  She has tactile stimulation to their lower FA.  She can flex and extend her thumb.  She can abduct and adduct all of her fingers.  Flexion and extension of the wrist is at 50°, no evidence of wrist drop.  Range of motion of the elbow is 10° to 140°.  Range of motion of the shoulder is 0° to 60°.  Cap refill is less than 3 seconds and radial pulses 2+.    RADS: right humerus xray was obtained and personally reviewed by me.  Findings show IM nailing appears to be in good position and alignment without evidence of hardware failure.  She has a comminuted fracture of the humerus shaft.  Fracture fragments appear to be stable.    Assessment:  Post-op visit (2 weeks)    Plan:    ICD-10-CM ICD-9-CM   1. Closed displaced comminuted fracture of shaft of right humerus with routine healing, subsequent encounter s/p ORIF 9/19/23  S42.351D V54.11   2. Post-operative state  Z98.890 V45.89     Current care, treatment plan, precautions, activity level/ modifications, limitations, rehabilitation exercises and proposed future treatment were discussed with the patient. We discussed the need to monitor for changes in symptoms and condition and report them to the physician.  Discussed importance of compliance with all appointments and follow up examinations.     WOUND CARE  ORDERS  -Tabby was advised to keep the incision clean and dry for the next 24 hours after which she may wash the area with antibacterial soap in the shower.   -She not submerge until the incision is completely healed  -Patient was advised to monitor wound closely and multiple times daily for any problems. Call clinic immediately or report to ED for immediate medical attention for any complications including reopening of wound, drainage, purulence, redness, streaking, odor, pain out of proportion, fever, chills, etc.   - If there are signs of infection, please call Ortho Clinic [phone] for further instructions and to make appt to be seen.       PHYSICAL THERAPY:   - [facility]  - Weight bearing NWB to RUE x 2 months  - Range of motion as tolerated.      PAIN MEDICATION:   - Pain medication: refill was needed, I will refill her Robaxin, Neurontin, and Tylenol  - Pain medication refill policy provided to patient for review, yes.    - Patient was informed a multi-modal approach is used to treat their pain.     DVT PROPHYLAXIS:   - ASA 81 mg bid     FOLLOW UP:   - Patient will follow up in the clinic in 4 weeks.  - X-ray of her right humerus is needed.  - Encouraged smoking cessation.    - Future Appointments:   Future Appointments   Date Time Provider Department Center   10/5/2023 11:15 AM Madhuri Calvin NP [facility] NEURO [place] [facility]   10/31/2023 10:45 AM Luis Del Valle NP [facility] ORTHO [facility]           If there are any questions prior to scheduled follow up, the patient was instructed to contact the office

## 2023-10-05 ENCOUNTER — PATIENT MESSAGE (OUTPATIENT)
Dept: NEUROLOGY | Facility: CLINIC | Age: 43
End: 2023-10-05

## 2023-10-05 ENCOUNTER — OFFICE VISIT (OUTPATIENT)
Dept: NEUROLOGY | Facility: CLINIC | Age: 43
End: 2023-10-05
Payer: COMMERCIAL

## 2023-10-05 DIAGNOSIS — G93.2 PSEUDOTUMOR CEREBRI: Primary | ICD-10-CM

## 2023-10-05 DIAGNOSIS — G43.709 CHRONIC MIGRAINE WITHOUT AURA WITHOUT STATUS MIGRAINOSUS, NOT INTRACTABLE: ICD-10-CM

## 2023-10-05 PROCEDURE — 99214 OFFICE O/P EST MOD 30 MIN: CPT | Mod: 95,,, | Performed by: NURSE PRACTITIONER

## 2023-10-05 PROCEDURE — 99214 PR OFFICE/OUTPT VISIT, EST, LEVL IV, 30-39 MIN: ICD-10-PCS | Mod: 95,,, | Performed by: NURSE PRACTITIONER

## 2023-10-05 RX ORDER — ACETAZOLAMIDE 250 MG/1
250 TABLET ORAL 3 TIMES DAILY
Qty: 90 TABLET | Refills: 3 | Status: SHIPPED | OUTPATIENT
Start: 2023-10-05 | End: 2023-11-02

## 2023-10-09 ENCOUNTER — CLINICAL SUPPORT (OUTPATIENT)
Dept: REHABILITATION | Facility: HOSPITAL | Age: 43
End: 2023-10-09
Attending: NURSE PRACTITIONER
Payer: COMMERCIAL

## 2023-10-09 DIAGNOSIS — M25.611 SHOULDER STIFFNESS, RIGHT: ICD-10-CM

## 2023-10-09 DIAGNOSIS — R53.1 WEAKNESS: ICD-10-CM

## 2023-10-09 DIAGNOSIS — M25.621 ELBOW STIFFNESS, RIGHT: ICD-10-CM

## 2023-10-09 DIAGNOSIS — R52 PAIN: ICD-10-CM

## 2023-10-09 DIAGNOSIS — S42.351D CLOSED DISPLACED COMMINUTED FRACTURE OF SHAFT OF RIGHT HUMERUS WITH ROUTINE HEALING, SUBSEQUENT ENCOUNTER: ICD-10-CM

## 2023-10-09 PROCEDURE — 97530 THERAPEUTIC ACTIVITIES: CPT | Mod: PO

## 2023-10-09 PROCEDURE — 97166 OT EVAL MOD COMPLEX 45 MIN: CPT | Mod: PO

## 2023-10-09 NOTE — PLAN OF CARE
[facility]  Occupational Therapy - Shoulder Evaluation       Evaluation Date: 10/9/2023  Patient: Tabby Veloz  YOB: 1980  Chart Number: [id number]  Referring Physician: Luis Del Valle NP  Diagnosis:   1. Closed displaced comminuted fracture of shaft of right humerus with routine healing, subsequent encounter s/p ORIF 9/19/23  Ambulatory referral/consult to Physical/Occupational Therapy      2. Weakness        3. Elbow stiffness, right        4. Shoulder stiffness, right        5. Pain            Visit #: 1 of 1  Plan of Care Certification Date: 1/1/24  Authorization Period: 10/3/23 -10/2/24  Date of Return to MD: 4 weeks    Time In: 1115  Time Out: 1200  Total Billable Time: 45    Precautions:  Standard and Weightbearing- Weight bearing NWB to RUE x 2 months  - Range of motion as tolerated.     Subjective     Reason for Referral: Tabby presents to therapy with a medical referral of ORIF right humeral shaft fracture   Involved Side: R  Dominant Side: Left  Date of Onset: 9/15/23   Mechanism of Injury: traumatic   History of Current Condition: Pt states that she feel on a Friday and went to the ER which confirmed her fx. She was set up to have surgery on the following Tuesday. She is 3 weeks post ORIF to humerus shaft  Surgical Procedure: Humerus shaft ORIF - 9/19/23  Imaging: EXAMINATION:  XR HUMERUS 2 VIEW RIGHT     CLINICAL HISTORY:  Other specified disorders of bone, upper arm     COMPARISON:  09/19/2023     FINDINGS:  Two views right humerus.     Intramedullary zev and screw construct noted in repair of complex humeral diaphyseal fracture.  Overall fracture alignment appears grossly stable allowing for differences in positioning.  There is progressive healing.  No dislocation.  No convincing new fracture.     Impression:     1. Evolution of humeral fracture status post fixation as above.        Previous Therapy: N     Pain:  Functional Pain Scale Rating 0-10:   3/10 on  "average  1/10 at best  4/10 at worst  Location: Elbow and shoulder   Description: Aching and Dull  Activities Which Increase Pain: Movement in all directions outside of sling  Activities Which Decrease Pain: pain medication    Functional Limitations/Social History:    Previous functional status includes: Independent with all ADLs.     Current FunctionalStatus   Home/Living environment : lives with their family      Limitation of Functional Status as follows:   ADLs/IADLs:     - Feeding: MOD INDEP    - Bathing: MOD A     - Dressing: MOD A     - Grooming: MOD A     - Driving: N     Leisure: n/a    Occupation:  N/A  Working presently: n/a  Duties: n/a     Patient's Goals for Therapy: " To be able to use my arm again."    Past Medical History/Physical Systems Review:   Past Medical History:   Diagnosis Date    Abnormal Pap smear     Migraines     Pneumonia     as  a  child     Pseudotumor cerebri 2016     Tabby Veloz  has a past surgical history that includes Cervical biopsy w/ loop electrode excision; Ankle arthroscopy w/ open repair (Right); tonsilectomy; Cholecystectomy; addenoids; Abscess drainage; Postpartum ligation of fallopian tube (Bilateral, 6/12/2018); Incision and drainage of abscess (N/A, 8/10/2022); and Intramedullary rodding of humerus (Right, 9/19/2023).    Tabby has a current medication list which includes the following prescription(s): acetaminophen, acetazolamide, acetazolamide, aspirin, bupropion, celecoxib, cetirizine, cranberry, gabapentin, methocarbamol, minocycline, oxycodone, and topiramate.    Review of patient's allergies indicates:  No Known Allergies     Barriers:  Environmental Concerns/ Fall Risk:  None  Barriers to Learning: None   Cultural/Spiritual : None   Developmental/Education: None   Abuse/ Neglect: none   Nutritional Deficit: None   Language: None   Hearing/Vision Deficit: None   History of Cancer: N        Objective     Observation: Pt is present with abduction sling on " her right shoulder     UE Range of Motion  Passive Range of Motion:   Shoulder Right   Flexion 20   Abduction 50   ER at 90 20   IR 80      Active Range of Motion:   Shoulder Left Right   Flexion 170 70   Abduction 165 70   ER at 90 80 20   IR 80 80     Elbow Left Right   Flexion 140 120   Extension 0 -10     Painful Arc:   Patient demonstrates no painful arc in shoulder flexion or abduction    Upper Extremity Strength  (R) UE  (L) UE    Shoulder flexion: 2-/5 Shoulder flexion: 5/5   Shoulder Abduction: 2-/5 Shoulder abduction: 5/5   Shoulder ER 2-/5 Shoulder ER 5/5   Shoulder IR 2-/5 Shoulder IR 5/5   Rhomboids 2-/5 Rhomboids 5/5      Strength: (OSMANY Dynamometer in psi.)      10/9/2023 10/9/2023    Left Right   Rung II 60 N/a     Joint Mobility/End Feels: Hard    Palpation: Pt is tender at incision sites     Sensation: Pt denies any numbness or tingling      Scapular Control/Dyskinesis:    Normal / Subtle / Obvious  Comments    Left  N N/a    Right  N N/a       OUTCOME MEASURE:FOTO    Category:Self care       Current Score  = 37%   Goal at Discharge Score = 65%   Treatment     Treatment Time In: 1145  Treatment Time Out: 1200    Issued HEP and educated on modality use for pain management (see patient instructions). Pt verbally understood the instructions as they were given and demonstrated proper form and technique during therapy. Pt was advise to perform these exercises free of pain, and to stop performing them if pain occurs.      Patient/Family Education: role of OT, goals for OT, scheduling/cancellations - pt verbalized understanding. Discussed insurance limitations with patient.      Assessment     Tabby Veloz is a 43 y.o. female presents with limitations as described in problem list. Patient can benefit from Occupational Therapy services for Iontophoresis, ultrasound, moist heat, therapeutic exercises, home exercise program provied with written instructions, ice and strengthening and orthotics, if  deemed necessary . The following goals were discussed with the patient and she is in agreement with them as to be addressed in the treatment plan.    The patient's rehab potential is Good.     Anticipated barriers to occupational therapy: none at this time  Pt has no cultural, educational or language barriers to learning provided.    Profile and History Assessment of Occupational Performance Level of Clinical Decision Making Complexity Score   Occupational Profile:   Tabby Veloz is a 43 y.o. female who is unemployed. Tabby Veloz has difficulty with  ADLs and IADLs as listed previously, which  affecting his/her daily functional abilities.      Comorbidities:    has a past medical history of Abnormal Pap smear, Migraines, Pneumonia, and Pseudotumor cerebri.    Medical and Therapy History Review:   Expanded               Performance Deficits    Physical:  Joint Mobility  Joint Stability  Muscle Power/Strength  Muscle Endurance   Strength  Pinch Strength  Pain    Cognitive:  No Deficits    Psychosocial:    Habits  Routines  Rituals     Clinical Decision Making:  moderate    Assessment Process:  Problem-Focused Assessments    Modification/Need for Assistance:  Minimal-Moderate Modifications/Assistance    Intervention Selection:  Several Treatment Options       moderate  Based on PMHX, co morbidities , data from assessments and functional level of assistance required with task and clinical presentation directly impacting function.       Goals:     Short Term (6 weeks ):  1)   Patient to be IND with HEP and modalities for pain management  2)   Increase ROM to 120 degrees in shoulder flexion to increase functional UE use for ADLs and IADLs  3)   Increase ROM to 90 degrees in shoulder abduction to increase functional UE use for ADLs and IADLs  4)   Increase ROM to 80 degrees in shoulder internal rotation to increase functional UE use for ADLs and IADLs  5)   Increase ROM to 50 degrees in shoulder external  rotation to increase functional UE use for ADLs and IADLs  6)   Increase ROM to 130 degrees in elbow flexion to increase functional UE use for ADLs and IADLs  7)   Increase ROM to 5 degrees in elbow extension to increase functional UE use for ADLs and IADLs  8)   Increase  strength to 30 lbs. to grasp for  ADLs and IADLs  9)   Improve muscle strength ing -2 to 3+ in order to participate in ADLs and IADLs with less assistance.     Long Term (by discharge):  1)   Pt will report 1 out of 10 pain at worst when completing ADLs and IADLs .  2)   Increase ROM to 170 degrees in shoulder flexion to increase functional UE use for ADLs and IADLs  3)   Increase ROM to 165 degrees in shoulder abduction to increase functional UE use for ADLs and IADLs  4)   Increase ROM to 80 degrees in shoulder internal rotation to increase functional UE use for ADLs and IADLs  5)   Increase ROM to 80 degrees in shoulder external rotation to increase functional UE use for ADLs and IADLs  6)   Increase ROM to 140 degrees in elbow flexion to increase functional UE use for ADLs and IADLs  7)   Increase ROM to 0 degrees in elbow extension to increase functional UE use for ADLs and IADLs  8)   Increase  strength to 50 lbs. to grasp for  ADLs and IADLs  9)   Improve muscle strength ing to 4+ in order to participate in ADLs and IADLs with less assistance.   10)   Patient to score at 65% or more on FOTO to demonstrate improved perception of functional shoulder use.  11)  Pt will return to prior level of function for ADLs and household management.     Plan     Pt to be treated by Occupational Therapy 2 times per week for 12 weeks during the certification period to achieve the established goals.     Treatment to include: Paraffin, Fluidotherapy, Manual therapy/joint mobilizations, Modalities for pain management, US 3 mhz, Therapeutic exercises/activities., Iontophoresis with 2.0 cc Dexamethasone, Strengthening, Orthotic Fabrication/Fit/Training,  Edema Control, Scar Management, Wound Care, Electrical Modalities, Joint Protection, and Energy Conservation, as well as any other treatments deemed necessary based on the patient's needs or progress.       FERNANDO Samuel

## 2023-10-09 NOTE — PROGRESS NOTES
[facility]  Occupational Therapy - Shoulder Evaluation       Evaluation Date: 10/9/2023  Patient: Tabby Veloz  YOB: 1980  Chart Number: [id number]  Referring Physician: Luis Del Valle NP  Diagnosis:   1. Closed displaced comminuted fracture of shaft of right humerus with routine healing, subsequent encounter s/p ORIF 9/19/23  Ambulatory referral/consult to Physical/Occupational Therapy      2. Weakness        3. Elbow stiffness, right        4. Shoulder stiffness, right        5. Pain            Visit #: 1 of 1  Plan of Care Certification Date: 1/1/24  Authorization Period: 10/3/23 -10/2/24  Date of Return to MD: 4 weeks    Time In: 1115  Time Out: 1200  Total Billable Time: 45    Precautions:  Standard and Weightbearing- Weight bearing NWB to RUE x 2 months  - Range of motion as tolerated.     Subjective     Reason for Referral: Tabby presents to therapy with a medical referral of ORIF right humeral shaft fracture   Involved Side: R  Dominant Side: Left  Date of Onset: 9/15/23   Mechanism of Injury: traumatic   History of Current Condition: Pt states that she feel on a Friday and went to the ER which confirmed her fx. She was set up to have surgery on the following Tuesday. She is 3 weeks post ORIF to humerus shaft  Surgical Procedure: Humerus shaft ORIF - 9/19/23  Imaging: EXAMINATION:  XR HUMERUS 2 VIEW RIGHT     CLINICAL HISTORY:  Other specified disorders of bone, upper arm     COMPARISON:  09/19/2023     FINDINGS:  Two views right humerus.     Intramedullary zev and screw construct noted in repair of complex humeral diaphyseal fracture.  Overall fracture alignment appears grossly stable allowing for differences in positioning.  There is progressive healing.  No dislocation.  No convincing new fracture.     Impression:     1. Evolution of humeral fracture status post fixation as above.        Previous Therapy: N     Pain:  Functional Pain Scale Rating 0-10:   3/10 on  "average  1/10 at best  4/10 at worst  Location: Elbow and shoulder   Description: Aching and Dull  Activities Which Increase Pain: Movement in all directions outside of sling  Activities Which Decrease Pain: pain medication    Functional Limitations/Social History:    Previous functional status includes: Independent with all ADLs.     Current FunctionalStatus   Home/Living environment : lives with their family      Limitation of Functional Status as follows:   ADLs/IADLs:     - Feeding: MOD INDEP    - Bathing: MOD A     - Dressing: MOD A     - Grooming: MOD A     - Driving: N     Leisure: n/a    Occupation:  N/A  Working presently: n/a  Duties: n/a     Patient's Goals for Therapy: " To be able to use my arm again."    Past Medical History/Physical Systems Review:   Past Medical History:   Diagnosis Date    Abnormal Pap smear     Migraines     Pneumonia     as  a  child     Pseudotumor cerebri 2016     Tabby Veloz  has a past surgical history that includes Cervical biopsy w/ loop electrode excision; Ankle arthroscopy w/ open repair (Right); tonsilectomy; Cholecystectomy; addenoids; Abscess drainage; Postpartum ligation of fallopian tube (Bilateral, 6/12/2018); Incision and drainage of abscess (N/A, 8/10/2022); and Intramedullary rodding of humerus (Right, 9/19/2023).    Tabby has a current medication list which includes the following prescription(s): acetaminophen, acetazolamide, acetazolamide, aspirin, bupropion, celecoxib, cetirizine, cranberry, gabapentin, methocarbamol, minocycline, oxycodone, and topiramate.    Review of patient's allergies indicates:  No Known Allergies     Barriers:  Environmental Concerns/ Fall Risk:  None  Barriers to Learning: None   Cultural/Spiritual : None   Developmental/Education: None   Abuse/ Neglect: none   Nutritional Deficit: None   Language: None   Hearing/Vision Deficit: None   History of Cancer: N        Objective     Observation: Pt is present with abduction sling on " her right shoulder     UE Range of Motion  Passive Range of Motion:   Shoulder Right   Flexion 20   Abduction 50   ER at 90 20   IR 80      Active Range of Motion:   Shoulder Left Right   Flexion 170 70   Abduction 165 70   ER at 90 80 20   IR 80 80     Elbow Left Right   Flexion 140 120   Extension 0 -10     Painful Arc:   Patient demonstrates no painful arc in shoulder flexion or abduction    Upper Extremity Strength  (R) UE  (L) UE    Shoulder flexion: 2-/5 Shoulder flexion: 5/5   Shoulder Abduction: 2-/5 Shoulder abduction: 5/5   Shoulder ER 2-/5 Shoulder ER 5/5   Shoulder IR 2-/5 Shoulder IR 5/5   Rhomboids 2-/5 Rhomboids 5/5      Strength: (OSMANY Dynamometer in psi.)      10/9/2023 10/9/2023    Left Right   Rung II 60 N/a     Joint Mobility/End Feels: Hard    Palpation: Pt is tender at incision sites     Sensation: Pt denies any numbness or tingling      Scapular Control/Dyskinesis:    Normal / Subtle / Obvious  Comments    Left  N N/a    Right  N N/a       OUTCOME MEASURE:FOTO    Category:Self care       Current Score  = 37%   Goal at Discharge Score = 65%   Treatment     Treatment Time In: 1145  Treatment Time Out: 1200    Issued HEP and educated on modality use for pain management (see patient instructions). Pt verbally understood the instructions as they were given and demonstrated proper form and technique during therapy. Pt was advise to perform these exercises free of pain, and to stop performing them if pain occurs.      Patient/Family Education: role of OT, goals for OT, scheduling/cancellations - pt verbalized understanding. Discussed insurance limitations with patient.      Assessment     Tabby Veloz is a 43 y.o. female presents with limitations as described in problem list. Patient can benefit from Occupational Therapy services for Iontophoresis, ultrasound, moist heat, therapeutic exercises, home exercise program provied with written instructions, ice and strengthening and orthotics, if  deemed necessary . The following goals were discussed with the patient and she is in agreement with them as to be addressed in the treatment plan.    The patient's rehab potential is Good.     Anticipated barriers to occupational therapy: none at this time  Pt has no cultural, educational or language barriers to learning provided.    Profile and History Assessment of Occupational Performance Level of Clinical Decision Making Complexity Score   Occupational Profile:   Tabby Veloz is a 43 y.o. female who is unemployed. Tabby Veloz has difficulty with  ADLs and IADLs as listed previously, which  affecting his/her daily functional abilities.      Comorbidities:    has a past medical history of Abnormal Pap smear, Migraines, Pneumonia, and Pseudotumor cerebri.    Medical and Therapy History Review:   Expanded               Performance Deficits    Physical:  Joint Mobility  Joint Stability  Muscle Power/Strength  Muscle Endurance   Strength  Pinch Strength  Pain    Cognitive:  No Deficits    Psychosocial:    Habits  Routines  Rituals     Clinical Decision Making:  moderate    Assessment Process:  Problem-Focused Assessments    Modification/Need for Assistance:  Minimal-Moderate Modifications/Assistance    Intervention Selection:  Several Treatment Options       moderate  Based on PMHX, co morbidities , data from assessments and functional level of assistance required with task and clinical presentation directly impacting function.       Goals:     Short Term (6 weeks ):  1)   Patient to be IND with HEP and modalities for pain management  2)   Increase ROM to 120 degrees in shoulder flexion to increase functional UE use for ADLs and IADLs  3)   Increase ROM to 90 degrees in shoulder abduction to increase functional UE use for ADLs and IADLs  4)   Increase ROM to 80 degrees in shoulder internal rotation to increase functional UE use for ADLs and IADLs  5)   Increase ROM to 50 degrees in shoulder external  rotation to increase functional UE use for ADLs and IADLs  6)   Increase ROM to 130 degrees in elbow flexion to increase functional UE use for ADLs and IADLs  7)   Increase ROM to 5 degrees in elbow extension to increase functional UE use for ADLs and IADLs  8)   Increase  strength to 30 lbs. to grasp for  ADLs and IADLs  9)   Improve muscle strength ing -2 to 3+ in order to participate in ADLs and IADLs with less assistance.     Long Term (by discharge):  1)   Pt will report 1 out of 10 pain at worst when completing ADLs and IADLs .  2)   Increase ROM to 170 degrees in shoulder flexion to increase functional UE use for ADLs and IADLs  3)   Increase ROM to 165 degrees in shoulder abduction to increase functional UE use for ADLs and IADLs  4)   Increase ROM to 80 degrees in shoulder internal rotation to increase functional UE use for ADLs and IADLs  5)   Increase ROM to 80 degrees in shoulder external rotation to increase functional UE use for ADLs and IADLs  6)   Increase ROM to 140 degrees in elbow flexion to increase functional UE use for ADLs and IADLs  7)   Increase ROM to 0 degrees in elbow extension to increase functional UE use for ADLs and IADLs  8)   Increase  strength to 50 lbs. to grasp for  ADLs and IADLs  9)   Improve muscle strength ing to 4+ in order to participate in ADLs and IADLs with less assistance.   10)   Patient to score at 65% or more on FOTO to demonstrate improved perception of functional shoulder use.  11)  Pt will return to prior level of function for ADLs and household management.     Plan     Pt to be treated by Occupational Therapy 2 times per week for 12 weeks during the certification period to achieve the established goals.     Treatment to include: Paraffin, Fluidotherapy, Manual therapy/joint mobilizations, Modalities for pain management, US 3 mhz, Therapeutic exercises/activities., Iontophoresis with 2.0 cc Dexamethasone, Strengthening, Orthotic Fabrication/Fit/Training,  Edema Control, Scar Management, Wound Care, Electrical Modalities, Joint Protection, and Energy Conservation, as well as any other treatments deemed necessary based on the patient's needs or progress.       FERNANDO Samuel

## 2023-10-12 ENCOUNTER — CLINICAL SUPPORT (OUTPATIENT)
Dept: REHABILITATION | Facility: HOSPITAL | Age: 43
End: 2023-10-12
Attending: NURSE PRACTITIONER
Payer: COMMERCIAL

## 2023-10-12 DIAGNOSIS — M25.611 SHOULDER STIFFNESS, RIGHT: ICD-10-CM

## 2023-10-12 DIAGNOSIS — M25.621 ELBOW STIFFNESS, RIGHT: ICD-10-CM

## 2023-10-12 DIAGNOSIS — R53.1 WEAKNESS: Primary | ICD-10-CM

## 2023-10-12 DIAGNOSIS — R52 PAIN: ICD-10-CM

## 2023-10-12 PROCEDURE — 97140 MANUAL THERAPY 1/> REGIONS: CPT | Mod: PO

## 2023-10-12 PROCEDURE — 97530 THERAPEUTIC ACTIVITIES: CPT | Mod: PO

## 2023-10-12 NOTE — PROGRESS NOTES
Occupational Therapy Daily Treatment Note     Name: Tabby Veloz  Clinic Number: [id number]    Therapy Diagnosis:   Encounter Diagnoses   Name Primary?    Weakness Yes    Elbow stiffness, right     Shoulder stiffness, right     Pain      Physician: Luis Del Valle NP    Visit Date: 10/12/2023    Physician Orders: OT eval/ treat   Plan of Care Certification Date: 1/1/24  Authorization Period: 10/3/23 -10/2/24  Date of Return to MD: 4 weeks   Medical Diagnosis: S42.351D (ICD-10-CM) - Closed displaced comminuted fracture of shaft of right humerus with routine healing, subsequent   Surgical Procedure and Date: ORIF for her right humeral shaft fracture with IM nail by Dr. Stanley on 9/19/2023. ,   Evaluation Date: 10/9/23  Onset Date:9/15/23   Date of Return to MD: 10/31/23  Visit # / Visits authorized: 1 / 12  FOTO Completion: 1/3    Time In:1000  Time Out: 1100  Total Billable Time: 60 minutes    Precautions:  Standard and Weightbearing- Weight bearing NWB to RUE x 2 months  - Range of motion as tolerated.      Subjective     Pt reports: She completed her exercises   she was compliant with home exercise program given last session.   Response to previous treatment:1st after   Functional change: 1st after     Pain: 6/10 when stretching   Location: right arms     Objective       Observation: Pt is present with abduction sling on her right shoulder      UE Range of Motion  Passive Range of Motion:   Shoulder Right   Flexion 20   Abduction 50   ER at 90 20   IR 80      Active Range of Motion:   Shoulder Left Right   Flexion 170 70   Abduction 165 70   ER at 90 80 20   IR 80 80      Elbow Left Right   Flexion 140 120   Extension 0 -10      Painful Arc:   Patient demonstrates no painful arc in shoulder flexion or abduction     Upper Extremity Strength  (R) UE   (L) UE     Shoulder flexion: 2-/5 Shoulder flexion: 5/5   Shoulder Abduction: 2-/5 Shoulder abduction: 5/5   Shoulder ER 2-/5 Shoulder ER 5/5   Shoulder IR  2-/5 Shoulder IR 5/5   Rhomboids 2-/5 Rhomboids 5/5       Strength: (OSMANY Dynamometer in psi.)        10/9/2023 10/9/2023     Left Right   Rung II 60 N/a      Joint Mobility/End Feels: Hard     Palpation: Pt is tender at incision sites      Sensation: Pt denies any numbness or tingling        Scapular Control/Dyskinesis:     Normal / Subtle / Obvious  Comments    Left  N N/a    Right  N N/a         OUTCOME MEASURE:FOTO     Category:Self care       Current Score  = 37%   Goal at Discharge Score = 65%     Treatment       Julee received the following manual therapy techniques for 25 minutes:   -PROM of shoulder in flexion, abduction, internal rotation, and external rotation 4 x 10 to toleration  - joint mobilizations to glenohumeral joint anterior to posterior     Julee participated in dynamic functional therapeutic activities to improve functional performance for 35  minutes, including:  -supine flexion with wand to toleration  -supine abduction with wand to toleration  -supine chest press with wand to toleration  - elbow flexion with wand palm up  -elbow flexion with wand palm down  -hot pack to elbow placed in an extension stretch     Home Exercises and Education Provided     Education provided:   - Progress towards goals     Written Home Exercises Provided: Patient instructed to cont prior HEP.  Exercises were reviewed and Julee was able to demonstrate them prior to the end of the session.  Julee demonstrated good  understanding of the HEP provided.   .   See EMR under Patient Instructions for exercises provided prior visit.        Assessment     Pt would continue to benefit from skilled OT. Pt tolerated added therapeutic exercises well today. Pt 3 weeks post op, PROM initiated to toleration. Will progress as tolerated. Pt motivated.      Julee is progressing well towards her goals and there are no updates to goals at this time. Pt prognosis is Good.     Pt will continue to benefit from skilled outpatient  occupational therapy to address the deficits listed in the problem list on initial evaluation provide pt/family education and to maximize pt's level of independence in the home and community environment.     Anticipated barriers to occupational therapy: toleration to pain    Pt's spiritual, cultural and educational needs considered and pt agreeable to plan of care and goals.    Goals:      Short Term (6 weeks ):  1)   Patient to be IND with HEP and modalities for pain management Ongoing  2)   Increase ROM to 120 degrees in shoulder flexion to increase functional UE use for ADLs and IADLs Ongoing  3)   Increase ROM to 90 degrees in shoulder abduction to increase functional UE use for ADLs and IADLs Ongoing  4)   Increase ROM to 80 degrees in shoulder internal rotation to increase functional UE use for ADLs and IADLs Ongoing  5)   Increase ROM to 50 degrees in shoulder external rotation to increase functional UE use for ADLs and IADLs Ongoing  6)   Increase ROM to 130 degrees in elbow flexion to increase functional UE use for ADLs and IADLs Ongoing  7)   Increase ROM to 5 degrees in elbow extension to increase functional UE use for ADLs and IADLs Ongoing  8)   Increase  strength to 30 lbs. to grasp for  ADLs and IADLs Ongoing  9)   Improve muscle strength ing -2 to 3+ in order to participate in ADLs and IADLs with less assistance. Ongoing     Long Term (by discharge):  1)   Pt will report 1 out of 10 pain at worst when completing ADLs and IADLs. Ongoing  2)   Increase ROM to 170 degrees in shoulder flexion to increase functional UE use for ADLs and IADLs Ongoing  3)   Increase ROM to 165 degrees in shoulder abduction to increase functional UE use for ADLs and IADLs Ongoing  4)   Increase ROM to 80 degrees in shoulder internal rotation to increase functional UE use for ADLs and IADLs Ongoing  5)   Increase ROM to 80 degrees in shoulder external rotation to increase functional UE use for ADLs and IADLs Ongoing  6)    Increase ROM to 140 degrees in elbow flexion to increase functional UE use for ADLs and IADLs Ongoing  7)   Increase ROM to 0 degrees in elbow extension to increase functional UE use for ADLs and IADLs Ongoing  8)   Increase  strength to 50 lbs. to grasp for  ADLs and IADLs Ongoing  9)   Improve muscle strength ing to 4+ in order to participate in ADLs and IADLs with less assistance.  Ongoing  10)   Patient to score at 65% or more on FOTO to demonstrate improved perception of functional shoulder use.Ongoing  11)  Pt will return to prior level of function for ADLs and household management. Ongoing     Plan      Pt to be treated by Occupational Therapy 2 times per week for 12 weeks during the certification period to achieve the established goals.      Treatment to include: Paraffin, Fluidotherapy, Manual therapy/joint mobilizations, Modalities for pain management, US 3 mhz, Therapeutic exercises/activities., Iontophoresis with 2.0 cc Dexamethasone, Strengthening, Orthotic Fabrication/Fit/Training, Edema Control, Scar Management, Wound Care, Electrical Modalities, Joint Protection, and Energy Conservation, as well as any other treatments deemed necessary based on the patient's needs or progress.     Updates/Grading for next session: Continue with current plan of care     FERNANDO Samuel

## 2023-10-16 ENCOUNTER — CLINICAL SUPPORT (OUTPATIENT)
Dept: REHABILITATION | Facility: HOSPITAL | Age: 43
End: 2023-10-16
Payer: COMMERCIAL

## 2023-10-16 DIAGNOSIS — R53.1 WEAKNESS: Primary | ICD-10-CM

## 2023-10-16 DIAGNOSIS — M25.611 SHOULDER STIFFNESS, RIGHT: ICD-10-CM

## 2023-10-16 DIAGNOSIS — R52 PAIN: ICD-10-CM

## 2023-10-16 DIAGNOSIS — M25.621 ELBOW STIFFNESS, RIGHT: ICD-10-CM

## 2023-10-16 PROCEDURE — 97140 MANUAL THERAPY 1/> REGIONS: CPT | Mod: PO

## 2023-10-16 PROCEDURE — 97530 THERAPEUTIC ACTIVITIES: CPT | Mod: PO

## 2023-10-16 NOTE — PROGRESS NOTES
Occupational Therapy Daily Treatment Note     Name: Tabby Veloz  Clinic Number: [id number]    Therapy Diagnosis:   Encounter Diagnoses   Name Primary?    Weakness Yes    Elbow stiffness, right     Shoulder stiffness, right     Pain      Physician: Luis Del Valle NP    Visit Date: 10/16/2023    Physician Orders: OT eval/ treat   Plan of Care Certification Date: 1/1/24  Authorization Period: 10/3/23 -10/2/24  Date of Return to MD: 4 weeks   Medical Diagnosis: S42.351D (ICD-10-CM) - Closed displaced comminuted fracture of shaft of right humerus with routine healing, subsequent   Surgical Procedure and Date: ORIF for her right humeral shaft fracture with IM nail by Dr. Stanley on 9/19/2023. ,   Evaluation Date: 10/9/23  Onset Date:9/15/23   Date of Return to MD: 10/31/23  Visit # / Visits authorized: 3/ 12  FOTO Completion: 1/3    Time In:1300  Time Out: 1400  Total Billable Time: 60 minutes    Precautions:  Standard and Weightbearing- Weight bearing NWB to RUE x 2 months  - Range of motion as tolerated.      Subjective     Pt reports: She completed her exercises   she was compliant with home exercise program given last session.   Response to previous treatment:increased ROM  Functional change: increased ROM    Pain: 6/10 when stretching   Location: right arms     Objective       Observation: Pt is present with abduction sling on her right shoulder      UE Range of Motion  Passive Range of Motion:   Shoulder Right   Flexion 20   Abduction 50   ER at 90 20   IR 80      Active Range of Motion:   Shoulder Left Right   Flexion 170 70   Abduction 165 70   ER at 90 80 20   IR 80 80      Elbow Left Right   Flexion 140 120   Extension 0 -10      Painful Arc:   Patient demonstrates no painful arc in shoulder flexion or abduction     Upper Extremity Strength  (R) UE   (L) UE     Shoulder flexion: 2-/5 Shoulder flexion: 5/5   Shoulder Abduction: 2-/5 Shoulder abduction: 5/5   Shoulder ER 2-/5 Shoulder ER 5/5   Shoulder  IR 2-/5 Shoulder IR 5/5   Rhomboids 2-/5 Rhomboids 5/5       Strength: (OSMANY Dynamometer in psi.)        10/9/2023 10/9/2023     Left Right   Rung II 60 N/a      Joint Mobility/End Feels: Hard     Palpation: Pt is tender at incision sites      Sensation: Pt denies any numbness or tingling        Scapular Control/Dyskinesis:     Normal / Subtle / Obvious  Comments    Left  N N/a    Right  N N/a         OUTCOME MEASURE:FOTO     Category:Self care       Current Score  = 37%   Goal at Discharge Score = 65%     Treatment       Julee received the following manual therapy techniques for 25 minutes:   -PROM of shoulder in flexion, abduction, internal rotation, and external rotation 4 x 10 to toleration  - joint mobilizations to glenohumeral joint anterior to posterior     Julee participated in dynamic functional therapeutic activities to improve functional performance for 35  minutes, including:  -supine flexion with wand to toleration  -supine abduction with wand to toleration  -supine chest press with wand to toleration  - elbow flexion with wand palm up  -elbow flexion with wand palm down  -hot pack to elbow placed in an extension stretch     Home Exercises and Education Provided     Education provided:   - Progress towards goals     Written Home Exercises Provided: Patient instructed to cont prior HEP.  Exercises were reviewed and Julee was able to demonstrate them prior to the end of the session.  Julee demonstrated good  understanding of the HEP provided.   .   See EMR under Patient Instructions for exercises provided prior visit.        Assessment     Pt would continue to benefit from skilled OT. Pt tolerated added therapeutic exercises well today. Pt 4 weeks post op, PROM completed to toleration. Will progress as tolerated. Pt motivated.      Julee is progressing well towards her goals and there are no updates to goals at this time. Pt prognosis is Good.     Pt will continue to benefit from skilled outpatient  occupational therapy to address the deficits listed in the problem list on initial evaluation provide pt/family education and to maximize pt's level of independence in the home and community environment.     Anticipated barriers to occupational therapy: toleration to pain    Pt's spiritual, cultural and educational needs considered and pt agreeable to plan of care and goals.    Goals:      Short Term (6 weeks ):  1)   Patient to be IND with HEP and modalities for pain management Ongoing  2)   Increase ROM to 120 degrees in shoulder flexion to increase functional UE use for ADLs and IADLs Ongoing  3)   Increase ROM to 90 degrees in shoulder abduction to increase functional UE use for ADLs and IADLs Ongoing  4)   Increase ROM to 80 degrees in shoulder internal rotation to increase functional UE use for ADLs and IADLs Ongoing  5)   Increase ROM to 50 degrees in shoulder external rotation to increase functional UE use for ADLs and IADLs Ongoing  6)   Increase ROM to 130 degrees in elbow flexion to increase functional UE use for ADLs and IADLs Ongoing  7)   Increase ROM to 5 degrees in elbow extension to increase functional UE use for ADLs and IADLs Ongoing  8)   Increase  strength to 30 lbs. to grasp for  ADLs and IADLs Ongoing  9)   Improve muscle strength ing -2 to 3+ in order to participate in ADLs and IADLs with less assistance. Ongoing     Long Term (by discharge):  1)   Pt will report 1 out of 10 pain at worst when completing ADLs and IADLs. Ongoing  2)   Increase ROM to 170 degrees in shoulder flexion to increase functional UE use for ADLs and IADLs Ongoing  3)   Increase ROM to 165 degrees in shoulder abduction to increase functional UE use for ADLs and IADLs Ongoing  4)   Increase ROM to 80 degrees in shoulder internal rotation to increase functional UE use for ADLs and IADLs Ongoing  5)   Increase ROM to 80 degrees in shoulder external rotation to increase functional UE use for ADLs and IADLs Ongoing  6)    Increase ROM to 140 degrees in elbow flexion to increase functional UE use for ADLs and IADLs Ongoing  7)   Increase ROM to 0 degrees in elbow extension to increase functional UE use for ADLs and IADLs Ongoing  8)   Increase  strength to 50 lbs. to grasp for  ADLs and IADLs Ongoing  9)   Improve muscle strength ing to 4+ in order to participate in ADLs and IADLs with less assistance.  Ongoing  10)   Patient to score at 65% or more on FOTO to demonstrate improved perception of functional shoulder use.Ongoing  11)  Pt will return to prior level of function for ADLs and household management. Ongoing     Plan      Pt to be treated by Occupational Therapy 2 times per week for 12 weeks during the certification period to achieve the established goals.      Treatment to include: Paraffin, Fluidotherapy, Manual therapy/joint mobilizations, Modalities for pain management, US 3 mhz, Therapeutic exercises/activities., Iontophoresis with 2.0 cc Dexamethasone, Strengthening, Orthotic Fabrication/Fit/Training, Edema Control, Scar Management, Wound Care, Electrical Modalities, Joint Protection, and Energy Conservation, as well as any other treatments deemed necessary based on the patient's needs or progress.     Updates/Grading for next session: Continue with current plan of care     FERNANDO Samuel

## 2023-10-20 ENCOUNTER — TELEPHONE (OUTPATIENT)
Dept: ORTHOPEDICS | Facility: CLINIC | Age: 43
End: 2023-10-20
Payer: COMMERCIAL

## 2023-10-20 NOTE — TELEPHONE ENCOUNTER
----- Message from Lyndsay Herrera sent at 10/20/2023  4:05 PM CDT -----  Regarding: Patient needs earlier Post Op appt time than 10:45am, please advise for options, same day  Contact: [phone]  Regarding:Patient needs earlier Post Op appt time than 10:45am, please advise for options, same day    Contact: Julee    Type: Call back with appt times    Who Called: Julee    Would the patient rather a call back or a response via [facility]? Phone call    Best Call Back Number: [phone]

## 2023-10-20 NOTE — TELEPHONE ENCOUNTER
Called and Inform pt her appt with Luis has been rescheduled as request to 9:45 am on 10/31. Pt verbally understood and was satisfied.

## 2023-10-23 ENCOUNTER — CLINICAL SUPPORT (OUTPATIENT)
Dept: REHABILITATION | Facility: HOSPITAL | Age: 43
End: 2023-10-23
Payer: COMMERCIAL

## 2023-10-23 DIAGNOSIS — S42.351D CLOSED DISPLACED COMMINUTED FRACTURE OF SHAFT OF RIGHT HUMERUS WITH ROUTINE HEALING, SUBSEQUENT ENCOUNTER: ICD-10-CM

## 2023-10-23 DIAGNOSIS — M25.621 ELBOW STIFFNESS, RIGHT: ICD-10-CM

## 2023-10-23 DIAGNOSIS — R53.1 WEAKNESS: Primary | ICD-10-CM

## 2023-10-23 DIAGNOSIS — R52 PAIN: ICD-10-CM

## 2023-10-23 DIAGNOSIS — M25.611 SHOULDER STIFFNESS, RIGHT: ICD-10-CM

## 2023-10-23 PROCEDURE — 97140 MANUAL THERAPY 1/> REGIONS: CPT | Mod: PO

## 2023-10-23 PROCEDURE — 97530 THERAPEUTIC ACTIVITIES: CPT | Mod: PO

## 2023-10-23 RX ORDER — GABAPENTIN 300 MG/1
300 CAPSULE ORAL 3 TIMES DAILY
Qty: 42 CAPSULE | Refills: 0 | Status: SHIPPED | OUTPATIENT
Start: 2023-10-23 | End: 2023-11-15 | Stop reason: SDUPTHER

## 2023-10-23 RX ORDER — METHOCARBAMOL 750 MG/1
750 TABLET, FILM COATED ORAL 3 TIMES DAILY PRN
Qty: 42 TABLET | Refills: 0 | Status: SHIPPED | OUTPATIENT
Start: 2023-10-23 | End: 2023-11-15 | Stop reason: SDUPTHER

## 2023-10-23 NOTE — PROGRESS NOTES
Occupational Therapy Daily Treatment Note     Name: Tabby Veloz  Clinic Number: [id number]    Therapy Diagnosis:   Encounter Diagnoses   Name Primary?    Weakness Yes    Elbow stiffness, right     Shoulder stiffness, right     Pain        Physician: Luis Del Valle NP    Visit Date: 10/23/2023    Physician Orders: OT eval/ treat   Plan of Care Certification Date: 1/1/24  Authorization Period: 10/3/23 -10/2/24  Date of Return to MD: 4 weeks   Medical Diagnosis: S42.351D (ICD-10-CM) - Closed displaced comminuted fracture of shaft of right humerus with routine healing, subsequent   Surgical Procedure and Date: ORIF for her right humeral shaft fracture with IM nail by Dr. Stanley on 9/19/2023. ,   Evaluation Date: 10/9/23  Onset Date:9/15/23   Date of Return to MD: 10/31/23  Visit # / Visits authorized: 3/ 12  FOTO Completion: 1/3    Time In:1100  Time Out: 1200  Total Billable Time: 60 minutes    Precautions:  Standard and Weightbearing- Weight bearing NWB to RUE x 2 months  - Range of motion as tolerated. 10/24/23 - 5 weeks out       Subjective     Pt reports: She reports she hasn't taken her pain medication which has caused her to have more pain.   she was compliant with home exercise program given last session.   Response to previous treatment:increased ROM  Functional change: increased ROM    Pain:8/10 when stretching   Location: right arms     Objective       Observation: Pt is present with abduction sling on her right shoulder      UE Range of Motion  Passive Range of Motion:   Shoulder Right   Flexion 20   Abduction 50   ER at 90 20   IR 80      Active Range of Motion:   Shoulder Left Right   Flexion 170 70   Abduction 165 70   ER at 90 80 20   IR 80 80      Elbow Left Right   Flexion 140 120   Extension 0 -10      Painful Arc:   Patient demonstrates no painful arc in shoulder flexion or abduction     Upper Extremity Strength  (R) UE   (L) UE     Shoulder flexion: 2-/5 Shoulder flexion: 5/5   Shoulder  Abduction: 2-/5 Shoulder abduction: 5/5   Shoulder ER 2-/5 Shoulder ER 5/5   Shoulder IR 2-/5 Shoulder IR 5/5   Rhomboids 2-/5 Rhomboids 5/5       Strength: (OSMANY Dynamometer in psi.)        10/9/2023 10/9/2023     Left Right   Rung II 60 N/a      Joint Mobility/End Feels: Hard     Palpation: Pt is tender at incision sites      Sensation: Pt denies any numbness or tingling        Scapular Control/Dyskinesis:     Normal / Subtle / Obvious  Comments    Left  N N/a    Right  N N/a         OUTCOME MEASURE:FOTO     Category:Self care       Current Score  = 37%   Goal at Discharge Score = 65%     Treatment       Julee received the following manual therapy techniques for 25 minutes:   -PROM of shoulder in flexion, abduction, internal rotation, and external rotation 4 x 10 to toleration  - joint mobilizations to glenohumeral joint anterior to posterior     Julee participated in dynamic functional therapeutic activities to improve functional performance for 35  minutes, including:  -supine flexion with wand to toleration 2/15  -supine abduction with wand to toleration 2/15  -supine chest press with wand to toleration 2/15  - elbow flexion with wand palm up 2/15  -elbow flexion with wand palm down 2/15  -hot pack to elbow placed in an extension stretch   -seated external rotation with wand 2/15    Home Exercises and Education Provided     Education provided:   - Progress towards goals     Written Home Exercises Provided: Patient instructed to cont prior HEP.  Exercises were reviewed and Julee was able to demonstrate them prior to the end of the session.  Julee demonstrated good  understanding of the HEP provided.   .   See EMR under Patient Instructions for exercises provided prior visit.        Assessment     Pt would continue to benefit from skilled OT. Pt tolerated added therapeutic exercises well today. Pt 5 weeks post op, PROM completed to toleration. Will progress as tolerated. Pt motivated.      Julee is  progressing well towards her goals and there are no updates to goals at this time. Pt prognosis is Good.     Pt will continue to benefit from skilled outpatient occupational therapy to address the deficits listed in the problem list on initial evaluation provide pt/family education and to maximize pt's level of independence in the home and community environment.     Anticipated barriers to occupational therapy: toleration to pain    Pt's spiritual, cultural and educational needs considered and pt agreeable to plan of care and goals.    Goals:      Short Term (6 weeks ):  1)   Patient to be IND with HEP and modalities for pain management Ongoing  2)   Increase ROM to 120 degrees in shoulder flexion to increase functional UE use for ADLs and IADLs Ongoing  3)   Increase ROM to 90 degrees in shoulder abduction to increase functional UE use for ADLs and IADLs Ongoing  4)   Increase ROM to 80 degrees in shoulder internal rotation to increase functional UE use for ADLs and IADLs Ongoing  5)   Increase ROM to 50 degrees in shoulder external rotation to increase functional UE use for ADLs and IADLs Ongoing  6)   Increase ROM to 130 degrees in elbow flexion to increase functional UE use for ADLs and IADLs Ongoing  7)   Increase ROM to 5 degrees in elbow extension to increase functional UE use for ADLs and IADLs Ongoing  8)   Increase  strength to 30 lbs. to grasp for  ADLs and IADLs Ongoing  9)   Improve muscle strength ing -2 to 3+ in order to participate in ADLs and IADLs with less assistance. Ongoing     Long Term (by discharge):  1)   Pt will report 1 out of 10 pain at worst when completing ADLs and IADLs. Ongoing  2)   Increase ROM to 170 degrees in shoulder flexion to increase functional UE use for ADLs and IADLs Ongoing  3)   Increase ROM to 165 degrees in shoulder abduction to increase functional UE use for ADLs and IADLs Ongoing  4)   Increase ROM to 80 degrees in shoulder internal rotation to increase  functional UE use for ADLs and IADLs Ongoing  5)   Increase ROM to 80 degrees in shoulder external rotation to increase functional UE use for ADLs and IADLs Ongoing  6)   Increase ROM to 140 degrees in elbow flexion to increase functional UE use for ADLs and IADLs Ongoing  7)   Increase ROM to 0 degrees in elbow extension to increase functional UE use for ADLs and IADLs Ongoing  8)   Increase  strength to 50 lbs. to grasp for  ADLs and IADLs Ongoing  9)   Improve muscle strength ing to 4+ in order to participate in ADLs and IADLs with less assistance.  Ongoing  10)   Patient to score at 65% or more on FOTO to demonstrate improved perception of functional shoulder use.Ongoing  11)  Pt will return to prior level of function for ADLs and household management. Ongoing     Plan      Pt to be treated by Occupational Therapy 2 times per week for 12 weeks during the certification period to achieve the established goals.      Treatment to include: Paraffin, Fluidotherapy, Manual therapy/joint mobilizations, Modalities for pain management, US 3 mhz, Therapeutic exercises/activities., Iontophoresis with 2.0 cc Dexamethasone, Strengthening, Orthotic Fabrication/Fit/Training, Edema Control, Scar Management, Wound Care, Electrical Modalities, Joint Protection, and Energy Conservation, as well as any other treatments deemed necessary based on the patient's needs or progress.     Updates/Grading for next session: Reassessment of objective measures     FERNANDO Samuel

## 2023-10-25 ENCOUNTER — CLINICAL SUPPORT (OUTPATIENT)
Dept: REHABILITATION | Facility: HOSPITAL | Age: 43
End: 2023-10-25
Payer: COMMERCIAL

## 2023-10-25 DIAGNOSIS — M25.611 SHOULDER STIFFNESS, RIGHT: ICD-10-CM

## 2023-10-25 DIAGNOSIS — M25.621 ELBOW STIFFNESS, RIGHT: ICD-10-CM

## 2023-10-25 DIAGNOSIS — R53.1 WEAKNESS: Primary | ICD-10-CM

## 2023-10-25 DIAGNOSIS — R52 PAIN: ICD-10-CM

## 2023-10-25 PROCEDURE — 97140 MANUAL THERAPY 1/> REGIONS: CPT | Mod: PO

## 2023-10-25 PROCEDURE — 97530 THERAPEUTIC ACTIVITIES: CPT | Mod: PO

## 2023-10-25 NOTE — PROGRESS NOTES
Occupational Therapy Daily Treatment Note     Name: Tabby Veloz  Clinic Number: [id number]    Therapy Diagnosis:   Encounter Diagnoses   Name Primary?    Weakness Yes    Elbow stiffness, right     Shoulder stiffness, right     Pain        Physician: Luis Del Valle NP    Visit Date: 10/25/2023    Physician Orders: OT eval/ treat   Plan of Care Certification Date: 1/1/24  Authorization Period: 10/3/23 -10/2/24  Date of Return to MD: 4 weeks   Medical Diagnosis: S42.351D (ICD-10-CM) - Closed displaced comminuted fracture of shaft of right humerus with routine healing, subsequent   Surgical Procedure and Date: ORIF for her right humeral shaft fracture with IM nail by Dr. Stanley on 9/19/2023. ,   Evaluation Date: 10/9/23  Onset Date:9/15/23   Date of Return to MD: 10/31/23  Visit # / Visits authorized: 3/ 12  FOTO Completion: 1/3    Time In:0900  Time Out: 1000  Total Billable Time: 60 minutes    Precautions:  Standard and Weightbearing- Weight bearing NWB to RUE x 2 months  - Range of motion as tolerated. 10/24/23 - 5 weeks out       Subjective     Pt reports: She reports she got a refill of her medication and she feels a little better, but she is still very stiff  she was compliant with home exercise program given last session.   Response to previous treatment:increased ROM  Functional change: increased ROM    Pain:6/10 when stretching   Location: right arms     Objective       Observation: Pt is present with abduction sling on her right shoulder      UE Range of Motion  Passive Range of Motion:   Shoulder Right   Flexion 20   Abduction 50   ER at 90 20   IR 80      Active Range of Motion:   Shoulder Left Right   Flexion 170 70   Abduction 165 70   ER at 90 80 20   IR 80 80      Elbow Left Right   Flexion 140 120   Extension 0 -10      Painful Arc:   Patient demonstrates no painful arc in shoulder flexion or abduction     Upper Extremity Strength  (R) UE   (L) UE     Shoulder flexion: 2-/5 Shoulder flexion:  5/5   Shoulder Abduction: 2-/5 Shoulder abduction: 5/5   Shoulder ER 2-/5 Shoulder ER 5/5   Shoulder IR 2-/5 Shoulder IR 5/5   Rhomboids 2-/5 Rhomboids 5/5       Strength: (OSMANY Dynamometer in psi.)        10/9/2023 10/9/2023     Left Right   Rung II 60 N/a      Joint Mobility/End Feels: Hard     Palpation: Pt is tender at incision sites      Sensation: Pt denies any numbness or tingling        Scapular Control/Dyskinesis:     Normal / Subtle / Obvious  Comments    Left  N N/a    Right  N N/a         OUTCOME MEASURE:FOTO     Category:Self care       Current Score  = 37%   Goal at Discharge Score = 65%     Treatment       Julee received the following manual therapy techniques for 25 minutes:   -PROM of shoulder in flexion, abduction, internal rotation, and external rotation 4 x 10 to toleration  - joint mobilizations to glenohumeral joint anterior to posterior     Julee participated in dynamic functional therapeutic activities to improve functional performance for 35  minutes, including:  -supine flexion with wand to toleration 2/15  -supine abduction with wand to toleration 2/15  -supine chest press with wand to toleration 2/15  - elbow flexion with wand palm up 2/15  -elbow flexion with wand palm down 2/15  -hot pack to elbow placed in an extension stretch   -seated external rotation with wand 2/15    Home Exercises and Education Provided     Education provided:   - Progress towards goals     Written Home Exercises Provided: Patient instructed to cont prior HEP.  Exercises were reviewed and Julee was able to demonstrate them prior to the end of the session.  Julee demonstrated good  understanding of the HEP provided.   .   See EMR under Patient Instructions for exercises provided prior visit.        Assessment     Pt would continue to benefit from skilled OT. Pt tolerated added therapeutic exercises well today. Pt 5 weeks post op, PROM completed to toleration. Will progress as tolerated. Pt motivated.       Julee is progressing well towards her goals and there are no updates to goals at this time. Pt prognosis is Good.     Pt will continue to benefit from skilled outpatient occupational therapy to address the deficits listed in the problem list on initial evaluation provide pt/family education and to maximize pt's level of independence in the home and community environment.     Anticipated barriers to occupational therapy: toleration to pain    Pt's spiritual, cultural and educational needs considered and pt agreeable to plan of care and goals.    Goals:      Short Term (6 weeks ):  1)   Patient to be IND with HEP and modalities for pain management Ongoing  2)   Increase ROM to 120 degrees in shoulder flexion to increase functional UE use for ADLs and IADLs Ongoing  3)   Increase ROM to 90 degrees in shoulder abduction to increase functional UE use for ADLs and IADLs Ongoing  4)   Increase ROM to 80 degrees in shoulder internal rotation to increase functional UE use for ADLs and IADLs Ongoing  5)   Increase ROM to 50 degrees in shoulder external rotation to increase functional UE use for ADLs and IADLs Ongoing  6)   Increase ROM to 130 degrees in elbow flexion to increase functional UE use for ADLs and IADLs Ongoing  7)   Increase ROM to 5 degrees in elbow extension to increase functional UE use for ADLs and IADLs Ongoing  8)   Increase  strength to 30 lbs. to grasp for  ADLs and IADLs Ongoing  9)   Improve muscle strength ing -2 to 3+ in order to participate in ADLs and IADLs with less assistance. Ongoing     Long Term (by discharge):  1)   Pt will report 1 out of 10 pain at worst when completing ADLs and IADLs. Ongoing  2)   Increase ROM to 170 degrees in shoulder flexion to increase functional UE use for ADLs and IADLs Ongoing  3)   Increase ROM to 165 degrees in shoulder abduction to increase functional UE use for ADLs and IADLs Ongoing  4)   Increase ROM to 80 degrees in shoulder internal rotation to  increase functional UE use for ADLs and IADLs Ongoing  5)   Increase ROM to 80 degrees in shoulder external rotation to increase functional UE use for ADLs and IADLs Ongoing  6)   Increase ROM to 140 degrees in elbow flexion to increase functional UE use for ADLs and IADLs Ongoing  7)   Increase ROM to 0 degrees in elbow extension to increase functional UE use for ADLs and IADLs Ongoing  8)   Increase  strength to 50 lbs. to grasp for  ADLs and IADLs Ongoing  9)   Improve muscle strength ing to 4+ in order to participate in ADLs and IADLs with less assistance.  Ongoing  10)   Patient to score at 65% or more on FOTO to demonstrate improved perception of functional shoulder use.Ongoing  11)  Pt will return to prior level of function for ADLs and household management. Ongoing     Plan      Pt to be treated by Occupational Therapy 2 times per week for 12 weeks during the certification period to achieve the established goals.      Treatment to include: Paraffin, Fluidotherapy, Manual therapy/joint mobilizations, Modalities for pain management, US 3 mhz, Therapeutic exercises/activities., Iontophoresis with 2.0 cc Dexamethasone, Strengthening, Orthotic Fabrication/Fit/Training, Edema Control, Scar Management, Wound Care, Electrical Modalities, Joint Protection, and Energy Conservation, as well as any other treatments deemed necessary based on the patient's needs or progress.     Updates/Grading for next session: Reassessment of objective measures     FERNANDO Samuel

## 2023-10-26 ENCOUNTER — TELEPHONE (OUTPATIENT)
Dept: ORTHOPEDICS | Facility: CLINIC | Age: 43
End: 2023-10-26
Payer: COMMERCIAL

## 2023-10-26 NOTE — TELEPHONE ENCOUNTER
Spoke with patient regarding scheduling a fracture clinic appointment , patient stated not at this time

## 2023-10-30 ENCOUNTER — CLINICAL SUPPORT (OUTPATIENT)
Dept: REHABILITATION | Facility: HOSPITAL | Age: 43
End: 2023-10-30
Payer: COMMERCIAL

## 2023-10-30 DIAGNOSIS — R52 PAIN: ICD-10-CM

## 2023-10-30 DIAGNOSIS — R53.1 WEAKNESS: Primary | ICD-10-CM

## 2023-10-30 DIAGNOSIS — M25.621 ELBOW STIFFNESS, RIGHT: ICD-10-CM

## 2023-10-30 DIAGNOSIS — M25.611 SHOULDER STIFFNESS, RIGHT: ICD-10-CM

## 2023-10-30 PROCEDURE — 97530 THERAPEUTIC ACTIVITIES: CPT | Mod: PO

## 2023-10-30 PROCEDURE — 97140 MANUAL THERAPY 1/> REGIONS: CPT | Mod: PO

## 2023-10-30 NOTE — PROGRESS NOTES
Occupational Therapy Daily Treatment Note     Name: Tabby Veloz  Clinic Number: [id number]    Therapy Diagnosis:   Encounter Diagnoses   Name Primary?    Weakness Yes    Elbow stiffness, right     Shoulder stiffness, right     Pain        Physician: Luis Del Valle NP    Visit Date: 10/30/2023    Physician Orders: OT eval/ treat   Plan of Care Certification Date: 1/1/24  Authorization Period: 10/3/23 -10/2/24  Date of Return to MD: 4 weeks   Medical Diagnosis: S42.351D (ICD-10-CM) - Closed displaced comminuted fracture of shaft of right humerus with routine healing, subsequent   Surgical Procedure and Date: ORIF for her right humeral shaft fracture with IM nail by Dr. Stanley on 9/19/2023. ,   Evaluation Date: 10/9/23  Onset Date:9/15/23   Date of Return to MD: 10/31/23  Visit # / Visits authorized: 5/ 12  FOTO Completion: 1/3    Time In:1100  Time Out: 1200  Total Billable Time: 60 minutes    Precautions:  Standard and Weightbearing- Weight bearing NWB to RUE x 2 months  - Range of motion as tolerated. 10/24/23 - 5 weeks out       Subjective     Pt reports: She states she has been completing her HEP daily.   she was compliant with home exercise program given last session.   Response to previous treatment:increased ROM  Functional change: increased ROM    Pain:6/10 when stretching   Location: right arms     Objective       Observation: Pt is present with abduction sling on her right shoulder      UE Range of Motion  Active Range of Motion:   Shoulder Right Right  10/30/23   Flexion 20 145 - supine   Abduction 50 110 - supine   ER at 90 20 40 - supine   IR 80 80 - supine      Passive Range of Motion:   Shoulder Left Right Right   10/30/23   Flexion 170 70 160   Abduction 165 70 125   ER at 90 80 20 40   IR 80 80 80      Elbow Left Right Right   10/30/23   Flexion 140 120 130   Extension 0 -10 0      Painful Arc:   Patient demonstrates no painful arc in shoulder flexion or abduction     Upper Extremity  Strength  (R) UE   (L) UE     Shoulder flexion: 2-/5 Shoulder flexion: 5/5   Shoulder Abduction: 2-/5 Shoulder abduction: 5/5   Shoulder ER 2-/5 Shoulder ER 5/5   Shoulder IR 2-/5 Shoulder IR 5/5   Rhomboids 2-/5 Rhomboids 5/5       Strength: (OSMANY Dynamometer in psi.)        10/9/2023 10/9/2023     Left Right   Rung II 60 N/a      Joint Mobility/End Feels: Hard     Palpation: Pt is tender at incision sites      Sensation: Pt denies any numbness or tingling        Scapular Control/Dyskinesis:     Normal / Subtle / Obvious  Comments    Left  N N/a    Right  N N/a         OUTCOME MEASURE:FOTO     Category:Self care       Current Score  = 37%   Goal at Discharge Score = 65%     Treatment       Julee received the following manual therapy techniques for 25 minutes:   -PROM of shoulder in flexion, abduction, internal rotation, and external rotation 4 x 10 to toleration  - joint mobilizations to glenohumeral joint anterior to posterior     Julee participated in dynamic functional therapeutic activities to improve functional performance for 35  minutes, including:  -supine flexion with wand to toleration 2/15  -supine abduction with wand to toleration 2/15  -supine chest press with wand to toleration 2/15  -standing wand extension to toleration 2/15  -standing wand internal rotation to toleration 2/15  - elbow flexion with wand palm up 2/15  -elbow flexion with wand palm down 2/15  -hot pack to elbow placed in an extension stretch   -seated external rotation with wand 2/15  -reassessment of objective measures     Home Exercises and Education Provided     Education provided:   - Progress towards goals     Written Home Exercises Provided: Patient instructed to cont prior HEP.  Exercises were reviewed and Julee was able to demonstrate them prior to the end of the session.  Julee demonstrated good  understanding of the HEP provided.   .   See EMR under Patient Instructions for exercises provided prior visit.         Assessment     Pt would continue to benefit from skilled OT. Pt tolerated added therapeutic exercises well today. Pt 5 weeks post op, PROM completed to toleration. Reassessment of objective measures were completed. Pt has progressed significantly in overall AROM. Will progress as tolerated. Pt motivated.      Julee is progressing well towards her goals and there are no updates to goals at this time. Pt prognosis is Good.     Pt will continue to benefit from skilled outpatient occupational therapy to address the deficits listed in the problem list on initial evaluation provide pt/family education and to maximize pt's level of independence in the home and community environment.     Anticipated barriers to occupational therapy: toleration to pain    Pt's spiritual, cultural and educational needs considered and pt agreeable to plan of care and goals.    Goals:      Short Term (6 weeks ):  1)   Patient to be IND with HEP and modalities for pain management Ongoing  2)   Increase ROM to 120 degrees in shoulder flexion to increase functional UE use for ADLs and IADLs Ongoing  3)   Increase ROM to 90 degrees in shoulder abduction to increase functional UE use for ADLs and IADLs Ongoing  4)   Increase ROM to 80 degrees in shoulder internal rotation to increase functional UE use for ADLs and IADLs Ongoing  5)   Increase ROM to 50 degrees in shoulder external rotation to increase functional UE use for ADLs and IADLs Ongoing  6)   Increase ROM to 130 degrees in elbow flexion to increase functional UE use for ADLs and IADLs MET 10/30/23  7)   Increase ROM to 5 degrees in elbow extension to increase functional UE use for ADLs and IADLs Ongoing  8)   Increase  strength to 30 lbs. to grasp for  ADLs and IADLs Ongoing  9)   Improve muscle strength ing -2 to 3+ in order to participate in ADLs and IADLs with less assistance. Ongoing     Long Term (by discharge):  1)   Pt will report 1 out of 10 pain at worst when completing  ADLs and IADLs. Ongoing  2)   Increase ROM to 170 degrees in shoulder flexion to increase functional UE use for ADLs and IADLs Ongoing  3)   Increase ROM to 165 degrees in shoulder abduction to increase functional UE use for ADLs and IADLs Ongoing  4)   Increase ROM to 80 degrees in shoulder internal rotation to increase functional UE use for ADLs and IADLs Ongoing  5)   Increase ROM to 80 degrees in shoulder external rotation to increase functional UE use for ADLs and IADLs Ongoing  6)   Increase ROM to 140 degrees in elbow flexion to increase functional UE use for ADLs and IADLs Ongoing  7)   Increase ROM to 0 degrees in elbow extension to increase functional UE use for ADLs and IADLs Ongoing  8)   Increase  strength to 50 lbs. to grasp for  ADLs and IADLs Ongoing  9)   Improve muscle strength ing to 4+ in order to participate in ADLs and IADLs with less assistance.  Ongoing  10)   Patient to score at 65% or more on FOTO to demonstrate improved perception of functional shoulder use.Ongoing  11)  Pt will return to prior level of function for ADLs and household management. Ongoing     Plan      Pt to be treated by Occupational Therapy 2 times per week for 12 weeks during the certification period to achieve the established goals.      Treatment to include: Paraffin, Fluidotherapy, Manual therapy/joint mobilizations, Modalities for pain management, US 3 mhz, Therapeutic exercises/activities., Iontophoresis with 2.0 cc Dexamethasone, Strengthening, Orthotic Fabrication/Fit/Training, Edema Control, Scar Management, Wound Care, Electrical Modalities, Joint Protection, and Energy Conservation, as well as any other treatments deemed necessary based on the patient's needs or progress.     Updates/Grading for next session: Continue with current plan of care     FERNANDO Samuel

## 2023-10-31 ENCOUNTER — OFFICE VISIT (OUTPATIENT)
Dept: ORTHOPEDICS | Facility: CLINIC | Age: 43
End: 2023-10-31
Payer: COMMERCIAL

## 2023-10-31 ENCOUNTER — HOSPITAL ENCOUNTER (OUTPATIENT)
Dept: RADIOLOGY | Facility: HOSPITAL | Age: 43
Discharge: HOME | End: 2023-10-31
Attending: NURSE PRACTITIONER
Payer: COMMERCIAL

## 2023-10-31 DIAGNOSIS — M89.8X2 PAIN OF RIGHT HUMERUS: Primary | ICD-10-CM

## 2023-10-31 DIAGNOSIS — S42.351D CLOSED DISPLACED COMMINUTED FRACTURE OF SHAFT OF RIGHT HUMERUS WITH ROUTINE HEALING, SUBSEQUENT ENCOUNTER: Primary | ICD-10-CM

## 2023-10-31 DIAGNOSIS — M89.8X2 PAIN OF RIGHT HUMERUS: ICD-10-CM

## 2023-10-31 DIAGNOSIS — Z98.890 POST-OPERATIVE STATE: ICD-10-CM

## 2023-10-31 PROCEDURE — 73060 X-RAY EXAM OF HUMERUS: CPT | Mod: 26,RT,, | Performed by: RADIOLOGY

## 2023-10-31 PROCEDURE — 73060 XR HUMERUS 2 VIEW RIGHT: ICD-10-PCS | Mod: 26,RT,, | Performed by: RADIOLOGY

## 2023-10-31 PROCEDURE — 1159F PR MEDICATION LIST DOCUMENTED IN MEDICAL RECORD: ICD-10-PCS | Mod: CPTII,S$GLB,, | Performed by: NURSE PRACTITIONER

## 2023-10-31 PROCEDURE — 99999 PR PBB SHADOW E&M-EST. PATIENT-LVL III: ICD-10-PCS | Mod: PBBFAC,,, | Performed by: NURSE PRACTITIONER

## 2023-10-31 PROCEDURE — 73060 X-RAY EXAM OF HUMERUS: CPT | Mod: TC,RT

## 2023-10-31 PROCEDURE — 99024 POSTOP FOLLOW-UP VISIT: CPT | Mod: S$GLB,,, | Performed by: NURSE PRACTITIONER

## 2023-10-31 PROCEDURE — 1160F PR REVIEW ALL MEDS BY PRESCRIBER/CLIN PHARMACIST DOCUMENTED: ICD-10-PCS | Mod: CPTII,S$GLB,, | Performed by: NURSE PRACTITIONER

## 2023-10-31 PROCEDURE — 99024 PR POST-OP FOLLOW-UP VISIT: ICD-10-PCS | Mod: S$GLB,,, | Performed by: NURSE PRACTITIONER

## 2023-10-31 PROCEDURE — 99999 PR PBB SHADOW E&M-EST. PATIENT-LVL III: CPT | Mod: PBBFAC,,, | Performed by: NURSE PRACTITIONER

## 2023-10-31 PROCEDURE — 1160F RVW MEDS BY RX/DR IN RCRD: CPT | Mod: CPTII,S$GLB,, | Performed by: NURSE PRACTITIONER

## 2023-10-31 PROCEDURE — 1159F MED LIST DOCD IN RCRD: CPT | Mod: CPTII,S$GLB,, | Performed by: NURSE PRACTITIONER

## 2023-10-31 NOTE — PROGRESS NOTES
Ms. Veloz is here today for a post-operative visit after undergoing ORIF for her right humeral shaft fracture with IM nail by Dr. Stanley on 9/19/2023.    Interval History:  She reports that she is doing better.  Pain is tolerable.  She is not taking pain medication.  She reports her numbness to the el surface of her thumb only and shoulder area is improving.  She denies falls or injuries since her surgery.  She has followed her weight bearing restrictions.  She denies fever, chills, and sweats since the time of the surgery.     Physical exam:  Incision is open to air and healed.  She has tactile stimulation to their lower FA.  She can flex and extend her thumb.  She can abduct and adduct all of her fingers.  Flexion and extension of the wrist is at 50°, no evidence of wrist drop.  Range of motion of the elbow is 5° to 170°.  Range of motion of the shoulder is 0° to 100°, PROM is 0-180 degrees.  Cap refill is less than 3 seconds and radial pulses 2+.    RADS: right humerus xray was obtained and personally reviewed by me.  Findings show IM nailing appears to be in good position and alignment without evidence of hardware failure.  She has a comminuted fracture of the humerus shaft.  Fracture fragments appear to be stable.  Minimal callus formation noted.    Assessment:  Post-op visit (6 weeks)    Plan:    ICD-10-CM ICD-9-CM   1. Closed displaced comminuted fracture of shaft of right humerus with routine healing, subsequent encounter s/p ORIF 9/19/23  S42.351D V54.11   2. Post-operative state  Z98.890 V45.89       Current care, treatment plan, precautions, activity level/ modifications, limitations, rehabilitation exercises and proposed future treatment were discussed with the patient. We discussed the need to monitor for changes in symptoms and condition and report them to the physician.  Discussed importance of compliance with all appointments and follow up examinations.       PHYSICAL THERAPY:   - Continue  [facility] OT.  - Weight bearing NWB to RUE, needs 2 more weeks then can start with 2-4 lbs x 4 weeks and increase by 2-4 lbs/month thereafter as she tolerates.  Ok for therapist to work with a pulley.  - Range of motion as tolerated.        FOLLOW UP:   - Patient will follow up in the clinic in 6 weeks.  - X-ray of her right humerus is needed.  - Encouraged smoking cessation.    - Future Appointments:   Future Appointments   Date Time Provider Department Center   11/1/2023  9:00 AM Montserrat Marrero, OT [facility] [place] [facility]   11/2/2023 11:15 AM Madhuri Calvin NP [facility]   11/6/2023 11:00 AM Montserrat Marrero, OT [facility]   11/8/2023  9:00 AM Montserrat Marrero, OT [facility]   11/13/2023  9:00 AM Montserrat Marrero, OT [facility]   11/15/2023  9:00 AM Montserrat Marrero, OT [facility]   11/20/2023  9:00 AM Montserrat Marrero, OT [facility]   11/22/2023  9:00 AM Montserrat Marrero, OT [facility]   11/27/2023  9:00 AM Montserrat Marrero, OT [facility]   11/29/2023  9:00 AM Montserrat Marrero, OT [facility]   12/4/2023  9:00 AM Montserrat Marrero, OT [facility]   12/6/2023  9:00 AM Montserrat Marrero, OT [facility]   12/11/2023  9:00 AM Montserrat Marrero, OT [facility]   12/13/2023  9:00 AM Montserrat Marrero, OT [facility]   12/18/2023  9:00 AM Montserrat Marrero, OT [facility]   12/20/2023  9:00 AM Montserrat Marrero, OT [facility]   12/27/2023  9:00 AM Montserrat Marrero OT [facility]   1/3/2024  9:00 [facility]           If there are any questions prior to scheduled follow up, the patient was instructed to contact the office

## 2023-11-01 ENCOUNTER — CLINICAL SUPPORT (OUTPATIENT)
Dept: REHABILITATION | Facility: HOSPITAL | Age: 43
End: 2023-11-01
Payer: COMMERCIAL

## 2023-11-01 DIAGNOSIS — M25.611 SHOULDER STIFFNESS, RIGHT: ICD-10-CM

## 2023-11-01 DIAGNOSIS — R52 PAIN: ICD-10-CM

## 2023-11-01 DIAGNOSIS — M25.621 ELBOW STIFFNESS, RIGHT: ICD-10-CM

## 2023-11-01 DIAGNOSIS — R53.1 WEAKNESS: Primary | ICD-10-CM

## 2023-11-01 PROCEDURE — 97140 MANUAL THERAPY 1/> REGIONS: CPT | Mod: PO

## 2023-11-01 PROCEDURE — 97530 THERAPEUTIC ACTIVITIES: CPT | Mod: PO

## 2023-11-01 NOTE — PROGRESS NOTES
Occupational Therapy Daily Treatment Note     Name: Tabby Veloz  Clinic Number: [id number]    Therapy Diagnosis:   Encounter Diagnoses   Name Primary?    Weakness Yes    Elbow stiffness, right     Shoulder stiffness, right     Pain      Physician: Luis Del Valle NP    Visit Date: 11/1/2023    Physician Orders: OT eval/ treat   Plan of Care Certification Date: 1/1/24  Authorization Period: 10/3/23 -10/2/24  Date of Return to MD: 4 weeks   Medical Diagnosis: S42.351D (ICD-10-CM) - Closed displaced comminuted fracture of shaft of right humerus with routine healing, subsequent   Surgical Procedure and Date: ORIF for her right humeral shaft fracture with IM nail by Dr. Stanley on 9/19/2023. ,   Evaluation Date: 10/9/23  Onset Date:9/15/23   Date of Return to MD: 12/12/23  Visit # / Visits authorized: 6/ 12  FOTO Completion: 1/3    Time In:0900  Time Out: 1015  Total Billable Time: 75 minutes    Note from MD 10/31/23 visit:    - Continue [facility] OT.  - Weight bearing NWB to RUE, needs 2 more weeks (11/14/23) then can start with 2-4 lbs x 4 weeks and increase by 2-4 lbs/month thereafter as she tolerates.  Ok for therapist to work with a pulley.  - Range of motion as tolerated    Precautions: Standard and WB, 6 weeks 10/31/23  Subjective     Pt reports: She states she has been completing her HEP daily.   she was compliant with home exercise program given last session.   Response to previous treatment:increased ROM  Functional change: increased ROM    Pain:6/10 when stretching   Location: right arms     Objective       Observation: Pt is present with abduction sling on her right shoulder      UE Range of Motion  Active Range of Motion:   Shoulder Right Right  10/30/23   Flexion 20 145 - supine   Abduction 50 110 - supine   ER at 90 20 40 - supine   IR 80 80 - supine      Passive Range of Motion:   Shoulder Left Right Right   10/30/23   Flexion 170 70 160   Abduction 165 70 125   ER at 90 80 20 40   IR 80 80 80       Elbow Left Right Right   10/30/23   Flexion 140 120 130   Extension 0 -10 0      Painful Arc:   Patient demonstrates no painful arc in shoulder flexion or abduction     Upper Extremity Strength  (R) UE   (L) UE     Shoulder flexion: 2-/5 Shoulder flexion: 5/5   Shoulder Abduction: 2-/5 Shoulder abduction: 5/5   Shoulder ER 2-/5 Shoulder ER 5/5   Shoulder IR 2-/5 Shoulder IR 5/5   Rhomboids 2-/5 Rhomboids 5/5       Strength: (OSMANY Dynamometer in psi.)        10/9/2023 10/9/2023     Left Right   Rung II 60 N/a      Joint Mobility/End Feels: Hard     Palpation: Pt is tender at incision sites      Sensation: Pt denies any numbness or tingling        Scapular Control/Dyskinesis:     Normal / Subtle / Obvious  Comments    Left  N N/a    Right  N N/a         OUTCOME MEASURE:FOTO     Category:Self care       Current Score  = 37%   Goal at Discharge Score = 65%     Treatment       Julee received the following manual therapy techniques for 25 minutes:   -PROM of shoulder in flexion, abduction, internal rotation, and external rotation 4 x 10 to toleration  - joint mobilizations to glenohumeral joint anterior to posterior     Julee participated in dynamic functional therapeutic activities to improve functional performance for 50  minutes, including:  -supine flexion with wand to toleration 2/15  -supine abduction with wand to toleration 2/15  -supine chest press with wand to toleration 2/15  -standing wand extension to toleration 2/15  -standing wand internal rotation to toleration 2/15  - elbow flexion with wand palm up 2/15  -elbow flexion with wand palm down 2/15  -hot pack to elbow placed in an extension stretch   -seated external rotation with wand 2/15  -pulley's 3 min flexion/ 3 min abduction  - walls slides flexion 3 min/ abduction 3 min   Home Exercises and Education Provided     Education provided:   - Progress towards goals     Written Home Exercises Provided: Patient instructed to cont prior HEP.  Exercises  were reviewed and Julee was able to demonstrate them prior to the end of the session.  Julee demonstrated good  understanding of the HEP provided.   .   See EMR under Patient Instructions for exercises provided prior visit.        Assessment     Pt would continue to benefit from skilled OT. Pt tolerated added therapeutic exercises well today. Pt 6 weeks post op, pulleys initiated. Pt has progressed significantly in overall AROM. Pt tolerated added therapeutic activities well without additional complaints of pain. Will progress as tolerated. Pt motivated.      Julee is progressing well towards her goals and there are no updates to goals at this time. Pt prognosis is Good.     Pt will continue to benefit from skilled outpatient occupational therapy to address the deficits listed in the problem list on initial evaluation provide pt/family education and to maximize pt's level of independence in the home and community environment.     Anticipated barriers to occupational therapy: toleration to pain    Pt's spiritual, cultural and educational needs considered and pt agreeable to plan of care and goals.    Goals:      Short Term (6 weeks ):  1)   Patient to be IND with HEP and modalities for pain management Ongoing  2)   Increase ROM to 120 degrees in shoulder flexion to increase functional UE use for ADLs and IADLs Ongoing  3)   Increase ROM to 90 degrees in shoulder abduction to increase functional UE use for ADLs and IADLs Ongoing  4)   Increase ROM to 80 degrees in shoulder internal rotation to increase functional UE use for ADLs and IADLs Ongoing  5)   Increase ROM to 50 degrees in shoulder external rotation to increase functional UE use for ADLs and IADLs Ongoing  6)   Increase ROM to 130 degrees in elbow flexion to increase functional UE use for ADLs and IADLs MET 10/30/23  7)   Increase ROM to 5 degrees in elbow extension to increase functional UE use for ADLs and IADLs Ongoing  8)   Increase  strength to 30  lbs. to grasp for  ADLs and IADLs Ongoing  9)   Improve muscle strength ing -2 to 3+ in order to participate in ADLs and IADLs with less assistance. Ongoing     Long Term (by discharge):  1)   Pt will report 1 out of 10 pain at worst when completing ADLs and IADLs. Ongoing  2)   Increase ROM to 170 degrees in shoulder flexion to increase functional UE use for ADLs and IADLs Ongoing  3)   Increase ROM to 165 degrees in shoulder abduction to increase functional UE use for ADLs and IADLs Ongoing  4)   Increase ROM to 80 degrees in shoulder internal rotation to increase functional UE use for ADLs and IADLs Ongoing  5)   Increase ROM to 80 degrees in shoulder external rotation to increase functional UE use for ADLs and IADLs Ongoing  6)   Increase ROM to 140 degrees in elbow flexion to increase functional UE use for ADLs and IADLs Ongoing  7)   Increase ROM to 0 degrees in elbow extension to increase functional UE use for ADLs and IADLs Ongoing  8)   Increase  strength to 50 lbs. to grasp for  ADLs and IADLs Ongoing  9)   Improve muscle strength ing to 4+ in order to participate in ADLs and IADLs with less assistance.  Ongoing  10)   Patient to score at 65% or more on FOTO to demonstrate improved perception of functional shoulder use.Ongoing  11)  Pt will return to prior level of function for ADLs and household management. Ongoing     Plan      Pt to be treated by Occupational Therapy 2 times per week for 12 weeks during the certification period to achieve the established goals.      Treatment to include: Paraffin, Fluidotherapy, Manual therapy/joint mobilizations, Modalities for pain management, US 3 mhz, Therapeutic exercises/activities., Iontophoresis with 2.0 cc Dexamethasone, Strengthening, Orthotic Fabrication/Fit/Training, Edema Control, Scar Management, Wound Care, Electrical Modalities, Joint Protection, and Energy Conservation, as well as any other treatments deemed necessary based on the patient's needs or  progress.     Updates/Grading for next session: Continue with current plan of care     FERNANDO Samuel

## 2023-11-02 ENCOUNTER — OFFICE VISIT (OUTPATIENT)
Dept: NEUROLOGY | Facility: CLINIC | Age: 43
End: 2023-11-02
Payer: COMMERCIAL

## 2023-11-02 DIAGNOSIS — G43.709 CHRONIC MIGRAINE WITHOUT AURA WITHOUT STATUS MIGRAINOSUS, NOT INTRACTABLE: Primary | ICD-10-CM

## 2023-11-02 DIAGNOSIS — E66.01 CLASS 3 SEVERE OBESITY DUE TO EXCESS CALORIES WITH SERIOUS COMORBIDITY AND BODY MASS INDEX (BMI) OF 40.0 TO 44.9 IN ADULT: ICD-10-CM

## 2023-11-02 DIAGNOSIS — G93.2 IIH (IDIOPATHIC INTRACRANIAL HYPERTENSION): ICD-10-CM

## 2023-11-02 DIAGNOSIS — G93.2 PSEUDOTUMOR CEREBRI: ICD-10-CM

## 2023-11-02 PROCEDURE — 99213 OFFICE O/P EST LOW 20 MIN: CPT | Mod: 95,,, | Performed by: NURSE PRACTITIONER

## 2023-11-02 PROCEDURE — 1159F PR MEDICATION LIST DOCUMENTED IN MEDICAL RECORD: ICD-10-PCS | Mod: CPTII,95,, | Performed by: NURSE PRACTITIONER

## 2023-11-02 PROCEDURE — 99213 PR OFFICE/OUTPT VISIT, EST, LEVL III, 20-29 MIN: ICD-10-PCS | Mod: 95,,, | Performed by: NURSE PRACTITIONER

## 2023-11-02 PROCEDURE — 1159F MED LIST DOCD IN RCRD: CPT | Mod: CPTII,95,, | Performed by: NURSE PRACTITIONER

## 2023-11-02 RX ORDER — ACETAZOLAMIDE 250 MG/1
250 TABLET ORAL 2 TIMES DAILY
Qty: 60 TABLET | Refills: 3 | Status: SHIPPED | OUTPATIENT
Start: 2023-11-02 | End: 2024-11-01

## 2023-11-02 RX ORDER — ACETAZOLAMIDE 500 MG/1
500 CAPSULE, EXTENDED RELEASE ORAL 2 TIMES DAILY
Qty: 180 CAPSULE | Refills: 1 | Status: SHIPPED | OUTPATIENT
Start: 2023-11-02

## 2023-11-02 NOTE — PROGRESS NOTES
HPI: Tabby Veloz is a 43 y.o. female, referred by Dr. Sanjeev Cook, for evaluation of papilledema and headaches. Her medical history is overall unremarkable. She completed a lumbar puncture on 3/9/2017, which demonstrated an opening pressure of 46, consistent with a diagnosis of pseudotumor cerebri. Now with IUP and had  increased mild headaches and tinnitus. She is s/p ORIF right humerus.     She works in emergency management as the Emergency Preparedness .     The patient location is: home  The chief complaint leading to consultation is: follow up for abnormal eye exam    Visit type: audiovisual    20 minutes of total time spent on the encounter, which includes face to face time and non-face to face time preparing to see the patient (eg, review of tests), Obtaining and/or reviewing separately obtained history, Documenting clinical information in the electronic or other health record, Independently interpreting results (not separately reported) and communicating results to the patient/family/caregiver, or Care coordination (not separately reported).     Each patient to whom he or she provides medical services by telemedicine is:  (1) informed of the relationship between the physician and patient and the respective role of any other health care provider with respect to management of the patient; and (2) notified that he or she may decline to receive medical services by telemedicine and may withdraw from such care at any time.    Notes:   She presents today for a follow up visit. Her Diamox was increased to 750 mg at her last visit for ongoing papilledema on her eye exam.     She does not believe that she has lost anymore weight. She believes that her weight may have increased after fracturing her right humerus in 9/2023. She weights 270.3, which is an 8 pound increase from prior in office weight.     She had an updated fundoscopic exam on 9/30/23, which showed mild improvement bilaterally when  comparing her exam from the month prior.     No migraines currently. No visual complaints.     She is taking Gabapentin for arm pain. She tried to wean off, but was unable to, due to increased pain. She is still in PT for her arm pain.     Stress and cognitive complaints are improved.     Her lumbar pain is improved  still.      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 She does experience episodic, occasional headaches with stress.     Review of Systems  Review of Systems   Constitutional:  Negative for fatigue.   HENT:  Negative for tinnitus.    Eyes:  Negative for visual disturbance.   Cardiovascular:  Negative for leg swelling.   Gastrointestinal:  Negative for blood in stool.   Genitourinary:  Negative for hematuria.   Musculoskeletal:  Positive for back pain. Negative for neck stiffness.   Skin:  Negative for rash.   Neurological:  Negative for headaches.   Psychiatric/Behavioral:  Negative for sleep disturbance. The patient is not nervous/anxious.        Objective:       There were no vitals filed for this visit.    Exam:  Gen Appearance, well developed/nourished in no apparent distress  CV: not evaluated  Neuro:  MS: Awake, alert, oriented to place, person, time, situation. Sustains attention. Recent/remote memory intact, Language is full to spontaneous speech/repetition/naming/comprehension. Fund of Knowledge is full  CN: Optic discs not observed, due to telemed, PERRL not done, Extraoccular movements and visual fields are full. Normal facial strength, Hearing adequate for telemed visit, Tongue is midline, palate not evaluated. Shoulder Shrug symmetric.  Motor: deferred due to telemed  Sensory:deferred due to telemed  Cerebellar: deferred due to telemed  Gait: deferred due to telemed    Imaging:   MRI Brain 3/1/2017:   Normal MRI brain specifically without evidence for acute infarction or enhancing lesion.    MRV 3/1/2017:  Unremarkable noncontrast MRV specifically without evidence  "for venous sinus thrombosis.    Labs: 2/2018 CMP with changes not unexpected for diamox use.  CSF studies unremarkable and culture negative    8/2016 CMP reviewed    LP 3/9/2017 opening pressure: 46    Labs:   8/2019 CMP WNL  6/2022 CMP unremarkable-was non-fasting  7/2023 CMP overall unremarkable  9/15/23 BMP unremarkable, CBC showed elevated WBC"s (recent fracture)  Assessment:   Tabby Veloz is a 43 y.o. female with papilledema and headaches. Her medical history is overall unremarkable. She completed a lumbar puncture on 3/9/2017, which demonstrated an opening pressure of 46, consistent with a diagnosis of pseudotumor cerebri.   Plan:   I recommend:   1. 7/2023 eye exam showed papilledema. She did gain 10 pounds between her last two visits, 6 months apart, which may have triggered her papilledema to return.   -Weight loss is still needed, which was discussed. Weight loss could resolve her pseudotumor.     -updated eye exam from one month ago shows mild improvement, but not resolution of her papilledema.      -Given ongoing papilledema, I increased her Diamox to 750 mg bid at 10/5/23 visit. Monitor CMP over time on this medication.   -will consider the need for increase to 1000 mg bid pending results of her next eye exam, should she continues with papilledema.     No current visual complaints, and she has lost 10 pounds thus far, but then she regained 8 pounds. Weight loss is challenged currently, given arm mobility issues.     -Her last therapeutic LP was during pregnancy and she is s/p tubal ligation.    2. Continue Topamax 100 mg bid for headache prevention and for pseudotumor, given papilledema on recent eye exam.   -needs a repeat eye exam in one month, two months from last exam, and 6 weeks from Topamax increase.   -advised to keep a headache log until her next visit.      -Advised to increase fluid intake with Topamax and Diamox use. Notify me with any development of renal stones.     TO ER with any " acute visual changes or call clinic with any mild changes.     3. Offered PT for lumbar/sciatica complaints. She declines formal PT currently and will do home exercises for now. Lumbar pain improved with exercises.     4. Her stress could be contributing to her cognitive complaints, which started prior to starting Topamax.   -stress reduction techniques discussed today. Wellbutrin helping with focus.     5. She is taking Gabapentin for post humerus fracture pain, and has been unable to wean off thus far. She is still in PT for this.     FU 8 weeks virtually. Notify me with any visual changes. To ER with any acute visual loss.

## 2023-11-06 ENCOUNTER — CLINICAL SUPPORT (OUTPATIENT)
Dept: REHABILITATION | Facility: HOSPITAL | Age: 43
End: 2023-11-06
Payer: COMMERCIAL

## 2023-11-06 DIAGNOSIS — M25.621 ELBOW STIFFNESS, RIGHT: ICD-10-CM

## 2023-11-06 DIAGNOSIS — R53.1 WEAKNESS: Primary | ICD-10-CM

## 2023-11-06 DIAGNOSIS — R52 PAIN: ICD-10-CM

## 2023-11-06 DIAGNOSIS — M25.611 SHOULDER STIFFNESS, RIGHT: ICD-10-CM

## 2023-11-06 PROCEDURE — 97140 MANUAL THERAPY 1/> REGIONS: CPT | Mod: PO

## 2023-11-06 PROCEDURE — 97530 THERAPEUTIC ACTIVITIES: CPT | Mod: PO

## 2023-11-06 NOTE — PROGRESS NOTES
Occupational Therapy Daily Treatment Note     Name: Tabby Veloz  Clinic Number: [id number]    Therapy Diagnosis:   Encounter Diagnoses   Name Primary?    Weakness Yes    Elbow stiffness, right     Shoulder stiffness, right     Pain        Physician: Luis Del Valle NP    Visit Date: 11/6/2023    Physician Orders: OT eval/ treat   Plan of Care Certification Date: 1/1/24  Authorization Period: 10/3/23 -10/2/24  Date of Return to MD: 4 weeks   Medical Diagnosis: S42.351D (ICD-10-CM) - Closed displaced comminuted fracture of shaft of right humerus with routine healing, subsequent   Surgical Procedure and Date: ORIF for her right humeral shaft fracture with IM nail by Dr. Stanley on 9/19/2023. ,   Evaluation Date: 10/9/23  Onset Date:9/15/23   Date of Return to MD: 12/12/23  Visit # / Visits authorized: 7/ 12  FOTO Completion: 1/3    Time In:0900  Time Out: 1000  Total Billable Time: 60 minutes    Note from MD 10/31/23 visit:    - Continue [facility] OT.  - Weight bearing NWB to RUE, needs 2 more weeks (11/14/23) then can start with 2-4 lbs x 4 weeks and increase by 2-4 lbs/month thereafter as she tolerates.  Ok for therapist to work with a pulley.  - Range of motion as tolerated    Precautions: Standard and WB, 6 weeks 10/31/23  Subjective     Pt reports: She states she has been completing her HEP daily.   she was compliant with home exercise program given last session.   Response to previous treatment:increased ROM  Functional change: increased ROM    Pain:6/10 when stretching   Location: right arms     Objective       Observation: Pt is present with abduction sling on her right shoulder      UE Range of Motion  Active Range of Motion:   Shoulder Right Right  10/30/23   Flexion 20 145 - supine   Abduction 50 110 - supine   ER at 90 20 40 - supine   IR 80 80 - supine      Passive Range of Motion:   Shoulder Left Right Right   10/30/23   Flexion 170 70 160   Abduction 165 70 125   ER at 90 80 20 40   IR 80 80 80       Elbow Left Right Right   10/30/23   Flexion 140 120 130   Extension 0 -10 0      Painful Arc:   Patient demonstrates no painful arc in shoulder flexion or abduction     Upper Extremity Strength  (R) UE   (L) UE     Shoulder flexion: 2-/5 Shoulder flexion: 5/5   Shoulder Abduction: 2-/5 Shoulder abduction: 5/5   Shoulder ER 2-/5 Shoulder ER 5/5   Shoulder IR 2-/5 Shoulder IR 5/5   Rhomboids 2-/5 Rhomboids 5/5       Strength: (OSMANY Dynamometer in psi.)        10/9/2023 10/9/2023     Left Right   Rung II 60 N/a      Joint Mobility/End Feels: Hard     Palpation: Pt is tender at incision sites      Sensation: Pt denies any numbness or tingling        Scapular Control/Dyskinesis:     Normal / Subtle / Obvious  Comments    Left  N N/a    Right  N N/a         OUTCOME MEASURE:FOTO     Category:Self care       Current Score  = 37%   Goal at Discharge Score = 65%     Treatment       Julee received the following manual therapy techniques for 25 minutes:   -PROM of shoulder in flexion, abduction, internal rotation, and external rotation 4 x 10 to toleration  - joint mobilizations to glenohumeral joint anterior to posterior     Julee participated in dynamic functional therapeutic activities to improve functional performance for 35  minutes, including:  -supine flexion with wand to toleration 2/15  -supine abduction with wand to toleration 2/15  -supine chest press with wand to toleration 2/15  -standing wand extension to toleration 2/15  -standing wand internal rotation to toleration 2/15  - elbow flexion with wand palm up 2/15  -elbow flexion with wand palm down 2/15  -hot pack to elbow placed in an extension stretch   -seated external rotation with wand 2/15  -pulley's 3 min flexion/ 3 min abduction  - walls slides flexion 3 min/ abduction 3 min   Home Exercises and Education Provided     Education provided:   - Progress towards goals     Written Home Exercises Provided: Patient instructed to cont prior  HEP.  Exercises were reviewed and Julee was able to demonstrate them prior to the end of the session.  Julee demonstrated good  understanding of the HEP provided.   .   See EMR under Patient Instructions for exercises provided prior visit.        Assessment     Pt would continue to benefit from skilled OT. Pt tolerated added therapeutic exercises well today. Pt 6 weeks post op. Pt has progressed significantly in overall AROM. Pt tolerated added therapeutic activities well without additional complaints of pain. Will progress as tolerated. Pt motivated.      Julee is progressing well towards her goals and there are no updates to goals at this time. Pt prognosis is Good.     Pt will continue to benefit from skilled outpatient occupational therapy to address the deficits listed in the problem list on initial evaluation provide pt/family education and to maximize pt's level of independence in the home and community environment.     Anticipated barriers to occupational therapy: toleration to pain    Pt's spiritual, cultural and educational needs considered and pt agreeable to plan of care and goals.    Goals:      Short Term (6 weeks ):  1)   Patient to be IND with HEP and modalities for pain management Ongoing  2)   Increase ROM to 120 degrees in shoulder flexion to increase functional UE use for ADLs and IADLs Ongoing  3)   Increase ROM to 90 degrees in shoulder abduction to increase functional UE use for ADLs and IADLs Ongoing  4)   Increase ROM to 80 degrees in shoulder internal rotation to increase functional UE use for ADLs and IADLs Ongoing  5)   Increase ROM to 50 degrees in shoulder external rotation to increase functional UE use for ADLs and IADLs Ongoing  6)   Increase ROM to 130 degrees in elbow flexion to increase functional UE use for ADLs and IADLs MET 10/30/23  7)   Increase ROM to 5 degrees in elbow extension to increase functional UE use for ADLs and IADLs Ongoing  8)   Increase  strength to 30 lbs.  to grasp for  ADLs and IADLs Ongoing  9)   Improve muscle strength ing -2 to 3+ in order to participate in ADLs and IADLs with less assistance. Ongoing     Long Term (by discharge):  1)   Pt will report 1 out of 10 pain at worst when completing ADLs and IADLs. Ongoing  2)   Increase ROM to 170 degrees in shoulder flexion to increase functional UE use for ADLs and IADLs Ongoing  3)   Increase ROM to 165 degrees in shoulder abduction to increase functional UE use for ADLs and IADLs Ongoing  4)   Increase ROM to 80 degrees in shoulder internal rotation to increase functional UE use for ADLs and IADLs Ongoing  5)   Increase ROM to 80 degrees in shoulder external rotation to increase functional UE use for ADLs and IADLs Ongoing  6)   Increase ROM to 140 degrees in elbow flexion to increase functional UE use for ADLs and IADLs Ongoing  7)   Increase ROM to 0 degrees in elbow extension to increase functional UE use for ADLs and IADLs Ongoing  8)   Increase  strength to 50 lbs. to grasp for  ADLs and IADLs Ongoing  9)   Improve muscle strength ing to 4+ in order to participate in ADLs and IADLs with less assistance.  Ongoing  10)   Patient to score at 65% or more on FOTO to demonstrate improved perception of functional shoulder use.Ongoing  11)  Pt will return to prior level of function for ADLs and household management. Ongoing     Plan      Pt to be treated by Occupational Therapy 2 times per week for 12 weeks during the certification period to achieve the established goals.      Treatment to include: Paraffin, Fluidotherapy, Manual therapy/joint mobilizations, Modalities for pain management, US 3 mhz, Therapeutic exercises/activities., Iontophoresis with 2.0 cc Dexamethasone, Strengthening, Orthotic Fabrication/Fit/Training, Edema Control, Scar Management, Wound Care, Electrical Modalities, Joint Protection, and Energy Conservation, as well as any other treatments deemed necessary based on the patient's needs or  progress.     Updates/Grading for next session: Continue with current plan of care     FERNANDO Samuel

## 2023-11-13 ENCOUNTER — CLINICAL SUPPORT (OUTPATIENT)
Dept: REHABILITATION | Facility: HOSPITAL | Age: 43
End: 2023-11-13
Payer: COMMERCIAL

## 2023-11-13 DIAGNOSIS — R52 PAIN: ICD-10-CM

## 2023-11-13 DIAGNOSIS — R53.1 WEAKNESS: Primary | ICD-10-CM

## 2023-11-13 DIAGNOSIS — M25.611 SHOULDER STIFFNESS, RIGHT: ICD-10-CM

## 2023-11-13 DIAGNOSIS — M25.621 ELBOW STIFFNESS, RIGHT: ICD-10-CM

## 2023-11-13 PROCEDURE — 97530 THERAPEUTIC ACTIVITIES: CPT | Mod: PO

## 2023-11-13 PROCEDURE — 97140 MANUAL THERAPY 1/> REGIONS: CPT | Mod: PO

## 2023-11-13 NOTE — PROGRESS NOTES
Occupational Therapy Daily Treatment Note     Name: Tabby Veloz  Clinic Number: [id number]    Therapy Diagnosis:   Encounter Diagnoses   Name Primary?    Weakness Yes    Elbow stiffness, right     Shoulder stiffness, right     Pain          Physician: Luis Del Valle NP    Visit Date: 11/13/2023    Physician Orders: OT eval/ treat   Plan of Care Certification Date: 1/1/24  Authorization Period: 10/3/23 -10/2/24  Date of Return to MD: 4 weeks   Medical Diagnosis: S42.351D (ICD-10-CM) - Closed displaced comminuted fracture of shaft of right humerus with routine healing, subsequent   Surgical Procedure and Date: ORIF for her right humeral shaft fracture with IM nail by Dr. Stanley on 9/19/2023. ,   Evaluation Date: 10/9/23  Onset Date:9/15/23   Date of Return to MD: 12/12/23  Visit # / Visits authorized: 8/ 12  FOTO Completion: 1/3    Time In:0900  Time Out: 1000  Total Billable Time: 60 minutes    Note from MD 10/31/23 visit:    - Continue [facility] OT.  - Weight bearing NWB to RUE, needs 2 more weeks (11/14/23) then can start with 2-4 lbs x 4 weeks and increase by 2-4 lbs/month thereafter as she tolerates.  Ok for therapist to work with a pulley.  - Range of motion as tolerated    Precautions: Standard and WB, 6 weeks 10/31/23  Subjective     Pt reports: She states she has been completing her HEP daily.   she was compliant with home exercise program given last session.   Response to previous treatment:increased ROM  Functional change: increased ROM    Pain:6/10 when stretching   Location: right arms     Objective       Observation: Pt is present with abduction sling on her right shoulder      UE Range of Motion  Active Range of Motion:   Shoulder Right Right  10/30/23   Flexion 20 145 - supine   Abduction 50 110 - supine   ER at 90 20 40 - supine   IR 80 80 - supine      Passive Range of Motion:   Shoulder Left Right Right   10/30/23   Flexion 170 70 160   Abduction 165 70 125   ER at 90 80 20 40   IR 80 80  80      Elbow Left Right Right   10/30/23   Flexion 140 120 130   Extension 0 -10 0      Painful Arc:   Patient demonstrates no painful arc in shoulder flexion or abduction     Upper Extremity Strength  (R) UE   (L) UE     Shoulder flexion: 2-/5 Shoulder flexion: 5/5   Shoulder Abduction: 2-/5 Shoulder abduction: 5/5   Shoulder ER 2-/5 Shoulder ER 5/5   Shoulder IR 2-/5 Shoulder IR 5/5   Rhomboids 2-/5 Rhomboids 5/5       Strength: (OSMANY Dynamometer in psi.)        10/9/2023 10/9/2023     Left Right   Rung II 60 N/a      Joint Mobility/End Feels: Hard     Palpation: Pt is tender at incision sites      Sensation: Pt denies any numbness or tingling        Scapular Control/Dyskinesis:     Normal / Subtle / Obvious  Comments    Left  N N/a    Right  N N/a         OUTCOME MEASURE:FOTO     Category:Self care       Current Score  = 37%   Goal at Discharge Score = 65%     Treatment       Julee received the following manual therapy techniques for 25 minutes:   -PROM of shoulder in flexion, abduction, internal rotation, and external rotation 4 x 10 to toleration  - joint mobilizations to glenohumeral joint anterior to posterior     Julee participated in dynamic functional therapeutic activities to improve functional performance for 35  minutes, including:  -supine flexion with wand to toleration 2/15  -supine abduction with wand to toleration 2/15  -supine chest press with wand to toleration 2/15  -standing wand extension to toleration 2/15  -standing wand internal rotation to toleration 2/15  - elbow flexion with wand palm up 2/15  -elbow flexion with wand palm down 2/15  -hot pack to elbow placed in an extension stretch   -seated external rotation with wand 2/15  -pulley's 3 min flexion/ 3 min abduction  - walls slides flexion 3 min/ abduction 3 min   Home Exercises and Education Provided     Education provided:   - Progress towards goals     Written Home Exercises Provided: Patient instructed to cont prior  HEP.  Exercises were reviewed and Julee was able to demonstrate them prior to the end of the session.  Julee demonstrated good  understanding of the HEP provided.   .   See EMR under Patient Instructions for exercises provided prior visit.        Assessment     Pt would continue to benefit from skilled OT. Pt tolerated added therapeutic exercises well today. Pt 8 weeks post op 11/14/23. Will initiate light strengthening at next session. Pt has progressed significantly in overall AROM. Pt tolerated added therapeutic activities well without additional complaints of pain. Will progress as tolerated. Pt motivated.      Julee is progressing well towards her goals and there are no updates to goals at this time. Pt prognosis is Good.     Pt will continue to benefit from skilled outpatient occupational therapy to address the deficits listed in the problem list on initial evaluation provide pt/family education and to maximize pt's level of independence in the home and community environment.     Anticipated barriers to occupational therapy: toleration to pain    Pt's spiritual, cultural and educational needs considered and pt agreeable to plan of care and goals.    Goals:      Short Term (6 weeks ):  1)   Patient to be IND with HEP and modalities for pain management Ongoing  2)   Increase ROM to 120 degrees in shoulder flexion to increase functional UE use for ADLs and IADLs Ongoing  3)   Increase ROM to 90 degrees in shoulder abduction to increase functional UE use for ADLs and IADLs Ongoing  4)   Increase ROM to 80 degrees in shoulder internal rotation to increase functional UE use for ADLs and IADLs Ongoing  5)   Increase ROM to 50 degrees in shoulder external rotation to increase functional UE use for ADLs and IADLs Ongoing  6)   Increase ROM to 130 degrees in elbow flexion to increase functional UE use for ADLs and IADLs MET 10/30/23  7)   Increase ROM to 5 degrees in elbow extension to increase functional UE use for  ADLs and IADLs Ongoing  8)   Increase  strength to 30 lbs. to grasp for  ADLs and IADLs Ongoing  9)   Improve muscle strength ing -2 to 3+ in order to participate in ADLs and IADLs with less assistance. Ongoing     Long Term (by discharge):  1)   Pt will report 1 out of 10 pain at worst when completing ADLs and IADLs. Ongoing  2)   Increase ROM to 170 degrees in shoulder flexion to increase functional UE use for ADLs and IADLs Ongoing  3)   Increase ROM to 165 degrees in shoulder abduction to increase functional UE use for ADLs and IADLs Ongoing  4)   Increase ROM to 80 degrees in shoulder internal rotation to increase functional UE use for ADLs and IADLs Ongoing  5)   Increase ROM to 80 degrees in shoulder external rotation to increase functional UE use for ADLs and IADLs Ongoing  6)   Increase ROM to 140 degrees in elbow flexion to increase functional UE use for ADLs and IADLs Ongoing  7)   Increase ROM to 0 degrees in elbow extension to increase functional UE use for ADLs and IADLs Ongoing  8)   Increase  strength to 50 lbs. to grasp for  ADLs and IADLs Ongoing  9)   Improve muscle strength ing to 4+ in order to participate in ADLs and IADLs with less assistance.  Ongoing  10)   Patient to score at 65% or more on FOTO to demonstrate improved perception of functional shoulder use.Ongoing  11)  Pt will return to prior level of function for ADLs and household management. Ongoing     Plan      Pt to be treated by Occupational Therapy 2 times per week for 12 weeks during the certification period to achieve the established goals.      Treatment to include: Paraffin, Fluidotherapy, Manual therapy/joint mobilizations, Modalities for pain management, US 3 mhz, Therapeutic exercises/activities., Iontophoresis with 2.0 cc Dexamethasone, Strengthening, Orthotic Fabrication/Fit/Training, Edema Control, Scar Management, Wound Care, Electrical Modalities, Joint Protection, and Energy Conservation, as well as any other  treatments deemed necessary based on the patient's needs or progress.     Updates/Grading for next session: Reassessment of objective measures     FERNANDO Samuel

## 2023-11-15 ENCOUNTER — CLINICAL SUPPORT (OUTPATIENT)
Dept: REHABILITATION | Facility: HOSPITAL | Age: 43
End: 2023-11-15
Payer: COMMERCIAL

## 2023-11-15 DIAGNOSIS — M25.611 SHOULDER STIFFNESS, RIGHT: ICD-10-CM

## 2023-11-15 DIAGNOSIS — R52 PAIN: ICD-10-CM

## 2023-11-15 DIAGNOSIS — M25.621 ELBOW STIFFNESS, RIGHT: ICD-10-CM

## 2023-11-15 DIAGNOSIS — S42.351D CLOSED DISPLACED COMMINUTED FRACTURE OF SHAFT OF RIGHT HUMERUS WITH ROUTINE HEALING, SUBSEQUENT ENCOUNTER: ICD-10-CM

## 2023-11-15 DIAGNOSIS — R53.1 WEAKNESS: Primary | ICD-10-CM

## 2023-11-15 PROCEDURE — 97140 MANUAL THERAPY 1/> REGIONS: CPT | Mod: PO

## 2023-11-15 PROCEDURE — 97530 THERAPEUTIC ACTIVITIES: CPT | Mod: PO

## 2023-11-15 RX ORDER — GABAPENTIN 300 MG/1
300 CAPSULE ORAL 3 TIMES DAILY
Qty: 42 CAPSULE | Refills: 0 | Status: SHIPPED | OUTPATIENT
Start: 2023-11-15 | End: 2023-12-04 | Stop reason: SDUPTHER

## 2023-11-15 RX ORDER — METHOCARBAMOL 500 MG/1
500 TABLET, FILM COATED ORAL 3 TIMES DAILY PRN
Qty: 30 TABLET | Refills: 0 | Status: SHIPPED | OUTPATIENT
Start: 2023-11-15 | End: 2023-12-04 | Stop reason: SDUPTHER

## 2023-11-15 NOTE — PROGRESS NOTES
Occupational Therapy Daily Treatment Note     Name: Tabby Veloz  Clinic Number: [id number]    Therapy Diagnosis:   Encounter Diagnoses   Name Primary?    Weakness Yes    Elbow stiffness, right     Shoulder stiffness, right     Pain          Physician: Luis Del Valle NP    Visit Date: 11/15/2023    Physician Orders: OT eval/ treat   Plan of Care Certification Date: 1/1/24  Authorization Period: 10/3/23 -10/2/24  Date of Return to MD: 4 weeks   Medical Diagnosis: S42.351D (ICD-10-CM) - Closed displaced comminuted fracture of shaft of right humerus with routine healing, subsequent   Surgical Procedure and Date: ORIF for her right humeral shaft fracture with IM nail by Dr. Stanley on 9/19/2023. ,   Evaluation Date: 10/9/23  Onset Date:9/15/23   Date of Return to MD: 12/12/23  Visit # / Visits authorized: 9/ 12  FOTO Completion: 1/3    Time In:0900  Time Out: 1010  Total Billable Time: 70 minutes    Note from MD 10/31/23 visit:    - Continue [facility] OT.  - Weight bearing NWB to RUE, needs 2 more weeks (11/14/23) then can start with 2-4 lbs x 4 weeks and increase by 2-4 lbs/month thereafter as she tolerates.  Ok for therapist to work with a pulley.  - Range of motion as tolerated    Precautions: Standard and WB, 8 weeks 10/31/23   Subjective     Pt reports: She states she has been completing her HEP daily.   she was compliant with home exercise program given last session.   Response to previous treatment:increased ROM  Functional change: increased ROM    Pain:3/10 when stretching   Location: right arms     Objective       Observation: Pt is present with abduction sling on her right shoulder      UE Range of Motion  Active Range of Motion:   Shoulder Right Right  10/30/23   Flexion 20 145 - supine   Abduction 50 110 - supine   ER at 90 20 40 - supine   IR 80 80 - supine      Passive Range of Motion:   Shoulder Left Right Right   10/30/23   Flexion 170 70 160   Abduction 165 70 125   ER at 90 80 20 40   IR  80 80 80      Elbow Left Right Right   10/30/23   Flexion 140 120 130   Extension 0 -10 0      Painful Arc:   Patient demonstrates no painful arc in shoulder flexion or abduction     Upper Extremity Strength  (R) UE   (L) UE     Shoulder flexion: 2-/5 Shoulder flexion: 5/5   Shoulder Abduction: 2-/5 Shoulder abduction: 5/5   Shoulder ER 2-/5 Shoulder ER 5/5   Shoulder IR 2-/5 Shoulder IR 5/5   Rhomboids 2-/5 Rhomboids 5/5       Strength: (OSMANY Dynamometer in psi.)        10/9/2023 10/9/2023     Left Right   Rung II 60 N/a      Joint Mobility/End Feels: Hard     Palpation: Pt is tender at incision sites      Sensation: Pt denies any numbness or tingling        Scapular Control/Dyskinesis:     Normal / Subtle / Obvious  Comments    Left  N N/a    Right  N N/a         OUTCOME MEASURE:FOTO     Category:Self care       Current Score  = 37%   Goal at Discharge Score = 65%     Treatment       Julee received the following manual therapy techniques for 25 minutes:   -PROM of shoulder in flexion, abduction, internal rotation, and external rotation 4 x 10 to toleration  - joint mobilizations to glenohumeral joint anterior to posterior     Julee participated in dynamic functional therapeutic activities to improve functional performance for 45  minutes, including:  -supine flexion with 2# dowel to toleration 2/15  -supine abduction with 2# dowel to toleration 2/15  -supine chest press with 2# dowel to toleration 2/15  -standing 2# dowel extension to toleration 2/15  -standing 2# dowel internal rotation to toleration 2/15  - elbow flexion with 2# dowel palm up 2/15  -elbow flexion with 2# dowel palm down 2/15   -seated external rotation with 2# dowel 2/15  -pulley's 3 min flexion/ 3 min abduction  - walls slides flexion 3 min/ abduction 3 min   - 5 minute carry with 2# DB     Home Exercises and Education Provided     Education provided:   - Progress towards goals     Written Home Exercises Provided: Patient instructed to  cont prior HEP.  Exercises were reviewed and Julee was able to demonstrate them prior to the end of the session.  Julee demonstrated good  understanding of the HEP provided.   .   See EMR under Patient Instructions for exercises provided prior visit.        Assessment     Pt would continue to benefit from skilled OT. Pt tolerated added therapeutic exercises well today. Pt 8 weeks post op 11/14/23. Will initiate light strengthening at next session. Pt has progressed significantly in overall AROM. Pt tolerated added therapeutic activities well without additional complaints of pain. Will progress as tolerated. Pt motivated.      Julee is progressing well towards her goals and there are no updates to goals at this time. Pt prognosis is Good.     Pt will continue to benefit from skilled outpatient occupational therapy to address the deficits listed in the problem list on initial evaluation provide pt/family education and to maximize pt's level of independence in the home and community environment.     Anticipated barriers to occupational therapy: toleration to pain    Pt's spiritual, cultural and educational needs considered and pt agreeable to plan of care and goals.    Goals:      Short Term (6 weeks ):  1)   Patient to be IND with HEP and modalities for pain management Ongoing  2)   Increase ROM to 120 degrees in shoulder flexion to increase functional UE use for ADLs and IADLs Ongoing  3)   Increase ROM to 90 degrees in shoulder abduction to increase functional UE use for ADLs and IADLs Ongoing  4)   Increase ROM to 80 degrees in shoulder internal rotation to increase functional UE use for ADLs and IADLs Ongoing  5)   Increase ROM to 50 degrees in shoulder external rotation to increase functional UE use for ADLs and IADLs Ongoing  6)   Increase ROM to 130 degrees in elbow flexion to increase functional UE use for ADLs and IADLs MET 10/30/23  7)   Increase ROM to 5 degrees in elbow extension to increase functional UE  use for ADLs and IADLs Ongoing  8)   Increase  strength to 30 lbs. to grasp for  ADLs and IADLs Ongoing  9)   Improve muscle strength ing -2 to 3+ in order to participate in ADLs and IADLs with less assistance. Ongoing     Long Term (by discharge):  1)   Pt will report 1 out of 10 pain at worst when completing ADLs and IADLs. Ongoing  2)   Increase ROM to 170 degrees in shoulder flexion to increase functional UE use for ADLs and IADLs Ongoing  3)   Increase ROM to 165 degrees in shoulder abduction to increase functional UE use for ADLs and IADLs Ongoing  4)   Increase ROM to 80 degrees in shoulder internal rotation to increase functional UE use for ADLs and IADLs Ongoing  5)   Increase ROM to 80 degrees in shoulder external rotation to increase functional UE use for ADLs and IADLs Ongoing  6)   Increase ROM to 140 degrees in elbow flexion to increase functional UE use for ADLs and IADLs Ongoing  7)   Increase ROM to 0 degrees in elbow extension to increase functional UE use for ADLs and IADLs Ongoing  8)   Increase  strength to 50 lbs. to grasp for  ADLs and IADLs Ongoing  9)   Improve muscle strength ing to 4+ in order to participate in ADLs and IADLs with less assistance.  Ongoing  10)   Patient to score at 65% or more on FOTO to demonstrate improved perception of functional shoulder use.Ongoing  11)  Pt will return to prior level of function for ADLs and household management. Ongoing     Plan      Pt to be treated by Occupational Therapy 2 times per week for 12 weeks during the certification period to achieve the established goals.      Treatment to include: Paraffin, Fluidotherapy, Manual therapy/joint mobilizations, Modalities for pain management, US 3 mhz, Therapeutic exercises/activities., Iontophoresis with 2.0 cc Dexamethasone, Strengthening, Orthotic Fabrication/Fit/Training, Edema Control, Scar Management, Wound Care, Electrical Modalities, Joint Protection, and Energy Conservation, as well as any  other treatments deemed necessary based on the patient's needs or progress.     Updates/Grading for next session: Reassessment of objective measures, FERNANDO Shannon

## 2023-11-20 ENCOUNTER — CLINICAL SUPPORT (OUTPATIENT)
Dept: REHABILITATION | Facility: HOSPITAL | Age: 43
End: 2023-11-20
Payer: COMMERCIAL

## 2023-11-20 DIAGNOSIS — M25.611 SHOULDER STIFFNESS, RIGHT: ICD-10-CM

## 2023-11-20 DIAGNOSIS — M25.621 ELBOW STIFFNESS, RIGHT: ICD-10-CM

## 2023-11-20 DIAGNOSIS — R53.1 WEAKNESS: Primary | ICD-10-CM

## 2023-11-20 DIAGNOSIS — R52 PAIN: ICD-10-CM

## 2023-11-20 PROCEDURE — 97140 MANUAL THERAPY 1/> REGIONS: CPT | Mod: PO

## 2023-11-20 PROCEDURE — 97530 THERAPEUTIC ACTIVITIES: CPT | Mod: PO

## 2023-11-20 NOTE — PROGRESS NOTES
Occupational Therapy Daily Treatment Note     Name: Tabby Veloz  Clinic Number: [id number]    Therapy Diagnosis:   Encounter Diagnoses   Name Primary?    Weakness Yes    Elbow stiffness, right     Shoulder stiffness, right     Pain          Physician: Luis Del Valle NP    Visit Date: 11/20/2023    Physician Orders: OT eval/ treat   Plan of Care Certification Date: 1/1/24  Authorization Period: 10/3/23 -10/2/24  Date of Return to MD: 4 weeks   Medical Diagnosis: S42.351D (ICD-10-CM) - Closed displaced comminuted fracture of shaft of right humerus with routine healing, subsequent   Surgical Procedure and Date: ORIF for her right humeral shaft fracture with IM nail by Dr. Stanley on 9/19/2023. ,   Evaluation Date: 10/9/23  Onset Date:9/15/23   Date of Return to MD: 12/12/23  Visit # / Visits authorized: 10/ 12  FOTO Completion: 2/3    Time In:0900  Time Out: 1015  Total Billable Time: 75 minutes    Note from MD 10/31/23 visit:    - Continue [facility] OT.  - Weight bearing NWB to RUE, needs 2 more weeks (11/14/23) then can start with 2-4 lbs x 4 weeks and increase by 2-4 lbs/month thereafter as she tolerates.  Ok for therapist to work with a pulley.  - Range of motion as tolerated    Precautions: Standard and WB, 8 weeks 10/31/23   Subjective     Pt reports: She states she has been completing her HEP daily.   she was compliant with home exercise program given last session.   Response to previous treatment:increased ROM  Functional change: increased ROM    Pain:3/10 when stretching   Location: right arms     Objective       Observation: Pt is present with abduction sling on her right shoulder      UE Range of Motion  Active Range of Motion:   Shoulder Right Right  10/30/23   Flexion 20 145 - supine   Abduction 50 110 - supine   ER at 90 20 40 - supine   IR 80 80 - supine      Passive Range of Motion:   Shoulder Left Right Right   10/30/23   Flexion 170 70 160   Abduction 165 70 125   ER at 90 80 20 40    IR 80 80 80      Elbow Left Right Right   10/30/23   Flexion 140 120 130   Extension 0 -10 0      Painful Arc:   Patient demonstrates no painful arc in shoulder flexion or abduction     Upper Extremity Strength  (R) UE   (L) UE     Shoulder flexion: 2-/5 Shoulder flexion: 5/5   Shoulder Abduction: 2-/5 Shoulder abduction: 5/5   Shoulder ER 2-/5 Shoulder ER 5/5   Shoulder IR 2-/5 Shoulder IR 5/5   Rhomboids 2-/5 Rhomboids 5/5       Strength: (OSMANY Dynamometer in psi.)        10/9/2023 10/9/2023     Left Right   Rung II 60 N/a      Joint Mobility/End Feels: Hard     Palpation: Pt is tender at incision sites      Sensation: Pt denies any numbness or tingling        Scapular Control/Dyskinesis:     Normal / Subtle / Obvious  Comments    Left  N N/a    Right  N N/a         OUTCOME MEASURE:FOTO     Category:Self care       Current Score  = 37%   Goal at Discharge Score = 65%     11/20/23: 53%    Treatment       Julee received the following manual therapy techniques for 25 minutes:   -PROM of shoulder in flexion, abduction, internal rotation, and external rotation 4 x 10 to toleration  - joint mobilizations to glenohumeral joint anterior to posterior     Julee participated in dynamic functional therapeutic activities to improve functional performance for 50  minutes, including:  -supine flexion with 2# dowel to toleration 2/15  -supine abduction with 2# dowel to toleration 2/15  -supine chest press with 2# dowel to toleration 2/15  -standing 2# dowel extension to toleration 2/15  -standing 2# dowel internal rotation to toleration 2/15  - elbow flexion with 2# dowel palm up 2/15  -elbow flexion with 2# dowel palm down 2/15   -seated external rotation with 2# dowel 2/15  -pulley's 3 min flexion/ 3 min abduction  - walls slides flexion 3 min/ abduction 3 min   - 5 minute carry with 2# DB   -reissued FOTO    Home Exercises and Education Provided     Education provided:   - Progress towards goals     Written Home  Exercises Provided: Patient instructed to cont prior HEP.  Exercises were reviewed and Julee was able to demonstrate them prior to the end of the session.  Julee demonstrated good  understanding of the HEP provided.   .   See EMR under Patient Instructions for exercises provided prior visit.        Assessment     Pt would continue to benefit from skilled OT. Pt tolerated added therapeutic exercises well today. Pt 8 weeks post op 11/14/23. Light strengthening initiated at today's session to patient's toleration. Pt hand increased stiffness at beginning of therapy session; however, responded well to manual therapy provided by OT.  Pt tolerated added therapeutic activities well without additional complaints of pain. FOTO was reissued and patient had progressed from 37% at initial evaluation to 53%. Will progress as tolerated. Pt motivated.      Julee is progressing well towards her goals and there are no updates to goals at this time. Pt prognosis is Good.     Pt will continue to benefit from skilled outpatient occupational therapy to address the deficits listed in the problem list on initial evaluation provide pt/family education and to maximize pt's level of independence in the home and community environment.     Anticipated barriers to occupational therapy: toleration to pain    Pt's spiritual, cultural and educational needs considered and pt agreeable to plan of care and goals.    Goals:      Short Term (6 weeks ):  1)   Patient to be IND with HEP and modalities for pain management Ongoing  2)   Increase ROM to 120 degrees in shoulder flexion to increase functional UE use for ADLs and IADLs Ongoing  3)   Increase ROM to 90 degrees in shoulder abduction to increase functional UE use for ADLs and IADLs Ongoing  4)   Increase ROM to 80 degrees in shoulder internal rotation to increase functional UE use for ADLs and IADLs Ongoing  5)   Increase ROM to 50 degrees in shoulder external rotation to increase functional UE  use for ADLs and IADLs Ongoing  6)   Increase ROM to 130 degrees in elbow flexion to increase functional UE use for ADLs and IADLs MET 10/30/23  7)   Increase ROM to 5 degrees in elbow extension to increase functional UE use for ADLs and IADLs Ongoing  8)   Increase  strength to 30 lbs. to grasp for  ADLs and IADLs Ongoing  9)   Improve muscle strength ing -2 to 3+ in order to participate in ADLs and IADLs with less assistance. Ongoing     Long Term (by discharge):  1)   Pt will report 1 out of 10 pain at worst when completing ADLs and IADLs. Ongoing  2)   Increase ROM to 170 degrees in shoulder flexion to increase functional UE use for ADLs and IADLs Ongoing  3)   Increase ROM to 165 degrees in shoulder abduction to increase functional UE use for ADLs and IADLs Ongoing  4)   Increase ROM to 80 degrees in shoulder internal rotation to increase functional UE use for ADLs and IADLs Ongoing  5)   Increase ROM to 80 degrees in shoulder external rotation to increase functional UE use for ADLs and IADLs Ongoing  6)   Increase ROM to 140 degrees in elbow flexion to increase functional UE use for ADLs and IADLs Ongoing  7)   Increase ROM to 0 degrees in elbow extension to increase functional UE use for ADLs and IADLs Ongoing  8)   Increase  strength to 50 lbs. to grasp for  ADLs and IADLs Ongoing  9)   Improve muscle strength ing to 4+ in order to participate in ADLs and IADLs with less assistance.  Ongoing  10)   Patient to score at 65% or more on FOTO to demonstrate improved perception of functional shoulder use.Ongoing  11)  Pt will return to prior level of function for ADLs and household management. Ongoing     Plan      Pt to be treated by Occupational Therapy 2 times per week for 12 weeks during the certification period to achieve the established goals.      Treatment to include: Paraffin, Fluidotherapy, Manual therapy/joint mobilizations, Modalities for pain management, US 3 mhz, Therapeutic  exercises/activities., Iontophoresis with 2.0 cc Dexamethasone, Strengthening, Orthotic Fabrication/Fit/Training, Edema Control, Scar Management, Wound Care, Electrical Modalities, Joint Protection, and Energy Conservation, as well as any other treatments deemed necessary based on the patient's needs or progress.     Updates/Grading for next session: Reassessment of objective measures, FERNANDO Shannon

## 2023-11-22 ENCOUNTER — CLINICAL SUPPORT (OUTPATIENT)
Dept: REHABILITATION | Facility: HOSPITAL | Age: 43
End: 2023-11-22
Payer: COMMERCIAL

## 2023-11-22 DIAGNOSIS — R53.1 WEAKNESS: Primary | ICD-10-CM

## 2023-11-22 DIAGNOSIS — R52 PAIN: ICD-10-CM

## 2023-11-22 DIAGNOSIS — M25.621 ELBOW STIFFNESS, RIGHT: ICD-10-CM

## 2023-11-22 DIAGNOSIS — M25.611 SHOULDER STIFFNESS, RIGHT: ICD-10-CM

## 2023-11-22 PROCEDURE — 97140 MANUAL THERAPY 1/> REGIONS: CPT | Mod: PO

## 2023-11-22 PROCEDURE — 97530 THERAPEUTIC ACTIVITIES: CPT | Mod: PO

## 2023-11-22 NOTE — PROGRESS NOTES
Occupational Therapy Daily Treatment Note     Name: Tabby Veloz  Clinic Number: [id number]    Therapy Diagnosis:   Encounter Diagnoses   Name Primary?    Weakness Yes    Elbow stiffness, right     Shoulder stiffness, right     Pain          Physician: Luis Del Valle NP    Visit Date: 11/22/2023    Physician Orders: OT eval/ treat   Plan of Care Certification Date: 1/1/24  Authorization Period: 10/3/23 -10/2/24  Date of Return to MD: 4 weeks   Medical Diagnosis: S42.351D (ICD-10-CM) - Closed displaced comminuted fracture of shaft of right humerus with routine healing, subsequent   Surgical Procedure and Date: ORIF for her right humeral shaft fracture with IM nail by Dr. Stanley on 9/19/2023. ,   Evaluation Date: 10/9/23  Onset Date:9/15/23   Date of Return to MD: 12/12/23  Visit # / Visits authorized: 11/ 12  FOTO Completion: 2/3    Time In:0900  Time Out: 1015  Total Billable Time: 75 minutes    Note from MD 10/31/23 visit:    - Continue [facility] OT.  - Weight bearing NWB to RUE, needs 2 more weeks (11/14/23) then can start with 2-4 lbs x 4 weeks and increase by 2-4 lbs/month thereafter as she tolerates.  Ok for therapist to work with a pulley.  - Range of motion as tolerated    Precautions: Standard and WB, 9 weeks 11/21/23   Subjective     Pt reports: She states she has been completing her HEP daily.   she was compliant with home exercise program given last session.   Response to previous treatment:increased ROM  Functional change: increased ROM    Pain:3/10 when stretching   Location: right arms     Objective       Observation: Pt is present with abduction sling on her right shoulder      UE Range of Motion  Active Range of Motion:   Shoulder Right Right  10/30/23   Flexion 20 145 - supine   Abduction 50 110 - supine   ER at 90 20 40 - supine   IR 80 80 - supine      Passive Range of Motion:   Shoulder Left Right Right   10/30/23   Flexion 170 70 160   Abduction 165 70 125   ER at 90 80 20 40    IR 80 80 80      Elbow Left Right Right   10/30/23   Flexion 140 120 130   Extension 0 -10 0      Painful Arc:   Patient demonstrates no painful arc in shoulder flexion or abduction     Upper Extremity Strength  (R) UE   (L) UE     Shoulder flexion: 2-/5 Shoulder flexion: 5/5   Shoulder Abduction: 2-/5 Shoulder abduction: 5/5   Shoulder ER 2-/5 Shoulder ER 5/5   Shoulder IR 2-/5 Shoulder IR 5/5   Rhomboids 2-/5 Rhomboids 5/5       Strength: (OSMANY Dynamometer in psi.)        10/9/2023 10/9/2023     Left Right   Rung II 60 N/a      Joint Mobility/End Feels: Hard     Palpation: Pt is tender at incision sites      Sensation: Pt denies any numbness or tingling        Scapular Control/Dyskinesis:     Normal / Subtle / Obvious  Comments    Left  N N/a    Right  N N/a         OUTCOME MEASURE:FOTO     Category:Self care       Current Score  = 37%   Goal at Discharge Score = 65%     11/20/23: 53%    Treatment       Julee received the following manual therapy techniques for 25 minutes:   -PROM of shoulder in flexion, abduction, internal rotation, and external rotation 4 x 10 to toleration  - joint mobilizations to glenohumeral joint anterior to posterior     Julee participated in dynamic functional therapeutic activities to improve functional performance for 50  minutes, including:  -pulley's 3 min flexion/ 3 min abduction  - walls slides flexion 3 min/ abduction 3 min   - 5 minute carry with 2# DB   - isometrics: flexion, extension, internal rotation, external rotation 5 second holds 10 x each   - UBE: level 1 for 5 minutes forward     Home Exercises and Education Provided     Education provided:   - Progress towards goals     Written Home Exercises Provided: Patient instructed to cont prior HEP.  Exercises were reviewed and Julee was able to demonstrate them prior to the end of the session.  Julee demonstrated good  understanding of the HEP provided.   .   See EMR under Patient Instructions for exercises provided  prior visit.        Assessment     Pt would continue to benefit from skilled OT. Pt tolerated added therapeutic exercises well today. Pt 9 weeks post op 11/21/23. Isometrics initiated at today's session to patient's toleration. Pt tolerated added therapeutic activities well without additional complaints of pain.  Will progress as tolerated. Pt motivated.      Julee is progressing well towards her goals and there are no updates to goals at this time. Pt prognosis is Good.     Pt will continue to benefit from skilled outpatient occupational therapy to address the deficits listed in the problem list on initial evaluation provide pt/family education and to maximize pt's level of independence in the home and community environment.     Anticipated barriers to occupational therapy: toleration to pain    Pt's spiritual, cultural and educational needs considered and pt agreeable to plan of care and goals.    Goals:      Short Term (6 weeks ):  1)   Patient to be IND with HEP and modalities for pain management Ongoing  2)   Increase ROM to 120 degrees in shoulder flexion to increase functional UE use for ADLs and IADLs Ongoing  3)   Increase ROM to 90 degrees in shoulder abduction to increase functional UE use for ADLs and IADLs Ongoing  4)   Increase ROM to 80 degrees in shoulder internal rotation to increase functional UE use for ADLs and IADLs Ongoing  5)   Increase ROM to 50 degrees in shoulder external rotation to increase functional UE use for ADLs and IADLs Ongoing  6)   Increase ROM to 130 degrees in elbow flexion to increase functional UE use for ADLs and IADLs MET 10/30/23  7)   Increase ROM to 5 degrees in elbow extension to increase functional UE use for ADLs and IADLs Ongoing  8)   Increase  strength to 30 lbs. to grasp for  ADLs and IADLs Ongoing  9)   Improve muscle strength ing -2 to 3+ in order to participate in ADLs and IADLs with less assistance. Ongoing     Long Term (by discharge):  1)   Pt will  report 1 out of 10 pain at worst when completing ADLs and IADLs. Ongoing  2)   Increase ROM to 170 degrees in shoulder flexion to increase functional UE use for ADLs and IADLs Ongoing  3)   Increase ROM to 165 degrees in shoulder abduction to increase functional UE use for ADLs and IADLs Ongoing  4)   Increase ROM to 80 degrees in shoulder internal rotation to increase functional UE use for ADLs and IADLs Ongoing  5)   Increase ROM to 80 degrees in shoulder external rotation to increase functional UE use for ADLs and IADLs Ongoing  6)   Increase ROM to 140 degrees in elbow flexion to increase functional UE use for ADLs and IADLs Ongoing  7)   Increase ROM to 0 degrees in elbow extension to increase functional UE use for ADLs and IADLs Ongoing  8)   Increase  strength to 50 lbs. to grasp for  ADLs and IADLs Ongoing  9)   Improve muscle strength ing to 4+ in order to participate in ADLs and IADLs with less assistance.  Ongoing  10)   Patient to score at 65% or more on FOTO to demonstrate improved perception of functional shoulder use.Ongoing  11)  Pt will return to prior level of function for ADLs and household management. Ongoing     Plan      Pt to be treated by Occupational Therapy 2 times per week for 12 weeks during the certification period to achieve the established goals.      Treatment to include: Paraffin, Fluidotherapy, Manual therapy/joint mobilizations, Modalities for pain management, US 3 mhz, Therapeutic exercises/activities., Iontophoresis with 2.0 cc Dexamethasone, Strengthening, Orthotic Fabrication/Fit/Training, Edema Control, Scar Management, Wound Care, Electrical Modalities, Joint Protection, and Energy Conservation, as well as any other treatments deemed necessary based on the patient's needs or progress.     Updates/Grading for next session: Continue with current plan of care     FERNANDO Samuel

## 2023-11-27 ENCOUNTER — CLINICAL SUPPORT (OUTPATIENT)
Dept: REHABILITATION | Facility: HOSPITAL | Age: 43
End: 2023-11-27
Payer: COMMERCIAL

## 2023-11-27 DIAGNOSIS — R52 PAIN: ICD-10-CM

## 2023-11-27 DIAGNOSIS — R53.1 WEAKNESS: Primary | ICD-10-CM

## 2023-11-27 DIAGNOSIS — M25.621 ELBOW STIFFNESS, RIGHT: ICD-10-CM

## 2023-11-27 DIAGNOSIS — M25.611 SHOULDER STIFFNESS, RIGHT: ICD-10-CM

## 2023-11-27 PROCEDURE — 97530 THERAPEUTIC ACTIVITIES: CPT | Mod: PO

## 2023-11-27 PROCEDURE — 97140 MANUAL THERAPY 1/> REGIONS: CPT | Mod: PO

## 2023-11-27 NOTE — PROGRESS NOTES
Occupational Therapy Daily Treatment Note     Name: Tabby Veloz  Clinic Number: [id number]    Therapy Diagnosis:   Encounter Diagnoses   Name Primary?    Weakness Yes    Elbow stiffness, right     Shoulder stiffness, right     Pain          Physician: Luis Del Valle NP    Visit Date: 11/27/2023    Physician Orders: OT eval/ treat   Plan of Care Certification Date: 1/1/24  Authorization Period: 10/3/23 -10/2/24  Date of Return to MD: 4 weeks   Medical Diagnosis: S42.351D (ICD-10-CM) - Closed displaced comminuted fracture of shaft of right humerus with routine healing, subsequent   Surgical Procedure and Date: ORIF for her right humeral shaft fracture with IM nail by Dr. Stanley on 9/19/2023. ,   Evaluation Date: 10/9/23  Onset Date:9/15/23   Date of Return to MD: 12/12/23  Visit # / Visits authorized: 12/ 12  FOTO Completion: 2/3    Time In:0900  Time Out: 1015  Total Billable Time: 75 minutes    Note from MD 10/31/23 visit:    - Continue [facility] OT.  - Weight bearing NWB to RUE, needs 2 more weeks (11/14/23) then can start with 2-4 lbs x 4 weeks and increase by 2-4 lbs/month thereafter as she tolerates.  Ok for therapist to work with a pulley.  - Range of motion as tolerated    Precautions: Standard and WB, 9 weeks 11/21/23   Subjective     Pt reports: She states she has been completing her HEP daily.   she was compliant with home exercise program given last session.   Response to previous treatment:increased ROM  Functional change: increased ROM    Pain:2/10 when stretching   Location: right arms     Objective       Observation: Pt is present with abduction sling on her right shoulder      UE Range of Motion  Active Range of Motion:   Shoulder Right Right  10/30/23   Flexion 20 145 - supine   Abduction 50 110 - supine   ER at 90 20 40 - supine   IR 80 80 - supine      Passive Range of Motion:   Shoulder Left Right Right   10/30/23   Flexion 170 70 160   Abduction 165 70 125   ER at 90 80 20 40    IR 80 80 80      Elbow Left Right Right   10/30/23   Flexion 140 120 130   Extension 0 -10 0      Painful Arc:   Patient demonstrates no painful arc in shoulder flexion or abduction     Upper Extremity Strength  (R) UE   (L) UE     Shoulder flexion: 2-/5 Shoulder flexion: 5/5   Shoulder Abduction: 2-/5 Shoulder abduction: 5/5   Shoulder ER 2-/5 Shoulder ER 5/5   Shoulder IR 2-/5 Shoulder IR 5/5   Rhomboids 2-/5 Rhomboids 5/5       Strength: (OSMANY Dynamometer in psi.)        10/9/2023 10/9/2023     Left Right   Rung II 60 N/a      Joint Mobility/End Feels: Hard     Palpation: Pt is tender at incision sites      Sensation: Pt denies any numbness or tingling        Scapular Control/Dyskinesis:     Normal / Subtle / Obvious  Comments    Left  N N/a    Right  N N/a         OUTCOME MEASURE:FOTO     Category:Self care       Current Score  = 37%   Goal at Discharge Score = 65%     11/20/23: 53%    Treatment       Julee received the following manual therapy techniques for 25 minutes:   -PROM of shoulder in flexion, abduction, internal rotation, and external rotation 4 x 10 to toleration  - joint mobilizations to glenohumeral joint anterior to posterior     Julee participated in dynamic functional therapeutic activities to improve functional performance for 50  minutes, including:  -pulley's 3 min flexion/ 3 min abduction  - walls slides flexion 2/15  - wall slides abduction 2/15  - 5 minute carry with 2# DB   - isometrics: flexion, extension, internal rotation, external rotation 5 second holds 10 x each   - UBE: level 3 for 8 minutes 4 forward/4 backward  -supine flexion stretch with 4# dowel 15 second hold 15x     Home Exercises and Education Provided     Education provided:   - Progress towards goals     Written Home Exercises Provided: Patient instructed to cont prior HEP.  Exercises were reviewed and Julee was able to demonstrate them prior to the end of the session.  Julee demonstrated good  understanding of the  HEP provided.   .   See EMR under Patient Instructions for exercises provided prior visit.        Assessment     Pt would continue to benefit from skilled OT. Pt tolerated added therapeutic exercises well today. Pt 9 weeks post op 11/21/23. Isometrics completed at today's session to patient's toleration. Pt tolerated added therapeutic activities well without additional complaints of pain.  Will progress as tolerated. Pt motivated.      Julee is progressing well towards her goals and there are no updates to goals at this time. Pt prognosis is Good.     Pt will continue to benefit from skilled outpatient occupational therapy to address the deficits listed in the problem list on initial evaluation provide pt/family education and to maximize pt's level of independence in the home and community environment.     Anticipated barriers to occupational therapy: toleration to pain    Pt's spiritual, cultural and educational needs considered and pt agreeable to plan of care and goals.    Goals:      Short Term (6 weeks ):  1)   Patient to be IND with HEP and modalities for pain management Ongoing  2)   Increase ROM to 120 degrees in shoulder flexion to increase functional UE use for ADLs and IADLs Ongoing  3)   Increase ROM to 90 degrees in shoulder abduction to increase functional UE use for ADLs and IADLs Ongoing  4)   Increase ROM to 80 degrees in shoulder internal rotation to increase functional UE use for ADLs and IADLs Ongoing  5)   Increase ROM to 50 degrees in shoulder external rotation to increase functional UE use for ADLs and IADLs Ongoing  6)   Increase ROM to 130 degrees in elbow flexion to increase functional UE use for ADLs and IADLs MET 10/30/23  7)   Increase ROM to 5 degrees in elbow extension to increase functional UE use for ADLs and IADLs Ongoing  8)   Increase  strength to 30 lbs. to grasp for  ADLs and IADLs Ongoing  9)   Improve muscle strength ing -2 to 3+ in order to participate in ADLs and IADLs  with less assistance. Ongoing     Long Term (by discharge):  1)   Pt will report 1 out of 10 pain at worst when completing ADLs and IADLs. Ongoing  2)   Increase ROM to 170 degrees in shoulder flexion to increase functional UE use for ADLs and IADLs Ongoing  3)   Increase ROM to 165 degrees in shoulder abduction to increase functional UE use for ADLs and IADLs Ongoing  4)   Increase ROM to 80 degrees in shoulder internal rotation to increase functional UE use for ADLs and IADLs Ongoing  5)   Increase ROM to 80 degrees in shoulder external rotation to increase functional UE use for ADLs and IADLs Ongoing  6)   Increase ROM to 140 degrees in elbow flexion to increase functional UE use for ADLs and IADLs Ongoing  7)   Increase ROM to 0 degrees in elbow extension to increase functional UE use for ADLs and IADLs Ongoing  8)   Increase  strength to 50 lbs. to grasp for  ADLs and IADLs Ongoing  9)   Improve muscle strength ing to 4+ in order to participate in ADLs and IADLs with less assistance.  Ongoing  10)   Patient to score at 65% or more on FOTO to demonstrate improved perception of functional shoulder use.Ongoing  11)  Pt will return to prior level of function for ADLs and household management. Ongoing     Plan      Pt to be treated by Occupational Therapy 2 times per week for 12 weeks during the certification period to achieve the established goals.      Treatment to include: Paraffin, Fluidotherapy, Manual therapy/joint mobilizations, Modalities for pain management, US 3 mhz, Therapeutic exercises/activities., Iontophoresis with 2.0 cc Dexamethasone, Strengthening, Orthotic Fabrication/Fit/Training, Edema Control, Scar Management, Wound Care, Electrical Modalities, Joint Protection, and Energy Conservation, as well as any other treatments deemed necessary based on the patient's needs or progress.     Updates/Grading for next session: Reassessment of objective measures     FERNANDO Samuel

## 2023-11-29 ENCOUNTER — CLINICAL SUPPORT (OUTPATIENT)
Dept: REHABILITATION | Facility: HOSPITAL | Age: 43
End: 2023-11-29
Payer: COMMERCIAL

## 2023-11-29 DIAGNOSIS — M25.621 ELBOW STIFFNESS, RIGHT: ICD-10-CM

## 2023-11-29 DIAGNOSIS — R53.1 WEAKNESS: Primary | ICD-10-CM

## 2023-11-29 DIAGNOSIS — M25.611 SHOULDER STIFFNESS, RIGHT: ICD-10-CM

## 2023-11-29 DIAGNOSIS — R52 PAIN: ICD-10-CM

## 2023-11-29 PROCEDURE — 97140 MANUAL THERAPY 1/> REGIONS: CPT | Mod: PO

## 2023-11-29 PROCEDURE — 97530 THERAPEUTIC ACTIVITIES: CPT | Mod: PO

## 2023-11-29 NOTE — PROGRESS NOTES
Occupational Therapy Daily Treatment Note     Name: Tabby Veloz  Clinic Number: [id number]    Therapy Diagnosis:   Encounter Diagnoses   Name Primary?    Weakness Yes    Elbow stiffness, right     Shoulder stiffness, right     Pain          Physician: Luis Del Valle NP    Visit Date: 11/29/2023    Physician Orders: OT eval/ treat   Plan of Care Certification Date: 1/1/24  Authorization Period: 10/3/23 -10/2/24  Date of Return to MD: 4 weeks   Medical Diagnosis: S42.351D (ICD-10-CM) - Closed displaced comminuted fracture of shaft of right humerus with routine healing, subsequent   Surgical Procedure and Date: ORIF for her right humeral shaft fracture with IM nail by Dr. Stanley on 9/19/2023. ,   Evaluation Date: 10/9/23  Onset Date:9/15/23   Date of Return to MD: 12/12/23  Visit # / Visits authorized: 13/ 24  FOTO Completion: 2/3    Time In:0900  Time Out: 1015  Total Billable Time: 75 minutes    Note from MD 10/31/23 visit:    - Continue [facility] OT.  - Weight bearing NWB to RUE, needs 2 more weeks (11/14/23) then can start with 2-4 lbs x 4 weeks and increase by 2-4 lbs/month thereafter as she tolerates.  Ok for therapist to work with a pulley.  - Range of motion as tolerated    Precautions: Standard and WB, 9 weeks 11/21/23   Subjective     Pt reports: She states she has been completing her HEP daily.   she was compliant with home exercise program given last session.   Response to previous treatment:increased ROM  Functional change: increased ROM    Pain:2/10 when stretching   Location: right arms     Objective       Observation: Pt is present with abduction sling on her right shoulder      UE Range of Motion  Active Range of Motion:   Shoulder Right Right  10/30/23   Flexion 20 145 - supine   Abduction 50 110 - supine   ER at 90 20 40 - supine   IR 80 80 - supine      Passive Range of Motion:   Shoulder Left Right Right   10/30/23   Flexion 170 70 160   Abduction 165 70 125   ER at 90 80 20 40    IR 80 80 80      Elbow Left Right Right   10/30/23   Flexion 140 120 130   Extension 0 -10 0      Painful Arc:   Patient demonstrates no painful arc in shoulder flexion or abduction     Upper Extremity Strength  (R) UE   (L) UE     Shoulder flexion: 2-/5 Shoulder flexion: 5/5   Shoulder Abduction: 2-/5 Shoulder abduction: 5/5   Shoulder ER 2-/5 Shoulder ER 5/5   Shoulder IR 2-/5 Shoulder IR 5/5   Rhomboids 2-/5 Rhomboids 5/5       Strength: (OSMANY Dynamometer in psi.)        10/9/2023 10/9/2023     Left Right   Rung II 60 N/a      Joint Mobility/End Feels: Hard     Palpation: Pt is tender at incision sites      Sensation: Pt denies any numbness or tingling        Scapular Control/Dyskinesis:     Normal / Subtle / Obvious  Comments    Left  N N/a    Right  N N/a         OUTCOME MEASURE:FOTO     Category:Self care       Current Score  = 37%   Goal at Discharge Score = 65%     11/20/23: 53%    Treatment       Julee received the following manual therapy techniques for 25 minutes:   -PROM of shoulder in flexion, abduction, internal rotation, and external rotation 4 x 10 to toleration  - joint mobilizations to glenohumeral joint anterior to posterior     Julee participated in dynamic functional therapeutic activities to improve functional performance for 50  minutes, including:  -pulley's 3 min flexion/ 3 min abduction  - walls slides flexion 2/15  - wall slides abduction 2/15  - 5 minute carry with 3# DB   - Red theraband rows 2/15  - red theraband pull downs 2/15  - red theraband internal rotation 2/15  - red theraband external rotation 2/15  - UBE: level 3 for 5 minutes forward   -supine flexion stretch with 4# dowel 15 second hold 15x   - corner stretch 5 x with 30 second holds     Home Exercises and Education Provided     Education provided:   - Progress towards goals     Written Home Exercises Provided: Patient instructed to cont prior HEP.  Exercises were reviewed and Julee was able to demonstrate them prior  to the end of the session.  Julee demonstrated good  understanding of the HEP provided.   .   See EMR under Patient Instructions for exercises provided prior visit.        Assessment     Pt would continue to benefit from skilled OT. Pt tolerated added therapeutic exercises well today. Pt 10 weeks post op 11/28/23. Light strengthening initiated  at today's session to patient's toleration. Pt tolerated added therapeutic activities well without additional complaints of pain.  Plan to reassess patient's measurements at next session. Will progress as tolerated. Pt motivated.      Julee is progressing well towards her goals and there are no updates to goals at this time. Pt prognosis is Good.     Pt will continue to benefit from skilled outpatient occupational therapy to address the deficits listed in the problem list on initial evaluation provide pt/family education and to maximize pt's level of independence in the home and community environment.     Anticipated barriers to occupational therapy: toleration to pain    Pt's spiritual, cultural and educational needs considered and pt agreeable to plan of care and goals.    Goals:      Short Term (6 weeks ):  1)   Patient to be IND with HEP and modalities for pain management Ongoing  2)   Increase ROM to 120 degrees in shoulder flexion to increase functional UE use for ADLs and IADLs Ongoing  3)   Increase ROM to 90 degrees in shoulder abduction to increase functional UE use for ADLs and IADLs Ongoing  4)   Increase ROM to 80 degrees in shoulder internal rotation to increase functional UE use for ADLs and IADLs Ongoing  5)   Increase ROM to 50 degrees in shoulder external rotation to increase functional UE use for ADLs and IADLs Ongoing  6)   Increase ROM to 130 degrees in elbow flexion to increase functional UE use for ADLs and IADLs MET 10/30/23  7)   Increase ROM to 5 degrees in elbow extension to increase functional UE use for ADLs and IADLs Ongoing  8)   Increase   strength to 30 lbs. to grasp for  ADLs and IADLs Ongoing  9)   Improve muscle strength ing -2 to 3+ in order to participate in ADLs and IADLs with less assistance. Ongoing     Long Term (by discharge):  1)   Pt will report 1 out of 10 pain at worst when completing ADLs and IADLs. Ongoing  2)   Increase ROM to 170 degrees in shoulder flexion to increase functional UE use for ADLs and IADLs Ongoing  3)   Increase ROM to 165 degrees in shoulder abduction to increase functional UE use for ADLs and IADLs Ongoing  4)   Increase ROM to 80 degrees in shoulder internal rotation to increase functional UE use for ADLs and IADLs Ongoing  5)   Increase ROM to 80 degrees in shoulder external rotation to increase functional UE use for ADLs and IADLs Ongoing  6)   Increase ROM to 140 degrees in elbow flexion to increase functional UE use for ADLs and IADLs Ongoing  7)   Increase ROM to 0 degrees in elbow extension to increase functional UE use for ADLs and IADLs Ongoing  8)   Increase  strength to 50 lbs. to grasp for  ADLs and IADLs Ongoing  9)   Improve muscle strength ing to 4+ in order to participate in ADLs and IADLs with less assistance.  Ongoing  10)   Patient to score at 65% or more on FOTO to demonstrate improved perception of functional shoulder use.Ongoing  11)  Pt will return to prior level of function for ADLs and household management. Ongoing     Plan      Pt to be treated by Occupational Therapy 2 times per week for 12 weeks during the certification period to achieve the established goals.      Treatment to include: Paraffin, Fluidotherapy, Manual therapy/joint mobilizations, Modalities for pain management, US 3 mhz, Therapeutic exercises/activities., Iontophoresis with 2.0 cc Dexamethasone, Strengthening, Orthotic Fabrication/Fit/Training, Edema Control, Scar Management, Wound Care, Electrical Modalities, Joint Protection, and Energy Conservation, as well as any other treatments deemed necessary based on the  patient's needs or progress.     Updates/Grading for next session: Reassessment of objective measures     FERNANDO Samuel

## 2023-12-04 ENCOUNTER — CLINICAL SUPPORT (OUTPATIENT)
Dept: REHABILITATION | Facility: HOSPITAL | Age: 43
End: 2023-12-04
Payer: COMMERCIAL

## 2023-12-04 DIAGNOSIS — R52 PAIN: ICD-10-CM

## 2023-12-04 DIAGNOSIS — S42.351D CLOSED DISPLACED COMMINUTED FRACTURE OF SHAFT OF RIGHT HUMERUS WITH ROUTINE HEALING, SUBSEQUENT ENCOUNTER: ICD-10-CM

## 2023-12-04 DIAGNOSIS — M25.611 SHOULDER STIFFNESS, RIGHT: ICD-10-CM

## 2023-12-04 DIAGNOSIS — M25.621 ELBOW STIFFNESS, RIGHT: ICD-10-CM

## 2023-12-04 DIAGNOSIS — R53.1 WEAKNESS: Primary | ICD-10-CM

## 2023-12-04 PROCEDURE — 97530 THERAPEUTIC ACTIVITIES: CPT | Mod: PO

## 2023-12-04 PROCEDURE — 97140 MANUAL THERAPY 1/> REGIONS: CPT | Mod: PO

## 2023-12-04 RX ORDER — GABAPENTIN 300 MG/1
300 CAPSULE ORAL 3 TIMES DAILY
Qty: 42 CAPSULE | Refills: 0 | Status: SHIPPED | OUTPATIENT
Start: 2023-12-04 | End: 2023-12-21 | Stop reason: SDUPTHER

## 2023-12-04 RX ORDER — METHOCARBAMOL 500 MG/1
500 TABLET, FILM COATED ORAL 3 TIMES DAILY PRN
Qty: 30 TABLET | Refills: 0 | Status: SHIPPED | OUTPATIENT
Start: 2023-12-04 | End: 2023-12-21 | Stop reason: SDUPTHER

## 2023-12-04 NOTE — PROGRESS NOTES
Occupational Therapy Daily Treatment Note     Name: Tabby Veloz  Clinic Number: [id number]    Therapy Diagnosis:   Encounter Diagnoses   Name Primary?    Weakness Yes    Elbow stiffness, right     Shoulder stiffness, right     Pain          Physician: Luis Del Valle NP    Visit Date: 12/4/2023    Physician Orders: OT eval/ treat   Plan of Care Certification Date: 1/1/24  Authorization Period: 10/3/23 -10/2/24  Date of Return to MD: 4 weeks   Medical Diagnosis: S42.351D (ICD-10-CM) - Closed displaced comminuted fracture of shaft of right humerus with routine healing, subsequent   Surgical Procedure and Date: ORIF for her right humeral shaft fracture with IM nail by Dr. Stanley on 9/19/2023. ,   Evaluation Date: 10/9/23  Onset Date:9/15/23   Date of Return to MD: 12/12/23  Visit # / Visits authorized: 14/ 24  FOTO Completion: 2/3    Time In:0900  Time Out: 1015  Total Billable Time: 75 minutes    Note from MD 10/31/23 visit:    - Continue [facility] OT.  - Weight bearing NWB to RUE, needs 2 more weeks (11/14/23) then can start with 2-4 lbs x 4 weeks and increase by 2-4 lbs/month thereafter as she tolerates.  Ok for therapist to work with a pulley.  - Range of motion as tolerated    Precautions: Standard and WB, 9 weeks 11/21/23   Subjective     Pt reports: She hit her elbow on the dining room table which has caused some pain  she was compliant with home exercise program given last session.   Response to previous treatment:increased ROM  Functional change: increased ROM    Pain:3/10 when stretching   Location: right arms     Objective       Observation: Pt is present with abduction sling on her right shoulder      UE Range of Motion  Active Range of Motion:   Shoulder Right Right  10/30/23   Flexion 20 145 - supine   Abduction 50 110 - supine   ER at 90 20 40 - supine   IR 80 80 - supine      Passive Range of Motion:   Shoulder Left Right Right   10/30/23   Flexion 170 70 160   Abduction 165 70 125   ER  at 90 80 20 40   IR 80 80 80      Elbow Left Right Right   10/30/23   Flexion 140 120 130   Extension 0 -10 0      Painful Arc:   Patient demonstrates no painful arc in shoulder flexion or abduction     Upper Extremity Strength  (R) UE   (L) UE     Shoulder flexion: 2-/5 Shoulder flexion: 5/5   Shoulder Abduction: 2-/5 Shoulder abduction: 5/5   Shoulder ER 2-/5 Shoulder ER 5/5   Shoulder IR 2-/5 Shoulder IR 5/5   Rhomboids 2-/5 Rhomboids 5/5       Strength: (OSMANY Dynamometer in psi.)        10/9/2023 10/9/2023     Left Right   Rung II 60 N/a      Joint Mobility/End Feels: Hard     Palpation: Pt is tender at incision sites      Sensation: Pt denies any numbness or tingling        Scapular Control/Dyskinesis:     Normal / Subtle / Obvious  Comments    Left  N N/a    Right  N N/a         OUTCOME MEASURE:FOTO     Category:Self care       Current Score  = 37%   Goal at Discharge Score = 65%     11/20/23: 53%    Treatment       Julee received the following manual therapy techniques for 25 minutes:   -PROM of shoulder in flexion, abduction, internal rotation, and external rotation 4 x 10 to toleration  - joint mobilizations to glenohumeral joint anterior to posterior     Julee participated in dynamic functional therapeutic activities to improve functional performance for 50  minutes, including:  -pulley's 3 min flexion/ 3 min abduction  - walls slides flexion 2/15  - wall slides abduction 2/15  - 5 minute carry with 4# DB   - Red theraband rows 2/15  - red theraband pull downs 2/15  - red theraband internal rotation 2/15  - red theraband external rotation 2/15  - UBE: level 3 for 5 minutes forward   -supine flexion stretch with 4# dowel 15 second hold 15x   - corner stretch 5 x with 30 second holds     Home Exercises and Education Provided     Education provided:   - Progress towards goals     Written Home Exercises Provided: Patient instructed to cont prior HEP.  Exercises were reviewed and Julee was able to  demonstrate them prior to the end of the session.  Julee demonstrated good  understanding of the HEP provided.   .   See EMR under Patient Instructions for exercises provided prior visit.        Assessment     Pt would continue to benefit from skilled OT. Pt tolerated added therapeutic exercises well today. Pt 10 weeks post op 11/28/23. Light strengthening continued at today's session to patient's toleration. Pt tolerated added therapeutic activities well without additional complaints of pain. Will progress as tolerated. Pt motivated.      Julee is progressing well towards her goals and there are no updates to goals at this time. Pt prognosis is Good.     Pt will continue to benefit from skilled outpatient occupational therapy to address the deficits listed in the problem list on initial evaluation provide pt/family education and to maximize pt's level of independence in the home and community environment.     Anticipated barriers to occupational therapy: toleration to pain    Pt's spiritual, cultural and educational needs considered and pt agreeable to plan of care and goals.    Goals:      Short Term (6 weeks ):  1)   Patient to be IND with HEP and modalities for pain management Ongoing  2)   Increase ROM to 120 degrees in shoulder flexion to increase functional UE use for ADLs and IADLs Ongoing  3)   Increase ROM to 90 degrees in shoulder abduction to increase functional UE use for ADLs and IADLs Ongoing  4)   Increase ROM to 80 degrees in shoulder internal rotation to increase functional UE use for ADLs and IADLs Ongoing  5)   Increase ROM to 50 degrees in shoulder external rotation to increase functional UE use for ADLs and IADLs Ongoing  6)   Increase ROM to 130 degrees in elbow flexion to increase functional UE use for ADLs and IADLs MET 10/30/23  7)   Increase ROM to 5 degrees in elbow extension to increase functional UE use for ADLs and IADLs Ongoing  8)   Increase  strength to 30 lbs. to grasp for   ADLs and IADLs Ongoing  9)   Improve muscle strength ing -2 to 3+ in order to participate in ADLs and IADLs with less assistance. Ongoing     Long Term (by discharge):  1)   Pt will report 1 out of 10 pain at worst when completing ADLs and IADLs. Ongoing  2)   Increase ROM to 170 degrees in shoulder flexion to increase functional UE use for ADLs and IADLs Ongoing  3)   Increase ROM to 165 degrees in shoulder abduction to increase functional UE use for ADLs and IADLs Ongoing  4)   Increase ROM to 80 degrees in shoulder internal rotation to increase functional UE use for ADLs and IADLs Ongoing  5)   Increase ROM to 80 degrees in shoulder external rotation to increase functional UE use for ADLs and IADLs Ongoing  6)   Increase ROM to 140 degrees in elbow flexion to increase functional UE use for ADLs and IADLs Ongoing  7)   Increase ROM to 0 degrees in elbow extension to increase functional UE use for ADLs and IADLs Ongoing  8)   Increase  strength to 50 lbs. to grasp for  ADLs and IADLs Ongoing  9)   Improve muscle strength ing to 4+ in order to participate in ADLs and IADLs with less assistance.  Ongoing  10)   Patient to score at 65% or more on FOTO to demonstrate improved perception of functional shoulder use.Ongoing  11)  Pt will return to prior level of function for ADLs and household management. Ongoing     Plan      Pt to be treated by Occupational Therapy 2 times per week for 12 weeks during the certification period to achieve the established goals.      Treatment to include: Paraffin, Fluidotherapy, Manual therapy/joint mobilizations, Modalities for pain management, US 3 mhz, Therapeutic exercises/activities., Iontophoresis with 2.0 cc Dexamethasone, Strengthening, Orthotic Fabrication/Fit/Training, Edema Control, Scar Management, Wound Care, Electrical Modalities, Joint Protection, and Energy Conservation, as well as any other treatments deemed necessary based on the patient's needs or progress.      Updates/Grading for next session: Reassessment of objective measures     FERNANDO Samuel

## 2023-12-06 ENCOUNTER — CLINICAL SUPPORT (OUTPATIENT)
Dept: REHABILITATION | Facility: HOSPITAL | Age: 43
End: 2023-12-06
Payer: COMMERCIAL

## 2023-12-06 DIAGNOSIS — M25.621 ELBOW STIFFNESS, RIGHT: ICD-10-CM

## 2023-12-06 DIAGNOSIS — R53.1 WEAKNESS: Primary | ICD-10-CM

## 2023-12-06 DIAGNOSIS — R52 PAIN: ICD-10-CM

## 2023-12-06 DIAGNOSIS — M25.611 SHOULDER STIFFNESS, RIGHT: ICD-10-CM

## 2023-12-06 PROCEDURE — 97140 MANUAL THERAPY 1/> REGIONS: CPT | Mod: PO

## 2023-12-06 PROCEDURE — 97530 THERAPEUTIC ACTIVITIES: CPT | Mod: PO

## 2023-12-06 NOTE — PROGRESS NOTES
Occupational Therapy Daily Treatment Note     Name: Tabby Veloz  Clinic Number: [id number]    Therapy Diagnosis:   Encounter Diagnoses   Name Primary?    Weakness Yes    Elbow stiffness, right     Shoulder stiffness, right     Pain          Physician: Luis Del Valle NP    Visit Date: 12/6/2023    Physician Orders: OT eval/ treat   Plan of Care Certification Date: 1/1/24  Authorization Period: 10/3/23 -10/2/24  Date of Return to MD: 4 weeks   Medical Diagnosis: S42.351D (ICD-10-CM) - Closed displaced comminuted fracture of shaft of right humerus with routine healing, subsequent   Surgical Procedure and Date: ORIF for her right humeral shaft fracture with IM nail by Dr. Stanley on 9/19/2023. ,   Evaluation Date: 10/9/23  Onset Date:9/15/23   Date of Return to MD: 12/12/23  Visit # / Visits authorized: 15/ 24  FOTO Completion: 2/3    Time In:0900  Time Out: 1000  Total Billable Time: 60 minutes    Note from MD 10/31/23 visit:    - Continue [facility] OT.  - Weight bearing NWB to RUE, needs 2 more weeks (11/14/23) then can start with 2-4 lbs x 4 weeks and increase by 2-4 lbs/month thereafter as she tolerates.  Ok for therapist to work with a pulley.  - Range of motion as tolerated    Precautions: Standard and WB, 9 weeks 11/21/23   Subjective     Pt reports: She opened the [place] door with her right arm and it starting hurting afterwards   she was compliant with home exercise program given last session.   Response to previous treatment:increased ROM  Functional change: increased ROM    Pain:4/10  Location: right arms     Objective       Observation: Pt is present with abduction sling on her right shoulder      UE Range of Motion  Active Range of Motion:   Shoulder Right Right  10/30/23   Flexion 20 145 - supine   Abduction 50 110 - supine   ER at 90 20 40 - supine   IR 80 80 - supine      Passive Range of Motion:   Shoulder Left Right Right   10/30/23   Flexion 170 70 160   Abduction 165 70 125   ER at 90  80 20 40   IR 80 80 80      Elbow Left Right Right   10/30/23   Flexion 140 120 130   Extension 0 -10 0      Painful Arc:   Patient demonstrates no painful arc in shoulder flexion or abduction     Upper Extremity Strength  (R) UE   (L) UE     Shoulder flexion: 2-/5 Shoulder flexion: 5/5   Shoulder Abduction: 2-/5 Shoulder abduction: 5/5   Shoulder ER 2-/5 Shoulder ER 5/5   Shoulder IR 2-/5 Shoulder IR 5/5   Rhomboids 2-/5 Rhomboids 5/5       Strength: (OSMANY Dynamometer in psi.)        10/9/2023 10/9/2023     Left Right   Rung II 60 N/a      Joint Mobility/End Feels: Hard     Palpation: Pt is tender at incision sites      Sensation: Pt denies any numbness or tingling        Scapular Control/Dyskinesis:     Normal / Subtle / Obvious  Comments    Left  N N/a    Right  N N/a         OUTCOME MEASURE:FOTO     Category:Self care       Current Score  = 37%   Goal at Discharge Score = 65%     11/20/23: 53%    Treatment       Julee received the following manual therapy techniques for 10 minutes:   -PROM of shoulder in flexion, abduction, internal rotation, and external rotation 4 x 10 to toleration  - joint mobilizations to glenohumeral joint anterior to posterior     Julee participated in dynamic functional therapeutic activities to improve functional performance for 50  minutes, including:  -pulley's 3 min flexion/ 3 min abduction  - walls slides flexion 2/15  - wall slides abduction 2/15  - Red theraband rows 2/15  - red theraband pull downs 2/15  - red theraband internal rotation 2/15  - red theraband external rotation 2/15  - UBE: level 3 for 5 minutes forward     Home Exercises and Education Provided     Education provided:   - Progress towards goals     Written Home Exercises Provided: Patient instructed to cont prior HEP.  Exercises were reviewed and Julee was able to demonstrate them prior to the end of the session.  Julee demonstrated good  understanding of the HEP provided.   .   See EMR under Patient  Instructions for exercises provided prior visit.        Assessment     Pt would continue to benefit from skilled OT. Pt tolerated added therapeutic exercises well today. Pt 10 weeks post op 11/28/23. Light strengthening continued at today's session to patient's toleration. Pt tolerated added therapeutic activities well without additional complaints of pain. Will progress as tolerated. Pt motivated.      Julee is progressing well towards her goals and there are no updates to goals at this time. Pt prognosis is Good.     Pt will continue to benefit from skilled outpatient occupational therapy to address the deficits listed in the problem list on initial evaluation provide pt/family education and to maximize pt's level of independence in the home and community environment.     Anticipated barriers to occupational therapy: toleration to pain    Pt's spiritual, cultural and educational needs considered and pt agreeable to plan of care and goals.    Goals:      Short Term (6 weeks ):  1)   Patient to be IND with HEP and modalities for pain management Ongoing  2)   Increase ROM to 120 degrees in shoulder flexion to increase functional UE use for ADLs and IADLs Ongoing  3)   Increase ROM to 90 degrees in shoulder abduction to increase functional UE use for ADLs and IADLs Ongoing  4)   Increase ROM to 80 degrees in shoulder internal rotation to increase functional UE use for ADLs and IADLs Ongoing  5)   Increase ROM to 50 degrees in shoulder external rotation to increase functional UE use for ADLs and IADLs Ongoing  6)   Increase ROM to 130 degrees in elbow flexion to increase functional UE use for ADLs and IADLs MET 10/30/23  7)   Increase ROM to 5 degrees in elbow extension to increase functional UE use for ADLs and IADLs Ongoing  8)   Increase  strength to 30 lbs. to grasp for  ADLs and IADLs Ongoing  9)   Improve muscle strength ing -2 to 3+ in order to participate in ADLs and IADLs with less assistance. Ongoing      Long Term (by discharge):  1)   Pt will report 1 out of 10 pain at worst when completing ADLs and IADLs. Ongoing  2)   Increase ROM to 170 degrees in shoulder flexion to increase functional UE use for ADLs and IADLs Ongoing  3)   Increase ROM to 165 degrees in shoulder abduction to increase functional UE use for ADLs and IADLs Ongoing  4)   Increase ROM to 80 degrees in shoulder internal rotation to increase functional UE use for ADLs and IADLs Ongoing  5)   Increase ROM to 80 degrees in shoulder external rotation to increase functional UE use for ADLs and IADLs Ongoing  6)   Increase ROM to 140 degrees in elbow flexion to increase functional UE use for ADLs and IADLs Ongoing  7)   Increase ROM to 0 degrees in elbow extension to increase functional UE use for ADLs and IADLs Ongoing  8)   Increase  strength to 50 lbs. to grasp for  ADLs and IADLs Ongoing  9)   Improve muscle strength ing to 4+ in order to participate in ADLs and IADLs with less assistance.  Ongoing  10)   Patient to score at 65% or more on FOTO to demonstrate improved perception of functional shoulder use.Ongoing  11)  Pt will return to prior level of function for ADLs and household management. Ongoing     Plan      Pt to be treated by Occupational Therapy 2 times per week for 12 weeks during the certification period to achieve the established goals.      Treatment to include: Paraffin, Fluidotherapy, Manual therapy/joint mobilizations, Modalities for pain management, US 3 mhz, Therapeutic exercises/activities., Iontophoresis with 2.0 cc Dexamethasone, Strengthening, Orthotic Fabrication/Fit/Training, Edema Control, Scar Management, Wound Care, Electrical Modalities, Joint Protection, and Energy Conservation, as well as any other treatments deemed necessary based on the patient's needs or progress.     Updates/Grading for session: Reassessment of objective measures     FERNANDO Harman

## 2023-12-11 ENCOUNTER — CLINICAL SUPPORT (OUTPATIENT)
Dept: REHABILITATION | Facility: HOSPITAL | Age: 43
End: 2023-12-11
Payer: COMMERCIAL

## 2023-12-11 DIAGNOSIS — M25.611 SHOULDER STIFFNESS, RIGHT: ICD-10-CM

## 2023-12-11 DIAGNOSIS — R53.1 WEAKNESS: Primary | ICD-10-CM

## 2023-12-11 DIAGNOSIS — M25.621 ELBOW STIFFNESS, RIGHT: ICD-10-CM

## 2023-12-11 DIAGNOSIS — R52 PAIN: ICD-10-CM

## 2023-12-11 PROCEDURE — 97140 MANUAL THERAPY 1/> REGIONS: CPT | Mod: PO

## 2023-12-11 PROCEDURE — 97530 THERAPEUTIC ACTIVITIES: CPT | Mod: PO

## 2023-12-11 NOTE — PROGRESS NOTES
Occupational Therapy Daily Treatment Note     Name: Tabby Veloz  Clinic Number: [id number]    Therapy Diagnosis:   Encounter Diagnoses   Name Primary?    Weakness Yes    Elbow stiffness, right     Shoulder stiffness, right     Pain          Physician: Luis Del Valle NP    Visit Date: 12/11/2023    Physician Orders: OT eval/ treat   Plan of Care Certification Date: 1/1/24  Authorization Period: 10/3/23 -10/2/24  Date of Return to MD: 4 weeks   Medical Diagnosis: S42.351D (ICD-10-CM) - Closed displaced comminuted fracture of shaft of right humerus with routine healing, subsequent   Surgical Procedure and Date: ORIF for her right humeral shaft fracture with IM nail by Dr. Stanley on 9/19/2023. ,   Evaluation Date: 10/9/23  Onset Date:9/15/23   Date of Return to MD: 12/12/23  Visit # / Visits authorized: 16/ 24  FOTO Completion: 2/3    Time In:0900  Time Out: 1000  Total Billable Time: 60 minutes    Note from MD 10/31/23 visit:    - Continue [facility] OT.  - Weight bearing NWB to RUE, needs 2 more weeks (11/14/23) then can start with 2-4 lbs x 4 weeks and increase by 2-4 lbs/month thereafter as she tolerates.  Ok for therapist to work with a pulley.  - Range of motion as tolerated    Precautions: Standard and WB, 12 weeks 12/12/23   Subjective     Pt reports: She states that her arm is a little sore after going to a [place] party this past Saturday, but she is doing well.  she was compliant with home exercise program given last session.   Response to previous treatment:increased ROM  Functional change: increased ROM    Pain:3/10  Location: right arms     Objective       Observation: Pt is present with abduction sling on her right shoulder      UE Range of Motion  Active Range of Motion:   Shoulder Right Right  10/30/23 Right  12/11/23   Flexion 20 145 - supine 160 - supine   Abduction 50 110 - supine 130 - supine   ER at 90 20 40 - supine 50 - supine   IR 80 80 - supine 80 - supine      Passive Range  of Motion:   Shoulder Left Right Right   10/30/23 Right  12/11/23   Flexion 170 70 160 170   Abduction 165 70 125 145   ER at 90 80 20 40 55   IR 80 80 80 80      Elbow Left Right Right   10/30/23 Right  12/11/23   Flexion 140 120 130 140   Extension 0 -10 0 0      Painful Arc:   Patient demonstrates no painful arc in shoulder flexion or abduction     Upper Extremity Strength  (R) UE   (L) UE     Shoulder flexion: 2-/5 Shoulder flexion: 5/5   Shoulder Abduction: 2-/5 Shoulder abduction: 5/5   Shoulder ER 2-/5 Shoulder ER 5/5   Shoulder IR 2-/5 Shoulder IR 5/5   Rhomboids 2-/5 Rhomboids 5/5       Strength: (OSMANY Dynamometer in psi.)        10/9/2023 10/9/2023 12/11/23     Left Right Right   Rung II 60 N/a 50      Joint Mobility/End Feels: Hard     Palpation: Pt is tender at incision sites      Sensation: Pt denies any numbness or tingling        Scapular Control/Dyskinesis:     Normal / Subtle / Obvious  Comments    Left  N N/a    Right  N N/a         OUTCOME MEASURE:FOTO     Category:Self care       Current Score  = 37%   Goal at Discharge Score = 65%     11/20/23: 53%    Treatment       Julee received the following manual therapy techniques for 10 minutes:   -PROM of shoulder in flexion, abduction, internal rotation, and external rotation 4 x 10 to toleration  - joint mobilizations to glenohumeral joint anterior to posterior     Julee participated in dynamic functional therapeutic activities to improve functional performance for 50  minutes, including:  -pulley's 3 min flexion/ 3 min abduction  - walls slides flexion 2/15  - wall slides abduction 2/15  - 5 minute carry with 4# DB   - Red theraband rows 2/15  - red theraband pull downs 2/15  - red theraband internal rotation 2/15  - red theraband external rotation 2/15  - UBE: level 3 for 5 minutes forward   -reassessment of objective measures     Home Exercises and Education Provided     Education provided:   - Progress towards goals     Written Home Exercises  Provided: Patient instructed to cont prior HEP.  Exercises were reviewed and Julee was able to demonstrate them prior to the end of the session.  Julee demonstrated good  understanding of the HEP provided.   .   See EMR under Patient Instructions for exercises provided prior visit.        Assessment     Pt would continue to benefit from skilled OT. Pt tolerated added therapeutic exercises well today. Pt 12 weeks post op 12/12/23. Light strengthening continued at today's session to patient's toleration. Pt tolerated added therapeutic activities well without additional complaints of pain. Reassessment of objective measures were completed. Pt has MET 9/9 STGs and 4/11 LTGs. Will progress as tolerated. Pt motivated.      Julee is progressing well towards her goals and there are no updates to goals at this time. Pt prognosis is Good.     Pt will continue to benefit from skilled outpatient occupational therapy to address the deficits listed in the problem list on initial evaluation provide pt/family education and to maximize pt's level of independence in the home and community environment.     Anticipated barriers to occupational therapy: toleration to pain    Pt's spiritual, cultural and educational needs considered and pt agreeable to plan of care and goals.    Goals:      Short Term (6 weeks ):  1)   Patient to be IND with HEP and modalities for pain management MET 12/11/23  2)   Increase ROM to 120 degrees in shoulder flexion to increase functional UE use for ADLs and IADLs MET 12/11/23  3)   Increase ROM to 90 degrees in shoulder abduction to increase functional UE use for ADLs and IADLs MET 12/11/23  4)   Increase ROM to 80 degrees in shoulder internal rotation to increase functional UE use for ADLs and IADLs MET 12/11/23  5)   Increase ROM to 50 degrees in shoulder external rotation to increase functional UE use for ADLs and IADLs MET 12/11/23  6)   Increase ROM to 130 degrees in elbow flexion to increase  functional UE use for ADLs and IADLs MET 10/30/23  7)   Increase ROM to 5 degrees in elbow extension to increase functional UE use for ADLs and IADLs MET 12/11/23  8)   Increase  strength to 30 lbs. to grasp for  ADLs and IADLs MET 12/11/23  9)   Improve muscle strength ing -2 to 3+ in order to participate in ADLs and IADLs with less assistance. MET 12/11/23     Long Term (by discharge):  1)   Pt will report 1 out of 10 pain at worst when completing ADLs and IADLs. Ongoing  2)   Increase ROM to 170 degrees in shoulder flexion to increase functional UE use for ADLs and IADLs Ongoing  3)   Increase ROM to 165 degrees in shoulder abduction to increase functional UE use for ADLs and IADLs Ongoing  4)   Increase ROM to 80 degrees in shoulder internal rotation to increase functional UE use for ADLs and IADLs MET 12/11/23  5)   Increase ROM to 80 degrees in shoulder external rotation to increase functional UE use for ADLs and IADLs Ongoing  6)   Increase ROM to 140 degrees in elbow flexion to increase functional UE use for ADLs and IADLs MET 12/11/23  7)   Increase ROM to 0 degrees in elbow extension to increase functional UE use for ADLs and IADLs MET 12/11/23  8)   Increase  strength to 50 lbs. to grasp for  ADLs and IADLs MET 12/11/23  9)   Improve muscle strength ing to 4+ in order to participate in ADLs and IADLs with less assistance.  Ongoing  10)   Patient to score at 65% or more on FOTO to demonstrate improved perception of functional shoulder use.Ongoing  11)  Pt will return to prior level of function for ADLs and household management. Ongoing     Plan      Pt to be treated by Occupational Therapy 2 times per week for 12 weeks during the certification period to achieve the established goals.      Treatment to include: Paraffin, Fluidotherapy, Manual therapy/joint mobilizations, Modalities for pain management, US 3 mhz, Therapeutic exercises/activities., Iontophoresis with 2.0 cc Dexamethasone,  Strengthening, Orthotic Fabrication/Fit/Training, Edema Control, Scar Management, Wound Care, Electrical Modalities, Joint Protection, and Energy Conservation, as well as any other treatments deemed necessary based on the patient's needs or progress.     Updates/Grading for session: Continue with current plan   FERNANDO Harman

## 2023-12-12 ENCOUNTER — OFFICE VISIT (OUTPATIENT)
Dept: ORTHOPEDICS | Facility: CLINIC | Age: 43
End: 2023-12-12
Payer: COMMERCIAL

## 2023-12-12 ENCOUNTER — HOSPITAL ENCOUNTER (OUTPATIENT)
Dept: RADIOLOGY | Facility: HOSPITAL | Age: 43
Discharge: HOME | End: 2023-12-12
Attending: NURSE PRACTITIONER
Payer: COMMERCIAL

## 2023-12-12 DIAGNOSIS — S42.351D CLOSED DISPLACED COMMINUTED FRACTURE OF SHAFT OF RIGHT HUMERUS WITH ROUTINE HEALING, SUBSEQUENT ENCOUNTER: Primary | ICD-10-CM

## 2023-12-12 DIAGNOSIS — Z98.890 POST-OPERATIVE STATE: ICD-10-CM

## 2023-12-12 DIAGNOSIS — M81.0 OSTEOPOROSIS, UNSPECIFIED OSTEOPOROSIS TYPE, UNSPECIFIED PATHOLOGICAL FRACTURE PRESENCE: ICD-10-CM

## 2023-12-12 DIAGNOSIS — M81.6 LOCALIZED OSTEOPOROSIS OF LEQUESNE: ICD-10-CM

## 2023-12-12 DIAGNOSIS — M89.8X2 PAIN OF RIGHT HUMERUS: ICD-10-CM

## 2023-12-12 DIAGNOSIS — M89.8X2 PAIN OF RIGHT HUMERUS: Primary | ICD-10-CM

## 2023-12-12 DIAGNOSIS — M80.80XA PATHOLOGICAL FRACTURE DUE TO OSTEOPOROSIS, UNSPECIFIED FRACTURE SITE, UNSPECIFIED OSTEOPOROSIS TYPE, INITIAL ENCOUNTER: Primary | ICD-10-CM

## 2023-12-12 PROCEDURE — 1159F MED LIST DOCD IN RCRD: CPT | Mod: CPTII,S$GLB,, | Performed by: PHYSICIAN ASSISTANT

## 2023-12-12 PROCEDURE — 1159F PR MEDICATION LIST DOCUMENTED IN MEDICAL RECORD: ICD-10-PCS | Mod: CPTII,S$GLB,, | Performed by: NURSE PRACTITIONER

## 2023-12-12 PROCEDURE — 1160F PR REVIEW ALL MEDS BY PRESCRIBER/CLIN PHARMACIST DOCUMENTED: ICD-10-PCS | Mod: CPTII,S$GLB,, | Performed by: NURSE PRACTITIONER

## 2023-12-12 PROCEDURE — 1159F PR MEDICATION LIST DOCUMENTED IN MEDICAL RECORD: ICD-10-PCS | Mod: CPTII,S$GLB,, | Performed by: PHYSICIAN ASSISTANT

## 2023-12-12 PROCEDURE — 99999 PR PBB SHADOW E&M-EST. PATIENT-LVL III: CPT | Mod: PBBFAC,,, | Performed by: NURSE PRACTITIONER

## 2023-12-12 PROCEDURE — 1159F MED LIST DOCD IN RCRD: CPT | Mod: CPTII,S$GLB,, | Performed by: NURSE PRACTITIONER

## 2023-12-12 PROCEDURE — 73060 X-RAY EXAM OF HUMERUS: CPT | Mod: 26,RT,, | Performed by: RADIOLOGY

## 2023-12-12 PROCEDURE — 73060 XR HUMERUS 2 VIEW RIGHT: ICD-10-PCS | Mod: 26,RT,, | Performed by: RADIOLOGY

## 2023-12-12 PROCEDURE — 99024 POSTOP FOLLOW-UP VISIT: CPT | Mod: S$GLB,,, | Performed by: NURSE PRACTITIONER

## 2023-12-12 PROCEDURE — 99999 PR PBB SHADOW E&M-EST. PATIENT-LVL III: ICD-10-PCS | Mod: PBBFAC,,, | Performed by: NURSE PRACTITIONER

## 2023-12-12 PROCEDURE — 1160F PR REVIEW ALL MEDS BY PRESCRIBER/CLIN PHARMACIST DOCUMENTED: ICD-10-PCS | Mod: CPTII,S$GLB,, | Performed by: PHYSICIAN ASSISTANT

## 2023-12-12 PROCEDURE — 73060 X-RAY EXAM OF HUMERUS: CPT | Mod: TC,RT

## 2023-12-12 PROCEDURE — 1160F RVW MEDS BY RX/DR IN RCRD: CPT | Mod: CPTII,S$GLB,, | Performed by: NURSE PRACTITIONER

## 2023-12-12 PROCEDURE — 99999 PR PBB SHADOW E&M-EST. PATIENT-LVL III: ICD-10-PCS | Mod: PBBFAC,,, | Performed by: PHYSICIAN ASSISTANT

## 2023-12-12 PROCEDURE — 99214 PR OFFICE/OUTPT VISIT, EST, LEVL IV, 30-39 MIN: ICD-10-PCS | Mod: 25,S$GLB,, | Performed by: PHYSICIAN ASSISTANT

## 2023-12-12 PROCEDURE — 99214 OFFICE O/P EST MOD 30 MIN: CPT | Mod: 25,S$GLB,, | Performed by: PHYSICIAN ASSISTANT

## 2023-12-12 PROCEDURE — 99999 PR PBB SHADOW E&M-EST. PATIENT-LVL III: CPT | Mod: PBBFAC,,, | Performed by: PHYSICIAN ASSISTANT

## 2023-12-12 PROCEDURE — 99024 PR POST-OP FOLLOW-UP VISIT: ICD-10-PCS | Mod: S$GLB,,, | Performed by: NURSE PRACTITIONER

## 2023-12-12 PROCEDURE — 1160F RVW MEDS BY RX/DR IN RCRD: CPT | Mod: CPTII,S$GLB,, | Performed by: PHYSICIAN ASSISTANT

## 2023-12-12 NOTE — PROGRESS NOTES
Ms. Veloz is here today for a post-operative visit after undergoing ORIF for her right humeral shaft fracture with IM nail by Dr. Stanley on 9/19/2023.    Interval History:  She reports that she is doing good.  Pain is well controlled.  She is not taking pain medication.  She is taking Neurontin 300 mg qam and Robaxin bid which works for her.  She currently has no numbness to her right arm/hand/fingers.  She denies falls or injuries since her surgery.  She is able to tolerate 4 lbs at this time.  She denies fever, chills, and sweats since the time of the surgery.     Physical exam:  Incision is open to air and healed.  She has tactile stimulation to their lower FA.  She can flex and extend her thumb.  She can abduct and adduct all of her fingers.  Flexion and extension of the wrist is at 50°, no evidence of wrist drop.  Range of motion of the elbow is 0° to 170°.  Range of motion of the shoulder is 0° to 135°, PROM is 0-180 degrees.  Cap refill is less than 3 seconds and radial pulses 2+.    RADS: right humerus xray was obtained and personally reviewed by me.  Findings show IM nailing appears to be in good position and alignment without evidence of hardware failure.  She has a comminuted fracture of the humerus shaft.  Fracture fragments appear to be stable.  Minimal callus formation noted.    Assessment:  Post-op visit (12 weeks)    Plan:    ICD-10-CM ICD-9-CM   1. Closed displaced comminuted fracture of shaft of right humerus with routine healing, subsequent encounter s/p ORIF 9/19/23  S42.351D V54.11   2. Post-operative state  Z98.890 V45.89     Current care, treatment plan, precautions, activity level/ modifications, limitations, rehabilitation exercises and proposed future treatment were discussed with the patient. We discussed the need to monitor for changes in symptoms and condition and report them to the physician.  Discussed importance of compliance with all appointments and follow up examinations.        PHYSICAL THERAPY:   - Continue [facility] OT.  - Weight bearing 2-4 lbs x 4 weeks and increase by 2-4 lbs/month thereafter as she tolerates.  Ok for therapist to work with a pulley.  - Range of motion as tolerated.        FOLLOW UP:   - Patient will follow up in the clinic in 6 weeks.  - X-ray of her right humerus is needed.  - May consider bone stimulator.    - Future Appointments:   Future Appointments   Date Time Provider Department Center   12/12/2023 11:50 AM LAB, APPOINTMENT [facility] LAB VNP [facility]   12/13/2023  9:00 AM Montserrat Wood OT [facility] REHABOP [facility]   12/18/2023  9:00 AM [facility]at Wood OT RV REHABOP [facility]   12/20/2023  9:00 AM Montserrat Wood, OT RV REHABOP [facility]   12/20/2023 10:00 AM [facility] DEXA1 LIMIT 350 LBS RV BMD [facility]   12/27/2023  9:00 AM [facility]errat Wood OT [facility] REHABOP [facility]   1/3/2024  9:00 AM Mont[place]errat Wood, OT [facility] REHABOP [facility]   1/5/2024 10:30 AM [facility] GADIEL Crow       If there are any questions prior to scheduled follow up, the patient was instructed to contact the office

## 2023-12-12 NOTE — PROGRESS NOTES
SUBJECTIVE:     Chief Complaint & History of Present Illness:  Tabby Veloz is a new patient 43 y.o. female who is seen here today for a bone health evaluation for osteoporosis, fracture prevention, and risk evaluation for future fractures.        she was appropriately identified and referred by Luis Del Valle NP due to concerns for compromised bone quality, and risk of future fractures.  This visit is medically necessary to identify risk, investigate and initiative treatment as appropriate to improve bone quality and strength for the reduction of secondary fractures.     her medical history, medications and fracture history will be reviewed and summarized      Review of patient's allergies indicates:  No Known Allergies      Current Outpatient Medications   Medication Sig Dispense Refill    acetaminophen (TYLENOL) 650 MG TbSR Take 1 tablet (650 mg total) by mouth every 8 (eight) hours. 42 tablet 0    acetaZOLAMIDE (DIAMOX) 250 MG tablet Take 1 tablet (250 mg total) by mouth 2 (two) times daily. 60 tablet 3    acetaZOLAMIDE (DIAMOX) 500 mg CpSR Take 1 capsule (500 mg total) by mouth 2 (two) times daily. 180 capsule 1    buPROPion (WELLBUTRIN) 100 MG tablet Take 100 mg by mouth 2 (two) times daily.      cetirizine (ZYRTEC) 10 MG tablet Take 10 mg by mouth once daily.      cranberry fruit extract (CRANBERRY ORAL) Take 2 tablets by mouth once daily.      gabapentin (NEURONTIN) 300 MG capsule Take 1 capsule (300 mg total) by mouth 3 (three) times daily. 42 capsule 0    methocarbamoL (ROBAXIN) 500 MG Tab Take 1 tablet (500 mg total) by mouth 3 (three) times daily as needed (muscle spasm). 30 tablet 0    minocycline (MINOCIN,DYNACIN) 100 MG capsule Take 100 mg by mouth once daily.      topiramate (TOPAMAX) 100 MG tablet Take 1 tablet (100 mg total) by mouth 2 (two) times daily. 180 tablet 1     No current facility-administered medications for this visit.       Past Medical History:   Diagnosis Date    Abnormal  Pap smear     Migraines     Pneumonia     as  a  child     Pseudotumor cerebri 2016       Past Surgical History:   Procedure Laterality Date    ABSCESS DRAINAGE      x 3     addenoids      ANKLE ARTHROSCOPY W/ OPEN REPAIR Right     CERVICAL BIOPSY  W/ LOOP ELECTRODE EXCISION      CHOLECYSTECTOMY      INCISION AND DRAINAGE OF ABSCESS N/A 8/10/2022    Procedure: INCISION AND DRAINAGE, ABSCESS VULVAR;  Surgeon: Melody Rothman MD;  Location: [facility] OR;  Service: OB/GYN;  Laterality: N/A;    INTRAMEDULLARY RODDING OF HUMERUS Right 9/19/2023    Procedure: INSERTION, INTRAMEDULLARY JAEL, HUMERUS;  Surgeon: Andrey Stanley MD;  Location: [facility] OR [address] [place] [address];  Service: Orthopedics;  Laterality: Right;    POSTPARTUM LIGATION OF FALLOPIAN TUBE Bilateral 6/12/2018    Procedure: LIGATION, FALLOPIAN TUBE, POSTPARTUM;  Surgeon: Melody Rothman MD;  Location: [facility] OR;  Service: OB/GYN;  Laterality: Bilateral;    tonsilectomy         Lab Results   Component Value Date     09/15/2023    K 3.6 09/15/2023     (H) 09/15/2023    CO2 19 (L) 09/15/2023     (H) 09/15/2023    BUN 14 09/15/2023    CREATININE 0.7 09/15/2023    CALCIUM 8.8 09/15/2023    PROT 7.1 07/05/2023    ALBUMIN 3.9 07/05/2023    BILITOT 0.5 07/05/2023    ALKPHOS 94 07/05/2023    AST 21 07/05/2023    ALT 33 07/05/2023    ANIONGAP 10 09/15/2023    ESTGFRAFRICA SEE COMMENT 09/15/2023    EGFRNONAA SEE COMMENT 09/15/2023      Lab Results   Component Value Date    WBC 16.01 (H) 09/15/2023    RBC 4.17 09/15/2023    HGB 12.8 09/15/2023    HCT 42 09/15/2023    MCV 91 09/15/2023    MCH 30.7 09/15/2023    MCHC 33.9 09/15/2023    RDW 14.5 09/15/2023     09/15/2023    MPV 10.3 09/15/2023    GRAN 13.3 (H) 09/15/2023    GRAN 83.1 (H) 09/15/2023    LYMPH 1.3 09/15/2023    LYMPH 8.2 (L) 09/15/2023    MONO 1.1 (H) 09/15/2023    MONO 6.7 09/15/2023    EOS 0.1 09/15/2023    BASO 0.10 09/15/2023    EOSINOPHIL 0.7 09/15/2023    BASOPHIL 0.6  "09/15/2023    DIFFMETHOD Automated 09/15/2023      Lab Results   Component Value Date    MG 1.8 09/15/2023      No results found for: "PHOS"   No results found for: "[id number]"   No results found for: "PTH"   Lab Results   Component Value Date    TSH 1.28 11/06/2017      No results found for: "FREET4"   Lab Results   Component Value Date    HGBA1C 5.3 11/06/2017      No results found for: "FREETESTOSTE"         Vital Signs (Most Recent)  There were no vitals filed for this visit.      Today's Visit and Bone Health History     Question 12/10/2023  8:14 PM CST - Filed by Patient   Have you had any loss of height or gotten shorter since your 20's? 0   Are you postmenopausal? No   Have you ever taken any type of hormone replacement therapy? No   Do you currently smoke? Yes   If yes, how many years? 22 years   How many alcoholic beverages do you drink per day? 0   How many caffeinated beverages do you drink per day? 1 to 3   Have you had 2 or more falls in the past 12 months? No   How active have you been in the past 12 months? Somewhat active ( walk up to 1 mile per day )   Did either of your parents have a hip fracture after the age of 50? No   Have you ever been diagnosed with any of the following?    Do you currently have a fracture? No   Have you broken any other bones since you turned 52 or older? No   Have you ever had a bone density test or DXA scan? No   Are you currently taking or have you ever taken any of the following medications? Anticonvulsants (Gabapentin, Lyrica)    Opiods (Oxycodone, Hyrocodone)    Steroids (Prednisone, Cortisone)   Do you take Calcium? Yes   If you take Calcium what dose? 600 mg   Do you take Vitamin D? Yes   If you take Vitamin D what dose? 1000 IU   Have you ever been diagnosed with cancer? No   Have you ever been treated for cancer with high beam radiation or had radioactive implants? No          she has medical history and medication use known to be associated with bone loss and " osteoporosis to include low energy humeral fracture current smoker.         Review of Systems:  ROS:  Constitutional: no fever or chills  Eyes: no visual changes  ENT: no nasal congestion or sore throat  Respiratory: no respiratory symptoms  Cardiovascular: no chest pain or palpitations  Gastrointestinal: no nausea or vomiting, tolerating diet  Genitourinary: no hematuria or dysuria  Integument/Breast: no rash or pruritis  Hematologic/Lymphatic: no easy bruising or lymphadenopathy  Musculoskeletal: no arthralgias or myalgias, comminuted humeral fracture of the right  Neurological: no seizures or tremors, positive pseudo motor cerebri idiopathic intracranial hypertension, chronic migraines  Behavioral/Psych: no auditory or visual hallucinations  Endocrine: no heat or cold intolerance      OBJECTIVE:     PHYSICAL EXAM:     ,                  General: no acute distress and well developed/well nourished  Behavioral/Psych: normal judgment and insight and normal mood/affect  Skin: no rash  Head/Neck: atraumatic, normocephalic, without obvious abnormality  Respiratory: normal respiratory effort  Cardiac: regular rate and rhythm  Vascular: pulses present  Abdomen: soft, non-tender  Musculoskeletal: no joint tenderness, deformity or swelling               ASSESSMENT/PLAN:     Assessment:    Osteoporosis, at high risk for future fractures, history of low energy humeral fracture.    Plan:   30-45 minutes spent in face-to-face consultation with patient and her family members today, discussing the disease management of osteoporosis, for the reduction of future fractures.  I have explained that bone strength is equal to bone quality. A bone density / DEXA scan is important to complement her care since her fracture was by definition a fragility fracture/traumatic fracture.  Over half of the encounter was spent (50%) counseling the patient on the disease of osteoporosis evidence-based and best practice treatment options available  as well as recommendations for improvement of bone quality, the importance of nutritional supplements to include:  Calcium 1624-1942 mg daily in divided doses   Vitamin D3  9812-8245 units daily in divided doses.   Fall prevention and personal safety for the reduction of future fractures.    Clinicians Guidelines for the treatment of Osteoporosis 2023 as part of the National Osteoporosis Foundation were utilized as the evidence-based information provided.    Discussed pharmaceutical treatment options to include Biphosphatases, Denosumab, Abaloparatide and Teriparatide. Information to include indications for therapy, risks and benefits to treatment, and important safety information related to these treatments was provided and discussed.  Handouts were given to the patient for her review on osteoporosis and other pharmacological treatment information related to other available treatment options.    The importance of diet, impact exercise, and core strengthening with gait and balance exercise, through  both formal physical therapy programs and home exercise programs was discussed.     Bone density test recommended and ordered  Bone health labs recommended and ordered    Will see her back following testing discuss treatment options

## 2023-12-13 ENCOUNTER — CLINICAL SUPPORT (OUTPATIENT)
Dept: REHABILITATION | Facility: HOSPITAL | Age: 43
End: 2023-12-13
Payer: COMMERCIAL

## 2023-12-13 DIAGNOSIS — M25.621 ELBOW STIFFNESS, RIGHT: ICD-10-CM

## 2023-12-13 DIAGNOSIS — R53.1 WEAKNESS: Primary | ICD-10-CM

## 2023-12-13 DIAGNOSIS — R52 PAIN: ICD-10-CM

## 2023-12-13 DIAGNOSIS — M25.611 SHOULDER STIFFNESS, RIGHT: ICD-10-CM

## 2023-12-13 PROCEDURE — 97530 THERAPEUTIC ACTIVITIES: CPT | Mod: PO

## 2023-12-13 PROCEDURE — 97140 MANUAL THERAPY 1/> REGIONS: CPT | Mod: PO

## 2023-12-13 NOTE — PROGRESS NOTES
Occupational Therapy Daily Treatment Note     Name: Tabby Veloz  Clinic Number: [id number]    Therapy Diagnosis:   Encounter Diagnoses   Name Primary?    Weakness Yes    Elbow stiffness, right     Shoulder stiffness, right     Pain          Physician: Luis Del Valle NP    Visit Date: 12/13/2023    Physician Orders: OT eval/ treat   Plan of Care Certification Date: 1/1/24  Authorization Period: 10/3/23 -10/2/24  Date of Return to MD: 4 weeks   Medical Diagnosis: S42.351D (ICD-10-CM) - Closed displaced comminuted fracture of shaft of right humerus with routine healing, subsequent   Surgical Procedure and Date: ORIF for her right humeral shaft fracture with IM nail by Dr. Stanley on 9/19/2023. ,   Evaluation Date: 10/9/23  Onset Date:9/15/23   Date of Return to MD: 12/12/23  Visit # / Visits authorized: 17/ 24  FOTO Completion: 2/3    Time In:0900  Time Out: 1000  Total Billable Time: 60 minutes    Note from MD 12/12/23 visit:    - Continue [facility] OT.  - Weight bearing 2-4 lbs x 4 weeks and increase by 2-4 lbs/month thereafter as she tolerates.  Ok for therapist to work with a pulley.  - Range of motion as tolerated.     Precautions: Standard and WB, 12 weeks 12/12/23   Subjective     Pt reports: She states she went to the MD yesterday and took xrays  she was compliant with home exercise program given last session.   Response to previous treatment:increased ROM  Functional change: increased ROM    Pain:3/10 when stretching  Location: right arms     Objective       Observation: Pt is present with abduction sling on her right shoulder      UE Range of Motion  Active Range of Motion:   Shoulder Right Right  10/30/23 Right  12/11/23   Flexion 20 145 - supine 160 - supine   Abduction 50 110 - supine 130 - supine   ER at 90 20 40 - supine 50 - supine   IR 80 80 - supine 80 - supine      Passive Range of Motion:   Shoulder Left Right Right   10/30/23 Right  12/11/23   Flexion 170 70 160 170   Abduction 165  70 125 145   ER at 90 80 20 40 55   IR 80 80 80 80      Elbow Left Right Right   10/30/23 Right  12/11/23   Flexion 140 120 130 140   Extension 0 -10 0 0      Painful Arc:   Patient demonstrates no painful arc in shoulder flexion or abduction     Upper Extremity Strength  (R) UE   (L) UE     Shoulder flexion: 2-/5 Shoulder flexion: 5/5   Shoulder Abduction: 2-/5 Shoulder abduction: 5/5   Shoulder ER 2-/5 Shoulder ER 5/5   Shoulder IR 2-/5 Shoulder IR 5/5   Rhomboids 2-/5 Rhomboids 5/5       Strength: (OSMANY Dynamometer in psi.)        10/9/2023 10/9/2023 12/11/23     Left Right Right   Rung II 60 N/a 50      Joint Mobility/End Feels: Hard     Palpation: Pt is tender at incision sites      Sensation: Pt denies any numbness or tingling        Scapular Control/Dyskinesis:     Normal / Subtle / Obvious  Comments    Left  N N/a    Right  N N/a         OUTCOME MEASURE:FOTO     Category:Self care       Current Score  = 37%   Goal at Discharge Score = 65%     11/20/23: 53%    Treatment       Julee received the following manual therapy techniques for 10 minutes:   -PROM of shoulder in flexion, abduction, internal rotation, and external rotation 4 x 10 to toleration  - joint mobilizations to glenohumeral joint anterior to posterior     Julee participated in dynamic functional therapeutic activities to improve functional performance for 50  minutes, including:  -pulley's 3 min flexion/ 3 min abduction  - walls slides flexion 2/15  - wall slides abduction 2/15  - 5 minute carry with 4# DB   - Red theraband rows 2/15  - red theraband pull downs 2/15  - red theraband internal rotation 2/15  - red theraband external rotation 2/15  - UBE: level 3 for 5 minutes forward   -supine flexion stretch with 4# dowel 15 second hold 15x     Home Exercises and Education Provided     Education provided:   - Progress towards goals     Written Home Exercises Provided: Patient instructed to cont prior HEP.  Exercises were reviewed and Julee  was able to demonstrate them prior to the end of the session.  Julee demonstrated good  understanding of the HEP provided.   .   See EMR under Patient Instructions for exercises provided prior visit.        Assessment     Pt would continue to benefit from skilled OT. Pt tolerated added therapeutic exercises well today. Pt 12 weeks post op 12/12/23. Light strengthening continued at today's session to patient's toleration. Pt tolerated added therapeutic activities well without additional complaints of pain. Will progress as tolerated. Pt motivated.      Julee is progressing well towards her goals and there are no updates to goals at this time. Pt prognosis is Good.     Pt will continue to benefit from skilled outpatient occupational therapy to address the deficits listed in the problem list on initial evaluation provide pt/family education and to maximize pt's level of independence in the home and community environment.     Anticipated barriers to occupational therapy: toleration to pain    Pt's spiritual, cultural and educational needs considered and pt agreeable to plan of care and goals.    Goals:      Short Term (6 weeks ):  1)   Patient to be IND with HEP and modalities for pain management MET 12/11/23  2)   Increase ROM to 120 degrees in shoulder flexion to increase functional UE use for ADLs and IADLs MET 12/11/23  3)   Increase ROM to 90 degrees in shoulder abduction to increase functional UE use for ADLs and IADLs MET 12/11/23  4)   Increase ROM to 80 degrees in shoulder internal rotation to increase functional UE use for ADLs and IADLs MET 12/11/23  5)   Increase ROM to 50 degrees in shoulder external rotation to increase functional UE use for ADLs and IADLs MET 12/11/23  6)   Increase ROM to 130 degrees in elbow flexion to increase functional UE use for ADLs and IADLs MET 10/30/23  7)   Increase ROM to 5 degrees in elbow extension to increase functional UE use for ADLs and IADLs MET 12/11/23  8)   Increase   strength to 30 lbs. to grasp for  ADLs and IADLs MET 12/11/23  9)   Improve muscle strength ing -2 to 3+ in order to participate in ADLs and IADLs with less assistance. MET 12/11/23     Long Term (by discharge):  1)   Pt will report 1 out of 10 pain at worst when completing ADLs and IADLs. Ongoing  2)   Increase ROM to 170 degrees in shoulder flexion to increase functional UE use for ADLs and IADLs Ongoing  3)   Increase ROM to 165 degrees in shoulder abduction to increase functional UE use for ADLs and IADLs Ongoing  4)   Increase ROM to 80 degrees in shoulder internal rotation to increase functional UE use for ADLs and IADLs MET 12/11/23  5)   Increase ROM to 80 degrees in shoulder external rotation to increase functional UE use for ADLs and IADLs Ongoing  6)   Increase ROM to 140 degrees in elbow flexion to increase functional UE use for ADLs and IADLs MET 12/11/23  7)   Increase ROM to 0 degrees in elbow extension to increase functional UE use for ADLs and IADLs MET 12/11/23  8)   Increase  strength to 50 lbs. to grasp for  ADLs and IADLs MET 12/11/23  9)   Improve muscle strength ing to 4+ in order to participate in ADLs and IADLs with less assistance.  Ongoing  10)   Patient to score at 65% or more on FOTO to demonstrate improved perception of functional shoulder use.Ongoing  11)  Pt will return to prior level of function for ADLs and household management. Ongoing     Plan      Pt to be treated by Occupational Therapy 2 times per week for 12 weeks during the certification period to achieve the established goals.      Treatment to include: Paraffin, Fluidotherapy, Manual therapy/joint mobilizations, Modalities for pain management, US 3 mhz, Therapeutic exercises/activities., Iontophoresis with 2.0 cc Dexamethasone, Strengthening, Orthotic Fabrication/Fit/Training, Edema Control, Scar Management, Wound Care, Electrical Modalities, Joint Protection, and Energy Conservation, as well as any other  treatments deemed necessary based on the patient's needs or progress.     Updates/Grading for session: Continue with current plan   FERNANDO Harman

## 2023-12-18 ENCOUNTER — CLINICAL SUPPORT (OUTPATIENT)
Dept: REHABILITATION | Facility: HOSPITAL | Age: 43
End: 2023-12-18
Payer: COMMERCIAL

## 2023-12-18 DIAGNOSIS — M25.611 SHOULDER STIFFNESS, RIGHT: ICD-10-CM

## 2023-12-18 DIAGNOSIS — R52 PAIN: ICD-10-CM

## 2023-12-18 DIAGNOSIS — M25.621 ELBOW STIFFNESS, RIGHT: ICD-10-CM

## 2023-12-18 DIAGNOSIS — R53.1 WEAKNESS: Primary | ICD-10-CM

## 2023-12-18 PROCEDURE — 97140 MANUAL THERAPY 1/> REGIONS: CPT | Mod: PO

## 2023-12-18 PROCEDURE — 97530 THERAPEUTIC ACTIVITIES: CPT | Mod: PO

## 2023-12-18 NOTE — PROGRESS NOTES
Occupational Therapy Daily Treatment Note     Name: Tabby Veloz  Clinic Number: [id number]    Therapy Diagnosis:   Encounter Diagnoses   Name Primary?    Weakness Yes    Elbow stiffness, right     Shoulder stiffness, right     Pain          Physician: Luis Del Valle NP    Visit Date: 12/18/2023    Physician Orders: OT eval/ treat   Plan of Care Certification Date: 1/1/24  Authorization Period: 10/3/23 -10/2/24  Date of Return to MD: 4 weeks   Medical Diagnosis: S42.351D (ICD-10-CM) - Closed displaced comminuted fracture of shaft of right humerus with routine healing, subsequent   Surgical Procedure and Date: ORIF for her right humeral shaft fracture with IM nail by Dr. Stanley on 9/19/2023. ,   Evaluation Date: 10/9/23  Onset Date:9/15/23   Date of Return to MD: 12/12/23  Visit # / Visits authorized: 17/ 24  FOTO Completion: 2/3    Time In:0900  Time Out: 1000  Total Billable Time: 60 minutes    Note from MD 12/12/23 visit:    - Continue [facility].  - Weight bearing 2-4 lbs x 4 weeks and increase by 2-4 lbs/month thereafter as she tolerates.  Ok for therapist to work with a pulley.  - Range of motion as tolerated.     Precautions: Standard and WB, 12 weeks 12/12/23   Subjective     Pt reports: She states that she has been able to use her arm more with less pain   she was compliant with home exercise program given last session.   Response to previous treatment:increased ROM  Functional change: increased ROM    Pain:3/10 when stretching  Location: right arms     Objective       Observation: Pt is present with abduction sling on her right shoulder      UE Range of Motion  Active Range of Motion:   Shoulder Right Right  10/30/23 Right  12/11/23   Flexion 20 145 - supine 160 - supine   Abduction 50 110 - supine 130 - supine   ER at 90 20 40 - supine 50 - supine   IR 80 80 - supine 80 - supine      Passive Range of Motion:   Shoulder Left Right Right   10/30/23 Right  12/11/23   Flexion 170 70 160 170    Abduction 165 70 125 145   ER at 90 80 20 40 55   IR 80 80 80 80      Elbow Left Right Right   10/30/23 Right  12/11/23   Flexion 140 120 130 140   Extension 0 -10 0 0      Painful Arc:   Patient demonstrates no painful arc in shoulder flexion or abduction     Upper Extremity Strength  (R) UE   (L) UE     Shoulder flexion: 2-/5 Shoulder flexion: 5/5   Shoulder Abduction: 2-/5 Shoulder abduction: 5/5   Shoulder ER 2-/5 Shoulder ER 5/5   Shoulder IR 2-/5 Shoulder IR 5/5   Rhomboids 2-/5 Rhomboids 5/5       Strength: (OSMANY Dynamometer in psi.)        10/9/2023 10/9/2023 12/11/23     Left Right Right   Rung II 60 N/a 50      Joint Mobility/End Feels: Hard     Palpation: Pt is tender at incision sites      Sensation: Pt denies any numbness or tingling        Scapular Control/Dyskinesis:     Normal / Subtle / Obvious  Comments    Left  N N/a    Right  N N/a         OUTCOME MEASURE:FOTO     Category:Self care       Current Score  = 37%   Goal at Discharge Score = 65%     11/20/23: 53%    Treatment       Julee received the following manual therapy techniques for 10 minutes:   -PROM of shoulder in flexion, abduction, internal rotation, and external rotation 4 x 10 to toleration  - joint mobilizations to glenohumeral joint anterior to posterior     Julee participated in dynamic functional therapeutic activities to improve functional performance for 50  minutes, including:  -pulley's 3 min flexion/ 3 min abduction  - walls slides flexion 2/15  - wall slides abduction 2/15  - 5 minute carry with 5# DB   - Green theraband rows 2/15  - Green theraband pull downs 2/15  - Green theraband internal rotation 2/15  - Green theraband external rotation 2/15  - UBE: level 3 for 5 minutes forward   -supine flexion stretch with 4# dowel 15 second hold 15x     Home Exercises and Education Provided     Education provided:   - Progress towards goals     Written Home Exercises Provided: Patient instructed to cont prior HEP.  Exercises  were reviewed and Julee was able to demonstrate them prior to the end of the session.  Julee demonstrated good  understanding of the HEP provided.   .   See EMR under Patient Instructions for exercises provided prior visit.        Assessment     Pt would continue to benefit from skilled OT. Pt tolerated added resistive therapeutic exercises well today. Theraband exercises increased from red to green today. Farmer's carry increased from 4# to 5# today. Pt tolerated added therapeutic activities well without additional complaints of pain. Will progress as tolerated. Pt motivated.      Julee is progressing well towards her goals and there are no updates to goals at this time. Pt prognosis is Good.     Pt will continue to benefit from skilled outpatient occupational therapy to address the deficits listed in the problem list on initial evaluation provide pt/family education and to maximize pt's level of independence in the home and community environment.     Anticipated barriers to occupational therapy: toleration to pain    Pt's spiritual, cultural and educational needs considered and pt agreeable to plan of care and goals.    Goals:      Short Term (6 weeks ):  1)   Patient to be IND with HEP and modalities for pain management MET 12/11/23  2)   Increase ROM to 120 degrees in shoulder flexion to increase functional UE use for ADLs and IADLs MET 12/11/23  3)   Increase ROM to 90 degrees in shoulder abduction to increase functional UE use for ADLs and IADLs MET 12/11/23  4)   Increase ROM to 80 degrees in shoulder internal rotation to increase functional UE use for ADLs and IADLs MET 12/11/23  5)   Increase ROM to 50 degrees in shoulder external rotation to increase functional UE use for ADLs and IADLs MET 12/11/23  6)   Increase ROM to 130 degrees in elbow flexion to increase functional UE use for ADLs and IADLs MET 10/30/23  7)   Increase ROM to 5 degrees in elbow extension to increase functional UE use for ADLs and  IADLs MET 12/11/23  8)   Increase  strength to 30 lbs. to grasp for  ADLs and IADLs MET 12/11/23  9)   Improve muscle strength ing -2 to 3+ in order to participate in ADLs and IADLs with less assistance. MET 12/11/23     Long Term (by discharge):  1)   Pt will report 1 out of 10 pain at worst when completing ADLs and IADLs. Ongoing  2)   Increase ROM to 170 degrees in shoulder flexion to increase functional UE use for ADLs and IADLs Ongoing  3)   Increase ROM to 165 degrees in shoulder abduction to increase functional UE use for ADLs and IADLs Ongoing  4)   Increase ROM to 80 degrees in shoulder internal rotation to increase functional UE use for ADLs and IADLs MET 12/11/23  5)   Increase ROM to 80 degrees in shoulder external rotation to increase functional UE use for ADLs and IADLs Ongoing  6)   Increase ROM to 140 degrees in elbow flexion to increase functional UE use for ADLs and IADLs MET 12/11/23  7)   Increase ROM to 0 degrees in elbow extension to increase functional UE use for ADLs and IADLs MET 12/11/23  8)   Increase  strength to 50 lbs. to grasp for  ADLs and IADLs MET 12/11/23  9)   Improve muscle strength ing to 4+ in order to participate in ADLs and IADLs with less assistance.  Ongoing  10)   Patient to score at 65% or more on FOTO to demonstrate improved perception of functional shoulder use.Ongoing  11)  Pt will return to prior level of function for ADLs and household management. Ongoing     Plan      Pt to be treated by Occupational Therapy 2 times per week for 12 weeks during the certification period to achieve the established goals.      Treatment to include: Paraffin, Fluidotherapy, Manual therapy/joint mobilizations, Modalities for pain management, US 3 mhz, Therapeutic exercises/activities., Iontophoresis with 2.0 cc Dexamethasone, Strengthening, Orthotic Fabrication/Fit/Training, Edema Control, Scar Management, Wound Care, Electrical Modalities, Joint Protection, and Energy  Conservation, as well as any other treatments deemed necessary based on the patient's needs or progress.     Updates/Grading for session: Continue with current plan   FERNANDO Harman

## 2023-12-19 ENCOUNTER — PATIENT MESSAGE (OUTPATIENT)
Dept: NEUROLOGY | Facility: CLINIC | Age: 43
End: 2023-12-19
Payer: COMMERCIAL

## 2023-12-19 DIAGNOSIS — G43.709 CHRONIC MIGRAINE WITHOUT AURA WITHOUT STATUS MIGRAINOSUS, NOT INTRACTABLE: ICD-10-CM

## 2023-12-19 RX ORDER — TOPIRAMATE 100 MG/1
100 TABLET, FILM COATED ORAL 2 TIMES DAILY
Qty: 180 TABLET | Refills: 1 | Status: SHIPPED | OUTPATIENT
Start: 2023-12-19 | End: 2024-12-18

## 2023-12-20 ENCOUNTER — HOSPITAL ENCOUNTER (OUTPATIENT)
Dept: RADIOLOGY | Facility: HOSPITAL | Age: 43
Discharge: HOME | End: 2023-12-20
Attending: PHYSICIAN ASSISTANT
Payer: COMMERCIAL

## 2023-12-20 ENCOUNTER — CLINICAL SUPPORT (OUTPATIENT)
Dept: REHABILITATION | Facility: HOSPITAL | Age: 43
End: 2023-12-20
Payer: COMMERCIAL

## 2023-12-20 DIAGNOSIS — M81.6 LOCALIZED OSTEOPOROSIS OF LEQUESNE: ICD-10-CM

## 2023-12-20 DIAGNOSIS — R53.1 WEAKNESS: Primary | ICD-10-CM

## 2023-12-20 DIAGNOSIS — M25.611 SHOULDER STIFFNESS, RIGHT: ICD-10-CM

## 2023-12-20 DIAGNOSIS — R52 PAIN: ICD-10-CM

## 2023-12-20 DIAGNOSIS — M81.0 OSTEOPOROSIS, UNSPECIFIED OSTEOPOROSIS TYPE, UNSPECIFIED PATHOLOGICAL FRACTURE PRESENCE: ICD-10-CM

## 2023-12-20 DIAGNOSIS — M80.80XA PATHOLOGICAL FRACTURE DUE TO OSTEOPOROSIS, UNSPECIFIED FRACTURE SITE, UNSPECIFIED OSTEOPOROSIS TYPE, INITIAL ENCOUNTER: ICD-10-CM

## 2023-12-20 DIAGNOSIS — M25.621 ELBOW STIFFNESS, RIGHT: ICD-10-CM

## 2023-12-20 PROCEDURE — 77080 DXA BONE DENSITY AXIAL: CPT | Mod: 26,,, | Performed by: RADIOLOGY

## 2023-12-20 PROCEDURE — 77080 DXA BONE DENSITY AXIAL: CPT | Mod: TC,PN

## 2023-12-20 PROCEDURE — 97530 THERAPEUTIC ACTIVITIES: CPT | Mod: PO

## 2023-12-20 PROCEDURE — 97140 MANUAL THERAPY 1/> REGIONS: CPT | Mod: PO

## 2023-12-20 PROCEDURE — 77080 DXA BONE DENSITY AXIAL SKELETON 1 OR MORE SITES: ICD-10-PCS | Mod: 26,,, | Performed by: RADIOLOGY

## 2023-12-20 NOTE — PROGRESS NOTES
Occupational Therapy Daily Treatment Note     Name: Tabby Veloz  Clinic Number: [id number]    Therapy Diagnosis:   Encounter Diagnoses   Name Primary?    Weakness Yes    Elbow stiffness, right     Shoulder stiffness, right     Pain          Physician: Luis Del Valle NP    Visit Date: 12/20/2023    Physician Orders: OT eval/ treat   Plan of Care Certification Date: 1/1/24  Authorization Period: 10/3/23 -10/2/24  Date of Return to MD: 4 weeks   Medical Diagnosis: S42.351D (ICD-10-CM) - Closed displaced comminuted fracture of shaft of right humerus with routine healing, subsequent   Surgical Procedure and Date: ORIF for her right humeral shaft fracture with IM nail by Dr. Stanley on 9/19/2023. ,   Evaluation Date: 10/9/23  Onset Date:9/15/23   Date of Return to MD: 12/12/23  Visit # / Visits authorized: 19/ 24  FOTO Completion: 2/3    Time In:0900  Time Out: 1000  Total Billable Time: 60 minutes    Note from MD 12/12/23 visit:    - Continue [facility] OT.  - Weight bearing 2-4 lbs x 4 weeks and increase by 2-4 lbs/month thereafter as she tolerates.  Ok for therapist to work with a pulley.  - Range of motion as tolerated.     Precautions: Standard and WB, 12 weeks 12/12/23   Subjective     Pt reports: She states that she has been able to use her arm more with less pain   she was compliant with home exercise program given last session.   Response to previous treatment:increased ROM  Functional change: increased ROM    Pain:3/10 when stretching  Location: right arms     Objective       Observation: Pt is present with abduction sling on her right shoulder      UE Range of Motion  Active Range of Motion:   Shoulder Right Right  10/30/23 Right  12/11/23   Flexion 20 145 - supine 160 - supine   Abduction 50 110 - supine 130 - supine   ER at 90 20 40 - supine 50 - supine   IR 80 80 - supine 80 - supine      Passive Range of Motion:   Shoulder Left Right Right   10/30/23 Right  12/11/23   Flexion 170 70 160 170    Abduction 165 70 125 145   ER at 90 80 20 40 55   IR 80 80 80 80      Elbow Left Right Right   10/30/23 Right  12/11/23   Flexion 140 120 130 140   Extension 0 -10 0 0      Painful Arc:   Patient demonstrates no painful arc in shoulder flexion or abduction     Upper Extremity Strength  (R) UE   (L) UE     Shoulder flexion: 2-/5 Shoulder flexion: 5/5   Shoulder Abduction: 2-/5 Shoulder abduction: 5/5   Shoulder ER 2-/5 Shoulder ER 5/5   Shoulder IR 2-/5 Shoulder IR 5/5   Rhomboids 2-/5 Rhomboids 5/5       Strength: (OSMANY Dynamometer in psi.)        10/9/2023 10/9/2023 12/11/23     Left Right Right   Rung II 60 N/a 50      Joint Mobility/End Feels: Hard     Palpation: Pt is tender at incision sites      Sensation: Pt denies any numbness or tingling        Scapular Control/Dyskinesis:     Normal / Subtle / Obvious  Comments    Left  N N/a    Right  N N/a         OUTCOME MEASURE:FOTO     Category:Self care       Current Score  = 37%   Goal at Discharge Score = 65%     11/20/23: 53%    Treatment       Julee received the following manual therapy techniques for 10 minutes:   -PROM of shoulder in flexion, abduction, internal rotation, and external rotation 4 x 10 to toleration  - joint mobilizations to glenohumeral joint anterior to posterior     Julee participated in dynamic functional therapeutic activities to improve functional performance for 50  minutes, including:  -pulley's 3 min flexion/ 3 min abduction  - walls slides flexion 2/15  - wall slides abduction 2/15  - 5 minute carry with 5# DB   - Green theraband rows 2/15  - Green theraband pull downs 2/15  - Green theraband internal rotation 2/15  - Green theraband external rotation 2/15  - UBE: level 3 for 5 minutes forward     Home Exercises and Education Provided     Education provided:   - Progress towards goals     Written Home Exercises Provided: Patient instructed to cont prior HEP.  Exercises were reviewed and Julee was able to demonstrate them prior to  the end of the session.  Julee demonstrated good  understanding of the HEP provided.   .   See EMR under Patient Instructions for exercises provided prior visit.        Assessment     Pt would continue to benefit from skilled OT. Pt tolerated added resistive therapeutic exercises well today. Pt tolerated added therapeutic activities well without additional complaints of pain. Will progress as tolerated. Pt motivated.      Julee is progressing well towards her goals and there are no updates to goals at this time. Pt prognosis is Good.     Pt will continue to benefit from skilled outpatient occupational therapy to address the deficits listed in the problem list on initial evaluation provide pt/family education and to maximize pt's level of independence in the home and community environment.     Anticipated barriers to occupational therapy: toleration to pain    Pt's spiritual, cultural and educational needs considered and pt agreeable to plan of care and goals.    Goals:      Short Term (6 weeks ):  1)   Patient to be IND with HEP and modalities for pain management MET 12/11/23  2)   Increase ROM to 120 degrees in shoulder flexion to increase functional UE use for ADLs and IADLs MET 12/11/23  3)   Increase ROM to 90 degrees in shoulder abduction to increase functional UE use for ADLs and IADLs MET 12/11/23  4)   Increase ROM to 80 degrees in shoulder internal rotation to increase functional UE use for ADLs and IADLs MET 12/11/23  5)   Increase ROM to 50 degrees in shoulder external rotation to increase functional UE use for ADLs and IADLs MET 12/11/23  6)   Increase ROM to 130 degrees in elbow flexion to increase functional UE use for ADLs and IADLs MET 10/30/23  7)   Increase ROM to 5 degrees in elbow extension to increase functional UE use for ADLs and IADLs MET 12/11/23  8)   Increase  strength to 30 lbs. to grasp for  ADLs and IADLs MET 12/11/23  9)   Improve muscle strength ing -2 to 3+ in order to  participate in ADLs and IADLs with less assistance. MET 12/11/23     Long Term (by discharge):  1)   Pt will report 1 out of 10 pain at worst when completing ADLs and IADLs. Ongoing  2)   Increase ROM to 170 degrees in shoulder flexion to increase functional UE use for ADLs and IADLs Ongoing  3)   Increase ROM to 165 degrees in shoulder abduction to increase functional UE use for ADLs and IADLs Ongoing  4)   Increase ROM to 80 degrees in shoulder internal rotation to increase functional UE use for ADLs and IADLs MET 12/11/23  5)   Increase ROM to 80 degrees in shoulder external rotation to increase functional UE use for ADLs and IADLs Ongoing  6)   Increase ROM to 140 degrees in elbow flexion to increase functional UE use for ADLs and IADLs MET 12/11/23  7)   Increase ROM to 0 degrees in elbow extension to increase functional UE use for ADLs and IADLs MET 12/11/23  8)   Increase  strength to 50 lbs. to grasp for  ADLs and IADLs MET 12/11/23  9)   Improve muscle strength ing to 4+ in order to participate in ADLs and IADLs with less assistance.  Ongoing  10)   Patient to score at 65% or more on FOTO to demonstrate improved perception of functional shoulder use.Ongoing  11)  Pt will return to prior level of function for ADLs and household management. Ongoing     Plan      Pt to be treated by Occupational Therapy 2 times per week for 12 weeks during the certification period to achieve the established goals.      Treatment to include: Paraffin, Fluidotherapy, Manual therapy/joint mobilizations, Modalities for pain management, US 3 mhz, Therapeutic exercises/activities., Iontophoresis with 2.0 cc Dexamethasone, Strengthening, Orthotic Fabrication/Fit/Training, Edema Control, Scar Management, Wound Care, Electrical Modalities, Joint Protection, and Energy Conservation, as well as any other treatments deemed necessary based on the patient's needs or progress.     Updates/Grading for session: Reassessment of objective  measures     FERNANDO Harman

## 2023-12-21 DIAGNOSIS — S42.351D CLOSED DISPLACED COMMINUTED FRACTURE OF SHAFT OF RIGHT HUMERUS WITH ROUTINE HEALING, SUBSEQUENT ENCOUNTER: ICD-10-CM

## 2023-12-21 RX ORDER — GABAPENTIN 300 MG/1
300 CAPSULE ORAL 3 TIMES DAILY
Qty: 42 CAPSULE | Refills: 0 | Status: SHIPPED | OUTPATIENT
Start: 2023-12-21 | End: 2024-02-15 | Stop reason: SDUPTHER

## 2023-12-21 RX ORDER — METHOCARBAMOL 500 MG/1
500 TABLET, FILM COATED ORAL 3 TIMES DAILY PRN
Qty: 30 TABLET | Refills: 0 | Status: SHIPPED | OUTPATIENT
Start: 2023-12-21 | End: 2024-01-08 | Stop reason: SDUPTHER

## 2023-12-27 ENCOUNTER — CLINICAL SUPPORT (OUTPATIENT)
Dept: REHABILITATION | Facility: HOSPITAL | Age: 43
End: 2023-12-27
Payer: COMMERCIAL

## 2023-12-27 DIAGNOSIS — M25.621 ELBOW STIFFNESS, RIGHT: ICD-10-CM

## 2023-12-27 DIAGNOSIS — M25.611 SHOULDER STIFFNESS, RIGHT: ICD-10-CM

## 2023-12-27 DIAGNOSIS — R53.1 WEAKNESS: Primary | ICD-10-CM

## 2023-12-27 DIAGNOSIS — R52 PAIN: ICD-10-CM

## 2023-12-27 PROCEDURE — 97530 THERAPEUTIC ACTIVITIES: CPT | Mod: PO

## 2023-12-27 PROCEDURE — 97140 MANUAL THERAPY 1/> REGIONS: CPT | Mod: PO

## 2023-12-27 NOTE — PROGRESS NOTES
Occupational Therapy Daily Treatment Note     Name: Tabby Veloz  Clinic Number: [id number]    Therapy Diagnosis:   Encounter Diagnoses   Name Primary?    Weakness Yes    Elbow stiffness, right     Shoulder stiffness, right     Pain        Physician: Luis Del Valle NP    Visit Date: 12/27/2023    Physician Orders: OT eval/ treat   Plan of Care Certification Date: 1/1/24  Authorization Period: 10/3/23 -10/2/24  Date of Return to MD: 4 weeks   Medical Diagnosis: S42.351D (ICD-10-CM) - Closed displaced comminuted fracture of shaft of right humerus with routine healing, subsequent   Surgical Procedure and Date: ORIF for her right humeral shaft fracture with IM nail by Dr. Stanley on 9/19/2023. ,   Evaluation Date: 10/9/23  Onset Date:9/15/23   Date of Return to MD: 12/12/23  Visit # / Visits authorized: 20/ 34  FOTO Completion: 2/3    Time In:0900  Time Out: 1000  Total Billable Time: 60 minutes    Note from MD 12/12/23 visit:    - Continue [facility].  - Weight bearing 2-4 lbs x 4 weeks and increase by 2-4 lbs/month thereafter as she tolerates.  Ok for therapist to work with a pulley.  - Range of motion as tolerated.     Precautions: Standard and WB, 12 weeks 12/12/23   Subjective     Pt reports: She states that she has been able to use her arm more with less pain   she was compliant with home exercise program given last session.   Response to previous treatment:increased ROM  Functional change: increased ROM    Pain:2/10 when stretching  Location: right arms     Objective       Observation: Pt is present with abduction sling on her right shoulder      UE Range of Motion  Active Range of Motion:   Shoulder Right Right  10/30/23 Right  12/11/23   Flexion 20 145 - supine 160 - supine   Abduction 50 110 - supine 130 - supine   ER at 90 20 40 - supine 50 - supine   IR 80 80 - supine 80 - supine      Passive Range of Motion:   Shoulder Left Right Right   10/30/23 Right  12/11/23   Flexion 170 70 160 170    Abduction 165 70 125 145   ER at 90 80 20 40 55   IR 80 80 80 80      Elbow Left Right Right   10/30/23 Right  12/11/23   Flexion 140 120 130 140   Extension 0 -10 0 0      Painful Arc:   Patient demonstrates no painful arc in shoulder flexion or abduction     Upper Extremity Strength  (R) UE   (L) UE     Shoulder flexion: 2-/5 Shoulder flexion: 5/5   Shoulder Abduction: 2-/5 Shoulder abduction: 5/5   Shoulder ER 2-/5 Shoulder ER 5/5   Shoulder IR 2-/5 Shoulder IR 5/5   Rhomboids 2-/5 Rhomboids 5/5       Strength: (OSMANY Dynamometer in psi.)        10/9/2023 10/9/2023 12/11/23     Left Right Right   Rung II 60 N/a 50      Joint Mobility/End Feels: Hard     Palpation: Pt is tender at incision sites      Sensation: Pt denies any numbness or tingling        Scapular Control/Dyskinesis:     Normal / Subtle / Obvious  Comments    Left  N N/a    Right  N N/a         OUTCOME MEASURE:FOTO     Category:Self care       Current Score  = 37%   Goal at Discharge Score = 65%     11/20/23: 53%    Treatment       Julee received the following manual therapy techniques for 10 minutes:   -PROM of shoulder in flexion, abduction, internal rotation, and external rotation 4 x 10 to toleration  - joint mobilizations to glenohumeral joint anterior to posterior     Julee participated in dynamic functional therapeutic activities to improve functional performance for 50  minutes, including:  -pulley's 3 min flexion/ 3 min abduction  - walls slides flexion 2/15  - wall slides abduction 2/15  - 5 minute carry with 5# DB   - Green theraband rows 2/15  - Green theraband pull downs 2/15  - Green theraband internal rotation 2/15  - Green theraband external rotation 2/15  - UBE: level 3 for 5 minutes forward     Home Exercises and Education Provided     Education provided:   - Progress towards goals     Written Home Exercises Provided: Patient instructed to cont prior HEP.  Exercises were reviewed and Julee was able to demonstrate them prior to  the end of the session.  Julee demonstrated good  understanding of the HEP provided.   .   See EMR under Patient Instructions for exercises provided prior visit.        Assessment     Pt would continue to benefit from skilled OT. Pt tolerated added resistive therapeutic exercises well today. Pt tolerated added therapeutic activities well without additional complaints of pain. Will progress as tolerated. Pt motivated.      Julee is progressing well towards her goals and there are no updates to goals at this time. Pt prognosis is Good.     Pt will continue to benefit from skilled outpatient occupational therapy to address the deficits listed in the problem list on initial evaluation provide pt/family education and to maximize pt's level of independence in the home and community environment.     Anticipated barriers to occupational therapy: toleration to pain    Pt's spiritual, cultural and educational needs considered and pt agreeable to plan of care and goals.    Goals:      Short Term (6 weeks ):  1)   Patient to be IND with HEP and modalities for pain management MET 12/11/23  2)   Increase ROM to 120 degrees in shoulder flexion to increase functional UE use for ADLs and IADLs MET 12/11/23  3)   Increase ROM to 90 degrees in shoulder abduction to increase functional UE use for ADLs and IADLs MET 12/11/23  4)   Increase ROM to 80 degrees in shoulder internal rotation to increase functional UE use for ADLs and IADLs MET 12/11/23  5)   Increase ROM to 50 degrees in shoulder external rotation to increase functional UE use for ADLs and IADLs MET 12/11/23  6)   Increase ROM to 130 degrees in elbow flexion to increase functional UE use for ADLs and IADLs MET 10/30/23  7)   Increase ROM to 5 degrees in elbow extension to increase functional UE use for ADLs and IADLs MET 12/11/23  8)   Increase  strength to 30 lbs. to grasp for  ADLs and IADLs MET 12/11/23  9)   Improve muscle strength ing -2 to 3+ in order to  participate in ADLs and IADLs with less assistance. MET 12/11/23     Long Term (by discharge):  1)   Pt will report 1 out of 10 pain at worst when completing ADLs and IADLs. Ongoing  2)   Increase ROM to 170 degrees in shoulder flexion to increase functional UE use for ADLs and IADLs Ongoing  3)   Increase ROM to 165 degrees in shoulder abduction to increase functional UE use for ADLs and IADLs Ongoing  4)   Increase ROM to 80 degrees in shoulder internal rotation to increase functional UE use for ADLs and IADLs MET 12/11/23  5)   Increase ROM to 80 degrees in shoulder external rotation to increase functional UE use for ADLs and IADLs Ongoing  6)   Increase ROM to 140 degrees in elbow flexion to increase functional UE use for ADLs and IADLs MET 12/11/23  7)   Increase ROM to 0 degrees in elbow extension to increase functional UE use for ADLs and IADLs MET 12/11/23  8)   Increase  strength to 50 lbs. to grasp for  ADLs and IADLs MET 12/11/23  9)   Improve muscle strength ing to 4+ in order to participate in ADLs and IADLs with less assistance.  Ongoing  10)   Patient to score at 65% or more on FOTO to demonstrate improved perception of functional shoulder use.Ongoing  11)  Pt will return to prior level of function for ADLs and household management. Ongoing     Plan      Pt to be treated by Occupational Therapy 2 times per week for 12 weeks during the certification period to achieve the established goals.      Treatment to include: Paraffin, Fluidotherapy, Manual therapy/joint mobilizations, Modalities for pain management, US 3 mhz, Therapeutic exercises/activities., Iontophoresis with 2.0 cc Dexamethasone, Strengthening, Orthotic Fabrication/Fit/Training, Edema Control, Scar Management, Wound Care, Electrical Modalities, Joint Protection, and Energy Conservation, as well as any other treatments deemed necessary based on the patient's needs or progress.     Updates/Grading for session: Reassessment of objective  measures     FERNANDO Harman

## 2023-12-29 ENCOUNTER — CLINICAL SUPPORT (OUTPATIENT)
Dept: REHABILITATION | Facility: HOSPITAL | Age: 43
End: 2023-12-29
Payer: COMMERCIAL

## 2023-12-29 DIAGNOSIS — R52 PAIN: ICD-10-CM

## 2023-12-29 DIAGNOSIS — M25.611 SHOULDER STIFFNESS, RIGHT: ICD-10-CM

## 2023-12-29 DIAGNOSIS — M25.621 ELBOW STIFFNESS, RIGHT: ICD-10-CM

## 2023-12-29 DIAGNOSIS — R53.1 WEAKNESS: Primary | ICD-10-CM

## 2023-12-29 PROCEDURE — 97530 THERAPEUTIC ACTIVITIES: CPT | Mod: PO

## 2023-12-29 PROCEDURE — 97140 MANUAL THERAPY 1/> REGIONS: CPT | Mod: PO

## 2023-12-29 NOTE — PROGRESS NOTES
Occupational Therapy Daily Treatment Note     Name: Tabby Veloz  Clinic Number: [id number]    Therapy Diagnosis:   Encounter Diagnoses   Name Primary?    Weakness Yes    Elbow stiffness, right     Shoulder stiffness, right     Pain        Physician: Luis Del Valle NP    Visit Date: 12/29/2023    Physician Orders: OT eval/ treat   Plan of Care Certification Date: 1/1/24  Authorization Period: 10/3/23 -10/2/24  Date of Return to MD: 4 weeks   Medical Diagnosis: S42.351D (ICD-10-CM) - Closed displaced comminuted fracture of shaft of right humerus with routine healing, subsequent   Surgical Procedure and Date: ORIF for her right humeral shaft fracture with IM nail by Dr. Stanley on 9/19/2023. ,   Evaluation Date: 10/9/23  Onset Date:9/15/23   Date of Return to MD: 1/23/24  Visit # / Visits authorized: 21/ 34  FOTO Completion: 3/3    Time In:0900  Time Out: 1000  Total Billable Time: 60 minutes    Note from MD 12/12/23 visit:    - Continue [facility].  - Weight bearing 2-4 lbs x 4 weeks and increase by 2-4 lbs/month thereafter as she tolerates.  Ok for therapist to work with a pulley.  - Range of motion as tolerated.     Precautions: Standard and WB, 12 weeks 12/12/23   Subjective     Pt reports: She states that she has been able to use her arm more with less pain   she was compliant with home exercise program given last session.   Response to previous treatment:increased ROM  Functional change: increased ROM    Pain:2/10 when stretching  Location: right arms     Objective       Observation: Pt is present with abduction sling on her right shoulder      UE Range of Motion  Active Range of Motion:   Shoulder Right Right  10/30/23 Right  12/11/23   Flexion 20 145 - supine 160 - supine   Abduction 50 110 - supine 130 - supine   ER at 90 20 40 - supine 50 - supine   IR 80 80 - supine 80 - supine      Passive Range of Motion:   Shoulder Left Right Right   10/30/23 Right  12/11/23   Flexion 170 70 160 170    Abduction 165 70 125 145   ER at 90 80 20 40 55   IR 80 80 80 80      Elbow Left Right Right   10/30/23 Right  12/11/23   Flexion 140 120 130 140   Extension 0 -10 0 0      Painful Arc:   Patient demonstrates no painful arc in shoulder flexion or abduction     Upper Extremity Strength  (R) UE   (L) UE     Shoulder flexion: 2-/5 Shoulder flexion: 5/5   Shoulder Abduction: 2-/5 Shoulder abduction: 5/5   Shoulder ER 2-/5 Shoulder ER 5/5   Shoulder IR 2-/5 Shoulder IR 5/5   Rhomboids 2-/5 Rhomboids 5/5       Strength: (OSMANY Dynamometer in psi.)        10/9/2023 10/9/2023 12/11/23     Left Right Right   Rung II 60 N/a 50      Joint Mobility/End Feels: Hard     Palpation: Pt is tender at incision sites      Sensation: Pt denies any numbness or tingling        Scapular Control/Dyskinesis:     Normal / Subtle / Obvious  Comments    Left  N N/a    Right  N N/a         OUTCOME MEASURE:FOTO     Category:Self care       Current Score  = 37%   Goal at Discharge Score = 65%     11/20/23: 53%    Treatment       Julee received the following manual therapy techniques for 10 minutes:   -PROM of shoulder in flexion, abduction, internal rotation, and external rotation 4 x 10 to toleration  - joint mobilizations to glenohumeral joint anterior to posterior     Julee participated in dynamic functional therapeutic activities to improve functional performance for 50  minutes, including:  -pulley's 3 min flexion/ 3 min abduction  - walls slides flexion 2/15  - wall slides abduction 2/15  - 5 minute carry with 5# DB   - Green theraband rows 2/15  - Green theraband pull downs 2/15  - Green theraband internal rotation 2/15  - Green theraband external rotation 2/15  - UBE: level 3 for 5 minutes forward   -supine flexion stretch with 4# dowel 15 second hold 15x     Home Exercises and Education Provided     Education provided:   - Progress towards goals     Written Home Exercises Provided: Patient instructed to cont prior HEP.  Exercises  were reviewed and Julee was able to demonstrate them prior to the end of the session.  Julee demonstrated good  understanding of the HEP provided.   .   See EMR under Patient Instructions for exercises provided prior visit.        Assessment     Pt would continue to benefit from skilled OT. Pt tolerated added resistive therapeutic exercises well today. Pt tolerated added therapeutic activities well without additional complaints of pain. Will progress as tolerated. Pt motivated.      Julee is progressing well towards her goals and there are no updates to goals at this time. Pt prognosis is Good.     Pt will continue to benefit from skilled outpatient occupational therapy to address the deficits listed in the problem list on initial evaluation provide pt/family education and to maximize pt's level of independence in the home and community environment.     Anticipated barriers to occupational therapy: toleration to pain    Pt's spiritual, cultural and educational needs considered and pt agreeable to plan of care and goals.    Goals:      Short Term (6 weeks ):  1)   Patient to be IND with HEP and modalities for pain management MET 12/11/23  2)   Increase ROM to 120 degrees in shoulder flexion to increase functional UE use for ADLs and IADLs MET 12/11/23  3)   Increase ROM to 90 degrees in shoulder abduction to increase functional UE use for ADLs and IADLs MET 12/11/23  4)   Increase ROM to 80 degrees in shoulder internal rotation to increase functional UE use for ADLs and IADLs MET 12/11/23  5)   Increase ROM to 50 degrees in shoulder external rotation to increase functional UE use for ADLs and IADLs MET 12/11/23  6)   Increase ROM to 130 degrees in elbow flexion to increase functional UE use for ADLs and IADLs MET 10/30/23  7)   Increase ROM to 5 degrees in elbow extension to increase functional UE use for ADLs and IADLs MET 12/11/23  8)   Increase  strength to 30 lbs. to grasp for  ADLs and IADLs MET 12/11/23  9)    Improve muscle strength ing -2 to 3+ in order to participate in ADLs and IADLs with less assistance. MET 12/11/23     Long Term (by discharge):  1)   Pt will report 1 out of 10 pain at worst when completing ADLs and IADLs. Ongoing  2)   Increase ROM to 170 degrees in shoulder flexion to increase functional UE use for ADLs and IADLs Ongoing  3)   Increase ROM to 165 degrees in shoulder abduction to increase functional UE use for ADLs and IADLs Ongoing  4)   Increase ROM to 80 degrees in shoulder internal rotation to increase functional UE use for ADLs and IADLs MET 12/11/23  5)   Increase ROM to 80 degrees in shoulder external rotation to increase functional UE use for ADLs and IADLs Ongoing  6)   Increase ROM to 140 degrees in elbow flexion to increase functional UE use for ADLs and IADLs MET 12/11/23  7)   Increase ROM to 0 degrees in elbow extension to increase functional UE use for ADLs and IADLs MET 12/11/23  8)   Increase  strength to 50 lbs. to grasp for  ADLs and IADLs MET 12/11/23  9)   Improve muscle strength ing to 4+ in order to participate in ADLs and IADLs with less assistance.  Ongoing  10)   Patient to score at 65% or more on FOTO to demonstrate improved perception of functional shoulder use.Ongoing  11)  Pt will return to prior level of function for ADLs and household management. Ongoing     Plan      Pt to be treated by Occupational Therapy 2 times per week for 12 weeks during the certification period to achieve the established goals.      Treatment to include: Paraffin, Fluidotherapy, Manual therapy/joint mobilizations, Modalities for pain management, US 3 mhz, Therapeutic exercises/activities., Iontophoresis with 2.0 cc Dexamethasone, Strengthening, Orthotic Fabrication/Fit/Training, Edema Control, Scar Management, Wound Care, Electrical Modalities, Joint Protection, and Energy Conservation, as well as any other treatments deemed necessary based on the patient's needs or progress.      Updates/Grading for session: Reassessment of objective measures, progression in resistance to therapeutic exercises    EFRNANDO Harman

## 2024-01-03 ENCOUNTER — CLINICAL SUPPORT (OUTPATIENT)
Dept: REHABILITATION | Facility: HOSPITAL | Age: 44
End: 2024-01-03
Payer: COMMERCIAL

## 2024-01-03 DIAGNOSIS — R52 PAIN: ICD-10-CM

## 2024-01-03 DIAGNOSIS — M25.611 SHOULDER STIFFNESS, RIGHT: ICD-10-CM

## 2024-01-03 DIAGNOSIS — R53.1 WEAKNESS: Primary | ICD-10-CM

## 2024-01-03 DIAGNOSIS — M25.621 ELBOW STIFFNESS, RIGHT: ICD-10-CM

## 2024-01-03 PROCEDURE — 97140 MANUAL THERAPY 1/> REGIONS: CPT | Mod: PO

## 2024-01-03 PROCEDURE — 97530 THERAPEUTIC ACTIVITIES: CPT | Mod: PO

## 2024-01-03 NOTE — PROGRESS NOTES
Occupational Therapy Daily Treatment Note     Name: Tabby Veloz  Clinic Number: [id number]    Therapy Diagnosis:   Encounter Diagnoses   Name Primary?    Weakness Yes    Elbow stiffness, right     Shoulder stiffness, right     Pain        Physician: Luis Del Valle NP    Visit Date: 1/3/2024    Physician Orders: OT eval/ treat   Plan of Care Certification Date: 1/1/24  Authorization Period: 10/3/23 -10/2/24  Date of Return to MD: 4 weeks   Medical Diagnosis: S42.351D (ICD-10-CM) - Closed displaced comminuted fracture of shaft of right humerus with routine healing, subsequent   Surgical Procedure and Date: ORIF for her right humeral shaft fracture with IM nail by Dr. Stanley on 9/19/2023. ,   Evaluation Date: 10/9/23  Onset Date:9/15/23   Date of Return to MD: 1/23/24  Visit # / Visits authorized: 1/20 + 22  FOTO Completion: 3/3    Time In:0900  Time Out: 1000  Total Billable Time: 60 minutes    Note from MD 12/12/23 visit:    - Continue [facility].  - Weight bearing 2-4 lbs x 4 weeks and increase by 2-4 lbs/month thereafter as she tolerates.  Ok for therapist to work with a pulley.  - Range of motion as tolerated.     Precautions: Standard and WB, 15 weeks 1/2/24   Subjective     Pt reports: She states that she has been able to use her arm more with less pain   she was compliant with home exercise program given last session.   Response to previous treatment:increased ROM  Functional change: increased ROM    Pain:0/10 when stretching  Location: right arms     Objective       Observation: Pt is present with abduction sling on her right shoulder      UE Range of Motion  Active Range of Motion:   Shoulder Right Right  10/30/23 Right  12/11/23 Right   1/3/24   Flexion 20 145 - supine 160 - supine 135 - standing   Abduction 50 110 - supine 130 - supine 130 -standing   ER at 90 20 40 - supine 50 - supine 55 - supine   IR 80 80 - supine 80 - supine 80- supine      Passive Range of Motion:   Shoulder Left  Right Right   10/30/23 Right  12/11/23   Flexion 170 70 160 170   Abduction 165 70 125 145   ER at 90 80 20 40 55   IR 80 80 80 80      Elbow Left Right Right   10/30/23 Right  12/11/23 Right   1/3/24   Flexion 140 120 130 140 140   Extension 0 -10 0 0 0      Painful Arc:   Patient demonstrates no painful arc in shoulder flexion or abduction     Upper Extremity Strength  (R) UE   (L) UE     Shoulder flexion: 2-/5 Shoulder flexion: 5/5   Shoulder Abduction: 2-/5 Shoulder abduction: 5/5   Shoulder ER 2-/5 Shoulder ER 5/5   Shoulder IR 2-/5 Shoulder IR 5/5   Rhomboids 2-/5 Rhomboids 5/5       Strength: (OSMANY Dynamometer in psi.)        10/9/2023 10/9/2023 12/11/23 1/3/24     Left Right Right Right   Rung II 60 N/a 50 63      Joint Mobility/End Feels: Hard     Palpation: Pt is tender at incision sites      Sensation: Pt denies any numbness or tingling        Scapular Control/Dyskinesis:     Normal / Subtle / Obvious  Comments    Left  N N/a    Right  N N/a         OUTCOME MEASURE:FOTO     Category:Self care       Current Score  = 37%   Goal at Discharge Score = 65%     11/20/23: 53%    Treatment       Julee received the following manual therapy techniques for 10 minutes:   -PROM of shoulder in flexion, abduction, internal rotation, and external rotation 4 x 10 to toleration  - joint mobilizations to glenohumeral joint anterior to posterior     Julee participated in dynamic functional therapeutic activities to improve functional performance for 50  minutes, including:  -pulley's 3 min flexion/ 3 min abduction  - walls slides flexion 2/15  - wall slides abduction 2/15  - 5 minute carry with 7# DB   - Blue theraband rows 2/15  - Blue theraband pull downs 2/15  - Blue theraband internal rotation 2/15  - Blue theraband external rotation 2/15  - UBE: level 4 for 8 minutes forward   -supine flexion stretch with 4# dowel 15 second hold 15x   -Reassessment of objective measurews     Home Exercises and Education Provided      Education provided:   - Progress towards goals     Written Home Exercises Provided: Patient instructed to cont prior HEP.  Exercises were reviewed and Julee was able to demonstrate them prior to the end of the session.  Julee demonstrated good  understanding of the HEP provided.   .   See EMR under Patient Instructions for exercises provided prior visit.        Assessment     Pt would continue to benefit from skilled OT. Pt tolerated added resistive therapeutic exercises well today. Pt tolerated added therapeutic activities well without additional complaints of pain. Reassessment of objective measures were taken and patient continues to progress in overall strength and range of motion. Progression from green to blue theraband, 5# to 7# with farmers carry and level 3 to level 4 with UBE. Will progress as tolerated. Pt motivated.      Julee is progressing well towards her goals and there are no updates to goals at this time. Pt prognosis is Good.     Pt will continue to benefit from skilled outpatient occupational therapy to address the deficits listed in the problem list on initial evaluation provide pt/family education and to maximize pt's level of independence in the home and community environment.     Anticipated barriers to occupational therapy: toleration to pain    Pt's spiritual, cultural and educational needs considered and pt agreeable to plan of care and goals.    Goals:      Short Term (6 weeks ):  1)   Patient to be IND with HEP and modalities for pain management MET 12/11/23  2)   Increase ROM to 120 degrees in shoulder flexion to increase functional UE use for ADLs and IADLs MET 12/11/23  3)   Increase ROM to 90 degrees in shoulder abduction to increase functional UE use for ADLs and IADLs MET 12/11/23  4)   Increase ROM to 80 degrees in shoulder internal rotation to increase functional UE use for ADLs and IADLs MET 12/11/23  5)   Increase ROM to 50 degrees in shoulder external rotation to increase  functional UE use for ADLs and IADLs MET 12/11/23  6)   Increase ROM to 130 degrees in elbow flexion to increase functional UE use for ADLs and IADLs MET 10/30/23  7)   Increase ROM to 5 degrees in elbow extension to increase functional UE use for ADLs and IADLs MET 12/11/23  8)   Increase  strength to 30 lbs. to grasp for  ADLs and IADLs MET 12/11/23  9)   Improve muscle strength ing -2 to 3+ in order to participate in ADLs and IADLs with less assistance. MET 12/11/23     Long Term (by discharge):  1)   Pt will report 1 out of 10 pain at worst when completing ADLs and IADLs. Ongoing  2)   Increase ROM to 170 degrees in shoulder flexion to increase functional UE use for ADLs and IADLs Ongoing  3)   Increase ROM to 165 degrees in shoulder abduction to increase functional UE use for ADLs and IADLs Ongoing  4)   Increase ROM to 80 degrees in shoulder internal rotation to increase functional UE use for ADLs and IADLs MET 12/11/23  5)   Increase ROM to 80 degrees in shoulder external rotation to increase functional UE use for ADLs and IADLs Ongoing  6)   Increase ROM to 140 degrees in elbow flexion to increase functional UE use for ADLs and IADLs MET 12/11/23  7)   Increase ROM to 0 degrees in elbow extension to increase functional UE use for ADLs and IADLs MET 12/11/23  8)   Increase  strength to 50 lbs. to grasp for  ADLs and IADLs MET 12/11/23  9)   Improve muscle strength ing to 4+ in order to participate in ADLs and IADLs with less assistance.  Ongoing  10)   Patient to score at 65% or more on FOTO to demonstrate improved perception of functional shoulder use.Ongoing  11)  Pt will return to prior level of function for ADLs and household management. Ongoing     Plan      Pt to be treated by Occupational Therapy 2 times per week for 12 weeks during the certification period to achieve the established goals.      Treatment to include: Paraffin, Fluidotherapy, Manual therapy/joint mobilizations, Modalities for  pain management, US 3 mhz, Therapeutic exercises/activities., Iontophoresis with 2.0 cc Dexamethasone, Strengthening, Orthotic Fabrication/Fit/Training, Edema Control, Scar Management, Wound Care, Electrical Modalities, Joint Protection, and Energy Conservation, as well as any other treatments deemed necessary based on the patient's needs or progress.     Updates/Grading for session: Continue with current plan of care     FERNANDO Harman

## 2024-01-05 ENCOUNTER — CLINICAL SUPPORT (OUTPATIENT)
Dept: REHABILITATION | Facility: HOSPITAL | Age: 44
End: 2024-01-05
Payer: COMMERCIAL

## 2024-01-05 ENCOUNTER — OFFICE VISIT (OUTPATIENT)
Dept: ORTHOPEDICS | Facility: CLINIC | Age: 44
End: 2024-01-05
Payer: COMMERCIAL

## 2024-01-05 ENCOUNTER — TELEPHONE (OUTPATIENT)
Dept: ORTHOPEDICS | Facility: CLINIC | Age: 44
End: 2024-01-05
Payer: COMMERCIAL

## 2024-01-05 DIAGNOSIS — R53.1 WEAKNESS: Primary | ICD-10-CM

## 2024-01-05 DIAGNOSIS — M25.621 ELBOW STIFFNESS, RIGHT: ICD-10-CM

## 2024-01-05 DIAGNOSIS — S42.351D CLOSED DISPLACED COMMINUTED FRACTURE OF SHAFT OF RIGHT HUMERUS WITH ROUTINE HEALING, SUBSEQUENT ENCOUNTER: Primary | ICD-10-CM

## 2024-01-05 DIAGNOSIS — M25.611 SHOULDER STIFFNESS, RIGHT: ICD-10-CM

## 2024-01-05 DIAGNOSIS — R52 PAIN: ICD-10-CM

## 2024-01-05 PROCEDURE — 99999 PR PBB SHADOW E&M-EST. PATIENT-LVL II: CPT | Mod: PBBFAC,,, | Performed by: PHYSICIAN ASSISTANT

## 2024-01-05 PROCEDURE — 97530 THERAPEUTIC ACTIVITIES: CPT | Mod: PO

## 2024-01-05 PROCEDURE — 99214 OFFICE O/P EST MOD 30 MIN: CPT | Mod: S$GLB,,, | Performed by: PHYSICIAN ASSISTANT

## 2024-01-05 PROCEDURE — 1159F MED LIST DOCD IN RCRD: CPT | Mod: CPTII,S$GLB,, | Performed by: PHYSICIAN ASSISTANT

## 2024-01-05 PROCEDURE — 97140 MANUAL THERAPY 1/> REGIONS: CPT | Mod: PO

## 2024-01-05 PROCEDURE — 1160F RVW MEDS BY RX/DR IN RCRD: CPT | Mod: CPTII,S$GLB,, | Performed by: PHYSICIAN ASSISTANT

## 2024-01-05 NOTE — PROGRESS NOTES
Occupational Therapy Daily Treatment Note     Name: Tabby Veloz  Clinic Number: [id number]    Therapy Diagnosis:   Encounter Diagnoses   Name Primary?    Weakness Yes    Elbow stiffness, right     Shoulder stiffness, right     Pain        Physician: Luis Del Valle NP    Visit Date: 1/5/2024    Physician Orders: OT eval/ treat   Plan of Care Certification Date: 1/1/24  Authorization Period: 10/3/23 -10/2/24  Date of Return to MD: 4 weeks   Medical Diagnosis: S42.351D (ICD-10-CM) - Closed displaced comminuted fracture of shaft of right humerus with routine healing, subsequent   Surgical Procedure and Date: ORIF for her right humeral shaft fracture with IM nail by Dr. Stanley on 9/19/2023. ,   Evaluation Date: 10/9/23  Onset Date:9/15/23   Date of Return to MD: 1/23/24  Visit # / Visits authorized: 2/20 + 22  FOTO Completion: 3/3    Time In:0800  Time Out: 0900  Total Billable Time: 60 minutes    Note from MD 12/12/23 visit:    - Continue [facility] OT.  - Weight bearing 2-4 lbs x 4 weeks and increase by 2-4 lbs/month thereafter as she tolerates.  Ok for therapist to work with a pulley.  - Range of motion as tolerated.     Precautions: Standard and WB, 15 weeks 1/2/24   Subjective     Pt reports: She states that she fell asleep on her arm last night so it is a little sore today.  she was compliant with home exercise program given last session.   Response to previous treatment:increased ROM  Functional change: increased ROM    Pain:1/10   Location: right arms     Objective       Observation: Pt is present with abduction sling on her right shoulder      UE Range of Motion  Active Range of Motion:   Shoulder Right Right  10/30/23 Right  12/11/23 Right   1/3/24   Flexion 20 145 - supine 160 - supine 135 - standing   Abduction 50 110 - supine 130 - supine 130 -standing   ER at 90 20 40 - supine 50 - supine 55 - supine   IR 80 80 - supine 80 - supine 80- supine      Passive Range of Motion:   Shoulder Left  Right Right   10/30/23 Right  12/11/23   Flexion 170 70 160 170   Abduction 165 70 125 145   ER at 90 80 20 40 55   IR 80 80 80 80      Elbow Left Right Right   10/30/23 Right  12/11/23 Right   1/3/24   Flexion 140 120 130 140 140   Extension 0 -10 0 0 0      Painful Arc:   Patient demonstrates no painful arc in shoulder flexion or abduction     Upper Extremity Strength  (R) UE   (L) UE     Shoulder flexion: 2-/5 Shoulder flexion: 5/5   Shoulder Abduction: 2-/5 Shoulder abduction: 5/5   Shoulder ER 2-/5 Shoulder ER 5/5   Shoulder IR 2-/5 Shoulder IR 5/5   Rhomboids 2-/5 Rhomboids 5/5       Strength: (OSMANY Dynamometer in psi.)        10/9/2023 10/9/2023 12/11/23 1/3/24     Left Right Right Right   Rung II 60 N/a 50 63      Joint Mobility/End Feels: Hard     Palpation: Pt is tender at incision sites      Sensation: Pt denies any numbness or tingling        Scapular Control/Dyskinesis:     Normal / Subtle / Obvious  Comments    Left  N N/a    Right  N N/a         OUTCOME MEASURE:FOTO     Category:Self care       Current Score  = 37%   Goal at Discharge Score = 65%     11/20/23: 53%    Treatment       Julee received the following manual therapy techniques for 10 minutes:   -PROM of shoulder in flexion, abduction, internal rotation, and external rotation 4 x 10 to toleration  - joint mobilizations to glenohumeral joint anterior to posterior     Julee participated in dynamic functional therapeutic activities to improve functional performance for 50  minutes, including:  -pulley's 3 min flexion/ 3 min abduction  - walls slides flexion 2/15  - wall slides abduction 2/15  - 5 minute carry with 7# DB   - Blue theraband rows 2/15  - Blue theraband pull downs 2/15  - Blue theraband internal rotation 2/15  - Blue theraband external rotation 2/15  - UBE: level 4 for 8 minutes forward   -supine flexion stretch with 4# dowel 15 second hold 15x     Home Exercises and Education Provided     Education provided:   - Progress  towards goals     Written Home Exercises Provided: Patient instructed to cont prior HEP.  Exercises were reviewed and Julee was able to demonstrate them prior to the end of the session.  Julee demonstrated good  understanding of the HEP provided.   .   See EMR under Patient Instructions for exercises provided prior visit.        Assessment     Pt would continue to benefit from skilled OT. Pt tolerated added resistive therapeutic exercises well today. Pt tolerated added therapeutic activities well without additional complaints of pain.  Will progress as tolerated. Pt motivated.      Julee is progressing well towards her goals and there are no updates to goals at this time. Pt prognosis is Good.     Pt will continue to benefit from skilled outpatient occupational therapy to address the deficits listed in the problem list on initial evaluation provide pt/family education and to maximize pt's level of independence in the home and community environment.     Anticipated barriers to occupational therapy: toleration to pain    Pt's spiritual, cultural and educational needs considered and pt agreeable to plan of care and goals.    Goals:      Short Term (6 weeks ):  1)   Patient to be IND with HEP and modalities for pain management MET 12/11/23  2)   Increase ROM to 120 degrees in shoulder flexion to increase functional UE use for ADLs and IADLs MET 12/11/23  3)   Increase ROM to 90 degrees in shoulder abduction to increase functional UE use for ADLs and IADLs MET 12/11/23  4)   Increase ROM to 80 degrees in shoulder internal rotation to increase functional UE use for ADLs and IADLs MET 12/11/23  5)   Increase ROM to 50 degrees in shoulder external rotation to increase functional UE use for ADLs and IADLs MET 12/11/23  6)   Increase ROM to 130 degrees in elbow flexion to increase functional UE use for ADLs and IADLs MET 10/30/23  7)   Increase ROM to 5 degrees in elbow extension to increase functional UE use for ADLs and  IADLs MET 12/11/23  8)   Increase  strength to 30 lbs. to grasp for  ADLs and IADLs MET 12/11/23  9)   Improve muscle strength ing -2 to 3+ in order to participate in ADLs and IADLs with less assistance. MET 12/11/23     Long Term (by discharge):  1)   Pt will report 1 out of 10 pain at worst when completing ADLs and IADLs. Ongoing  2)   Increase ROM to 170 degrees in shoulder flexion to increase functional UE use for ADLs and IADLs Ongoing  3)   Increase ROM to 165 degrees in shoulder abduction to increase functional UE use for ADLs and IADLs Ongoing  4)   Increase ROM to 80 degrees in shoulder internal rotation to increase functional UE use for ADLs and IADLs MET 12/11/23  5)   Increase ROM to 80 degrees in shoulder external rotation to increase functional UE use for ADLs and IADLs Ongoing  6)   Increase ROM to 140 degrees in elbow flexion to increase functional UE use for ADLs and IADLs MET 12/11/23  7)   Increase ROM to 0 degrees in elbow extension to increase functional UE use for ADLs and IADLs MET 12/11/23  8)   Increase  strength to 50 lbs. to grasp for  ADLs and IADLs MET 12/11/23  9)   Improve muscle strength ing to 4+ in order to participate in ADLs and IADLs with less assistance.  Ongoing  10)   Patient to score at 65% or more on FOTO to demonstrate improved perception of functional shoulder use.Ongoing  11)  Pt will return to prior level of function for ADLs and household management. Ongoing     Plan      Pt to be treated by Occupational Therapy 2 times per week for 12 weeks during the certification period to achieve the established goals.      Treatment to include: Paraffin, Fluidotherapy, Manual therapy/joint mobilizations, Modalities for pain management, US 3 mhz, Therapeutic exercises/activities., Iontophoresis with 2.0 cc Dexamethasone, Strengthening, Orthotic Fabrication/Fit/Training, Edema Control, Scar Management, Wound Care, Electrical Modalities, Joint Protection, and Energy  Conservation, as well as any other treatments deemed necessary based on the patient's needs or progress.     Updates/Grading for session: Reassessment of objective measures     FERNANDO Harman

## 2024-01-05 NOTE — PROGRESS NOTES
Chief Complaint & History of Present Illness:  Tabby Veloz is a established patient 43 y.o. female who is seen here today for review of lab results, DEXA scan results, and determine a treatment plan for her osteoporosis.  she has reviewed the information provided at her initial visit.  After review of treatment options together we has elected to proceed with a period of watchful waiting as her treatment option today for her osteoporosis      Review of patient's allergies indicates:  No Known Allergies      Current Outpatient Medications   Medication Sig Dispense Refill    acetaminophen (TYLENOL) 650 MG TbSR Take 1 tablet (650 mg total) by mouth every 8 (eight) hours. 42 tablet 0    acetaZOLAMIDE (DIAMOX) 250 MG tablet Take 1 tablet (250 mg total) by mouth 2 (two) times daily. 60 tablet 3    acetaZOLAMIDE (DIAMOX) 500 mg CpSR Take 1 capsule (500 mg total) by mouth 2 (two) times daily. 180 capsule 1    buPROPion (WELLBUTRIN) 100 MG tablet Take 100 mg by mouth 2 (two) times daily.      cetirizine (ZYRTEC) 10 MG tablet Take 10 mg by mouth once daily.      cranberry fruit extract (CRANBERRY ORAL) Take 2 tablets by mouth once daily.      gabapentin (NEURONTIN) 300 MG capsule Take 1 capsule (300 mg total) by mouth 3 (three) times daily. 42 capsule 0    minocycline (MINOCIN,DYNACIN) 100 MG capsule Take 100 mg by mouth once daily.      topiramate (TOPAMAX) 100 MG tablet Take 1 tablet (100 mg total) by mouth 2 (two) times daily. 180 tablet 1     No current facility-administered medications for this visit.       Past Medical History:   Diagnosis Date    Abnormal Pap smear     Migraines     Pneumonia     as  a  child     Pseudotumor cerebri 2016       Past Surgical History:   Procedure Laterality Date    ABSCESS DRAINAGE      x 3     addenoids      ANKLE ARTHROSCOPY W/ OPEN REPAIR Right     CERVICAL BIOPSY  W/ LOOP ELECTRODE EXCISION      CHOLECYSTECTOMY      INCISION AND DRAINAGE OF ABSCESS N/A 8/10/2022    Procedure:  "INCISION AND DRAINAGE, ABSCESS VULVAR;  Surgeon: Melody Rothman MD;  Location: [facility] OR;  Service: OB/GYN;  Laterality: N/A;    INTRAMEDULLARY RODDING OF HUMERUS Right 9/19/2023    Procedure: INSERTION, INTRAMEDULLARY JAEL, HUMERUS;  Surgeon: Andrey Stanley MD;  Location: [facility] OR [address] [place] [address];  Service: Orthopedics;  Laterality: Right;    POSTPARTUM LIGATION OF FALLOPIAN TUBE Bilateral 6/12/2018    Procedure: LIGATION, FALLOPIAN TUBE, POSTPARTUM;  Surgeon: Melody Rothman MD;  Location: [facility] OR;  Service: OB/GYN;  Laterality: Bilateral;    tonsilectomy         Lab Results   Component Value Date     12/12/2023    K 3.9 12/12/2023     (H) 12/12/2023    CO2 20 (L) 12/12/2023    GLU 96 12/12/2023    BUN 15 12/12/2023    CREATININE 0.8 12/12/2023    CALCIUM 9.1 12/12/2023    PROT 7.2 12/12/2023    ALBUMIN 3.9 12/12/2023    BILITOT 0.3 12/12/2023    ALKPHOS 107 12/12/2023    AST 14 12/12/2023    ALT 19 12/12/2023    ANIONGAP 8 12/12/2023    ESTGFRAFRICA SEE COMMENT 09/15/2023    EGFRNONAA SEE COMMENT 09/15/2023      Lab Results   Component Value Date    WBC 16.01 (H) 09/15/2023    RBC 4.17 09/15/2023    HGB 12.8 09/15/2023    HCT 42 09/15/2023    MCV 91 09/15/2023    MCH 30.7 09/15/2023    MCHC 33.9 09/15/2023    RDW 14.5 09/15/2023     09/15/2023    MPV 10.3 09/15/2023    GRAN 13.3 (H) 09/15/2023    GRAN 83.1 (H) 09/15/2023    LYMPH 1.3 09/15/2023    LYMPH 8.2 (L) 09/15/2023    MONO 1.1 (H) 09/15/2023    MONO 6.7 09/15/2023    EOS 0.1 09/15/2023    BASO 0.10 09/15/2023    EOSINOPHIL 0.7 09/15/2023    BASOPHIL 0.6 09/15/2023    DIFFMETHOD Automated 09/15/2023      Lab Results   Component Value Date    MG 1.8 09/15/2023      No results found for: "PHOS"   Lab Results   Component Value Date    LJJIGKGT24CI 31 12/12/2023      Lab Results   Component Value Date    PTH 41.4 12/12/2023      Lab Results   Component Value Date    TSH 1.384 12/12/2023      Lab Results   Component Value " "Date    FREET4 0.83 12/12/2023      Lab Results   Component Value Date    HGBA1C 5.3 11/06/2017      No results found for: "FREETESTOSTE"     DEXA Scan was reviewed and demonstrates :     T-Score Hip: -0.3     T-Score Spine: -0.7      FRAX:      Major Fx.: 3.3%         Hip Fx.: 0.1%                                    Vital Signs (Most Recent)  There were no vitals filed for this visit.      Review of Systems:  ROS:  Constitutional: no fever or chills  Eyes: no visual changes  ENT: no nasal congestion or sore throat  Respiratory: no respiratory symptoms  Cardiovascular: no chest pain or palpitations  Gastrointestinal: no nausea or vomiting, tolerating diet  Genitourinary: no hematuria or dysuria  Integument/Breast: no rash or pruritis  Hematologic/Lymphatic: no easy bruising or lymphadenopathy  Musculoskeletal: no arthralgias or myalgias, comminuted humeral fracture of the right  Neurological: no seizures or tremors, positive pseudo motor cerebri idiopathic intracranial hypertension, chronic migraines  Behavioral/Psych: no auditory or visual hallucinations  Endocrine: no heat or cold intolerance      she will continue with her calcium and vitamin D.  Fall prevention and personal safety have been reviewed.    We have discussed the need for core strengthening and balance exercises for fall prevention.  Discussed the risk of osteonecrosis of the jaw with bisphosphonates with incidence estimated from 1-10/[id number] treated patients. Discussed that the incidence of ONJ is higher in patients undergoing invasive dental surgery (excludes routine cleaning, fillings or root canals). Dental work should ideally be completed prior to initiation of treatment; I told her to let her dentist know at the upcoming appointment that we are planning on treating with alendronate to prevent fractures. Preventative measures such as good oral hygiene encouraged.     Discussed the risk of atypical femur fractures with bisphosphonates and " denosumab. The exact incidence is unknown, but has been estimated at approximately 1 in 64211 patients treated with oral bisphosphonates and increasing incidence was correlated with increased duration of bisphosphonate use. Despite this risk, the significant benefit on fracture reduction far outweighs the potential for this rare event.         Assessment and plan:      ICD-10-CM ICD-9-CM   1. Closed displaced comminuted fracture of shaft of right humerus with routine healing, subsequent encounter s/p ORIF 9/19/23  S42.351D V54.11       Repeat DEXA scan in 2 years

## 2024-01-05 NOTE — TELEPHONE ENCOUNTER
----- Message from Joelle Higuera sent at 1/5/2024 10:14 AM CST -----  Regarding: late arrival  Contact: [phone]  Pt running late to appt:    Caller: Pt  Appt time: 10:30  ETA: 10:50  Asking, will still be seen? Please call

## 2024-01-08 ENCOUNTER — CLINICAL SUPPORT (OUTPATIENT)
Dept: REHABILITATION | Facility: HOSPITAL | Age: 44
End: 2024-01-08
Payer: COMMERCIAL

## 2024-01-08 DIAGNOSIS — R52 PAIN: ICD-10-CM

## 2024-01-08 DIAGNOSIS — R53.1 WEAKNESS: Primary | ICD-10-CM

## 2024-01-08 DIAGNOSIS — M25.621 ELBOW STIFFNESS, RIGHT: ICD-10-CM

## 2024-01-08 DIAGNOSIS — M25.611 SHOULDER STIFFNESS, RIGHT: ICD-10-CM

## 2024-01-08 DIAGNOSIS — S42.351D CLOSED DISPLACED COMMINUTED FRACTURE OF SHAFT OF RIGHT HUMERUS WITH ROUTINE HEALING, SUBSEQUENT ENCOUNTER: ICD-10-CM

## 2024-01-08 PROCEDURE — 97530 THERAPEUTIC ACTIVITIES: CPT | Mod: PO

## 2024-01-08 PROCEDURE — 97140 MANUAL THERAPY 1/> REGIONS: CPT | Mod: PO

## 2024-01-08 RX ORDER — METHOCARBAMOL 500 MG/1
500 TABLET, FILM COATED ORAL 3 TIMES DAILY PRN
Qty: 30 TABLET | Refills: 0 | Status: SHIPPED | OUTPATIENT
Start: 2024-01-08 | End: 2024-01-23 | Stop reason: SDUPTHER

## 2024-01-08 NOTE — PROGRESS NOTES
Occupational Therapy Daily Treatment Note     Name: Tabby Veloz  Clinic Number: [id number]    Therapy Diagnosis:   Encounter Diagnoses   Name Primary?    Weakness Yes    Elbow stiffness, right     Shoulder stiffness, right     Pain        Physician: Luis Del Valle NP    Visit Date: 1/8/2024    Physician Orders: OT eval/ treat   Plan of Care Certification Date: 3/29/24  Authorization Period: 10/3/23 -10/2/24  Date of Return to MD: 4 weeks   Medical Diagnosis: S42.351D (ICD-10-CM) - Closed displaced comminuted fracture of shaft of right humerus with routine healing, subsequent   Surgical Procedure and Date: ORIF for her right humeral shaft fracture with IM nail by Dr. Stanley on 9/19/2023. ,   Evaluation Date: 10/9/23  Onset Date:9/15/23   Date of Return to MD: 1/23/24  Visit # / Visits authorized: 3/20 + 22  FOTO Completion: 3/3    Time In:0900  Time Out: 1000  Total Billable Time: 60 minutes    Note from MD 12/12/23 visit:    - Continue [facility] OT.  - Weight bearing 2-4 lbs x 4 weeks and increase by 2-4 lbs/month thereafter as she tolerates.  Ok for therapist to work with a pulley.  - Range of motion as tolerated.     Precautions: Standard and WB, 15 weeks 1/2/24   Subjective     Pt reports: She states that she fell asleep on her arm last night so it is a little sore today.  she was compliant with home exercise program given last session.   Response to previous treatment:increased ROM  Functional change: increased ROM    Pain:1/10   Location: right arms     Objective       Observation: Pt is present with abduction sling on her right shoulder      UE Range of Motion  Active Range of Motion:   Shoulder Right Right  10/30/23 Right  12/11/23 Right   1/3/24 Right  1/8/24   Flexion 20 145 - supine 160 - supine 135 - standing 165 - supine   Abduction 50 110 - supine 130 - supine 130 -standing 145 - supine   ER at 90 20 40 - supine 50 - supine 55 - supine 55 - supine   IR 80 80 - supine 80 - supine 80-  supine 80 - supine      Passive Range of Motion:   Shoulder Left Right Right   10/30/23 Right  12/11/23   Flexion 170 70 160 170   Abduction 165 70 125 145   ER at 90 80 20 40 55   IR 80 80 80 80      Elbow Left Right Right   10/30/23 Right  12/11/23 Right   1/3/24 Right  1/8/24   Flexion 140 120 130 140 140 140   Extension 0 -10 0 0 0 0      Painful Arc:   Patient demonstrates no painful arc in shoulder flexion or abduction     Upper Extremity Strength  (R) UE   (L) UE     Shoulder flexion: 2-/5 Shoulder flexion: 5/5   Shoulder Abduction: 2-/5 Shoulder abduction: 5/5   Shoulder ER 2-/5 Shoulder ER 5/5   Shoulder IR 2-/5 Shoulder IR 5/5   Rhomboids 2-/5 Rhomboids 5/5       Strength: (OSMANY Dynamometer in psi.)        10/9/2023 10/9/2023 12/11/23 1/3/24 1/8/24     Left Right Right Right Right   Rung II 60 N/a 50 63 65      Joint Mobility/End Feels: Hard     Palpation: Pt is tender at incision sites      Sensation: Pt denies any numbness or tingling        Scapular Control/Dyskinesis:     Normal / Subtle / Obvious  Comments    Left  N N/a    Right  N N/a         OUTCOME MEASURE:FOTO     Category:Self care       Current Score  = 37%   Goal at Discharge Score = 65%     11/20/23: 53%    Treatment       Julee received the following manual therapy techniques for 10 minutes:   -PROM of shoulder in flexion, abduction, internal rotation, and external rotation 4 x 10 to toleration  - joint mobilizations to glenohumeral joint anterior to posterior     Julee participated in dynamic functional therapeutic activities to improve functional performance for 50  minutes, including:  -pulley's 3 min flexion/ 3 min abduction  - walls slides flexion 2/15  - wall slides abduction 2/15  - 5 minute carry with 7# DB   - Blue theraband rows 2/15  - Blue theraband pull downs 2/15  - Blue theraband internal rotation 2/15  - Blue theraband external rotation 2/15  - UBE: level 4 for 8 minutes forward   -supine flexion stretch with 4# dowel 15  second hold 15x   -reassessment of objective measures     Home Exercises and Education Provided     Education provided:   - Progress towards goals     Written Home Exercises Provided: Patient instructed to cont prior HEP.  Exercises were reviewed and Julee was able to demonstrate them prior to the end of the session.  Julee demonstrated good  understanding of the HEP provided.   .   See EMR under Patient Instructions for exercises provided prior visit.        Assessment     Pt would continue to benefit from skilled OT. Pt tolerated added resistive therapeutic exercises well today. Pt tolerated added therapeutic activities well without additional complaints of pain.  Reassessment of objective measures were taken today and patient continues to progress in overall strength and range of motion. Will progress as tolerated. Pt motivated.      Julee is progressing well towards her goals and there are no updates to goals at this time. Pt prognosis is Good.     Pt will continue to benefit from skilled outpatient occupational therapy to address the deficits listed in the problem list on initial evaluation provide pt/family education and to maximize pt's level of independence in the home and community environment.     Anticipated barriers to occupational therapy: toleration to pain    Pt's spiritual, cultural and educational needs considered and pt agreeable to plan of care and goals.    Goals:      Short Term (6 weeks ):  1)   Patient to be IND with HEP and modalities for pain management MET 12/11/23  2)   Increase ROM to 120 degrees in shoulder flexion to increase functional UE use for ADLs and IADLs MET 12/11/23  3)   Increase ROM to 90 degrees in shoulder abduction to increase functional UE use for ADLs and IADLs MET 12/11/23  4)   Increase ROM to 80 degrees in shoulder internal rotation to increase functional UE use for ADLs and IADLs MET 12/11/23  5)   Increase ROM to 50 degrees in shoulder external rotation to increase  functional UE use for ADLs and IADLs MET 12/11/23  6)   Increase ROM to 130 degrees in elbow flexion to increase functional UE use for ADLs and IADLs MET 10/30/23  7)   Increase ROM to 5 degrees in elbow extension to increase functional UE use for ADLs and IADLs MET 12/11/23  8)   Increase  strength to 30 lbs. to grasp for  ADLs and IADLs MET 12/11/23  9)   Improve muscle strength ing -2 to 3+ in order to participate in ADLs and IADLs with less assistance. MET 12/11/23     Long Term (by discharge):  1)   Pt will report 1 out of 10 pain at worst when completing ADLs and IADLs. Ongoing  2)   Increase ROM to 170 degrees in shoulder flexion to increase functional UE use for ADLs and IADLs Ongoing  3)   Increase ROM to 165 degrees in shoulder abduction to increase functional UE use for ADLs and IADLs Ongoing  4)   Increase ROM to 80 degrees in shoulder internal rotation to increase functional UE use for ADLs and IADLs MET 12/11/23  5)   Increase ROM to 80 degrees in shoulder external rotation to increase functional UE use for ADLs and IADLs Ongoing  6)   Increase ROM to 140 degrees in elbow flexion to increase functional UE use for ADLs and IADLs MET 12/11/23  7)   Increase ROM to 0 degrees in elbow extension to increase functional UE use for ADLs and IADLs MET 12/11/23  8)   Increase  strength to 50 lbs. to grasp for  ADLs and IADLs MET 12/11/23  9)   Improve muscle strength ing to 4+ in order to participate in ADLs and IADLs with less assistance.  Ongoing  10)   Patient to score at 65% or more on FOTO to demonstrate improved perception of functional shoulder use.Ongoing  11)  Pt will return to prior level of function for ADLs and household management. Ongoing     Plan      Pt to be treated by Occupational Therapy 2 times per week for 12 weeks during the certification period to achieve the established goals.      Treatment to include: Paraffin, Fluidotherapy, Manual therapy/joint mobilizations, Modalities for  pain management, US 3 mhz, Therapeutic exercises/activities., Iontophoresis with 2.0 cc Dexamethasone, Strengthening, Orthotic Fabrication/Fit/Training, Edema Control, Scar Management, Wound Care, Electrical Modalities, Joint Protection, and Energy Conservation, as well as any other treatments deemed necessary based on the patient's needs or progress.     Updates/Grading for session: Continue with current plan of care    FERNANDO Harman

## 2024-01-08 NOTE — PLAN OF CARE
Outpatient Therapy Updated Plan of Care     Visit Date: 1/8/2024  Name: Tabby Veloz  Clinic Number: [id number]    Therapy Diagnosis:   Encounter Diagnoses   Name Primary?    Weakness Yes    Elbow stiffness, right     Shoulder stiffness, right     Pain      Physician: Luis Del Valle NP    Physician Orders: OT eval/treat   Medical Diagnosis: S42.351D (ICD-10-CM) - Closed displaced comminuted fracture of shaft of right humerus with routine healing, subsequent   Evaluation Date: 10/9/23    Total Visits Received: 25  Cancelled Visits: 0  No Show Visits: 0    Current Certification Period:  10/9/23 to 1/1/24  Precautions:  Standard  Visits from Evaluation Date:  24  Functional Level Prior to Evaluation:  Independent     Subjective     Update:   Pt reports: She states that she fell asleep on her arm last night so it is a little sore today.  she was compliant with home exercise program given last session.   Response to previous treatment:increased ROM  Functional change: increased ROM    Pain:1/10   Location: right arms     Objective     Update:   Observation: Pt is present with abduction sling on her right shoulder      UE Range of Motion  Active Range of Motion:   Shoulder Right Right  10/30/23 Right  12/11/23 Right   1/3/24 Right  1/8/24   Flexion 20 145 - supine 160 - supine 135 - standing 165 - supine   Abduction 50 110 - supine 130 - supine 130 -standing 145 - supine   ER at 90 20 40 - supine 50 - supine 55 - supine 55 - supine   IR 80 80 - supine 80 - supine 80- supine 80 - supine      Passive Range of Motion:   Shoulder Left Right Right   10/30/23 Right  12/11/23   Flexion 170 70 160 170   Abduction 165 70 125 145   ER at 90 80 20 40 55   IR 80 80 80 80      Elbow Left Right Right   10/30/23 Right  12/11/23 Right   1/3/24 Right  1/8/24   Flexion 140 120 130 140 140 140   Extension 0 -10 0 0 0 0      Painful Arc:   Patient demonstrates no painful arc in shoulder flexion or abduction     Upper Extremity  Strength  (R) UE   (L) UE     Shoulder flexion: 2-/5 Shoulder flexion: 5/5   Shoulder Abduction: 2-/5 Shoulder abduction: 5/5   Shoulder ER 2-/5 Shoulder ER 5/5   Shoulder IR 2-/5 Shoulder IR 5/5   Rhomboids 2-/5 Rhomboids 5/5       Strength: (OSMANY Dynamometer in psi.)        10/9/2023 10/9/2023 12/11/23 1/3/24 1/8/24     Left Right Right Right Right   Rung II 60 N/a 50 63 65      Joint Mobility/End Feels: Hard     Palpation: Pt is tender at incision sites      Sensation: Pt denies any numbness or tingling        Scapular Control/Dyskinesis:     Normal / Subtle / Obvious  Comments    Left  N N/a    Right  N N/a         OUTCOME MEASURE:FOTO     Category:Self care       Current Score  = 37%   Goal at Discharge Score = 65%     11/20/23: 53%    Assessment     Update: Pt would continue to benefit from skilled OT. Pt tolerated added resistive therapeutic exercises well today. Pt tolerated added therapeutic activities well without additional complaints of pain.  Reassessment of objective measures were taken today and patient continues to progress in overall strength and range of motion. Will progress as tolerated. Pt motivated.      Goals at original POC:      Short Term (6 weeks ):  1)   Patient to be IND with HEP and modalities for pain management MET 12/11/23  2)   Increase ROM to 120 degrees in shoulder flexion to increase functional UE use for ADLs and IADLs MET 12/11/23  3)   Increase ROM to 90 degrees in shoulder abduction to increase functional UE use for ADLs and IADLs MET 12/11/23  4)   Increase ROM to 80 degrees in shoulder internal rotation to increase functional UE use for ADLs and IADLs MET 12/11/23  5)   Increase ROM to 50 degrees in shoulder external rotation to increase functional UE use for ADLs and IADLs MET 12/11/23  6)   Increase ROM to 130 degrees in elbow flexion to increase functional UE use for ADLs and IADLs MET 10/30/23  7)   Increase ROM to 5 degrees in elbow extension to increase  functional UE use for ADLs and IADLs MET 12/11/23  8)   Increase  strength to 30 lbs. to grasp for  ADLs and IADLs MET 12/11/23  9)   Improve muscle strength ing -2 to 3+ in order to participate in ADLs and IADLs with less assistance. MET 12/11/23     Long Term (by discharge):  1)   Pt will report 1 out of 10 pain at worst when completing ADLs and IADLs. Ongoing  2)   Increase ROM to 170 degrees in shoulder flexion to increase functional UE use for ADLs and IADLs Ongoing  3)   Increase ROM to 165 degrees in shoulder abduction to increase functional UE use for ADLs and IADLs Ongoing  4)   Increase ROM to 80 degrees in shoulder internal rotation to increase functional UE use for ADLs and IADLs MET 12/11/23  5)   Increase ROM to 80 degrees in shoulder external rotation to increase functional UE use for ADLs and IADLs Ongoing  6)   Increase ROM to 140 degrees in elbow flexion to increase functional UE use for ADLs and IADLs MET 12/11/23  7)   Increase ROM to 0 degrees in elbow extension to increase functional UE use for ADLs and IADLs MET 12/11/23  8)   Increase  strength to 50 lbs. to grasp for  ADLs and IADLs MET 12/11/23  9)   Improve muscle strength ing to 4+ in order to participate in ADLs and IADLs with less assistance.  Ongoing  10)   Patient to score at 65% or more on FOTO to demonstrate improved perception of functional shoulder use.Ongoing  11)  Pt will return to prior level of function for ADLs and household management. Ongoing    UPDATED GOALS:  1)   Pt will report 1 out of 10 pain at worst when completing ADLs and IADLs. Ongoing  2)   Increase ROM to 170 degrees in shoulder flexion to increase functional UE use for ADLs and IADLs Ongoing  3)   Increase ROM to 165 degrees in shoulder abduction to increase functional UE use for ADLs and IADLs Ongoing  4)   Increase ROM to 80 degrees in shoulder external rotation to increase functional UE use for ADLs and IADLs Ongoing  5)   Improve muscle strength ing  to 4+ in order to participate in ADLs and IADLs with less assistance.  Ongoing  6)   Patient to score at 65% or more on FOTO to demonstrate improved perception of functional shoulder use.Ongoing  7)  Pt will return to prior level of function for ADLs and household management. Ongoing     Previous Short Term Goals Status:   9/9  Long Term Goal Status:   continue per initial plan of care.  Reasons for Recertification of Therapy:   Need re-certification to progress towards LTGs and enhance overall QOL.    Plan     Updated Certification Period: 1/1/24 to 3/29/24  Recommended Treatment Plan: 2 times per week for 12 weeks: Aquatic Therapy, Cervical/Lumbar Traction, Debridement (nonselective), Debridement (selective), Electrical Stimulation , Fluidotherapy, Gait Training, Iontophoresis (with ), Manual Therapy, Moist Heat/ Ice, Neuromuscular Re-ed, Orthotic Management and Training, Paraffin, Patient Education, Self Care, Therapeutic Activities, Therapeutic Exercise, and Ultrasound    Montserrat Wood, OT  1/8/2024      I CERTIFY THE NEED FOR THESE SERVICES FURNISHED UNDER THIS PLAN OF TREATMENT AND WHILE UNDER MY CARE    Physician's comments:        Physician's Signature: ___________________________________________________

## 2024-01-10 ENCOUNTER — CLINICAL SUPPORT (OUTPATIENT)
Dept: REHABILITATION | Facility: HOSPITAL | Age: 44
End: 2024-01-10
Payer: COMMERCIAL

## 2024-01-10 DIAGNOSIS — R52 PAIN: ICD-10-CM

## 2024-01-10 DIAGNOSIS — M25.621 ELBOW STIFFNESS, RIGHT: ICD-10-CM

## 2024-01-10 DIAGNOSIS — M25.611 SHOULDER STIFFNESS, RIGHT: ICD-10-CM

## 2024-01-10 DIAGNOSIS — R53.1 WEAKNESS: Primary | ICD-10-CM

## 2024-01-10 PROCEDURE — 97140 MANUAL THERAPY 1/> REGIONS: CPT | Mod: PO

## 2024-01-10 PROCEDURE — 97530 THERAPEUTIC ACTIVITIES: CPT | Mod: PO

## 2024-01-10 NOTE — PROGRESS NOTES
Occupational Therapy Daily Treatment Note     Name: Tabby Veloz  Clinic Number: [id number]    Therapy Diagnosis:   Encounter Diagnoses   Name Primary?    Weakness Yes    Elbow stiffness, right     Shoulder stiffness, right     Pain        Physician: Luis Del Valle NP    Visit Date: 1/10/2024    Physician Orders: OT eval/ treat   Plan of Care Certification Date: 3/29/24  Authorization Period: 10/3/23 -10/2/24  Date of Return to MD: 4 weeks   Medical Diagnosis: S42.351D (ICD-10-CM) - Closed displaced comminuted fracture of shaft of right humerus with routine healing, subsequent   Surgical Procedure and Date: ORIF for her right humeral shaft fracture with IM nail by Dr. Stanley on 9/19/2023. ,   Evaluation Date: 10/9/23  Onset Date:9/15/23   Date of Return to MD: 1/23/24  Visit # / Visits authorized: 4/20 + 22  FOTO Completion: 3/3    Time In:0900  Time Out: 1018  Total Billable Time: 78 minutes    Note from MD 12/12/23 visit:    - Continue [facility].  - Weight bearing 2-4 lbs x 4 weeks and increase by 2-4 lbs/month thereafter as she tolerates.  Ok for therapist to work with a pulley.  - Range of motion as tolerated.     Precautions: Standard and WB, 15 weeks 1/2/24   Subjective     Pt reports: She states that her arm gets tight when she is reaching in external rotation  she was compliant with home exercise program given last session.   Response to previous treatment:increased ROM  Functional change: increased ROM    Pain:1/10   Location: right arms     Objective       Observation: Pt is present with abduction sling on her right shoulder      UE Range of Motion  Active Range of Motion:   Shoulder Right Right  10/30/23 Right  12/11/23 Right   1/3/24 Right  1/8/24   Flexion 20 145 - supine 160 - supine 135 - standing 165 - supine   Abduction 50 110 - supine 130 - supine 130 -standing 145 - supine   ER at 90 20 40 - supine 50 - supine 55 - supine 55 - supine   IR 80 80 - supine 80 - supine 80- supine 80  - supine      Passive Range of Motion:   Shoulder Left Right Right   10/30/23 Right  12/11/23   Flexion 170 70 160 170   Abduction 165 70 125 145   ER at 90 80 20 40 55   IR 80 80 80 80      Elbow Left Right Right   10/30/23 Right  12/11/23 Right   1/3/24 Right  1/8/24   Flexion 140 120 130 140 140 140   Extension 0 -10 0 0 0 0      Painful Arc:   Patient demonstrates no painful arc in shoulder flexion or abduction     Upper Extremity Strength  (R) UE   (L) UE     Shoulder flexion: 2-/5 Shoulder flexion: 5/5   Shoulder Abduction: 2-/5 Shoulder abduction: 5/5   Shoulder ER 2-/5 Shoulder ER 5/5   Shoulder IR 2-/5 Shoulder IR 5/5   Rhomboids 2-/5 Rhomboids 5/5       Strength: (OSMANY Dynamometer in psi.)        10/9/2023 10/9/2023 12/11/23 1/3/24 1/8/24     Left Right Right Right Right   Rung II 60 N/a 50 63 65      Joint Mobility/End Feels: Hard     Palpation: Pt is tender at incision sites      Sensation: Pt denies any numbness or tingling        Scapular Control/Dyskinesis:     Normal / Subtle / Obvious  Comments    Left  N N/a    Right  N N/a         OUTCOME MEASURE:FOTO     Category:Self care       Current Score  = 37%   Goal at Discharge Score = 65%     11/20/23: 53%    Treatment       Julee received the following manual therapy techniques for 8 minutes:   -PROM of shoulder in flexion, abduction, internal rotation, and external rotation 4 x 10 to toleration  - joint mobilizations to glenohumeral joint anterior to posterior     Julee participated in dynamic functional therapeutic activities to improve functional performance for 70  minutes, including:  -pulley's 3 min flexion/ 3 min abduction  - walls slides flexion 2/15  - wall slides abduction 2/15  - 5 minute carry with 7# DB   - Blue theraband rows 2/15  - Blue theraband pull downs 2/15  - Blue theraband internal rotation 2/15  - Blue theraband external rotation 2/15  - UBE: level 4 for 8 minutes forward   -supine flexion stretch with 4# dowel 15 second  hold 15x       Home Exercises and Education Provided     Education provided:   - Progress towards goals     Written Home Exercises Provided: Patient instructed to cont prior HEP.  Exercises were reviewed and Julee was able to demonstrate them prior to the end of the session.  Julee demonstrated good  understanding of the HEP provided.   .   See EMR under Patient Instructions for exercises provided prior visit.        Assessment     Pt would continue to benefit from skilled OT. Pt tolerated added resistive therapeutic exercises well today. Pt tolerated added therapeutic activities well without additional complaints of pain. Patient continues to progress in overall strength and range of motion. Will progress as tolerated. Pt motivated.      Julee is progressing well towards her goals and there are no updates to goals at this time. Pt prognosis is Good.     Pt will continue to benefit from skilled outpatient occupational therapy to address the deficits listed in the problem list on initial evaluation provide pt/family education and to maximize pt's level of independence in the home and community environment.     Anticipated barriers to occupational therapy: slow bone healing    Pt's spiritual, cultural and educational needs considered and pt agreeable to plan of care and goals.    Goals:      Short Term (6 weeks ):  1)   Patient to be IND with HEP and modalities for pain management MET 12/11/23  2)   Increase ROM to 120 degrees in shoulder flexion to increase functional UE use for ADLs and IADLs MET 12/11/23  3)   Increase ROM to 90 degrees in shoulder abduction to increase functional UE use for ADLs and IADLs MET 12/11/23  4)   Increase ROM to 80 degrees in shoulder internal rotation to increase functional UE use for ADLs and IADLs MET 12/11/23  5)   Increase ROM to 50 degrees in shoulder external rotation to increase functional UE use for ADLs and IADLs MET 12/11/23  6)   Increase ROM to 130 degrees in elbow flexion  to increase functional UE use for ADLs and IADLs MET 10/30/23  7)   Increase ROM to 5 degrees in elbow extension to increase functional UE use for ADLs and IADLs MET 12/11/23  8)   Increase  strength to 30 lbs. to grasp for  ADLs and IADLs MET 12/11/23  9)   Improve muscle strength ing -2 to 3+ in order to participate in ADLs and IADLs with less assistance. MET 12/11/23     Long Term (by discharge):  1)   Pt will report 1 out of 10 pain at worst when completing ADLs and IADLs. Ongoing  2)   Increase ROM to 170 degrees in shoulder flexion to increase functional UE use for ADLs and IADLs Ongoing  3)   Increase ROM to 165 degrees in shoulder abduction to increase functional UE use for ADLs and IADLs Ongoing  4)   Increase ROM to 80 degrees in shoulder internal rotation to increase functional UE use for ADLs and IADLs MET 12/11/23  5)   Increase ROM to 80 degrees in shoulder external rotation to increase functional UE use for ADLs and IADLs Ongoing  6)   Increase ROM to 140 degrees in elbow flexion to increase functional UE use for ADLs and IADLs MET 12/11/23  7)   Increase ROM to 0 degrees in elbow extension to increase functional UE use for ADLs and IADLs MET 12/11/23  8)   Increase  strength to 50 lbs. to grasp for  ADLs and IADLs MET 12/11/23  9)   Improve muscle strength ing to 4+ in order to participate in ADLs and IADLs with less assistance.  Ongoing  10)   Patient to score at 65% or more on FOTO to demonstrate improved perception of functional shoulder use.Ongoing  11)  Pt will return to prior level of function for ADLs and household management. Ongoing     Plan      Pt to be treated by Occupational Therapy 2 times per week for 12 weeks during the certification period to achieve the established goals.      Treatment to include: Paraffin, Fluidotherapy, Manual therapy/joint mobilizations, Modalities for pain management, US 3 mhz, Therapeutic exercises/activities., Iontophoresis with 2.0 cc Dexamethasone,  Strengthening, Orthotic Fabrication/Fit/Training, Edema Control, Scar Management, Wound Care, Electrical Modalities, Joint Protection, and Energy Conservation, as well as any other treatments deemed necessary based on the patient's needs or progress.     Updates/Grading for session: Continue with current plan of care    FERNANDO Harman

## 2024-01-17 ENCOUNTER — CLINICAL SUPPORT (OUTPATIENT)
Dept: REHABILITATION | Facility: HOSPITAL | Age: 44
End: 2024-01-17
Payer: COMMERCIAL

## 2024-01-17 DIAGNOSIS — M25.611 SHOULDER STIFFNESS, RIGHT: ICD-10-CM

## 2024-01-17 DIAGNOSIS — M25.621 ELBOW STIFFNESS, RIGHT: ICD-10-CM

## 2024-01-17 DIAGNOSIS — R53.1 WEAKNESS: Primary | ICD-10-CM

## 2024-01-17 DIAGNOSIS — R52 PAIN: ICD-10-CM

## 2024-01-17 PROCEDURE — 97140 MANUAL THERAPY 1/> REGIONS: CPT | Mod: PO

## 2024-01-17 PROCEDURE — 97530 THERAPEUTIC ACTIVITIES: CPT | Mod: PO

## 2024-01-17 NOTE — PROGRESS NOTES
Occupational Therapy Daily Treatment Note     Name: Tabby Veloz  Clinic Number: [id number]    Therapy Diagnosis:   Encounter Diagnoses   Name Primary?    Weakness Yes    Elbow stiffness, right     Shoulder stiffness, right     Pain        Physician: Luis Del Valle NP    Visit Date: 1/17/2024    Physician Orders: OT eval/ treat   Plan of Care Certification Date: 3/29/24  Authorization Period: 10/3/23 -10/2/24  Date of Return to MD: 4 weeks   Medical Diagnosis: S42.351D (ICD-10-CM) - Closed displaced comminuted fracture of shaft of right humerus with routine healing, subsequent   Surgical Procedure and Date: ORIF for her right humeral shaft fracture with IM nail by Dr. Stanley on 9/19/2023. ,   Evaluation Date: 10/9/23  Onset Date:9/15/23   Date of Return to MD: 1/23/24  Visit # / Visits authorized: 5/20 + 22  FOTO Completion: 3/3    Time In:0900  Time Out: 1018  Total Billable Time: 78 minutes    Note from MD 12/12/23 visit:    - Continue [facility].  - Weight bearing 2-4 lbs x 4 weeks and increase by 2-4 lbs/month thereafter as she tolerates.  Ok for therapist to work with a pulley.  - Range of motion as tolerated.     Precautions: Standard and WB, 17 weeks 1/16/24   Subjective     Pt reports: She states that her arm has been feeling less heavy   she was compliant with home exercise program given last session.   Response to previous treatment:increased ROM  Functional change: increased ROM    Pain:1/10   Location: right arms     Objective       Observation: Pt is present with abduction sling on her right shoulder      UE Range of Motion  Active Range of Motion:   Shoulder Right Right  10/30/23 Right  12/11/23 Right   1/3/24 Right  1/8/24   Flexion 20 145 - supine 160 - supine 135 - standing 165 - supine   Abduction 50 110 - supine 130 - supine 130 -standing 145 - supine   ER at 90 20 40 - supine 50 - supine 55 - supine 55 - supine   IR 80 80 - supine 80 - supine 80- supine 80 - supine      Passive  Range of Motion:   Shoulder Left Right Right   10/30/23 Right  12/11/23   Flexion 170 70 160 170   Abduction 165 70 125 145   ER at 90 80 20 40 55   IR 80 80 80 80      Elbow Left Right Right   10/30/23 Right  12/11/23 Right   1/3/24 Right  1/8/24   Flexion 140 120 130 140 140 140   Extension 0 -10 0 0 0 0      Painful Arc:   Patient demonstrates no painful arc in shoulder flexion or abduction     Upper Extremity Strength  (R) UE   (L) UE     Shoulder flexion: 2-/5 Shoulder flexion: 5/5   Shoulder Abduction: 2-/5 Shoulder abduction: 5/5   Shoulder ER 2-/5 Shoulder ER 5/5   Shoulder IR 2-/5 Shoulder IR 5/5   Rhomboids 2-/5 Rhomboids 5/5       Strength: (OSMANY Dynamometer in psi.)        10/9/2023 10/9/2023 12/11/23 1/3/24 1/8/24     Left Right Right Right Right   Rung II 60 N/a 50 63 65      Joint Mobility/End Feels: Hard     Palpation: Pt is tender at incision sites      Sensation: Pt denies any numbness or tingling        Scapular Control/Dyskinesis:     Normal / Subtle / Obvious  Comments    Left  N N/a    Right  N N/a         OUTCOME MEASURE:FOTO     Category:Self care       Current Score  = 37%   Goal at Discharge Score = 65%     11/20/23: 53%    Treatment       Julee received the following manual therapy techniques for 8 minutes:   -PROM of shoulder in flexion, abduction, internal rotation, and external rotation 4 x 10 to toleration  - joint mobilizations to glenohumeral joint anterior to posterior     Julee participated in dynamic functional therapeutic activities to improve functional performance for 70  minutes, including:  -pulley's 3 min flexion/ 3 min abduction  - walls slides flexion 2/15  - wall slides abduction 2/15  - 5 minute carry with 8# DB   - Blue theraband rows 2/15  - Blue theraband pull downs 2/15  - Blue theraband internal rotation 2/15  - Blue theraband external rotation 2/15  - UBE: level 4 for 6 minutes forward   -supine flexion stretch with 4# dowel 15 second hold 15x       Home  Exercises and Education Provided     Education provided:   - Progress towards goals     Written Home Exercises Provided: Patient instructed to cont prior HEP.  Exercises were reviewed and Julee was able to demonstrate them prior to the end of the session.  Julee demonstrated good  understanding of the HEP provided.   .   See EMR under Patient Instructions for exercises provided prior visit.        Assessment     Pt would continue to benefit from skilled OT. Pt tolerated added resistive therapeutic exercises well today. Pt tolerated added therapeutic activities well without additional complaints of pain. Patient continues to progress in overall strength and range of motion. Will progress as tolerated. Pt motivated.      Julee is progressing well towards her goals and there are no updates to goals at this time. Pt prognosis is Good.     Pt will continue to benefit from skilled outpatient occupational therapy to address the deficits listed in the problem list on initial evaluation provide pt/family education and to maximize pt's level of independence in the home and community environment.     Anticipated barriers to occupational therapy: slow bone healing    Pt's spiritual, cultural and educational needs considered and pt agreeable to plan of care and goals.    Goals:      Short Term (6 weeks ):  1)   Patient to be IND with HEP and modalities for pain management MET 12/11/23  2)   Increase ROM to 120 degrees in shoulder flexion to increase functional UE use for ADLs and IADLs MET 12/11/23  3)   Increase ROM to 90 degrees in shoulder abduction to increase functional UE use for ADLs and IADLs MET 12/11/23  4)   Increase ROM to 80 degrees in shoulder internal rotation to increase functional UE use for ADLs and IADLs MET 12/11/23  5)   Increase ROM to 50 degrees in shoulder external rotation to increase functional UE use for ADLs and IADLs MET 12/11/23  6)   Increase ROM to 130 degrees in elbow flexion to increase  functional UE use for ADLs and IADLs MET 10/30/23  7)   Increase ROM to 5 degrees in elbow extension to increase functional UE use for ADLs and IADLs MET 12/11/23  8)   Increase  strength to 30 lbs. to grasp for  ADLs and IADLs MET 12/11/23  9)   Improve muscle strength ing -2 to 3+ in order to participate in ADLs and IADLs with less assistance. MET 12/11/23     Long Term (by discharge):  1)   Pt will report 1 out of 10 pain at worst when completing ADLs and IADLs. Ongoing  2)   Increase ROM to 170 degrees in shoulder flexion to increase functional UE use for ADLs and IADLs Ongoing  3)   Increase ROM to 165 degrees in shoulder abduction to increase functional UE use for ADLs and IADLs Ongoing  4)   Increase ROM to 80 degrees in shoulder internal rotation to increase functional UE use for ADLs and IADLs MET 12/11/23  5)   Increase ROM to 80 degrees in shoulder external rotation to increase functional UE use for ADLs and IADLs Ongoing  6)   Increase ROM to 140 degrees in elbow flexion to increase functional UE use for ADLs and IADLs MET 12/11/23  7)   Increase ROM to 0 degrees in elbow extension to increase functional UE use for ADLs and IADLs MET 12/11/23  8)   Increase  strength to 50 lbs. to grasp for  ADLs and IADLs MET 12/11/23  9)   Improve muscle strength ing to 4+ in order to participate in ADLs and IADLs with less assistance.  Ongoing  10)   Patient to score at 65% or more on FOTO to demonstrate improved perception of functional shoulder use.Ongoing  11)  Pt will return to prior level of function for ADLs and household management. Ongoing     Plan      Pt to be treated by Occupational Therapy 2 times per week for 12 weeks during the certification period to achieve the established goals.      Treatment to include: Paraffin, Fluidotherapy, Manual therapy/joint mobilizations, Modalities for pain management, US 3 mhz, Therapeutic exercises/activities., Iontophoresis with 2.0 cc Dexamethasone,  Strengthening, Orthotic Fabrication/Fit/Training, Edema Control, Scar Management, Wound Care, Electrical Modalities, Joint Protection, and Energy Conservation, as well as any other treatments deemed necessary based on the patient's needs or progress.     Updates/Grading for session: Reassessment of objective measures     FERNANDO Harman

## 2024-01-19 ENCOUNTER — CLINICAL SUPPORT (OUTPATIENT)
Dept: REHABILITATION | Facility: HOSPITAL | Age: 44
End: 2024-01-19
Payer: COMMERCIAL

## 2024-01-19 DIAGNOSIS — M25.621 ELBOW STIFFNESS, RIGHT: ICD-10-CM

## 2024-01-19 DIAGNOSIS — M25.611 SHOULDER STIFFNESS, RIGHT: ICD-10-CM

## 2024-01-19 DIAGNOSIS — R53.1 WEAKNESS: Primary | ICD-10-CM

## 2024-01-19 DIAGNOSIS — R52 PAIN: ICD-10-CM

## 2024-01-19 PROCEDURE — 97530 THERAPEUTIC ACTIVITIES: CPT | Mod: PO

## 2024-01-19 PROCEDURE — 97140 MANUAL THERAPY 1/> REGIONS: CPT | Mod: PO

## 2024-01-19 NOTE — PROGRESS NOTES
Occupational Therapy Daily Treatment Note     Name: Tabby Veloz  Clinic Number: [id number]    Therapy Diagnosis:   Encounter Diagnoses   Name Primary?    Weakness Yes    Elbow stiffness, right     Shoulder stiffness, right     Pain        Physician: Luis Del Valle NP    Visit Date: 1/19/2024    Physician Orders: OT eval/ treat   Plan of Care Certification Date: 3/29/24  Authorization Period: 10/3/23 -10/2/24   Medical Diagnosis: S42.351D (ICD-10-CM) - Closed displaced comminuted fracture of shaft of right humerus with routine healing, subsequent   Surgical Procedure and Date: ORIF for her right humeral shaft fracture with IM nail by Dr. Stanley on 9/19/2023. ,   Evaluation Date: 10/9/23  Onset Date:9/15/23   Date of Return to MD: 1/23/24  Visit # / Visits authorized: 6/20 + 22  FOTO Completion: 3/3    Time In:0900  Time Out: 1000  Total Billable Time: 60 minutes    Note from MD 12/12/23 visit:    - Continue [facility].  - Weight bearing 2-4 lbs x 4 weeks and increase by 2-4 lbs/month thereafter as she tolerates.  Ok for therapist to work with a pulley.  - Range of motion as tolerated.     Precautions: Standard and WB, 17 weeks 1/16/24   Subjective     Pt reports: She states that her arm has been feeling less heavy   she was compliant with home exercise program given last session.   Response to previous treatment:increased ROM  Functional change: increased ROM    Pain:1/10   Location: right arms     Objective       Observation: Pt is present with abduction sling on her right shoulder      UE Range of Motion  Active Range of Motion:   Shoulder Right Right  10/30/23 Right  12/11/23 Right   1/3/24 Right  1/8/24   Flexion 20 145 - supine 160 - supine 135 - standing 165 - supine   Abduction 50 110 - supine 130 - supine 130 -standing 145 - supine   ER at 90 20 40 - supine 50 - supine 55 - supine 55 - supine   IR 80 80 - supine 80 - supine 80- supine 80 - supine      Passive Range of Motion:   Shoulder  Left Right Right   10/30/23 Right  12/11/23   Flexion 170 70 160 170   Abduction 165 70 125 145   ER at 90 80 20 40 55   IR 80 80 80 80      Elbow Left Right Right   10/30/23 Right  12/11/23 Right   1/3/24 Right  1/8/24   Flexion 140 120 130 140 140 140   Extension 0 -10 0 0 0 0      Painful Arc:   Patient demonstrates no painful arc in shoulder flexion or abduction     Upper Extremity Strength  (R) UE   (L) UE     Shoulder flexion: 2-/5 Shoulder flexion: 5/5   Shoulder Abduction: 2-/5 Shoulder abduction: 5/5   Shoulder ER 2-/5 Shoulder ER 5/5   Shoulder IR 2-/5 Shoulder IR 5/5   Rhomboids 2-/5 Rhomboids 5/5       Strength: (OSMANY Dynamometer in psi.)        10/9/2023 10/9/2023 12/11/23 1/3/24 1/8/24     Left Right Right Right Right   Rung II 60 N/a 50 63 65      Joint Mobility/End Feels: Hard     Palpation: Pt is tender at incision sites      Sensation: Pt denies any numbness or tingling        Scapular Control/Dyskinesis:     Normal / Subtle / Obvious  Comments    Left  N N/a    Right  N N/a         OUTCOME MEASURE:FOTO     Category:Self care       Current Score  = 37%   Goal at Discharge Score = 65%     11/20/23: 53%    Treatment       Julee received the following manual therapy techniques for 8 minutes:   -PROM of shoulder in flexion, abduction, internal rotation, and external rotation 4 x 10 to toleration  - joint mobilizations to glenohumeral joint anterior to posterior     Julee participated in dynamic functional therapeutic activities to improve functional performance for 52  minutes, including:  -pulley's 3 min flexion/ 3 min abduction  - walls slides flexion 2/15  - wall slides abduction 2/15  - 5 minute carry with 8# DB   - Blue theraband rows 2/15  - Blue theraband pull downs 2/15  - Blue theraband internal rotation 2/15  - Blue theraband external rotation 2/15  - UBE: level 4 for 6 minutes forward   -supine flexion stretch with 4# dowel 15 second hold 15x       Home Exercises and Education Provided      Education provided:   - Progress towards goals     Written Home Exercises Provided: Patient instructed to cont prior HEP.  Exercises were reviewed and Julee was able to demonstrate them prior to the end of the session.  Julee demonstrated good  understanding of the HEP provided.   .   See EMR under Patient Instructions for exercises provided prior visit.        Assessment     Pt would continue to benefit from skilled OT. Pt tolerated added resistive therapeutic exercises well today. Pt tolerated added therapeutic activities well without additional complaints of pain. Patient continues to progress in overall strength and range of motion. Will progress as tolerated. Pt motivated.      Julee is progressing well towards her goals and there are no updates to goals at this time. Pt prognosis is Good.     Pt will continue to benefit from skilled outpatient occupational therapy to address the deficits listed in the problem list on initial evaluation provide pt/family education and to maximize pt's level of independence in the home and community environment.     Anticipated barriers to occupational therapy: slow bone healing    Pt's spiritual, cultural and educational needs considered and pt agreeable to plan of care and goals.    Goals:      Short Term (6 weeks ):  1)   Patient to be IND with HEP and modalities for pain management MET 12/11/23  2)   Increase ROM to 120 degrees in shoulder flexion to increase functional UE use for ADLs and IADLs MET 12/11/23  3)   Increase ROM to 90 degrees in shoulder abduction to increase functional UE use for ADLs and IADLs MET 12/11/23  4)   Increase ROM to 80 degrees in shoulder internal rotation to increase functional UE use for ADLs and IADLs MET 12/11/23  5)   Increase ROM to 50 degrees in shoulder external rotation to increase functional UE use for ADLs and IADLs MET 12/11/23  6)   Increase ROM to 130 degrees in elbow flexion to increase functional UE use for ADLs and IADLs MET  10/30/23  7)   Increase ROM to 5 degrees in elbow extension to increase functional UE use for ADLs and IADLs MET 12/11/23  8)   Increase  strength to 30 lbs. to grasp for  ADLs and IADLs MET 12/11/23  9)   Improve muscle strength ing -2 to 3+ in order to participate in ADLs and IADLs with less assistance. MET 12/11/23     Long Term (by discharge):  1)   Pt will report 1 out of 10 pain at worst when completing ADLs and IADLs. Ongoing  2)   Increase ROM to 170 degrees in shoulder flexion to increase functional UE use for ADLs and IADLs Ongoing  3)   Increase ROM to 165 degrees in shoulder abduction to increase functional UE use for ADLs and IADLs Ongoing  4)   Increase ROM to 80 degrees in shoulder internal rotation to increase functional UE use for ADLs and IADLs MET 12/11/23  5)   Increase ROM to 80 degrees in shoulder external rotation to increase functional UE use for ADLs and IADLs Ongoing  6)   Increase ROM to 140 degrees in elbow flexion to increase functional UE use for ADLs and IADLs MET 12/11/23  7)   Increase ROM to 0 degrees in elbow extension to increase functional UE use for ADLs and IADLs MET 12/11/23  8)   Increase  strength to 50 lbs. to grasp for  ADLs and IADLs MET 12/11/23  9)   Improve muscle strength ing to 4+ in order to participate in ADLs and IADLs with less assistance.  Ongoing  10)   Patient to score at 65% or more on FOTO to demonstrate improved perception of functional shoulder use.Ongoing  11)  Pt will return to prior level of function for ADLs and household management. Ongoing     Plan      Pt to be treated by Occupational Therapy 2 times per week for 12 weeks during the certification period to achieve the established goals.      Treatment to include: Paraffin, Fluidotherapy, Manual therapy/joint mobilizations, Modalities for pain management, US 3 mhz, Therapeutic exercises/activities., Iontophoresis with 2.0 cc Dexamethasone, Strengthening, Orthotic Fabrication/Fit/Training,  Edema Control, Scar Management, Wound Care, Electrical Modalities, Joint Protection, and Energy Conservation, as well as any other treatments deemed necessary based on the patient's needs or progress.     Updates/Grading for session: Continue with current plan of care     FERNANDO Harman

## 2024-01-22 ENCOUNTER — CLINICAL SUPPORT (OUTPATIENT)
Dept: REHABILITATION | Facility: HOSPITAL | Age: 44
End: 2024-01-22
Payer: COMMERCIAL

## 2024-01-22 DIAGNOSIS — M25.621 ELBOW STIFFNESS, RIGHT: ICD-10-CM

## 2024-01-22 DIAGNOSIS — R52 PAIN: ICD-10-CM

## 2024-01-22 DIAGNOSIS — R53.1 WEAKNESS: Primary | ICD-10-CM

## 2024-01-22 DIAGNOSIS — M25.611 SHOULDER STIFFNESS, RIGHT: ICD-10-CM

## 2024-01-22 PROCEDURE — 97140 MANUAL THERAPY 1/> REGIONS: CPT | Mod: PO

## 2024-01-22 PROCEDURE — 97530 THERAPEUTIC ACTIVITIES: CPT | Mod: PO

## 2024-01-22 NOTE — PROGRESS NOTES
Occupational Therapy Daily Treatment Note     Name: Tabby Veloz  Clinic Number: [id number]    Therapy Diagnosis:   Encounter Diagnoses   Name Primary?    Weakness Yes    Elbow stiffness, right     Shoulder stiffness, right     Pain          Physician: Luis Del Valle NP    Visit Date: 1/22/2024    Physician Orders: OT eval/ treat   Plan of Care Certification Date: 3/29/24  Authorization Period: 10/3/23 -10/2/24   Medical Diagnosis: S42.351D (ICD-10-CM) - Closed displaced comminuted fracture of shaft of right humerus with routine healing, subsequent   Surgical Procedure and Date: ORIF for her right humeral shaft fracture with IM nail by Dr. Stanley on 9/19/2023. ,   Evaluation Date: 10/9/23  Onset Date:9/15/23   Date of Return to MD: 1/23/24  Visit # / Visits authorized: 7/20 + 22  FOTO Completion: 3/3    Time In:1100  Time Out: 1200  Total Billable Time: 60 minutes    Note from MD 12/12/23 visit:    - Continue [facility].  - Weight bearing 2-4 lbs x 4 weeks and increase by 2-4 lbs/month thereafter as she tolerates.  Ok for therapist to work with a pulley.  - Range of motion as tolerated.     Precautions: Standard and WB, 17 weeks 1/16/24   Subjective     Pt reports: She states that it hurts going into external rotation   she was compliant with home exercise program given last session.   Response to previous treatment:increased ROM  Functional change: increased ROM    Pain:1/10   Location: right arms     Objective       Observation: Pt is present with abduction sling on her right shoulder      UE Range of Motion  Active Range of Motion:   Shoulder Right Right  10/30/23 Right  12/11/23 Right   1/3/24 Right  1/8/24 Right   1/22/24   Flexion 20 145 - supine 160 - supine 135 - standing 165 - supine 140- standing  165 - supine   Abduction 50 110 - supine 130 - supine 130 -standing 145 - supine 145 - standing   150 - supine   ER at 90 20 40 - supine 50 - supine 55 - supine 55 - supine  35 - standing   55 -  supine   IR 80 80 - supine 80 - supine 80- supine 80 - supine 70 - standing   80 - supine       Passive Range of Motion:   Shoulder Left Right Right   10/30/23 Right  12/11/23   Flexion 170 70 160 170   Abduction 165 70 125 145   ER at 90 80 20 40 55   IR 80 80 80 80      Elbow Left Right Right   10/30/23 Right  12/11/23 Right   1/3/24 Right  1/8/24 Right  1/22/24   Flexion 140 120 130 140 140 140 140   Extension 0 -10 0 0 0 0 0      Painful Arc:   Patient demonstrates no painful arc in shoulder flexion or abduction     Upper Extremity Strength  (R) UE   (L) UE     Shoulder flexion: 2-/5 Shoulder flexion: 5/5   Shoulder Abduction: 2-/5 Shoulder abduction: 5/5   Shoulder ER 2-/5 Shoulder ER 5/5   Shoulder IR 2-/5 Shoulder IR 5/5   Rhomboids 2-/5 Rhomboids 5/5       Strength: (OSMANY Dynamometer in psi.)        10/9/2023 10/9/2023 12/11/23 1/3/24 1/8/24 1/22/24     Left Right Right Right Right Right   Rung II 60 N/a 50 63 65 65      Joint Mobility/End Feels: Hard     Palpation: Pt is tender at incision sites      Sensation: Pt denies any numbness or tingling        Scapular Control/Dyskinesis:     Normal / Subtle / Obvious  Comments    Left  N N/a    Right  N N/a         OUTCOME MEASURE:FOTO     Category:Self care       Current Score  = 37%   Goal at Discharge Score = 65%     11/20/23: 53%    Treatment       Julee received the following manual therapy techniques for 8 minutes:   -PROM of shoulder in flexion, abduction, internal rotation, and external rotation 4 x 10 to toleration  - joint mobilizations to glenohumeral joint anterior to posterior     Julee participated in dynamic functional therapeutic activities to improve functional performance for 52  minutes, including:  -pulley's 3 min flexion/ 3 min abduction  - walls slides flexion 2/15  - wall slides abduction 2/15  - Blue theraband rows 2/15  - Blue theraband pull downs 2/15  - Blue theraband internal rotation 2/15  - Blue theraband external rotation  2/15  - UBE: level 4 for 6 minutes forward   -reassessment of objective measures       Home Exercises and Education Provided     Education provided:   - Progress towards goals     Written Home Exercises Provided: Patient instructed to cont prior HEP.  Exercises were reviewed and Julee was able to demonstrate them prior to the end of the session.  Julee demonstrated good  understanding of the HEP provided.   .   See EMR under Patient Instructions for exercises provided prior visit.        Assessment     Pt would continue to benefit from skilled OT. Pt tolerated added resistive therapeutic exercises well today. Pt tolerated added therapeutic activities well without additional complaints of pain. Patient continues to progress in overall strength and range of motion. Reassessment of objective measures were completed today and patient continues to progress in overall strength, range of motion, and endurance. Will progress as tolerated. Pt motivated.       Julee is progressing well towards her goals and there are no updates to goals at this time. Pt prognosis is Good.     Pt will continue to benefit from skilled outpatient occupational therapy to address the deficits listed in the problem list on initial evaluation provide pt/family education and to maximize pt's level of independence in the home and community environment.     Anticipated barriers to occupational therapy: slow bone healing    Pt's spiritual, cultural and educational needs considered and pt agreeable to plan of care and goals.    Goals:      Short Term (6 weeks ):  1)   Patient to be IND with HEP and modalities for pain management MET 12/11/23  2)   Increase ROM to 120 degrees in shoulder flexion to increase functional UE use for ADLs and IADLs MET 12/11/23  3)   Increase ROM to 90 degrees in shoulder abduction to increase functional UE use for ADLs and IADLs MET 12/11/23  4)   Increase ROM to 80 degrees in shoulder internal rotation to increase functional  UE use for ADLs and IADLs MET 12/11/23  5)   Increase ROM to 50 degrees in shoulder external rotation to increase functional UE use for ADLs and IADLs MET 12/11/23  6)   Increase ROM to 130 degrees in elbow flexion to increase functional UE use for ADLs and IADLs MET 10/30/23  7)   Increase ROM to 5 degrees in elbow extension to increase functional UE use for ADLs and IADLs MET 12/11/23  8)   Increase  strength to 30 lbs. to grasp for  ADLs and IADLs MET 12/11/23  9)   Improve muscle strength ing -2 to 3+ in order to participate in ADLs and IADLs with less assistance. MET 12/11/23     Long Term (by discharge):  1)   Pt will report 1 out of 10 pain at worst when completing ADLs and IADLs. Ongoing  2)   Increase ROM to 170 degrees in shoulder flexion to increase functional UE use for ADLs and IADLs Ongoing  3)   Increase ROM to 165 degrees in shoulder abduction to increase functional UE use for ADLs and IADLs Ongoing  4)   Increase ROM to 80 degrees in shoulder internal rotation to increase functional UE use for ADLs and IADLs MET 12/11/23  5)   Increase ROM to 80 degrees in shoulder external rotation to increase functional UE use for ADLs and IADLs Ongoing  6)   Increase ROM to 140 degrees in elbow flexion to increase functional UE use for ADLs and IADLs MET 12/11/23  7)   Increase ROM to 0 degrees in elbow extension to increase functional UE use for ADLs and IADLs MET 12/11/23  8)   Increase  strength to 50 lbs. to grasp for  ADLs and IADLs MET 12/11/23  9)   Improve muscle strength ing to 4+ in order to participate in ADLs and IADLs with less assistance.  Ongoing  10)   Patient to score at 65% or more on FOTO to demonstrate improved perception of functional shoulder use.Ongoing  11)  Pt will return to prior level of function for ADLs and household management. Ongoing     Plan      Pt to be treated by Occupational Therapy 2 times per week for 12 weeks during the certification period to achieve the  established goals.      Treatment to include: Paraffin, Fluidotherapy, Manual therapy/joint mobilizations, Modalities for pain management, US 3 mhz, Therapeutic exercises/activities., Iontophoresis with 2.0 cc Dexamethasone, Strengthening, Orthotic Fabrication/Fit/Training, Edema Control, Scar Management, Wound Care, Electrical Modalities, Joint Protection, and Energy Conservation, as well as any other treatments deemed necessary based on the patient's needs or progress.     Updates/Grading for session: Continue with current plan of care     FERNANDO Harman

## 2024-01-23 ENCOUNTER — HOSPITAL ENCOUNTER (OUTPATIENT)
Dept: RADIOLOGY | Facility: HOSPITAL | Age: 44
Discharge: HOME | End: 2024-01-23
Attending: NURSE PRACTITIONER
Payer: COMMERCIAL

## 2024-01-23 ENCOUNTER — OFFICE VISIT (OUTPATIENT)
Dept: ORTHOPEDICS | Facility: CLINIC | Age: 44
End: 2024-01-23
Payer: COMMERCIAL

## 2024-01-23 VITALS — BODY MASS INDEX: 41.11 KG/M2 | WEIGHT: 261.94 LBS | HEIGHT: 67 IN

## 2024-01-23 DIAGNOSIS — S42.351G CLOSED DISPLACED COMMINUTED FRACTURE OF SHAFT OF RIGHT HUMERUS WITH DELAYED HEALING, SUBSEQUENT ENCOUNTER: Primary | ICD-10-CM

## 2024-01-23 DIAGNOSIS — S42.351D CLOSED DISPLACED COMMINUTED FRACTURE OF SHAFT OF RIGHT HUMERUS WITH ROUTINE HEALING, SUBSEQUENT ENCOUNTER: Primary | ICD-10-CM

## 2024-01-23 DIAGNOSIS — S42.351D CLOSED DISPLACED COMMINUTED FRACTURE OF SHAFT OF RIGHT HUMERUS WITH ROUTINE HEALING, SUBSEQUENT ENCOUNTER: ICD-10-CM

## 2024-01-23 PROCEDURE — 3008F BODY MASS INDEX DOCD: CPT | Mod: CPTII,S$GLB,, | Performed by: NURSE PRACTITIONER

## 2024-01-23 PROCEDURE — 1159F MED LIST DOCD IN RCRD: CPT | Mod: CPTII,S$GLB,, | Performed by: NURSE PRACTITIONER

## 2024-01-23 PROCEDURE — 99999 PR PBB SHADOW E&M-EST. PATIENT-LVL III: CPT | Mod: PBBFAC,,, | Performed by: NURSE PRACTITIONER

## 2024-01-23 PROCEDURE — 99214 OFFICE O/P EST MOD 30 MIN: CPT | Mod: S$GLB,,, | Performed by: NURSE PRACTITIONER

## 2024-01-23 PROCEDURE — 73060 X-RAY EXAM OF HUMERUS: CPT | Mod: 26,RT,, | Performed by: RADIOLOGY

## 2024-01-23 PROCEDURE — 73060 X-RAY EXAM OF HUMERUS: CPT | Mod: TC,RT

## 2024-01-23 RX ORDER — METHOCARBAMOL 500 MG/1
500 TABLET, FILM COATED ORAL 3 TIMES DAILY PRN
Qty: 30 TABLET | Refills: 0 | Status: SHIPPED | OUTPATIENT
Start: 2024-01-23 | End: 2024-02-15 | Stop reason: SDUPTHER

## 2024-01-23 NOTE — PROGRESS NOTES
Ms. Veloz is here today for a follow up visit after undergoing ORIF for her right humeral shaft fracture with IM nail by Dr. Stanley on 9/19/2023.    Interval History:  She reports that she is doing good.  Pain is well controlled.  She is not taking pain medication.  She is taking Neurontin 300 mg qam and Robaxin bid which works for her.  She currently has no numbness to her right arm/hand/fingers.  She denies falls or injuries since her surgery.  She is able to tolerate 8 lbs at this time.  She is going to therapy which she finds helpful.  She also reports she completed her workup with the fragility clinic and was told not treatment was indicated.  She denies fever, chills, and sweats since the time of the surgery.     Physical exam:  Incision is open to air and healed.  She has tactile stimulation to their lower FA.  She can flex and extend her thumb.  She can abduct and adduct all of her fingers.  Flexion and extension of the wrist is at 50°, no evidence of wrist drop.  Range of motion of the elbow is 0° to 170°.  Range of motion of the shoulder is 0° to 170°, PROM is 0-180 degrees.  Cap refill is less than 3 seconds and radial pulses 2+.    RADS: right humerus xray was obtained and personally reviewed by me.  Findings show IM nailing appears to be in good position and alignment without evidence of hardware failure.  She has a comminuted fracture of the humerus shaft.  Fracture fragments appear to be stable.  Minimal callus formation noted.    Assessment:  Follow up visit (16 weeks)    Plan:    ICD-10-CM ICD-9-CM   1. Closed displaced comminuted fracture of shaft of right humerus with delayed healing, subsequent encounter  S42.351G V54.11       Current care, treatment plan, precautions, activity level/ modifications, limitations, rehabilitation exercises and proposed future treatment were discussed with the patient. We discussed the need to monitor for changes in symptoms and condition and report them to the  physician.  Discussed importance of compliance with all appointments and follow up examinations.       PHYSICAL THERAPY:   - Continue [facility].  - Weight bearing 2-4 lbs x 4 weeks and increase by 2-4 lbs/month thereafter as she tolerates.  Ok for therapist to work with a pulley.  - Range of motion as tolerated.     Appears fracture site with minimal callus formation at 16 weeks post-op, therefore, will order a bone stimulator and have her follow up with MD.       FOLLOW UP:   - Patient will follow up in the clinic in 6 weeks.  - X-ray of her right humerus is needed.    - Future Appointments:   Future Appointments   Date Time Provider Department Center   1/23/2024  5:00 PM [facility]   1/24/2024  9:00 AM Montserrat Wood OT [facility] [place]   1/29/2024  9:00 AM Mont[facility]errat Wood, OT [facility] [place]   1/31/2024  9:00 AM [facility] Wood OT [facility] [place]   2/5/2024  9:00 AM [facility]at Wood, OT [facility] [place]   2/7/2024  9:00 AM [facility]errat Wood, OT [facility] [place]   2/12/2024  9:00 AM Montserrat Wood, OT [facility] [place]   2/14/2024  9:00 AM Montserrat Wood, OT [facility] [place]       If there are any questions prior to scheduled follow up, the patient was instructed to contact the office

## 2024-01-24 ENCOUNTER — CLINICAL SUPPORT (OUTPATIENT)
Dept: REHABILITATION | Facility: HOSPITAL | Age: 44
End: 2024-01-24
Payer: COMMERCIAL

## 2024-01-24 DIAGNOSIS — M25.621 ELBOW STIFFNESS, RIGHT: ICD-10-CM

## 2024-01-24 DIAGNOSIS — R52 PAIN: ICD-10-CM

## 2024-01-24 DIAGNOSIS — R53.1 WEAKNESS: Primary | ICD-10-CM

## 2024-01-24 DIAGNOSIS — M25.611 SHOULDER STIFFNESS, RIGHT: ICD-10-CM

## 2024-01-24 PROCEDURE — 97140 MANUAL THERAPY 1/> REGIONS: CPT | Mod: PO

## 2024-01-24 PROCEDURE — 97530 THERAPEUTIC ACTIVITIES: CPT | Mod: PO

## 2024-01-24 NOTE — PROGRESS NOTES
Occupational Therapy Daily Treatment Note     Name: Tabby Veloz  Clinic Number: [id number]    Therapy Diagnosis:   Encounter Diagnoses   Name Primary?    Weakness Yes    Elbow stiffness, right     Shoulder stiffness, right     Pain          Physician: Luis Del Valle NP    Visit Date: 1/24/2024    Physician Orders: OT eval/ treat   Plan of Care Certification Date: 3/29/24  Authorization Period: 10/3/23 -10/2/24   Medical Diagnosis: S42.351D (ICD-10-CM) - Closed displaced comminuted fracture of shaft of right humerus with routine healing, subsequent   Surgical Procedure and Date: ORIF for her right humeral shaft fracture with IM nail by Dr. Stanley on 9/19/2023. ,   Evaluation Date: 10/9/23  Onset Date:9/15/23   Date of Return to MD: 1/23/24  Visit # / Visits authorized: [phone] + 22  FOTO Completion: 3/3    Time In:0900  Time Out: 1000  Total Billable Time: 60 minutes    Note from MD 12/12/23 visit:    - Continue [facility] OT.  - Weight bearing 2-4 lbs x 4 weeks and increase by 2-4 lbs/month thereafter as she tolerates.  Ok for therapist to work with a pulley.  - Range of motion as tolerated.     Precautions: Standard and WB, 17 weeks 1/16/24   Subjective     Pt reports: She states that it hurts going into external rotation   she was compliant with home exercise program given last session.   Response to previous treatment:increased ROM  Functional change: increased ROM    Pain:1/10   Location: right arms     Objective       Observation: Pt is present with abduction sling on her right shoulder      UE Range of Motion  Active Range of Motion:   Shoulder Right Right  10/30/23 Right  12/11/23 Right   1/3/24 Right  1/8/24 Right   1/22/24   Flexion 20 145 - supine 160 - supine 135 - standing 165 - supine 140- standing  165 - supine   Abduction 50 110 - supine 130 - supine 130 -standing 145 - supine 145 - standing   150 - supine   ER at 90 20 40 - supine 50 - supine 55 - supine 55 - supine  35 - standing   55 -  supine   IR 80 80 - supine 80 - supine 80- supine 80 - supine 70 - standing   80 - supine       Passive Range of Motion:   Shoulder Left Right Right   10/30/23 Right  12/11/23   Flexion 170 70 160 170   Abduction 165 70 125 145   ER at 90 80 20 40 55   IR 80 80 80 80      Elbow Left Right Right   10/30/23 Right  12/11/23 Right   1/3/24 Right  1/8/24 Right  1/22/24   Flexion 140 120 130 140 140 140 140   Extension 0 -10 0 0 0 0 0      Painful Arc:   Patient demonstrates no painful arc in shoulder flexion or abduction     Upper Extremity Strength  (R) UE   (L) UE     Shoulder flexion: 2-/5 Shoulder flexion: 5/5   Shoulder Abduction: 2-/5 Shoulder abduction: 5/5   Shoulder ER 2-/5 Shoulder ER 5/5   Shoulder IR 2-/5 Shoulder IR 5/5   Rhomboids 2-/5 Rhomboids 5/5       Strength: (OSMANY Dynamometer in psi.)        10/9/2023 10/9/2023 12/11/23 1/3/24 1/8/24 1/22/24     Left Right Right Right Right Right   Rung II 60 N/a 50 63 65 65      Joint Mobility/End Feels: Hard     Palpation: Pt is tender at incision sites      Sensation: Pt denies any numbness or tingling        Scapular Control/Dyskinesis:     Normal / Subtle / Obvious  Comments    Left  N N/a    Right  N N/a         OUTCOME MEASURE:FOTO     Category:Self care       Current Score  = 37%   Goal at Discharge Score = 65%     11/20/23: 53%    Treatment       Julee received the following manual therapy techniques for 8 minutes:   -PROM of shoulder in flexion, abduction, internal rotation, and external rotation 4 x 10 to toleration  - joint mobilizations to glenohumeral joint anterior to posterior     Julee participated in dynamic functional therapeutic activities to improve functional performance for 52  minutes, including:  -pulley's 3 min flexion/ 3 min abduction  - 5 minute carry with 8# DB   - Blue theraband rows 2/15  - Blue theraband pull downs 2/15  - Blue theraband internal rotation 2/15  - Blue theraband external rotation 2/15  - UBE: level 4 for 6 minutes  forward   -supine flexion stretch with 5# dowel 15 second hold 15x   - green theraband pull aparts 1/15  - standing push press with 5# dowel x 15      Home Exercises and Education Provided     Education provided:   - Progress towards goals     Written Home Exercises Provided: Patient instructed to cont prior HEP.  Exercises were reviewed and Julee was able to demonstrate them prior to the end of the session.  Julee demonstrated good  understanding of the HEP provided.   .   See EMR under Patient Instructions for exercises provided prior visit.        Assessment     Pt would continue to benefit from skilled OT. Pt tolerated added resistive therapeutic exercises well today. Pt tolerated added therapeutic activities well without additional complaints of pain. Patient continues to progress in overall strength and range of motion. Pt had an MD appointment and he is ordering her a bone stimulator to help with new bone formation. Will progress as tolerated. Pt motivated.       Julee is progressing well towards her goals and there are no updates to goals at this time. Pt prognosis is Good.     Pt will continue to benefit from skilled outpatient occupational therapy to address the deficits listed in the problem list on initial evaluation provide pt/family education and to maximize pt's level of independence in the home and community environment.     Anticipated barriers to occupational therapy: slow bone healing    Pt's spiritual, cultural and educational needs considered and pt agreeable to plan of care and goals.    Goals:      Short Term (6 weeks ):  1)   Patient to be IND with HEP and modalities for pain management MET 12/11/23  2)   Increase ROM to 120 degrees in shoulder flexion to increase functional UE use for ADLs and IADLs MET 12/11/23  3)   Increase ROM to 90 degrees in shoulder abduction to increase functional UE use for ADLs and IADLs MET 12/11/23  4)   Increase ROM to 80 degrees in shoulder internal rotation to  increase functional UE use for ADLs and IADLs MET 12/11/23  5)   Increase ROM to 50 degrees in shoulder external rotation to increase functional UE use for ADLs and IADLs MET 12/11/23  6)   Increase ROM to 130 degrees in elbow flexion to increase functional UE use for ADLs and IADLs MET 10/30/23  7)   Increase ROM to 5 degrees in elbow extension to increase functional UE use for ADLs and IADLs MET 12/11/23  8)   Increase  strength to 30 lbs. to grasp for  ADLs and IADLs MET 12/11/23  9)   Improve muscle strength ing -2 to 3+ in order to participate in ADLs and IADLs with less assistance. MET 12/11/23     Long Term (by discharge):  1)   Pt will report 1 out of 10 pain at worst when completing ADLs and IADLs. Ongoing  2)   Increase ROM to 170 degrees in shoulder flexion to increase functional UE use for ADLs and IADLs Ongoing  3)   Increase ROM to 165 degrees in shoulder abduction to increase functional UE use for ADLs and IADLs Ongoing  4)   Increase ROM to 80 degrees in shoulder internal rotation to increase functional UE use for ADLs and IADLs MET 12/11/23  5)   Increase ROM to 80 degrees in shoulder external rotation to increase functional UE use for ADLs and IADLs Ongoing  6)   Increase ROM to 140 degrees in elbow flexion to increase functional UE use for ADLs and IADLs MET 12/11/23  7)   Increase ROM to 0 degrees in elbow extension to increase functional UE use for ADLs and IADLs MET 12/11/23  8)   Increase  strength to 50 lbs. to grasp for  ADLs and IADLs MET 12/11/23  9)   Improve muscle strength ing to 4+ in order to participate in ADLs and IADLs with less assistance.  Ongoing  10)   Patient to score at 65% or more on FOTO to demonstrate improved perception of functional shoulder use.Ongoing  11)  Pt will return to prior level of function for ADLs and household management. Ongoing     Plan      Pt to be treated by Occupational Therapy 2 times per week for 12 weeks during the certification period to  achieve the established goals.      Treatment to include: Paraffin, Fluidotherapy, Manual therapy/joint mobilizations, Modalities for pain management, US 3 mhz, Therapeutic exercises/activities., Iontophoresis with 2.0 cc Dexamethasone, Strengthening, Orthotic Fabrication/Fit/Training, Edema Control, Scar Management, Wound Care, Electrical Modalities, Joint Protection, and Energy Conservation, as well as any other treatments deemed necessary based on the patient's needs or progress.     Updates/Grading for session: Continue with current plan of care     FERNANDO Harman

## 2024-01-29 ENCOUNTER — CLINICAL SUPPORT (OUTPATIENT)
Dept: REHABILITATION | Facility: HOSPITAL | Age: 44
End: 2024-01-29
Payer: COMMERCIAL

## 2024-01-29 DIAGNOSIS — M25.611 SHOULDER STIFFNESS, RIGHT: ICD-10-CM

## 2024-01-29 DIAGNOSIS — M25.621 ELBOW STIFFNESS, RIGHT: ICD-10-CM

## 2024-01-29 DIAGNOSIS — R52 PAIN: ICD-10-CM

## 2024-01-29 DIAGNOSIS — R53.1 WEAKNESS: Primary | ICD-10-CM

## 2024-01-29 PROCEDURE — 97140 MANUAL THERAPY 1/> REGIONS: CPT | Mod: PO

## 2024-01-29 PROCEDURE — 97530 THERAPEUTIC ACTIVITIES: CPT | Mod: PO

## 2024-01-29 NOTE — PROGRESS NOTES
Occupational Therapy Daily Treatment Note     Name: Tabby Veloz  Clinic Number: [id number]    Therapy Diagnosis:   Encounter Diagnoses   Name Primary?    Weakness Yes    Elbow stiffness, right     Shoulder stiffness, right     Pain          Physician: Luis Del Valle NP    Visit Date: 1/29/2024    Physician Orders: OT eval/ treat   Plan of Care Certification Date: 3/29/24  Authorization Period: 10/3/23 -10/2/24   Medical Diagnosis: S42.351D (ICD-10-CM) - Closed displaced comminuted fracture of shaft of right humerus with routine healing, subsequent   Surgical Procedure and Date: ORIF for her right humeral shaft fracture with IM nail by Dr. Stanley on 9/19/2023. ,   Evaluation Date: 10/9/23  Onset Date:9/15/23   Date of Return to MD: 1/23/24  Visit # / Visits authorized: 9/30 + 22  FOTO Completion: 3/3    Time In:0900  Time Out: 1000  Total Billable Time: 60 minutes    Note from MD 12/12/23 visit:    - Continue [facility] OT.  - Weight bearing 2-4 lbs x 4 weeks and increase by 2-4 lbs/month thereafter as she tolerates.  Ok for therapist to work with a pulley.  - Range of motion as tolerated.     Precautions: Standard and WB, 17 weeks 1/16/24   Subjective     Pt reports: She states that it hurts going into external rotation   she was compliant with home exercise program given last session.   Response to previous treatment:increased ROM  Functional change: increased ROM    Pain:1/10   Location: right arms     Objective       Observation: Pt is present with abduction sling on her right shoulder      UE Range of Motion  Active Range of Motion:   Shoulder Right Right  10/30/23 Right  12/11/23 Right   1/3/24 Right  1/8/24 Right   1/22/24   Flexion 20 145 - supine 160 - supine 135 - standing 165 - supine 140- standing  165 - supine   Abduction 50 110 - supine 130 - supine 130 -standing 145 - supine 145 - standing   150 - supine   ER at 90 20 40 - supine 50 - supine 55 - supine 55 - supine  35 - standing   55 -  supine   IR 80 80 - supine 80 - supine 80- supine 80 - supine 70 - standing   80 - supine       Passive Range of Motion:   Shoulder Left Right Right   10/30/23 Right  12/11/23   Flexion 170 70 160 170   Abduction 165 70 125 145   ER at 90 80 20 40 55   IR 80 80 80 80      Elbow Left Right Right   10/30/23 Right  12/11/23 Right   1/3/24 Right  1/8/24 Right  1/22/24   Flexion 140 120 130 140 140 140 140   Extension 0 -10 0 0 0 0 0      Painful Arc:   Patient demonstrates no painful arc in shoulder flexion or abduction     Upper Extremity Strength  (R) UE   (L) UE     Shoulder flexion: 2-/5 Shoulder flexion: 5/5   Shoulder Abduction: 2-/5 Shoulder abduction: 5/5   Shoulder ER 2-/5 Shoulder ER 5/5   Shoulder IR 2-/5 Shoulder IR 5/5   Rhomboids 2-/5 Rhomboids 5/5       Strength: (OSMANY Dynamometer in psi.)        10/9/2023 10/9/2023 12/11/23 1/3/24 1/8/24 1/22/24     Left Right Right Right Right Right   Rung II 60 N/a 50 63 65 65      Joint Mobility/End Feels: Hard     Palpation: Pt is tender at incision sites      Sensation: Pt denies any numbness or tingling        Scapular Control/Dyskinesis:     Normal / Subtle / Obvious  Comments    Left  N N/a    Right  N N/a         OUTCOME MEASURE:FOTO     Category:Self care       Current Score  = 37%   Goal at Discharge Score = 65%     11/20/23: 53%    Treatment       Julee received the following manual therapy techniques for 8 minutes:   -PROM of shoulder in flexion, abduction, internal rotation, and external rotation 4 x 10 to toleration  - joint mobilizations to glenohumeral joint anterior to posterior     Julee participated in dynamic functional therapeutic activities to improve functional performance for 52  minutes, including:  -pulley's 3 min flexion/ 3 min abduction  - 5 minute carry with 8# DB   - Blue theraband rows 2/15  - Blue theraband pull downs 2/15  - Blue theraband internal rotation 2/15  - Blue theraband external rotation 2/15  - UBE: level 5 for 10 minutes  5 min forward/ 5 minutes backwards  -supine flexion stretch with 5# dowel 15 second hold 15x   - green theraband pull aparts 2/15  - standing push press with 5# dowel 2/15      Home Exercises and Education Provided     Education provided:   - Progress towards goals     Written Home Exercises Provided: Patient instructed to cont prior HEP.  Exercises were reviewed and Julee was able to demonstrate them prior to the end of the session.  Julee demonstrated good  understanding of the HEP provided.   .   See EMR under Patient Instructions for exercises provided prior visit.        Assessment     Pt would continue to benefit from skilled OT. Pt tolerated added resistive therapeutic exercises well today. Pt tolerated added therapeutic activities well without additional complaints of pain. Patient continues to progress in overall strength and range of motion.  Will progress as tolerated. Pt motivated.       Julee is progressing well towards her goals and there are no updates to goals at this time. Pt prognosis is Good.     Pt will continue to benefit from skilled outpatient occupational therapy to address the deficits listed in the problem list on initial evaluation provide pt/family education and to maximize pt's level of independence in the home and community environment.     Anticipated barriers to occupational therapy: slow bone healing    Pt's spiritual, cultural and educational needs considered and pt agreeable to plan of care and goals.    Goals:      Short Term (6 weeks ):  1)   Patient to be IND with HEP and modalities for pain management MET 12/11/23  2)   Increase ROM to 120 degrees in shoulder flexion to increase functional UE use for ADLs and IADLs MET 12/11/23  3)   Increase ROM to 90 degrees in shoulder abduction to increase functional UE use for ADLs and IADLs MET 12/11/23  4)   Increase ROM to 80 degrees in shoulder internal rotation to increase functional UE use for ADLs and IADLs MET 12/11/23  5)    Increase ROM to 50 degrees in shoulder external rotation to increase functional UE use for ADLs and IADLs MET 12/11/23  6)   Increase ROM to 130 degrees in elbow flexion to increase functional UE use for ADLs and IADLs MET 10/30/23  7)   Increase ROM to 5 degrees in elbow extension to increase functional UE use for ADLs and IADLs MET 12/11/23  8)   Increase  strength to 30 lbs. to grasp for  ADLs and IADLs MET 12/11/23  9)   Improve muscle strength ing -2 to 3+ in order to participate in ADLs and IADLs with less assistance. MET 12/11/23     Long Term (by discharge):  1)   Pt will report 1 out of 10 pain at worst when completing ADLs and IADLs. Ongoing  2)   Increase ROM to 170 degrees in shoulder flexion to increase functional UE use for ADLs and IADLs Ongoing  3)   Increase ROM to 165 degrees in shoulder abduction to increase functional UE use for ADLs and IADLs Ongoing  4)   Increase ROM to 80 degrees in shoulder internal rotation to increase functional UE use for ADLs and IADLs MET 12/11/23  5)   Increase ROM to 80 degrees in shoulder external rotation to increase functional UE use for ADLs and IADLs Ongoing  6)   Increase ROM to 140 degrees in elbow flexion to increase functional UE use for ADLs and IADLs MET 12/11/23  7)   Increase ROM to 0 degrees in elbow extension to increase functional UE use for ADLs and IADLs MET 12/11/23  8)   Increase  strength to 50 lbs. to grasp for  ADLs and IADLs MET 12/11/23  9)   Improve muscle strength ing to 4+ in order to participate in ADLs and IADLs with less assistance.  Ongoing  10)   Patient to score at 65% or more on FOTO to demonstrate improved perception of functional shoulder use.Ongoing  11)  Pt will return to prior level of function for ADLs and household management. Ongoing     Plan      Pt to be treated by Occupational Therapy 2 times per week for 12 weeks during the certification period to achieve the established goals.      Treatment to include: Paraffin,  Fluidotherapy, Manual therapy/joint mobilizations, Modalities for pain management, US 3 mhz, Therapeutic exercises/activities., Iontophoresis with 2.0 cc Dexamethasone, Strengthening, Orthotic Fabrication/Fit/Training, Edema Control, Scar Management, Wound Care, Electrical Modalities, Joint Protection, and Energy Conservation, as well as any other treatments deemed necessary based on the patient's needs or progress.     Updates/Grading for session: Continue with current plan of care     FERNANDO Harman

## 2024-01-31 ENCOUNTER — CLINICAL SUPPORT (OUTPATIENT)
Dept: REHABILITATION | Facility: HOSPITAL | Age: 44
End: 2024-01-31
Payer: COMMERCIAL

## 2024-01-31 DIAGNOSIS — R53.1 WEAKNESS: Primary | ICD-10-CM

## 2024-01-31 DIAGNOSIS — M25.611 SHOULDER STIFFNESS, RIGHT: ICD-10-CM

## 2024-01-31 DIAGNOSIS — M25.621 ELBOW STIFFNESS, RIGHT: ICD-10-CM

## 2024-01-31 DIAGNOSIS — R52 PAIN: ICD-10-CM

## 2024-01-31 PROCEDURE — 97140 MANUAL THERAPY 1/> REGIONS: CPT | Mod: PO

## 2024-01-31 PROCEDURE — 97530 THERAPEUTIC ACTIVITIES: CPT | Mod: PO

## 2024-01-31 NOTE — PROGRESS NOTES
Occupational Therapy Daily Treatment Note     Name: Tabby Veloz  Clinic Number: [id number]    Therapy Diagnosis:   Encounter Diagnoses   Name Primary?    Weakness Yes    Elbow stiffness, right     Shoulder stiffness, right     Pain          Physician: Luis Del Valle NP    Visit Date: 1/31/2024    Physician Orders: OT eval/ treat   Plan of Care Certification Date: 3/29/24  Authorization Period: 10/3/23 -10/2/24   Medical Diagnosis: S42.351D (ICD-10-CM) - Closed displaced comminuted fracture of shaft of right humerus with routine healing, subsequent   Surgical Procedure and Date: ORIF for her right humeral shaft fracture with IM nail by Dr. Stanley on 9/19/2023. ,   Evaluation Date: 10/9/23  Onset Date:9/15/23   Date of Return to MD: 1/23/24  Visit # / Visits authorized: 10/30 + 22  FOTO Completion: 3/3    Time In:0900  Time Out: 1000  Total Billable Time: 60 minutes    Note from MD 12/12/23 visit:    - Continue [facility] OT.  - Weight bearing 2-4 lbs x 4 weeks and increase by 2-4 lbs/month thereafter as she tolerates.  Ok for therapist to work with a pulley.  - Range of motion as tolerated.     Precautions: Standard and WB, 17 weeks 1/16/24   Subjective     Pt reports: She states that it hurts going into external rotation, but her arm doesn't have that heavy feeling that it's had   she was compliant with home exercise program given last session.   Response to previous treatment:increased ROM  Functional change: increased ROM    Pain:1/10   Location: right arms     Objective       Observation: Pt is present with abduction sling on her right shoulder      UE Range of Motion  Active Range of Motion:   Shoulder Right Right  10/30/23 Right  12/11/23 Right   1/3/24 Right  1/8/24 Right   1/22/24   Flexion 20 145 - supine 160 - supine 135 - standing 165 - supine 140- standing  165 - supine   Abduction 50 110 - supine 130 - supine 130 -standing 145 - supine 145 - standing   150 - supine   ER at 90 20 40 -  supine 50 - supine 55 - supine 55 - supine  35 - standing   55 - supine   IR 80 80 - supine 80 - supine 80- supine 80 - supine 70 - standing   80 - supine       Passive Range of Motion:   Shoulder Left Right Right   10/30/23 Right  12/11/23   Flexion 170 70 160 170   Abduction 165 70 125 145   ER at 90 80 20 40 55   IR 80 80 80 80      Elbow Left Right Right   10/30/23 Right  12/11/23 Right   1/3/24 Right  1/8/24 Right  1/22/24   Flexion 140 120 130 140 140 140 140   Extension 0 -10 0 0 0 0 0      Painful Arc:   Patient demonstrates no painful arc in shoulder flexion or abduction     Upper Extremity Strength  (R) UE   (L) UE     Shoulder flexion: 2-/5 Shoulder flexion: 5/5   Shoulder Abduction: 2-/5 Shoulder abduction: 5/5   Shoulder ER 2-/5 Shoulder ER 5/5   Shoulder IR 2-/5 Shoulder IR 5/5   Rhomboids 2-/5 Rhomboids 5/5       Strength: (OSMANY Dynamometer in psi.)        10/9/2023 10/9/2023 12/11/23 1/3/24 1/8/24 1/22/24     Left Right Right Right Right Right   Rung II 60 N/a 50 63 65 65      Joint Mobility/End Feels: Hard     Palpation: Pt is tender at incision sites      Sensation: Pt denies any numbness or tingling        Scapular Control/Dyskinesis:     Normal / Subtle / Obvious  Comments    Left  N N/a    Right  N N/a         OUTCOME MEASURE:FOTO     Category:Self care       Current Score  = 37%   Goal at Discharge Score = 65%     11/20/23: 53%    Treatment       Julee received the following manual therapy techniques for 8 minutes:   -PROM of shoulder in flexion, abduction, internal rotation, and external rotation 4 x 10 to toleration  - joint mobilizations to glenohumeral joint anterior to posterior     Julee participated in dynamic functional therapeutic activities to improve functional performance for 52  minutes, including:  -pulley's 3 min flexion/ 3 min abduction  - 7 minute carry with 8# DB   - Black theraband rows 2/15  - Blue theraband pull downs 2/15  - Blue theraband internal rotation 2/15  -  Blue theraband external rotation 2/15  - UBE: level 5 for 10 minutes 5 min forward/ 5 minutes backwards  -supine flexion stretch with 5# dowel 15 second hold 15x   - green theraband pull aparts 2/15  - standing push press with 5# dowel 2/15      Home Exercises and Education Provided     Education provided:   - Progress towards goals     Written Home Exercises Provided: Patient instructed to cont prior HEP.  Exercises were reviewed and Julee was able to demonstrate them prior to the end of the session.  Julee demonstrated good  understanding of the HEP provided.   .   See EMR under Patient Instructions for exercises provided prior visit.        Assessment     Pt would continue to benefit from skilled OT. Pt tolerated added resistive therapeutic exercises well today. Pt tolerated added therapeutic activities well without additional complaints of pain. Patient continues to progress in overall strength and range of motion.  Will progress as tolerated. Pt motivated.       Julee is progressing well towards her goals and there are no updates to goals at this time. Pt prognosis is Good.     Pt will continue to benefit from skilled outpatient occupational therapy to address the deficits listed in the problem list on initial evaluation provide pt/family education and to maximize pt's level of independence in the home and community environment.     Anticipated barriers to occupational therapy: slow bone healing    Pt's spiritual, cultural and educational needs considered and pt agreeable to plan of care and goals.    Goals:      Short Term (6 weeks ):  1)   Patient to be IND with HEP and modalities for pain management MET 12/11/23  2)   Increase ROM to 120 degrees in shoulder flexion to increase functional UE use for ADLs and IADLs MET 12/11/23  3)   Increase ROM to 90 degrees in shoulder abduction to increase functional UE use for ADLs and IADLs MET 12/11/23  4)   Increase ROM to 80 degrees in shoulder internal rotation to  increase functional UE use for ADLs and IADLs MET 12/11/23  5)   Increase ROM to 50 degrees in shoulder external rotation to increase functional UE use for ADLs and IADLs MET 12/11/23  6)   Increase ROM to 130 degrees in elbow flexion to increase functional UE use for ADLs and IADLs MET 10/30/23  7)   Increase ROM to 5 degrees in elbow extension to increase functional UE use for ADLs and IADLs MET 12/11/23  8)   Increase  strength to 30 lbs. to grasp for  ADLs and IADLs MET 12/11/23  9)   Improve muscle strength ing -2 to 3+ in order to participate in ADLs and IADLs with less assistance. MET 12/11/23     Long Term (by discharge):  1)   Pt will report 1 out of 10 pain at worst when completing ADLs and IADLs. Ongoing  2)   Increase ROM to 170 degrees in shoulder flexion to increase functional UE use for ADLs and IADLs Ongoing  3)   Increase ROM to 165 degrees in shoulder abduction to increase functional UE use for ADLs and IADLs Ongoing  4)   Increase ROM to 80 degrees in shoulder internal rotation to increase functional UE use for ADLs and IADLs MET 12/11/23  5)   Increase ROM to 80 degrees in shoulder external rotation to increase functional UE use for ADLs and IADLs Ongoing  6)   Increase ROM to 140 degrees in elbow flexion to increase functional UE use for ADLs and IADLs MET 12/11/23  7)   Increase ROM to 0 degrees in elbow extension to increase functional UE use for ADLs and IADLs MET 12/11/23  8)   Increase  strength to 50 lbs. to grasp for  ADLs and IADLs MET 12/11/23  9)   Improve muscle strength ing to 4+ in order to participate in ADLs and IADLs with less assistance.  Ongoing  10)   Patient to score at 65% or more on FOTO to demonstrate improved perception of functional shoulder use.Ongoing  11)  Pt will return to prior level of function for ADLs and household management. Ongoing     Plan      Pt to be treated by Occupational Therapy 2 times per week for 12 weeks during the certification period to  achieve the established goals.      Treatment to include: Paraffin, Fluidotherapy, Manual therapy/joint mobilizations, Modalities for pain management, US 3 mhz, Therapeutic exercises/activities., Iontophoresis with 2.0 cc Dexamethasone, Strengthening, Orthotic Fabrication/Fit/Training, Edema Control, Scar Management, Wound Care, Electrical Modalities, Joint Protection, and Energy Conservation, as well as any other treatments deemed necessary based on the patient's needs or progress.     Updates/Grading for session: Continue with current plan of care     FERNANDO Harman

## 2024-02-05 ENCOUNTER — CLINICAL SUPPORT (OUTPATIENT)
Dept: REHABILITATION | Facility: HOSPITAL | Age: 44
End: 2024-02-05
Payer: COMMERCIAL

## 2024-02-05 DIAGNOSIS — R53.1 WEAKNESS: Primary | ICD-10-CM

## 2024-02-05 DIAGNOSIS — R52 PAIN: ICD-10-CM

## 2024-02-05 DIAGNOSIS — M25.621 ELBOW STIFFNESS, RIGHT: ICD-10-CM

## 2024-02-05 DIAGNOSIS — M25.611 SHOULDER STIFFNESS, RIGHT: ICD-10-CM

## 2024-02-05 PROCEDURE — 97140 MANUAL THERAPY 1/> REGIONS: CPT | Mod: PO

## 2024-02-05 PROCEDURE — 97530 THERAPEUTIC ACTIVITIES: CPT | Mod: PO

## 2024-02-05 NOTE — PROGRESS NOTES
Occupational Therapy Daily Treatment Note     Name: Tabby Veloz  Clinic Number: [id number]    Therapy Diagnosis:   Encounter Diagnoses   Name Primary?    Weakness Yes    Elbow stiffness, right     Shoulder stiffness, right     Pain          Physician: Luis Del Valle NP    Visit Date: 2/5/2024    Physician Orders: OT eval/ treat   Plan of Care Certification Date: 3/29/24  Authorization Period: 10/3/23 -10/2/24   Medical Diagnosis: S42.351D (ICD-10-CM) - Closed displaced comminuted fracture of shaft of right humerus with routine healing, subsequent   Surgical Procedure and Date: ORIF for her right humeral shaft fracture with IM nail by Dr. Stanley on 9/19/2023. ,   Evaluation Date: 10/9/23  Onset Date:9/15/23   Date of Return to MD: 1/23/24  Visit # / Visits authorized: 11/30 + 22  FOTO Completion: 3/3    Time In:0900  Time Out: 1003  Total Billable Time: 63minutes    Note from MD 12/12/23 visit:    - Continue [facility] OT.  - Weight bearing 2-4 lbs x 4 weeks and increase by 2-4 lbs/month thereafter as she tolerates.  Ok for therapist to work with a pulley.  - Range of motion as tolerated.     Precautions: Standard and WB, 17 weeks 1/16/24   Subjective     Pt reports: She states that her arm is starting to feel more normal  she was compliant with home exercise program given last session.   Response to previous treatment:increased ROM  Functional change: increased ROM    Pain:1/10   Location: right arms     Objective       Observation: No abnormalities present      UE Range of Motion  Active Range of Motion:   Shoulder Right Right  10/30/23 Right  12/11/23 Right   1/3/24 Right  1/8/24 Right   1/22/24   Flexion 20 145 - supine 160 - supine 135 - standing 165 - supine 140- standing  165 - supine   Abduction 50 110 - supine 130 - supine 130 -standing 145 - supine 145 - standing   150 - supine   ER at 90 20 40 - supine 50 - supine 55 - supine 55 - supine  35 - standing   55 - supine   IR 80 80 - supine 80 -  supine 80- supine 80 - supine 70 - standing   80 - supine       Passive Range of Motion:   Shoulder Left Right Right   10/30/23 Right  12/11/23   Flexion 170 70 160 170   Abduction 165 70 125 145   ER at 90 80 20 40 55   IR 80 80 80 80      Elbow Left Right Right   10/30/23 Right  12/11/23 Right   1/3/24 Right  1/8/24 Right  1/22/24   Flexion 140 120 130 140 140 140 140   Extension 0 -10 0 0 0 0 0      Painful Arc:   Patient demonstrates no painful arc in shoulder flexion or abduction     Upper Extremity Strength  (R) UE   (L) UE     Shoulder flexion: 2-/5 Shoulder flexion: 5/5   Shoulder Abduction: 2-/5 Shoulder abduction: 5/5   Shoulder ER 2-/5 Shoulder ER 5/5   Shoulder IR 2-/5 Shoulder IR 5/5   Rhomboids 2-/5 Rhomboids 5/5       Strength: (OSMANY Dynamometer in psi.)        10/9/2023 10/9/2023 12/11/23 1/3/24 1/8/24 1/22/24     Left Right Right Right Right Right   Rung II 60 N/a 50 63 65 65      Joint Mobility/End Feels: Hard     Palpation: Pt is tender at incision sites      Sensation: Pt denies any numbness or tingling        Scapular Control/Dyskinesis:     Normal / Subtle / Obvious  Comments    Left  N N/a    Right  N N/a         OUTCOME MEASURE:FOTO     Category:Self care       Current Score  = 37%   Goal at Discharge Score = 65%     11/20/23: 53%    Treatment       Julee received the following manual therapy techniques for 8 minutes:   -PROM of shoulder in flexion, abduction, internal rotation, and external rotation 4 x 10 to toleration  - joint mobilizations to glenohumeral joint anterior to posterior     Julee participated in dynamic functional therapeutic activities to improve functional performance for 55  minutes, including:  -pulley's 3 min flexion/ 3 min abduction  - 5 minute carry with 10# DB   - Black theraband rows 2/15  - Black theraband pull downs 2/15  - Blue theraband internal rotation 2/15  - Blue theraband external rotation 2/15  - UBE: level 5 for 10 minutes 5 min forward/ 5 minutes  backwards  - green theraband pull aparts 2/15  - standing push press with 5# dowel 2/15      Home Exercises and Education Provided     Education provided:   - Progress towards goals     Written Home Exercises Provided: Patient instructed to cont prior HEP.  Exercises were reviewed and Julee was able to demonstrate them prior to the end of the session.  Julee demonstrated good  understanding of the HEP provided.   .   See EMR under Patient Instructions for exercises provided prior visit.        Assessment     Pt would continue to benefit from skilled OT. Pt tolerated added resistive therapeutic exercises well today. Pt tolerated added therapeutic activities well without additional complaints of pain. Patient continues to progress in overall strength and range of motion.  Will progress as tolerated. Pt motivated.       Julee is progressing well towards her goals and there are no updates to goals at this time. Pt prognosis is Good.     Pt will continue to benefit from skilled outpatient occupational therapy to address the deficits listed in the problem list on initial evaluation provide pt/family education and to maximize pt's level of independence in the home and community environment.     Anticipated barriers to occupational therapy: slow bone healing    Pt's spiritual, cultural and educational needs considered and pt agreeable to plan of care and goals.    Goals:      Short Term (6 weeks ):  1)   Patient to be IND with HEP and modalities for pain management MET 12/11/23  2)   Increase ROM to 120 degrees in shoulder flexion to increase functional UE use for ADLs and IADLs MET 12/11/23  3)   Increase ROM to 90 degrees in shoulder abduction to increase functional UE use for ADLs and IADLs MET 12/11/23  4)   Increase ROM to 80 degrees in shoulder internal rotation to increase functional UE use for ADLs and IADLs MET 12/11/23  5)   Increase ROM to 50 degrees in shoulder external rotation to increase functional UE use for  ADLs and IADLs MET 12/11/23  6)   Increase ROM to 130 degrees in elbow flexion to increase functional UE use for ADLs and IADLs MET 10/30/23  7)   Increase ROM to 5 degrees in elbow extension to increase functional UE use for ADLs and IADLs MET 12/11/23  8)   Increase  strength to 30 lbs. to grasp for  ADLs and IADLs MET 12/11/23  9)   Improve muscle strength ing -2 to 3+ in order to participate in ADLs and IADLs with less assistance. MET 12/11/23     Long Term (by discharge):  1)   Pt will report 1 out of 10 pain at worst when completing ADLs and IADLs. Ongoing  2)   Increase ROM to 170 degrees in shoulder flexion to increase functional UE use for ADLs and IADLs Ongoing  3)   Increase ROM to 165 degrees in shoulder abduction to increase functional UE use for ADLs and IADLs Ongoing  4)   Increase ROM to 80 degrees in shoulder internal rotation to increase functional UE use for ADLs and IADLs MET 12/11/23  5)   Increase ROM to 80 degrees in shoulder external rotation to increase functional UE use for ADLs and IADLs Ongoing  6)   Increase ROM to 140 degrees in elbow flexion to increase functional UE use for ADLs and IADLs MET 12/11/23  7)   Increase ROM to 0 degrees in elbow extension to increase functional UE use for ADLs and IADLs MET 12/11/23  8)   Increase  strength to 50 lbs. to grasp for  ADLs and IADLs MET 12/11/23  9)   Improve muscle strength ing to 4+ in order to participate in ADLs and IADLs with less assistance.  Ongoing  10)   Patient to score at 65% or more on FOTO to demonstrate improved perception of functional shoulder use.Ongoing  11)  Pt will return to prior level of function for ADLs and household management. Ongoing     Plan      Pt to be treated by Occupational Therapy 2 times per week for 12 weeks during the certification period to achieve the established goals.      Treatment to include: Paraffin, Fluidotherapy, Manual therapy/joint mobilizations, Modalities for pain management, US 3  mhz, Therapeutic exercises/activities., Iontophoresis with 2.0 cc Dexamethasone, Strengthening, Orthotic Fabrication/Fit/Training, Edema Control, Scar Management, Wound Care, Electrical Modalities, Joint Protection, and Energy Conservation, as well as any other treatments deemed necessary based on the patient's needs or progress.     Updates/Grading for session: Reassessment of objective measures     FERNANDO Harman

## 2024-02-07 ENCOUNTER — CLINICAL SUPPORT (OUTPATIENT)
Dept: REHABILITATION | Facility: HOSPITAL | Age: 44
End: 2024-02-07
Payer: COMMERCIAL

## 2024-02-07 DIAGNOSIS — M25.611 SHOULDER STIFFNESS, RIGHT: ICD-10-CM

## 2024-02-07 DIAGNOSIS — R52 PAIN: ICD-10-CM

## 2024-02-07 DIAGNOSIS — M25.621 ELBOW STIFFNESS, RIGHT: ICD-10-CM

## 2024-02-07 DIAGNOSIS — R53.1 WEAKNESS: Primary | ICD-10-CM

## 2024-02-07 PROCEDURE — 97530 THERAPEUTIC ACTIVITIES: CPT | Mod: PO

## 2024-02-07 NOTE — PROGRESS NOTES
Occupational Therapy Daily Treatment Note     Name: Tabby Veloz  Clinic Number: [id number]    Therapy Diagnosis:   Encounter Diagnoses   Name Primary?    Weakness Yes    Elbow stiffness, right     Shoulder stiffness, right     Pain            Physician: Luis Del Valle NP    Visit Date: 2/7/2024    Physician Orders: OT eval/ treat   Plan of Care Certification Date: 3/29/24  Authorization Period: 10/3/23 -10/2/24   Medical Diagnosis: S42.351D (ICD-10-CM) - Closed displaced comminuted fracture of shaft of right humerus with routine healing, subsequent   Surgical Procedure and Date: ORIF for her right humeral shaft fracture with IM nail by Dr. Stanley on 9/19/2023. ,   Evaluation Date: 10/9/23  Onset Date:9/15/23   Date of Return to MD: 1/23/24  Visit # / Visits authorized: 12/30 + 22  FOTO Completion: 3/3    Time In:0900  Time Out: 1000  Total Billable Time: 60 minutes    Note from MD 12/12/23 visit:    - Continue [facility] OT.  - Weight bearing 2-4 lbs x 4 weeks and increase by 2-4 lbs/month thereafter as she tolerates.  Ok for therapist to work with a pulley.  - Range of motion as tolerated.     Precautions: Standard and WB, 17 weeks 1/16/24   Subjective     Pt reports: She states that her arm is starting to feel more normal  she was compliant with home exercise program given last session.   Response to previous treatment:increased ROM  Functional change: increased ROM    Pain:1/10   Location: right arms     Objective       Observation: No abnormalities present      UE Range of Motion  Active Range of Motion:   Shoulder Right Right  10/30/23 Right  12/11/23 Right   1/3/24 Right  1/8/24 Right   1/22/24 Right  2/7/24   Flexion 20 145 - supine 160 - supine 135 - standing 165 - supine 140- standing  165 - supine 150 - standing   Abduction 50 110 - supine 130 - supine 130 -standing 145 - supine 145 - standing   150 - supine 145 - standing    ER at 90 20 40 - supine 50 - supine 55 - supine 55 - supine  35 -  standing   55 - supine 80 - standing    IR 80 80 - supine 80 - supine 80- supine 80 - supine 70 - standing   80 - supine  70 - standing       Passive Range of Motion:   Shoulder Left Right Right   10/30/23 Right  12/11/23   Flexion 170 70 160 170   Abduction 165 70 125 145   ER at 90 80 20 40 55   IR 80 80 80 80      Elbow Left Right Right   10/30/23 Right  12/11/23 Right   1/3/24 Right  1/8/24 Right  1/22/24   Flexion 140 120 130 140 140 140 140   Extension 0 -10 0 0 0 0 0      Painful Arc:   Patient demonstrates no painful arc in shoulder flexion or abduction     Upper Extremity Strength  (R) UE   (L) UE     Shoulder flexion: 2-/5 Shoulder flexion: 5/5   Shoulder Abduction: 2-/5 Shoulder abduction: 5/5   Shoulder ER 2-/5 Shoulder ER 5/5   Shoulder IR 2-/5 Shoulder IR 5/5   Rhomboids 2-/5 Rhomboids 5/5       Strength: (OSMANY Dynamometer in psi.)        10/9/2023 10/9/2023 12/11/23 1/3/24 1/8/24 1/22/24 2/7/24     Left Right Right Right Right Right Right   Rung II 60 N/a 50 63 65 65 70      Joint Mobility/End Feels: Hard     Palpation: Pt is tender at incision sites      Sensation: Pt denies any numbness or tingling        Scapular Control/Dyskinesis:     Normal / Subtle / Obvious  Comments    Left  N N/a    Right  N N/a         OUTCOME MEASURE:FOTO     Category:Self care       Current Score  = 37%   Goal at Discharge Score = 65%     11/20/23: 53%    Treatment     Julee participated in dynamic functional therapeutic activities to improve functional performance for 60  minutes, including:  -pulley's 3 min flexion/ 3 min abduction  - 7 minute carry with 10# DB   - Black theraband rows 2/15  - Black theraband pull downs 2/15  - Blue theraband internal rotation 2/15  - Blue theraband external rotation 2/15  - UBE: level 5 for 10 minutes 5 min forward/ 5 minutes backwards  - green theraband pull aparts 2/15  - standing push press with 5# dowel 2/15  -reassessment of objective measures       Home Exercises and  Education Provided     Education provided:   - Progress towards goals     Written Home Exercises Provided: Patient instructed to cont prior HEP.  Exercises were reviewed and Julee was able to demonstrate them prior to the end of the session.  Julee demonstrated good  understanding of the HEP provided.   .   See EMR under Patient Instructions for exercises provided prior visit.      Assessment     Pt would continue to benefit from skilled OT. Pt tolerated added therapeutic activities well without additional complaints of pain. Patient continues to progress in overall strength and range of motion.  Reassessment of objective measures were taken and patient continues to progress in overall strength and range of motion. Will progress as tolerated. Pt motivated.       Julee is progressing well towards her goals and there are no updates to goals at this time. Pt prognosis is Good.     Pt will continue to benefit from skilled outpatient occupational therapy to address the deficits listed in the problem list on initial evaluation provide pt/family education and to maximize pt's level of independence in the home and community environment.     Anticipated barriers to occupational therapy: slow bone healing    Pt's spiritual, cultural and educational needs considered and pt agreeable to plan of care and goals.    Goals:      Short Term (6 weeks ):  1)   Patient to be IND with HEP and modalities for pain management MET 12/11/23  2)   Increase ROM to 120 degrees in shoulder flexion to increase functional UE use for ADLs and IADLs MET 12/11/23  3)   Increase ROM to 90 degrees in shoulder abduction to increase functional UE use for ADLs and IADLs MET 12/11/23  4)   Increase ROM to 80 degrees in shoulder internal rotation to increase functional UE use for ADLs and IADLs MET 12/11/23  5)   Increase ROM to 50 degrees in shoulder external rotation to increase functional UE use for ADLs and IADLs MET 12/11/23  6)   Increase ROM to 130  degrees in elbow flexion to increase functional UE use for ADLs and IADLs MET 10/30/23  7)   Increase ROM to 5 degrees in elbow extension to increase functional UE use for ADLs and IADLs MET 12/11/23  8)   Increase  strength to 30 lbs. to grasp for  ADLs and IADLs MET 12/11/23  9)   Improve muscle strength ing -2 to 3+ in order to participate in ADLs and IADLs with less assistance. MET 12/11/23     Long Term (by discharge):  1)   Pt will report 1 out of 10 pain at worst when completing ADLs and IADLs. Ongoing  2)   Increase ROM to 170 degrees in shoulder flexion to increase functional UE use for ADLs and IADLs Ongoing  3)   Increase ROM to 165 degrees in shoulder abduction to increase functional UE use for ADLs and IADLs Ongoing  4)   Increase ROM to 80 degrees in shoulder internal rotation to increase functional UE use for ADLs and IADLs MET 12/11/23  5)   Increase ROM to 80 degrees in shoulder external rotation to increase functional UE use for ADLs and IADLs Ongoing  6)   Increase ROM to 140 degrees in elbow flexion to increase functional UE use for ADLs and IADLs MET 12/11/23  7)   Increase ROM to 0 degrees in elbow extension to increase functional UE use for ADLs and IADLs MET 12/11/23  8)   Increase  strength to 50 lbs. to grasp for  ADLs and IADLs MET 12/11/23  9)   Improve muscle strength ing to 4+ in order to participate in ADLs and IADLs with less assistance.  Ongoing  10)   Patient to score at 65% or more on FOTO to demonstrate improved perception of functional shoulder use.Ongoing  11)  Pt will return to prior level of function for ADLs and household management. Ongoing     Plan      Pt to be treated by Occupational Therapy 2 times per week for 12 weeks during the certification period to achieve the established goals.      Treatment to include: Paraffin, Fluidotherapy, Manual therapy/joint mobilizations, Modalities for pain management, US 3 mhz, Therapeutic exercises/activities., Iontophoresis  with 2.0 cc Dexamethasone, Strengthening, Orthotic Fabrication/Fit/Training, Edema Control, Scar Management, Wound Care, Electrical Modalities, Joint Protection, and Energy Conservation, as well as any other treatments deemed necessary based on the patient's needs or progress.     Updates/Grading for session: Continue with current plan of care     FERNANDO Harman

## 2024-02-12 ENCOUNTER — CLINICAL SUPPORT (OUTPATIENT)
Dept: REHABILITATION | Facility: HOSPITAL | Age: 44
End: 2024-02-12
Payer: COMMERCIAL

## 2024-02-12 DIAGNOSIS — M25.621 ELBOW STIFFNESS, RIGHT: ICD-10-CM

## 2024-02-12 DIAGNOSIS — R52 PAIN: ICD-10-CM

## 2024-02-12 DIAGNOSIS — M25.611 SHOULDER STIFFNESS, RIGHT: ICD-10-CM

## 2024-02-12 DIAGNOSIS — R53.1 WEAKNESS: Primary | ICD-10-CM

## 2024-02-12 PROCEDURE — 97530 THERAPEUTIC ACTIVITIES: CPT | Mod: PO

## 2024-02-12 NOTE — PROGRESS NOTES
Occupational Therapy Daily Treatment Note     Name: Tabby Veloz  Clinic Number: [id number]    Therapy Diagnosis:   Encounter Diagnoses   Name Primary?    Weakness Yes    Elbow stiffness, right     Shoulder stiffness, right     Pain            Physician: Luis Del Valle NP    Visit Date: 2/12/2024    Physician Orders: OT eval/ treat   Plan of Care Certification Date: 3/29/24  Authorization Period: 10/3/23 -10/2/24   Medical Diagnosis: S42.351D (ICD-10-CM) - Closed displaced comminuted fracture of shaft of right humerus with routine healing, subsequent   Surgical Procedure and Date: ORIF for her right humeral shaft fracture with IM nail by Dr. Stanley on 9/19/2023. ,   Evaluation Date: 10/9/23  Onset Date:9/15/23   Date of Return to MD: 1/23/24  Visit # / Visits authorized: 13/30 + 22  FOTO Completion: 3/3    Time In:0900  Time Out: 1000  Total Billable Time: 60 minutes    Note from MD 12/12/23 visit:    - Continue [facility] OT.  - Weight bearing 2-4 lbs x 4 weeks and increase by 2-4 lbs/month thereafter as she tolerates.  Ok for therapist to work with a pulley.  - Range of motion as tolerated.     Precautions: Standard and WB, 17 weeks 1/16/24   Subjective     Pt reports: She states that she had a fall this weekend so she's a little sore   she was compliant with home exercise program given last session.   Response to previous treatment:increased ROM  Functional change: increased ROM    Pain:1/10   Location: right arms     Objective       Observation: No abnormalities present      UE Range of Motion  Active Range of Motion:   Shoulder Right Right  10/30/23 Right  12/11/23 Right   1/3/24 Right  1/8/24 Right   1/22/24 Right  2/7/24   Flexion 20 145 - supine 160 - supine 135 - standing 165 - supine 140- standing  165 - supine 150 - standing   Abduction 50 110 - supine 130 - supine 130 -standing 145 - supine 145 - standing   150 - supine 145 - standing    ER at 90 20 40 - supine 50 - supine 55 - supine 55 -  supine  35 - standing   55 - supine 80 - standing    IR 80 80 - supine 80 - supine 80- supine 80 - supine 70 - standing   80 - supine  70 - standing       Passive Range of Motion:   Shoulder Left Right Right   10/30/23 Right  12/11/23   Flexion 170 70 160 170   Abduction 165 70 125 145   ER at 90 80 20 40 55   IR 80 80 80 80      Elbow Left Right Right   10/30/23 Right  12/11/23 Right   1/3/24 Right  1/8/24 Right  1/22/24   Flexion 140 120 130 140 140 140 140   Extension 0 -10 0 0 0 0 0      Painful Arc:   Patient demonstrates no painful arc in shoulder flexion or abduction     Upper Extremity Strength  (R) UE   (L) UE     Shoulder flexion: 2-/5 Shoulder flexion: 5/5   Shoulder Abduction: 2-/5 Shoulder abduction: 5/5   Shoulder ER 2-/5 Shoulder ER 5/5   Shoulder IR 2-/5 Shoulder IR 5/5   Rhomboids 2-/5 Rhomboids 5/5       Strength: (OSMANY Dynamometer in psi.)        10/9/2023 10/9/2023 12/11/23 1/3/24 1/8/24 1/22/24 2/7/24     Left Right Right Right Right Right Right   Rung II 60 N/a 50 63 65 65 70      Joint Mobility/End Feels: Hard     Palpation: Pt is tender at incision sites      Sensation: Pt denies any numbness or tingling        Scapular Control/Dyskinesis:     Normal / Subtle / Obvious  Comments    Left  N N/a    Right  N N/a         OUTCOME MEASURE:FOTO     Category:Self care       Current Score  = 37%   Goal at Discharge Score = 65%     11/20/23: 53%    Treatment     Julee participated in dynamic functional therapeutic activities to improve functional performance for 60  minutes, including:  -pulley's 3 min flexion/ 3 min abduction  - 7 minute carry with 10# DB   - Black theraband rows 2/15  - Black theraband pull downs 2/15  - Blue theraband internal rotation 2/15  - Blue theraband external rotation 2/15  - UBE: level 5 for 10 minutes 5 min forward/ 5 minutes backwards  - green theraband pull aparts 2/15  - standing push press with 5# dowel 2/15        Home Exercises and Education Provided      Education provided:   - Progress towards goals     Written Home Exercises Provided: Patient instructed to cont prior HEP.  Exercises were reviewed and Julee was able to demonstrate them prior to the end of the session.  Julee demonstrated good  understanding of the HEP provided.   .   See EMR under Patient Instructions for exercises provided prior visit.      Assessment     Pt would continue to benefit from skilled OT. Pt tolerated added therapeutic activities well without additional complaints of pain. Patient continues to progress in overall strength and range of motion. Pt states that she had a fall over the weekend. She tucked in her injured arm when she fell to protect it. She states that she doesn't have any soreness or pain with it and tolerated all of her exercises today without any issues. She states she is contacting her doctor today to notify him.  Will progress as tolerated. Pt motivated.       Julee is progressing well towards her goals and there are no updates to goals at this time. Pt prognosis is Good.     Pt will continue to benefit from skilled outpatient occupational therapy to address the deficits listed in the problem list on initial evaluation provide pt/family education and to maximize pt's level of independence in the home and community environment.     Anticipated barriers to occupational therapy: slow bone healing    Pt's spiritual, cultural and educational needs considered and pt agreeable to plan of care and goals.    Goals:      Short Term (6 weeks ):  1)   Patient to be IND with HEP and modalities for pain management MET 12/11/23  2)   Increase ROM to 120 degrees in shoulder flexion to increase functional UE use for ADLs and IADLs MET 12/11/23  3)   Increase ROM to 90 degrees in shoulder abduction to increase functional UE use for ADLs and IADLs MET 12/11/23  4)   Increase ROM to 80 degrees in shoulder internal rotation to increase functional UE use for ADLs and IADLs MET  12/11/23  5)   Increase ROM to 50 degrees in shoulder external rotation to increase functional UE use for ADLs and IADLs MET 12/11/23  6)   Increase ROM to 130 degrees in elbow flexion to increase functional UE use for ADLs and IADLs MET 10/30/23  7)   Increase ROM to 5 degrees in elbow extension to increase functional UE use for ADLs and IADLs MET 12/11/23  8)   Increase  strength to 30 lbs. to grasp for  ADLs and IADLs MET 12/11/23  9)   Improve muscle strength ing -2 to 3+ in order to participate in ADLs and IADLs with less assistance. MET 12/11/23     Long Term (by discharge):  1)   Pt will report 1 out of 10 pain at worst when completing ADLs and IADLs. Ongoing  2)   Increase ROM to 170 degrees in shoulder flexion to increase functional UE use for ADLs and IADLs Ongoing  3)   Increase ROM to 165 degrees in shoulder abduction to increase functional UE use for ADLs and IADLs Ongoing  4)   Increase ROM to 80 degrees in shoulder internal rotation to increase functional UE use for ADLs and IADLs MET 12/11/23  5)   Increase ROM to 80 degrees in shoulder external rotation to increase functional UE use for ADLs and IADLs Ongoing  6)   Increase ROM to 140 degrees in elbow flexion to increase functional UE use for ADLs and IADLs MET 12/11/23  7)   Increase ROM to 0 degrees in elbow extension to increase functional UE use for ADLs and IADLs MET 12/11/23  8)   Increase  strength to 50 lbs. to grasp for  ADLs and IADLs MET 12/11/23  9)   Improve muscle strength ing to 4+ in order to participate in ADLs and IADLs with less assistance.  Ongoing  10)   Patient to score at 65% or more on FOTO to demonstrate improved perception of functional shoulder use.Ongoing  11)  Pt will return to prior level of function for ADLs and household management. Ongoing     Plan      Pt to be treated by Occupational Therapy 2 times per week for 12 weeks during the certification period to achieve the established goals.      Treatment to  include: Paraffin, Fluidotherapy, Manual therapy/joint mobilizations, Modalities for pain management, US 3 mhz, Therapeutic exercises/activities., Iontophoresis with 2.0 cc Dexamethasone, Strengthening, Orthotic Fabrication/Fit/Training, Edema Control, Scar Management, Wound Care, Electrical Modalities, Joint Protection, and Energy Conservation, as well as any other treatments deemed necessary based on the patient's needs or progress.     Updates/Grading for session: Continue with current plan of care     FERNANDO Harman

## 2024-02-14 ENCOUNTER — CLINICAL SUPPORT (OUTPATIENT)
Dept: REHABILITATION | Facility: HOSPITAL | Age: 44
End: 2024-02-14
Payer: COMMERCIAL

## 2024-02-14 DIAGNOSIS — M25.611 SHOULDER STIFFNESS, RIGHT: ICD-10-CM

## 2024-02-14 DIAGNOSIS — R52 PAIN: ICD-10-CM

## 2024-02-14 DIAGNOSIS — M25.621 ELBOW STIFFNESS, RIGHT: ICD-10-CM

## 2024-02-14 DIAGNOSIS — R53.1 WEAKNESS: Primary | ICD-10-CM

## 2024-02-14 PROCEDURE — 97530 THERAPEUTIC ACTIVITIES: CPT | Mod: PO

## 2024-02-14 NOTE — PROGRESS NOTES
Occupational Therapy Daily Treatment Note     Name: Tabby Veloz  Clinic Number: [id number]    Therapy Diagnosis:   Encounter Diagnoses   Name Primary?    Weakness Yes    Elbow stiffness, right     Shoulder stiffness, right     Pain            Physician: Luis Del Valle NP    Visit Date: 2/14/2024    Physician Orders: OT eval/ treat   Plan of Care Certification Date: 3/29/24  Authorization Period: 10/3/23 -10/2/24   Medical Diagnosis: S42.351D (ICD-10-CM) - Closed displaced comminuted fracture of shaft of right humerus with routine healing, subsequent   Surgical Procedure and Date: ORIF for her right humeral shaft fracture with IM nail by Dr. Stanley on 9/19/2023. ,   Evaluation Date: 10/9/23  Onset Date:9/15/23   Date of Return to MD: 1/23/24  Visit # / Visits authorized: 14/30 + 22  FOTO Completion: 3/3    Time In:0900  Time Out: 1000  Total Billable Time: 60 minutes    Note from MD 12/12/23 visit:    - Continue [facility].  - Weight bearing 2-4 lbs x 4 weeks and increase by 2-4 lbs/month thereafter as she tolerates.  Ok for therapist to work with a pulley.  - Range of motion as tolerated.     Precautions: Standard and WB, 17 weeks 1/16/24   Subjective     Pt reports: She states she isn't hurting   she was compliant with home exercise program given last session.   Response to previous treatment:increased ROM  Functional change: increased ROM    Pain:1/10   Location: right arms     Objective       Observation: No abnormalities present      UE Range of Motion  Active Range of Motion:   Shoulder Right Right  10/30/23 Right  12/11/23 Right   1/3/24 Right  1/8/24 Right   1/22/24 Right  2/7/24   Flexion 20 145 - supine 160 - supine 135 - standing 165 - supine 140- standing  165 - supine 150 - standing   Abduction 50 110 - supine 130 - supine 130 -standing 145 - supine 145 - standing   150 - supine 145 - standing    ER at 90 20 40 - supine 50 - supine 55 - supine 55 - supine  35 - standing   55 - supine  80 - standing    IR 80 80 - supine 80 - supine 80- supine 80 - supine 70 - standing   80 - supine  70 - standing       Passive Range of Motion:   Shoulder Left Right Right   10/30/23 Right  12/11/23   Flexion 170 70 160 170   Abduction 165 70 125 145   ER at 90 80 20 40 55   IR 80 80 80 80      Elbow Left Right Right   10/30/23 Right  12/11/23 Right   1/3/24 Right  1/8/24 Right  1/22/24   Flexion 140 120 130 140 140 140 140   Extension 0 -10 0 0 0 0 0      Painful Arc:   Patient demonstrates no painful arc in shoulder flexion or abduction     Upper Extremity Strength  (R) UE   (L) UE     Shoulder flexion: 2-/5 Shoulder flexion: 5/5   Shoulder Abduction: 2-/5 Shoulder abduction: 5/5   Shoulder ER 2-/5 Shoulder ER 5/5   Shoulder IR 2-/5 Shoulder IR 5/5   Rhomboids 2-/5 Rhomboids 5/5       Strength: (OSMANY Dynamometer in psi.)        10/9/2023 10/9/2023 12/11/23 1/3/24 1/8/24 1/22/24 2/7/24     Left Right Right Right Right Right Right   Rung II 60 N/a 50 63 65 65 70      Joint Mobility/End Feels: Hard     Palpation: Pt is tender at incision sites      Sensation: Pt denies any numbness or tingling        Scapular Control/Dyskinesis:     Normal / Subtle / Obvious  Comments    Left  N N/a    Right  N N/a         OUTCOME MEASURE:FOTO     Category:Self care       Current Score  = 37%   Goal at Discharge Score = 65%     11/20/23: 53%    Treatment     Julee participated in dynamic functional therapeutic activities to improve functional performance for 60  minutes, including:  -pulley's 3 min flexion/ 3 min abduction  - 7 minute carry with 10# DB   - Black theraband rows 2/15  - Black theraband pull downs 2/15  - Blue theraband internal rotation 2/15  - Blue theraband external rotation 2/15  - UBE: level 5 for 10 minutes 5 min forward/ 5 minutes backwards  - green theraband pull aparts 2/15  - standing push press with 5# dowel 2/15        Home Exercises and Education Provided     Education provided:   - Progress towards  goals     Written Home Exercises Provided: Patient instructed to cont prior HEP.  Exercises were reviewed and Julee was able to demonstrate them prior to the end of the session.  Julee demonstrated good  understanding of the HEP provided.   .   See EMR under Patient Instructions for exercises provided prior visit.      Assessment     Pt would continue to benefit from skilled OT. Pt tolerated added therapeutic activities well without additional complaints of pain. Patient continues to progress in overall strength and range of motion.  Will progress as tolerated. Pt motivated.       Julee is progressing well towards her goals and there are no updates to goals at this time. Pt prognosis is Good.     Pt will continue to benefit from skilled outpatient occupational therapy to address the deficits listed in the problem list on initial evaluation provide pt/family education and to maximize pt's level of independence in the home and community environment.     Anticipated barriers to occupational therapy: slow bone healing    Pt's spiritual, cultural and educational needs considered and pt agreeable to plan of care and goals.    Goals:      Short Term (6 weeks ):  1)   Patient to be IND with HEP and modalities for pain management MET 12/11/23  2)   Increase ROM to 120 degrees in shoulder flexion to increase functional UE use for ADLs and IADLs MET 12/11/23  3)   Increase ROM to 90 degrees in shoulder abduction to increase functional UE use for ADLs and IADLs MET 12/11/23  4)   Increase ROM to 80 degrees in shoulder internal rotation to increase functional UE use for ADLs and IADLs MET 12/11/23  5)   Increase ROM to 50 degrees in shoulder external rotation to increase functional UE use for ADLs and IADLs MET 12/11/23  6)   Increase ROM to 130 degrees in elbow flexion to increase functional UE use for ADLs and IADLs MET 10/30/23  7)   Increase ROM to 5 degrees in elbow extension to increase functional UE use for ADLs and IADLs  MET 12/11/23  8)   Increase  strength to 30 lbs. to grasp for  ADLs and IADLs MET 12/11/23  9)   Improve muscle strength ing -2 to 3+ in order to participate in ADLs and IADLs with less assistance. MET 12/11/23     Long Term (by discharge):  1)   Pt will report 1 out of 10 pain at worst when completing ADLs and IADLs. Ongoing  2)   Increase ROM to 170 degrees in shoulder flexion to increase functional UE use for ADLs and IADLs Ongoing  3)   Increase ROM to 165 degrees in shoulder abduction to increase functional UE use for ADLs and IADLs Ongoing  4)   Increase ROM to 80 degrees in shoulder internal rotation to increase functional UE use for ADLs and IADLs MET 12/11/23  5)   Increase ROM to 80 degrees in shoulder external rotation to increase functional UE use for ADLs and IADLs Ongoing  6)   Increase ROM to 140 degrees in elbow flexion to increase functional UE use for ADLs and IADLs MET 12/11/23  7)   Increase ROM to 0 degrees in elbow extension to increase functional UE use for ADLs and IADLs MET 12/11/23  8)   Increase  strength to 50 lbs. to grasp for  ADLs and IADLs MET 12/11/23  9)   Improve muscle strength ing to 4+ in order to participate in ADLs and IADLs with less assistance.  Ongoing  10)   Patient to score at 65% or more on FOTO to demonstrate improved perception of functional shoulder use.Ongoing  11)  Pt will return to prior level of function for ADLs and household management. Ongoing     Plan      Pt to be treated by Occupational Therapy 2 times per week for 12 weeks during the certification period to achieve the established goals.      Treatment to include: Paraffin, Fluidotherapy, Manual therapy/joint mobilizations, Modalities for pain management, US 3 mhz, Therapeutic exercises/activities., Iontophoresis with 2.0 cc Dexamethasone, Strengthening, Orthotic Fabrication/Fit/Training, Edema Control, Scar Management, Wound Care, Electrical Modalities, Joint Protection, and Energy Conservation, as  well as any other treatments deemed necessary based on the patient's needs or progress.     Updates/Grading for session: Continue with current plan of care     FERNANDO Harman

## 2024-02-15 DIAGNOSIS — S42.351D CLOSED DISPLACED COMMINUTED FRACTURE OF SHAFT OF RIGHT HUMERUS WITH ROUTINE HEALING, SUBSEQUENT ENCOUNTER: ICD-10-CM

## 2024-02-15 DIAGNOSIS — S42.351G CLOSED DISPLACED COMMINUTED FRACTURE OF SHAFT OF RIGHT HUMERUS WITH DELAYED HEALING, SUBSEQUENT ENCOUNTER: ICD-10-CM

## 2024-02-15 RX ORDER — METHOCARBAMOL 500 MG/1
500 TABLET, FILM COATED ORAL 3 TIMES DAILY PRN
Qty: 30 TABLET | Refills: 0 | Status: SHIPPED | OUTPATIENT
Start: 2024-02-15 | End: 2024-02-25

## 2024-02-15 RX ORDER — GABAPENTIN 300 MG/1
300 CAPSULE ORAL 3 TIMES DAILY
Qty: 42 CAPSULE | Refills: 0 | Status: SHIPPED | OUTPATIENT
Start: 2024-02-15 | End: 2024-06-18

## 2024-02-19 ENCOUNTER — CLINICAL SUPPORT (OUTPATIENT)
Dept: REHABILITATION | Facility: HOSPITAL | Age: 44
End: 2024-02-19
Payer: COMMERCIAL

## 2024-02-19 DIAGNOSIS — R52 PAIN: ICD-10-CM

## 2024-02-19 DIAGNOSIS — M25.611 SHOULDER STIFFNESS, RIGHT: ICD-10-CM

## 2024-02-19 DIAGNOSIS — M25.621 ELBOW STIFFNESS, RIGHT: ICD-10-CM

## 2024-02-19 DIAGNOSIS — R53.1 WEAKNESS: Primary | ICD-10-CM

## 2024-02-19 PROCEDURE — 97530 THERAPEUTIC ACTIVITIES: CPT | Mod: PO

## 2024-02-19 NOTE — PROGRESS NOTES
Occupational Therapy Daily Treatment Note     Name: Tabby Veloz  Clinic Number: [id number]    Therapy Diagnosis:   Encounter Diagnoses   Name Primary?    Weakness Yes    Elbow stiffness, right     Shoulder stiffness, right     Pain            Physician: Luis Del Valle NP    Visit Date: 2/19/2024    Physician Orders: OT eval/ treat   Plan of Care Certification Date: 3/29/24  Authorization Period: 10/3/23 -10/2/24   Medical Diagnosis: S42.351D (ICD-10-CM) - Closed displaced comminuted fracture of shaft of right humerus with routine healing, subsequent   Surgical Procedure and Date: ORIF for her right humeral shaft fracture with IM nail by Dr. Stanley on 9/19/2023. ,   Evaluation Date: 10/9/23  Onset Date:9/15/23   Date of Return to MD: 1/23/24  Visit # / Visits authorized: 15/30 + 22  FOTO Completion: 3/3    Time In:0900  Time Out: 1000  Total Billable Time: 60 minutes    Note from MD 12/12/23 visit:    - Continue [facility].  - Weight bearing 2-4 lbs x 4 weeks and increase by 2-4 lbs/month thereafter as she tolerates.  Ok for therapist to work with a pulley.  - Range of motion as tolerated.     Precautions: Standard and WB, 17 weeks 1/16/24   Subjective     Pt reports: She received her bone stimulator this weekend.  she was compliant with home exercise program given last session.   Response to previous treatment:increased ROM  Functional change: increased ROM    Pain:1/10   Location: right arms     Objective       Observation: No abnormalities present      UE Range of Motion  Active Range of Motion:   Shoulder Right Right  10/30/23 Right  12/11/23 Right   1/3/24 Right  1/8/24 Right   1/22/24 Right  2/7/24   Flexion 20 145 - supine 160 - supine 135 - standing 165 - supine 140- standing  165 - supine 150 - standing   Abduction 50 110 - supine 130 - supine 130 -standing 145 - supine 145 - standing   150 - supine 145 - standing    ER at 90 20 40 - supine 50 - supine 55 - supine 55 - supine  35 -  standing   55 - supine 80 - standing    IR 80 80 - supine 80 - supine 80- supine 80 - supine 70 - standing   80 - supine  70 - standing       Passive Range of Motion:   Shoulder Left Right Right   10/30/23 Right  12/11/23   Flexion 170 70 160 170   Abduction 165 70 125 145   ER at 90 80 20 40 55   IR 80 80 80 80      Elbow Left Right Right   10/30/23 Right  12/11/23 Right   1/3/24 Right  1/8/24 Right  1/22/24   Flexion 140 120 130 140 140 140 140   Extension 0 -10 0 0 0 0 0      Painful Arc:   Patient demonstrates no painful arc in shoulder flexion or abduction     Upper Extremity Strength  (R) UE   (L) UE     Shoulder flexion: 2-/5 Shoulder flexion: 5/5   Shoulder Abduction: 2-/5 Shoulder abduction: 5/5   Shoulder ER 2-/5 Shoulder ER 5/5   Shoulder IR 2-/5 Shoulder IR 5/5   Rhomboids 2-/5 Rhomboids 5/5       Strength: (OSMANY Dynamometer in psi.)        10/9/2023 10/9/2023 12/11/23 1/3/24 1/8/24 1/22/24 2/7/24     Left Right Right Right Right Right Right   Rung II 60 N/a 50 63 65 65 70      Joint Mobility/End Feels: Hard     Palpation: Pt is tender at incision sites      Sensation: Pt denies any numbness or tingling        Scapular Control/Dyskinesis:     Normal / Subtle / Obvious  Comments    Left  N N/a    Right  N N/a         OUTCOME MEASURE:FOTO     Category:Self care       Current Score  = 37%   Goal at Discharge Score = 65%     11/20/23: 53%    Treatment     Julee participated in dynamic functional therapeutic activities to improve functional performance for 60  minutes, including:  -pulley's 3 min flexion/ 3 min abduction  - 10 minute carry with 10# DB   - Black theraband rows 2/15  - Black theraband pull downs 2/15  - Black theraband internal rotation 2/15  - Blue theraband external rotation 2/15  - UBE: level 5 for 10 minutes 5 min forward/ 5 minutes backwards  - green theraband pull aparts 2/15  - standing push press with 5# dowel 2/15      Home Exercises and Education Provided     Education provided:    - Progress towards goals     Written Home Exercises Provided: Patient instructed to cont prior HEP.  Exercises were reviewed and Julee was able to demonstrate them prior to the end of the session.  Julee demonstrated good  understanding of the HEP provided.   .   See EMR under Patient Instructions for exercises provided prior visit.      Assessment     Pt would continue to benefit from skilled OT. Pt tolerated added therapeutic activities well without additional complaints of pain. Patient continues to progress in overall strength and range of motion.  Will progress as tolerated. Pt motivated.       Julee is progressing well towards her goals and there are no updates to goals at this time. Pt prognosis is Good.     Pt will continue to benefit from skilled outpatient occupational therapy to address the deficits listed in the problem list on initial evaluation provide pt/family education and to maximize pt's level of independence in the home and community environment.     Anticipated barriers to occupational therapy: slow bone healing    Pt's spiritual, cultural and educational needs considered and pt agreeable to plan of care and goals.    Goals:      Short Term (6 weeks ):  1)   Patient to be IND with HEP and modalities for pain management MET 12/11/23  2)   Increase ROM to 120 degrees in shoulder flexion to increase functional UE use for ADLs and IADLs MET 12/11/23  3)   Increase ROM to 90 degrees in shoulder abduction to increase functional UE use for ADLs and IADLs MET 12/11/23  4)   Increase ROM to 80 degrees in shoulder internal rotation to increase functional UE use for ADLs and IADLs MET 12/11/23  5)   Increase ROM to 50 degrees in shoulder external rotation to increase functional UE use for ADLs and IADLs MET 12/11/23  6)   Increase ROM to 130 degrees in elbow flexion to increase functional UE use for ADLs and IADLs MET 10/30/23  7)   Increase ROM to 5 degrees in elbow extension to increase functional UE  use for ADLs and IADLs MET 12/11/23  8)   Increase  strength to 30 lbs. to grasp for  ADLs and IADLs MET 12/11/23  9)   Improve muscle strength ing -2 to 3+ in order to participate in ADLs and IADLs with less assistance. MET 12/11/23     Long Term (by discharge):  1)   Pt will report 1 out of 10 pain at worst when completing ADLs and IADLs. Ongoing  2)   Increase ROM to 170 degrees in shoulder flexion to increase functional UE use for ADLs and IADLs Ongoing  3)   Increase ROM to 165 degrees in shoulder abduction to increase functional UE use for ADLs and IADLs Ongoing  4)   Increase ROM to 80 degrees in shoulder internal rotation to increase functional UE use for ADLs and IADLs MET 12/11/23  5)   Increase ROM to 80 degrees in shoulder external rotation to increase functional UE use for ADLs and IADLs Ongoing  6)   Increase ROM to 140 degrees in elbow flexion to increase functional UE use for ADLs and IADLs MET 12/11/23  7)   Increase ROM to 0 degrees in elbow extension to increase functional UE use for ADLs and IADLs MET 12/11/23  8)   Increase  strength to 50 lbs. to grasp for  ADLs and IADLs MET 12/11/23  9)   Improve muscle strength ing to 4+ in order to participate in ADLs and IADLs with less assistance.  Ongoing  10)   Patient to score at 65% or more on FOTO to demonstrate improved perception of functional shoulder use.Ongoing  11)  Pt will return to prior level of function for ADLs and household management. Ongoing     Plan      Pt to be treated by Occupational Therapy 2 times per week for 12 weeks during the certification period to achieve the established goals.      Treatment to include: Paraffin, Fluidotherapy, Manual therapy/joint mobilizations, Modalities for pain management, US 3 mhz, Therapeutic exercises/activities., Iontophoresis with 2.0 cc Dexamethasone, Strengthening, Orthotic Fabrication/Fit/Training, Edema Control, Scar Management, Wound Care, Electrical Modalities, Joint Protection, and  Energy Conservation, as well as any other treatments deemed necessary based on the patient's needs or progress.     Updates/Grading for session: Continue with current plan of care     FERNANDO Harman

## 2024-02-21 ENCOUNTER — CLINICAL SUPPORT (OUTPATIENT)
Dept: REHABILITATION | Facility: HOSPITAL | Age: 44
End: 2024-02-21
Payer: COMMERCIAL

## 2024-02-21 DIAGNOSIS — M25.611 SHOULDER STIFFNESS, RIGHT: ICD-10-CM

## 2024-02-21 DIAGNOSIS — M25.621 ELBOW STIFFNESS, RIGHT: ICD-10-CM

## 2024-02-21 DIAGNOSIS — R52 PAIN: ICD-10-CM

## 2024-02-21 DIAGNOSIS — R53.1 WEAKNESS: Primary | ICD-10-CM

## 2024-02-21 PROCEDURE — 97530 THERAPEUTIC ACTIVITIES: CPT | Mod: PO

## 2024-02-21 NOTE — PROGRESS NOTES
Occupational Therapy Daily Treatment Note     Name: Tabby Veloz  Clinic Number: [id number]    Therapy Diagnosis:   Encounter Diagnoses   Name Primary?    Weakness Yes    Elbow stiffness, right     Shoulder stiffness, right     Pain            Physician: Luis Del Valle NP    Visit Date: 2/21/2024    Physician Orders: OT eval/ treat   Plan of Care Certification Date: 3/29/24  Authorization Period: 10/3/23 -10/2/24   Medical Diagnosis: S42.351D (ICD-10-CM) - Closed displaced comminuted fracture of shaft of right humerus with routine healing, subsequent   Surgical Procedure and Date: ORIF for her right humeral shaft fracture with IM nail by Dr. Stanley on 9/19/2023. ,   Evaluation Date: 10/9/23  Onset Date:9/15/23   Date of Return to MD: 1/23/24  Visit # / Visits authorized: 15/30 + 22  FOTO Completion: 3/3    Time In:0900  Time Out: 1000  Total Billable Time: 60 minutes    Note from MD 12/12/23 visit:    - Continue [facility].  - Weight bearing 2-4 lbs x 4 weeks and increase by 2-4 lbs/month thereafter as she tolerates.  Ok for therapist to work with a pulley.  - Range of motion as tolerated.     Precautions: Standard and WB, 17 weeks 1/16/24   Subjective     Pt reports: She can tell she is getting stronger   she was compliant with home exercise program given last session.   Response to previous treatment:increased ROM  Functional change: increased ROM    Pain:1/10   Location: right arms     Objective       Observation: No abnormalities present      UE Range of Motion  Active Range of Motion:   Shoulder Right Right  10/30/23 Right  12/11/23 Right   1/3/24 Right  1/8/24 Right   1/22/24 Right  2/7/24   Flexion 20 145 - supine 160 - supine 135 - standing 165 - supine 140- standing  165 - supine 150 - standing   Abduction 50 110 - supine 130 - supine 130 -standing 145 - supine 145 - standing   150 - supine 145 - standing    ER at 90 20 40 - supine 50 - supine 55 - supine 55 - supine  35 - standing   55 -  supine 80 - standing    IR 80 80 - supine 80 - supine 80- supine 80 - supine 70 - standing   80 - supine  70 - standing       Passive Range of Motion:   Shoulder Left Right Right   10/30/23 Right  12/11/23   Flexion 170 70 160 170   Abduction 165 70 125 145   ER at 90 80 20 40 55   IR 80 80 80 80      Elbow Left Right Right   10/30/23 Right  12/11/23 Right   1/3/24 Right  1/8/24 Right  1/22/24   Flexion 140 120 130 140 140 140 140   Extension 0 -10 0 0 0 0 0      Painful Arc:   Patient demonstrates no painful arc in shoulder flexion or abduction     Upper Extremity Strength  (R) UE   (L) UE     Shoulder flexion: 2-/5 Shoulder flexion: 5/5   Shoulder Abduction: 2-/5 Shoulder abduction: 5/5   Shoulder ER 2-/5 Shoulder ER 5/5   Shoulder IR 2-/5 Shoulder IR 5/5   Rhomboids 2-/5 Rhomboids 5/5       Strength: (OSMANY Dynamometer in psi.)        10/9/2023 10/9/2023 12/11/23 1/3/24 1/8/24 1/22/24 2/7/24     Left Right Right Right Right Right Right   Rung II 60 N/a 50 63 65 65 70      Joint Mobility/End Feels: Hard     Palpation: Pt is tender at incision sites      Sensation: Pt denies any numbness or tingling        Scapular Control/Dyskinesis:     Normal / Subtle / Obvious  Comments    Left  N N/a    Right  N N/a         OUTCOME MEASURE:FOTO     Category:Self care       Current Score  = 37%   Goal at Discharge Score = 65%     11/20/23: 53%    Treatment     Julee participated in dynamic functional therapeutic activities to improve functional performance for 60  minutes, including:  -pulley's 3 min flexion/ 3 min abduction  - 13 minute carry with 10# DB   - Black theraband rows 2/15  - Black theraband pull downs 2/15  - Black theraband internal rotation 2/15  - Blue theraband external rotation 2/15  - UBE: level 5 for 10 minutes 5 min forward/ 5 minutes backwards  - green theraband pull aparts 2/15  - standing push press with 5# dowel 2/15      Home Exercises and Education Provided     Education provided:   - Progress  towards goals     Written Home Exercises Provided: Patient instructed to cont prior HEP.  Exercises were reviewed and Julee was able to demonstrate them prior to the end of the session.  Julee demonstrated good  understanding of the HEP provided.   .   See EMR under Patient Instructions for exercises provided prior visit.      Assessment     Pt would continue to benefit from skilled OT. Pt tolerated added therapeutic activities well without additional complaints of pain. Patient continues to progress in overall strength and range of motion.  Will progress as tolerated. Pt motivated.       Julee is progressing well towards her goals and there are no updates to goals at this time. Pt prognosis is Good.     Pt will continue to benefit from skilled outpatient occupational therapy to address the deficits listed in the problem list on initial evaluation provide pt/family education and to maximize pt's level of independence in the home and community environment.     Anticipated barriers to occupational therapy: slow bone healing    Pt's spiritual, cultural and educational needs considered and pt agreeable to plan of care and goals.    Goals:      Short Term (6 weeks ):  1)   Patient to be IND with HEP and modalities for pain management MET 12/11/23  2)   Increase ROM to 120 degrees in shoulder flexion to increase functional UE use for ADLs and IADLs MET 12/11/23  3)   Increase ROM to 90 degrees in shoulder abduction to increase functional UE use for ADLs and IADLs MET 12/11/23  4)   Increase ROM to 80 degrees in shoulder internal rotation to increase functional UE use for ADLs and IADLs MET 12/11/23  5)   Increase ROM to 50 degrees in shoulder external rotation to increase functional UE use for ADLs and IADLs MET 12/11/23  6)   Increase ROM to 130 degrees in elbow flexion to increase functional UE use for ADLs and IADLs MET 10/30/23  7)   Increase ROM to 5 degrees in elbow extension to increase functional UE use for ADLs  and IADLs MET 12/11/23  8)   Increase  strength to 30 lbs. to grasp for  ADLs and IADLs MET 12/11/23  9)   Improve muscle strength ing -2 to 3+ in order to participate in ADLs and IADLs with less assistance. MET 12/11/23     Long Term (by discharge):  1)   Pt will report 1 out of 10 pain at worst when completing ADLs and IADLs. Ongoing  2)   Increase ROM to 170 degrees in shoulder flexion to increase functional UE use for ADLs and IADLs Ongoing  3)   Increase ROM to 165 degrees in shoulder abduction to increase functional UE use for ADLs and IADLs Ongoing  4)   Increase ROM to 80 degrees in shoulder internal rotation to increase functional UE use for ADLs and IADLs MET 12/11/23  5)   Increase ROM to 80 degrees in shoulder external rotation to increase functional UE use for ADLs and IADLs Ongoing  6)   Increase ROM to 140 degrees in elbow flexion to increase functional UE use for ADLs and IADLs MET 12/11/23  7)   Increase ROM to 0 degrees in elbow extension to increase functional UE use for ADLs and IADLs MET 12/11/23  8)   Increase  strength to 50 lbs. to grasp for  ADLs and IADLs MET 12/11/23  9)   Improve muscle strength ing to 4+ in order to participate in ADLs and IADLs with less assistance.  Ongoing  10)   Patient to score at 65% or more on FOTO to demonstrate improved perception of functional shoulder use.Ongoing  11)  Pt will return to prior level of function for ADLs and household management. Ongoing     Plan      Pt to be treated by Occupational Therapy 2 times per week for 12 weeks during the certification period to achieve the established goals.      Treatment to include: Paraffin, Fluidotherapy, Manual therapy/joint mobilizations, Modalities for pain management, US 3 mhz, Therapeutic exercises/activities., Iontophoresis with 2.0 cc Dexamethasone, Strengthening, Orthotic Fabrication/Fit/Training, Edema Control, Scar Management, Wound Care, Electrical Modalities, Joint Protection, and Energy  Conservation, as well as any other treatments deemed necessary based on the patient's needs or progress.     Updates/Grading for session: Continue with current plan of care     FERNANDO Harman

## 2024-02-26 ENCOUNTER — CLINICAL SUPPORT (OUTPATIENT)
Dept: REHABILITATION | Facility: HOSPITAL | Age: 44
End: 2024-02-26
Payer: COMMERCIAL

## 2024-02-26 DIAGNOSIS — M25.611 SHOULDER STIFFNESS, RIGHT: ICD-10-CM

## 2024-02-26 DIAGNOSIS — M25.621 ELBOW STIFFNESS, RIGHT: ICD-10-CM

## 2024-02-26 DIAGNOSIS — R52 PAIN: ICD-10-CM

## 2024-02-26 DIAGNOSIS — R53.1 WEAKNESS: Primary | ICD-10-CM

## 2024-02-26 PROCEDURE — 97530 THERAPEUTIC ACTIVITIES: CPT | Mod: PO

## 2024-02-26 NOTE — PROGRESS NOTES
Occupational Therapy Daily Treatment Note     Name: Tabby Veloz  Clinic Number: [id number]    Therapy Diagnosis:   Encounter Diagnoses   Name Primary?    Weakness Yes    Elbow stiffness, right     Shoulder stiffness, right     Pain              Physician: Luis Del Valle NP    Visit Date: 2/26/2024    Physician Orders: OT eval/ treat   Plan of Care Certification Date: 3/29/24  Authorization Period: 10/3/23 -10/2/24   Medical Diagnosis: S42.351D (ICD-10-CM) - Closed displaced comminuted fracture of shaft of right humerus with routine healing, subsequent   Surgical Procedure and Date: ORIF for her right humeral shaft fracture with IM nail by Dr. Stanley on 9/19/2023. ,   Evaluation Date: 10/9/23  Onset Date:9/15/23   Date of Return to MD: 1/23/24  Visit # / Visits authorized: 17/30 + 22  FOTO Completion: 3/3    Time In:0900  Time Out: 1000  Total Billable Time: 60 minutes    Note from MD 12/12/23 visit:    - Continue [facility] OT.  - Weight bearing 2-4 lbs x 4 weeks and increase by 2-4 lbs/month thereafter as she tolerates.  Ok for therapist to work with a pulley.  - Range of motion as tolerated.     Precautions: Standard and WB, 17 weeks 1/16/24   Subjective     Pt reports: She can tell she is getting stronger and she received her bone stimulator  she was compliant with home exercise program given last session.   Response to previous treatment:increased ROM  Functional change: increased ROM    Pain:1/10   Location: right arms     Objective       Observation: No abnormalities present      UE Range of Motion  Active Range of Motion:   Shoulder Right Right  10/30/23 Right  12/11/23 Right   1/3/24 Right  1/8/24 Right   1/22/24 Right  2/7/24   Flexion 20 145 - supine 160 - supine 135 - standing 165 - supine 140- standing  165 - supine 150 - standing   Abduction 50 110 - supine 130 - supine 130 -standing 145 - supine 145 - standing   150 - supine 145 - standing    ER at 90 20 40 - supine 50 - supine 55 -  supine 55 - supine  35 - standing   55 - supine 80 - standing    IR 80 80 - supine 80 - supine 80- supine 80 - supine 70 - standing   80 - supine  70 - standing       Passive Range of Motion:   Shoulder Left Right Right   10/30/23 Right  12/11/23   Flexion 170 70 160 170   Abduction 165 70 125 145   ER at 90 80 20 40 55   IR 80 80 80 80      Elbow Left Right Right   10/30/23 Right  12/11/23 Right   1/3/24 Right  1/8/24 Right  1/22/24   Flexion 140 120 130 140 140 140 140   Extension 0 -10 0 0 0 0 0      Painful Arc:   Patient demonstrates no painful arc in shoulder flexion or abduction     Upper Extremity Strength  (R) UE   (L) UE     Shoulder flexion: 2-/5 Shoulder flexion: 5/5   Shoulder Abduction: 2-/5 Shoulder abduction: 5/5   Shoulder ER 2-/5 Shoulder ER 5/5   Shoulder IR 2-/5 Shoulder IR 5/5   Rhomboids 2-/5 Rhomboids 5/5       Strength: (OSMANY Dynamometer in psi.)        10/9/2023 10/9/2023 12/11/23 1/3/24 1/8/24 1/22/24 2/7/24     Left Right Right Right Right Right Right   Rung II 60 N/a 50 63 65 65 70      Joint Mobility/End Feels: Hard     Palpation: Pt is tender at incision sites      Sensation: Pt denies any numbness or tingling        Scapular Control/Dyskinesis:     Normal / Subtle / Obvious  Comments    Left  N N/a    Right  N N/a         OUTCOME MEASURE:FOTO     Category:Self care       Current Score  = 37%   Goal at Discharge Score = 65%     11/20/23: 53%    Treatment     Julee participated in dynamic functional therapeutic activities to improve functional performance for 60  minutes, including:  -pulley's 3 min flexion/ 3 min abduction  - 6 minute carry with 12# DB   - Black theraband rows 2/15  - Black theraband pull downs 2/15  - Black theraband internal rotation 2/15  - Blue theraband external rotation 2/15  - UBE: level 5 for 10 minutes 5 min forward/ 5 minutes backwards  - green theraband pull aparts 2/15  - standing push press with 5# DB 3/10      Home Exercises and Education Provided      Education provided:   - Progress towards goals     Written Home Exercises Provided: Patient instructed to cont prior HEP.  Exercises were reviewed and Julee was able to demonstrate them prior to the end of the session.  Julee demonstrated good  understanding of the HEP provided.   .   See EMR under Patient Instructions for exercises provided prior visit.      Assessment     Pt would continue to benefit from skilled OT. Pt tolerated added therapeutic activities well without additional complaints of pain. Patient continues to progress in overall strength and range of motion.  Will progress as tolerated. Pt motivated.       Julee is progressing well towards her goals and there are no updates to goals at this time. Pt prognosis is Good.     Pt will continue to benefit from skilled outpatient occupational therapy to address the deficits listed in the problem list on initial evaluation provide pt/family education and to maximize pt's level of independence in the home and community environment.     Anticipated barriers to occupational therapy: slow bone healing    Pt's spiritual, cultural and educational needs considered and pt agreeable to plan of care and goals.    Goals:      Short Term (6 weeks ):  1)   Patient to be IND with HEP and modalities for pain management MET 12/11/23  2)   Increase ROM to 120 degrees in shoulder flexion to increase functional UE use for ADLs and IADLs MET 12/11/23  3)   Increase ROM to 90 degrees in shoulder abduction to increase functional UE use for ADLs and IADLs MET 12/11/23  4)   Increase ROM to 80 degrees in shoulder internal rotation to increase functional UE use for ADLs and IADLs MET 12/11/23  5)   Increase ROM to 50 degrees in shoulder external rotation to increase functional UE use for ADLs and IADLs MET 12/11/23  6)   Increase ROM to 130 degrees in elbow flexion to increase functional UE use for ADLs and IADLs MET 10/30/23  7)   Increase ROM to 5 degrees in elbow extension to  increase functional UE use for ADLs and IADLs MET 12/11/23  8)   Increase  strength to 30 lbs. to grasp for  ADLs and IADLs MET 12/11/23  9)   Improve muscle strength ing -2 to 3+ in order to participate in ADLs and IADLs with less assistance. MET 12/11/23     Long Term (by discharge):  1)   Pt will report 1 out of 10 pain at worst when completing ADLs and IADLs. Ongoing  2)   Increase ROM to 170 degrees in shoulder flexion to increase functional UE use for ADLs and IADLs Ongoing  3)   Increase ROM to 165 degrees in shoulder abduction to increase functional UE use for ADLs and IADLs Ongoing  4)   Increase ROM to 80 degrees in shoulder internal rotation to increase functional UE use for ADLs and IADLs MET 12/11/23  5)   Increase ROM to 80 degrees in shoulder external rotation to increase functional UE use for ADLs and IADLs Ongoing  6)   Increase ROM to 140 degrees in elbow flexion to increase functional UE use for ADLs and IADLs MET 12/11/23  7)   Increase ROM to 0 degrees in elbow extension to increase functional UE use for ADLs and IADLs MET 12/11/23  8)   Increase  strength to 50 lbs. to grasp for  ADLs and IADLs MET 12/11/23  9)   Improve muscle strength ing to 4+ in order to participate in ADLs and IADLs with less assistance.  Ongoing  10)   Patient to score at 65% or more on FOTO to demonstrate improved perception of functional shoulder use.Ongoing  11)  Pt will return to prior level of function for ADLs and household management. Ongoing     Plan      Pt to be treated by Occupational Therapy 2 times per week for 12 weeks during the certification period to achieve the established goals.      Treatment to include: Paraffin, Fluidotherapy, Manual therapy/joint mobilizations, Modalities for pain management, US 3 mhz, Therapeutic exercises/activities., Iontophoresis with 2.0 cc Dexamethasone, Strengthening, Orthotic Fabrication/Fit/Training, Edema Control, Scar Management, Wound Care, Electrical Modalities,  Joint Protection, and Energy Conservation, as well as any other treatments deemed necessary based on the patient's needs or progress.     Updates/Grading for session: Continue with current plan of care     FERNANDO Harman

## 2024-02-28 ENCOUNTER — CLINICAL SUPPORT (OUTPATIENT)
Dept: REHABILITATION | Facility: HOSPITAL | Age: 44
End: 2024-02-28
Payer: COMMERCIAL

## 2024-02-28 DIAGNOSIS — R53.1 WEAKNESS: Primary | ICD-10-CM

## 2024-02-28 DIAGNOSIS — M25.621 ELBOW STIFFNESS, RIGHT: ICD-10-CM

## 2024-02-28 DIAGNOSIS — R52 PAIN: ICD-10-CM

## 2024-02-28 DIAGNOSIS — M25.611 SHOULDER STIFFNESS, RIGHT: ICD-10-CM

## 2024-02-28 PROCEDURE — 97530 THERAPEUTIC ACTIVITIES: CPT | Mod: PO

## 2024-02-28 NOTE — PROGRESS NOTES
Occupational Therapy Daily Treatment Note     Name: Tabby Veloz  Clinic Number: [id number]    Therapy Diagnosis:   Encounter Diagnoses   Name Primary?    Weakness Yes    Elbow stiffness, right     Shoulder stiffness, right     Pain              Physician: Luis Del Valle NP    Visit Date: 2/28/2024    Physician Orders: OT eval/ treat   Plan of Care Certification Date: 3/29/24  Authorization Period: 10/3/23 -10/2/24   Medical Diagnosis: S42.351D (ICD-10-CM) - Closed displaced comminuted fracture of shaft of right humerus with routine healing, subsequent   Surgical Procedure and Date: ORIF for her right humeral shaft fracture with IM nail by Dr. Stanley on 9/19/2023. ,   Evaluation Date: 10/9/23  Onset Date:9/15/23   Date of Return to MD: 1/23/24  Visit # / Visits authorized: 18/30 + 22  FOTO Completion: 3/3    Time In:0900  Time Out: 1000  Total Billable Time: 60 minutes    Note from MD 12/12/23 visit:    - Continue [facility] OT.  - Weight bearing 2-4 lbs x 4 weeks and increase by 2-4 lbs/month thereafter as she tolerates.  Ok for therapist to work with a pulley.  - Range of motion as tolerated.     Precautions: Standard and WB, 17 weeks 1/16/24   Subjective     Pt reports: She can tell she is getting stronger and she received her bone stimulator  she was compliant with home exercise program given last session.   Response to previous treatment:increased ROM  Functional change: increased ROM    Pain:1/10   Location: right arms     Objective       Observation: No abnormalities present      UE Range of Motion  Active Range of Motion:   Shoulder Right Right  10/30/23 Right  12/11/23 Right   1/3/24 Right  1/8/24 Right   1/22/24 Right  2/7/24   Flexion 20 145 - supine 160 - supine 135 - standing 165 - supine 140- standing  165 - supine 150 - standing   Abduction 50 110 - supine 130 - supine 130 -standing 145 - supine 145 - standing   150 - supine 145 - standing    ER at 90 20 40 - supine 50 - supine 55 -  supine 55 - supine  35 - standing   55 - supine 80 - standing    IR 80 80 - supine 80 - supine 80- supine 80 - supine 70 - standing   80 - supine  70 - standing       Passive Range of Motion:   Shoulder Left Right Right   10/30/23 Right  12/11/23   Flexion 170 70 160 170   Abduction 165 70 125 145   ER at 90 80 20 40 55   IR 80 80 80 80      Elbow Left Right Right   10/30/23 Right  12/11/23 Right   1/3/24 Right  1/8/24 Right  1/22/24   Flexion 140 120 130 140 140 140 140   Extension 0 -10 0 0 0 0 0      Painful Arc:   Patient demonstrates no painful arc in shoulder flexion or abduction     Upper Extremity Strength  (R) UE   (L) UE     Shoulder flexion: 2-/5 Shoulder flexion: 5/5   Shoulder Abduction: 2-/5 Shoulder abduction: 5/5   Shoulder ER 2-/5 Shoulder ER 5/5   Shoulder IR 2-/5 Shoulder IR 5/5   Rhomboids 2-/5 Rhomboids 5/5       Strength: (OSMANY Dynamometer in psi.)        10/9/2023 10/9/2023 12/11/23 1/3/24 1/8/24 1/22/24 2/7/24     Left Right Right Right Right Right Right   Rung II 60 N/a 50 63 65 65 70      Joint Mobility/End Feels: Hard     Palpation: Pt is tender at incision sites      Sensation: Pt denies any numbness or tingling        Scapular Control/Dyskinesis:     Normal / Subtle / Obvious  Comments    Left  N N/a    Right  N N/a         OUTCOME MEASURE:FOTO     Category:Self care       Current Score  = 37%   Goal at Discharge Score = 65%     11/20/23: 53%    Treatment     Julee participated in dynamic functional therapeutic activities to improve functional performance for 60  minutes, including:  -pulley's 3 min flexion/ 3 min abduction  - 6 minute carry with 12# DB   - Black theraband rows 2/15  - Black theraband pull downs 2/15  - Black theraband internal rotation 2/15  - Blue theraband external rotation 2/15  - UBE: level 5 for 10 minutes 5 min forward/ 5 minutes backwards  - black theraband pull aparts 2/15  - standing push press with 5# DB 3/10  -reassessment of objective measures   -wall  push ups 3/10    Home Exercises and Education Provided     Education provided:   - Progress towards goals     Written Home Exercises Provided: Patient instructed to cont prior HEP.  Exercises were reviewed and Julee was able to demonstrate them prior to the end of the session.  Julee demonstrated good  understanding of the HEP provided.   .   See EMR under Patient Instructions for exercises provided prior visit.      Assessment     Pt would continue to benefit from skilled OT.  Increase in resistance today with therapeutic activities. Pt tolerated added therapeutic activities well without additional complaints of pain. Patient continues to progress in overall strength and range of motion.  Will progress as tolerated. Pt motivated.       Julee is progressing well towards her goals and there are no updates to goals at this time. Pt prognosis is Good.     Pt will continue to benefit from skilled outpatient occupational therapy to address the deficits listed in the problem list on initial evaluation provide pt/family education and to maximize pt's level of independence in the home and community environment.     Anticipated barriers to occupational therapy: slow bone healing    Pt's spiritual, cultural and educational needs considered and pt agreeable to plan of care and goals.    Goals:      Short Term (6 weeks ):  1)   Patient to be IND with HEP and modalities for pain management MET 12/11/23  2)   Increase ROM to 120 degrees in shoulder flexion to increase functional UE use for ADLs and IADLs MET 12/11/23  3)   Increase ROM to 90 degrees in shoulder abduction to increase functional UE use for ADLs and IADLs MET 12/11/23  4)   Increase ROM to 80 degrees in shoulder internal rotation to increase functional UE use for ADLs and IADLs MET 12/11/23  5)   Increase ROM to 50 degrees in shoulder external rotation to increase functional UE use for ADLs and IADLs MET 12/11/23  6)   Increase ROM to 130 degrees in elbow flexion to  increase functional UE use for ADLs and IADLs MET 10/30/23  7)   Increase ROM to 5 degrees in elbow extension to increase functional UE use for ADLs and IADLs MET 12/11/23  8)   Increase  strength to 30 lbs. to grasp for  ADLs and IADLs MET 12/11/23  9)   Improve muscle strength ing -2 to 3+ in order to participate in ADLs and IADLs with less assistance. MET 12/11/23     Long Term (by discharge):  1)   Pt will report 1 out of 10 pain at worst when completing ADLs and IADLs. Ongoing  2)   Increase ROM to 170 degrees in shoulder flexion to increase functional UE use for ADLs and IADLs Ongoing  3)   Increase ROM to 165 degrees in shoulder abduction to increase functional UE use for ADLs and IADLs Ongoing  4)   Increase ROM to 80 degrees in shoulder internal rotation to increase functional UE use for ADLs and IADLs MET 12/11/23  5)   Increase ROM to 80 degrees in shoulder external rotation to increase functional UE use for ADLs and IADLs Ongoing  6)   Increase ROM to 140 degrees in elbow flexion to increase functional UE use for ADLs and IADLs MET 12/11/23  7)   Increase ROM to 0 degrees in elbow extension to increase functional UE use for ADLs and IADLs MET 12/11/23  8)   Increase  strength to 50 lbs. to grasp for  ADLs and IADLs MET 12/11/23  9)   Improve muscle strength ing to 4+ in order to participate in ADLs and IADLs with less assistance.  Ongoing  10)   Patient to score at 65% or more on FOTO to demonstrate improved perception of functional shoulder use.Ongoing  11)  Pt will return to prior level of function for ADLs and household management. Ongoing     Plan      Pt to be treated by Occupational Therapy 2 times per week for 12 weeks during the certification period to achieve the established goals.      Treatment to include: Paraffin, Fluidotherapy, Manual therapy/joint mobilizations, Modalities for pain management, US 3 mhz, Therapeutic exercises/activities., Iontophoresis with 2.0 cc Dexamethasone,  Strengthening, Orthotic Fabrication/Fit/Training, Edema Control, Scar Management, Wound Care, Electrical Modalities, Joint Protection, and Energy Conservation, as well as any other treatments deemed necessary based on the patient's needs or progress.     Updates/Grading for session: Continue with current plan of care     FERNANDO Harman

## 2024-03-04 ENCOUNTER — CLINICAL SUPPORT (OUTPATIENT)
Dept: REHABILITATION | Facility: HOSPITAL | Age: 44
End: 2024-03-04
Payer: COMMERCIAL

## 2024-03-04 DIAGNOSIS — M25.621 ELBOW STIFFNESS, RIGHT: ICD-10-CM

## 2024-03-04 DIAGNOSIS — R53.1 WEAKNESS: Primary | ICD-10-CM

## 2024-03-04 DIAGNOSIS — M25.611 SHOULDER STIFFNESS, RIGHT: ICD-10-CM

## 2024-03-04 DIAGNOSIS — R52 PAIN: ICD-10-CM

## 2024-03-04 PROCEDURE — 97530 THERAPEUTIC ACTIVITIES: CPT | Mod: PO

## 2024-03-04 NOTE — PROGRESS NOTES
Occupational Therapy Daily Treatment Note     Name: Tabby Veloz  Clinic Number: [id number]    Therapy Diagnosis:   Encounter Diagnoses   Name Primary?    Weakness Yes    Elbow stiffness, right     Shoulder stiffness, right     Pain              Physician: Luis Del Valle NP    Visit Date: 3/4/2024    Physician Orders: OT eval/ treat   Plan of Care Certification Date: 3/29/24  Authorization Period: 10/3/23 -10/2/24   Medical Diagnosis: S42.351D (ICD-10-CM) - Closed displaced comminuted fracture of shaft of right humerus with routine healing, subsequent   Surgical Procedure and Date: ORIF for her right humeral shaft fracture with IM nail by Dr. Stanley on 9/19/2023. ,   Evaluation Date: 10/9/23  Onset Date:9/15/23   Date of Return to MD: 3/6/24  Visit # / Visits authorized: 19/30 + 22  FOTO Completion: 3/3    Time In:0900  Time Out: 1000  Total Billable Time: 60 minutes    Note from MD 12/12/23 visit:    - Continue [facility] OT.  - Weight bearing 2-4 lbs x 4 weeks and increase by 2-4 lbs/month thereafter as she tolerates.  Ok for therapist to work with a pulley.  - Range of motion as tolerated.     Precautions: Standard and WB, 17 weeks 1/16/24   Subjective     Pt reports: She can tell she is getting stronger and she received her bone stimulator  she was compliant with home exercise program given last session.   Response to previous treatment:increased ROM  Functional change: increased ROM    Pain:1/10   Location: right arms     Objective       Observation: No abnormalities present      UE Range of Motion  Active Range of Motion:   Shoulder Right Right  10/30/23 Right  12/11/23 Right   1/3/24 Right  1/8/24 Right   1/22/24 Right  2/7/24   Flexion 20 145 - supine 160 - supine 135 - standing 165 - supine 140- standing  165 - supine 150 - standing   Abduction 50 110 - supine 130 - supine 130 -standing 145 - supine 145 - standing   150 - supine 145 - standing    ER at 90 20 40 - supine 50 - supine 55 -  supine 55 - supine  35 - standing   55 - supine 80 - standing    IR 80 80 - supine 80 - supine 80- supine 80 - supine 70 - standing   80 - supine  70 - standing       Passive Range of Motion:   Shoulder Left Right Right   10/30/23 Right  12/11/23   Flexion 170 70 160 170   Abduction 165 70 125 145   ER at 90 80 20 40 55   IR 80 80 80 80      Elbow Left Right Right   10/30/23 Right  12/11/23 Right   1/3/24 Right  1/8/24 Right  1/22/24   Flexion 140 120 130 140 140 140 140   Extension 0 -10 0 0 0 0 0      Painful Arc:   Patient demonstrates no painful arc in shoulder flexion or abduction     Upper Extremity Strength  (R) UE   (L) UE     Shoulder flexion: 2-/5 Shoulder flexion: 5/5   Shoulder Abduction: 2-/5 Shoulder abduction: 5/5   Shoulder ER 2-/5 Shoulder ER 5/5   Shoulder IR 2-/5 Shoulder IR 5/5   Rhomboids 2-/5 Rhomboids 5/5       Strength: (OSMANY Dynamometer in psi.)        10/9/2023 10/9/2023 12/11/23 1/3/24 1/8/24 1/22/24 2/7/24     Left Right Right Right Right Right Right   Rung II 60 N/a 50 63 65 65 70      Joint Mobility/End Feels: Hard     Palpation: Pt is tender at incision sites      Sensation: Pt denies any numbness or tingling        Scapular Control/Dyskinesis:     Normal / Subtle / Obvious  Comments    Left  N N/a    Right  N N/a         OUTCOME MEASURE:FOTO     Category:Self care       Current Score  = 37%   Goal at Discharge Score = 65%     11/20/23: 53%    Treatment     Julee participated in dynamic functional therapeutic activities to improve functional performance for 60  minutes, including:  -pulley's 3 min flexion/ 3 min abduction  - 6 minute carry with 12# DB   - Black theraband rows 2/15  - Black theraband pull downs 2/15  - Black theraband internal rotation 2/15  - Blue theraband external rotation 2/15  - UBE: level 5 for 10 minutes 5 min forward/ 5 minutes backwards  - black theraband pull aparts 2/15  - standing push press with 7# DB 3/10  -reassessment of objective measures   -wall  push ups 3/10    Home Exercises and Education Provided     Education provided:   - Progress towards goals     Written Home Exercises Provided: Patient instructed to cont prior HEP.  Exercises were reviewed and Julee was able to demonstrate them prior to the end of the session.  Julee demonstrated good  understanding of the HEP provided.   .   See EMR under Patient Instructions for exercises provided prior visit.      Assessment     Pt would continue to benefit from skilled OT.  Increase in resistance today with therapeutic activities. Pt tolerated added therapeutic activities well without additional complaints of pain. Patient continues to progress in overall strength and range of motion.  Will progress as tolerated. Pt motivated.       Julee is progressing well towards her goals and there are no updates to goals at this time. Pt prognosis is Good.     Pt will continue to benefit from skilled outpatient occupational therapy to address the deficits listed in the problem list on initial evaluation provide pt/family education and to maximize pt's level of independence in the home and community environment.     Anticipated barriers to occupational therapy: slow bone healing    Pt's spiritual, cultural and educational needs considered and pt agreeable to plan of care and goals.    Goals:      Short Term (6 weeks ):  1)   Patient to be IND with HEP and modalities for pain management MET 12/11/23  2)   Increase ROM to 120 degrees in shoulder flexion to increase functional UE use for ADLs and IADLs MET 12/11/23  3)   Increase ROM to 90 degrees in shoulder abduction to increase functional UE use for ADLs and IADLs MET 12/11/23  4)   Increase ROM to 80 degrees in shoulder internal rotation to increase functional UE use for ADLs and IADLs MET 12/11/23  5)   Increase ROM to 50 degrees in shoulder external rotation to increase functional UE use for ADLs and IADLs MET 12/11/23  6)   Increase ROM to 130 degrees in elbow flexion to  increase functional UE use for ADLs and IADLs MET 10/30/23  7)   Increase ROM to 5 degrees in elbow extension to increase functional UE use for ADLs and IADLs MET 12/11/23  8)   Increase  strength to 30 lbs. to grasp for  ADLs and IADLs MET 12/11/23  9)   Improve muscle strength ing -2 to 3+ in order to participate in ADLs and IADLs with less assistance. MET 12/11/23     Long Term (by discharge):  1)   Pt will report 1 out of 10 pain at worst when completing ADLs and IADLs. Ongoing  2)   Increase ROM to 170 degrees in shoulder flexion to increase functional UE use for ADLs and IADLs Ongoing  3)   Increase ROM to 165 degrees in shoulder abduction to increase functional UE use for ADLs and IADLs Ongoing  4)   Increase ROM to 80 degrees in shoulder internal rotation to increase functional UE use for ADLs and IADLs MET 12/11/23  5)   Increase ROM to 80 degrees in shoulder external rotation to increase functional UE use for ADLs and IADLs Ongoing  6)   Increase ROM to 140 degrees in elbow flexion to increase functional UE use for ADLs and IADLs MET 12/11/23  7)   Increase ROM to 0 degrees in elbow extension to increase functional UE use for ADLs and IADLs MET 12/11/23  8)   Increase  strength to 50 lbs. to grasp for  ADLs and IADLs MET 12/11/23  9)   Improve muscle strength ing to 4+ in order to participate in ADLs and IADLs with less assistance.  Ongoing  10)   Patient to score at 65% or more on FOTO to demonstrate improved perception of functional shoulder use.Ongoing  11)  Pt will return to prior level of function for ADLs and household management. Ongoing     Plan      Pt to be treated by Occupational Therapy 2 times per week for 12 weeks during the certification period to achieve the established goals.      Treatment to include: Paraffin, Fluidotherapy, Manual therapy/joint mobilizations, Modalities for pain management, US 3 mhz, Therapeutic exercises/activities., Iontophoresis with 2.0 cc Dexamethasone,  Strengthening, Orthotic Fabrication/Fit/Training, Edema Control, Scar Management, Wound Care, Electrical Modalities, Joint Protection, and Energy Conservation, as well as any other treatments deemed necessary based on the patient's needs or progress.     Updates/Grading for session: Continue with current plan of care     FERNANDO Harman

## 2024-03-06 ENCOUNTER — CLINICAL SUPPORT (OUTPATIENT)
Dept: REHABILITATION | Facility: HOSPITAL | Age: 44
End: 2024-03-06
Payer: COMMERCIAL

## 2024-03-06 DIAGNOSIS — M25.621 ELBOW STIFFNESS, RIGHT: ICD-10-CM

## 2024-03-06 DIAGNOSIS — R53.1 WEAKNESS: Primary | ICD-10-CM

## 2024-03-06 DIAGNOSIS — R52 PAIN: ICD-10-CM

## 2024-03-06 DIAGNOSIS — M25.611 SHOULDER STIFFNESS, RIGHT: ICD-10-CM

## 2024-03-06 PROCEDURE — 97530 THERAPEUTIC ACTIVITIES: CPT | Mod: PO

## 2024-03-06 NOTE — PROGRESS NOTES
Occupational Therapy Daily Treatment Note     Name: Tabby Veloz  Clinic Number: [id number]    Therapy Diagnosis:   Encounter Diagnoses   Name Primary?    Weakness Yes    Elbow stiffness, right     Shoulder stiffness, right     Pain        Physician: Luis Del Valle NP    Visit Date: 3/6/2024    Physician Orders: OT eval/ treat   Plan of Care Certification Date: 3/29/24  Authorization Period: 10/3/23 -10/2/24   Medical Diagnosis: S42.351D (ICD-10-CM) - Closed displaced comminuted fracture of shaft of right humerus with routine healing, subsequent   Surgical Procedure and Date: ORIF for her right humeral shaft fracture with IM nail by Dr. Stanley on 9/19/2023. ,   Evaluation Date: 10/9/23  Onset Date:9/15/23   Date of Return to MD: 3/6/24  Visit # / Visits authorized: 19/30 + 22  FOTO Completion: 3/3    Time In:0900  Time Out: 1000  Total Billable Time: 60 minutes    Note from MD 12/12/23 visit:    - Continue [facility] OT.  - Weight bearing 2-4 lbs x 4 weeks and increase by 2-4 lbs/month thereafter as she tolerates.  Ok for therapist to work with a pulley.  - Range of motion as tolerated.     Precautions: Standard and WB, 17 weeks 1/16/24   Subjective     Pt reports: She can tell she is getting stronger and she received her bone stimulator  she was compliant with home exercise program given last session.   Response to previous treatment:increased ROM  Functional change: increased ROM    Pain:1/10   Location: right arms     Objective       Observation: No abnormalities present      UE Range of Motion  Active Range of Motion:   Shoulder Right Right  10/30/23 Right  12/11/23 Right   1/3/24 Right  1/8/24 Right   1/22/24 Right  2/7/24   Flexion 20 145 - supine 160 - supine 135 - standing 165 - supine 140- standing  165 - supine 150 - standing   Abduction 50 110 - supine 130 - supine 130 -standing 145 - supine 145 - standing   150 - supine 145 - standing    ER at 90 20 40 - supine 50 - supine 55 - supine 55 -  supine  35 - standing   55 - supine 80 - standing    IR 80 80 - supine 80 - supine 80- supine 80 - supine 70 - standing   80 - supine  70 - standing       Passive Range of Motion:   Shoulder Left Right Right   10/30/23 Right  12/11/23   Flexion 170 70 160 170   Abduction 165 70 125 145   ER at 90 80 20 40 55   IR 80 80 80 80      Elbow Left Right Right   10/30/23 Right  12/11/23 Right   1/3/24 Right  1/8/24 Right  1/22/24   Flexion 140 120 130 140 140 140 140   Extension 0 -10 0 0 0 0 0      Painful Arc:   Patient demonstrates no painful arc in shoulder flexion or abduction     Upper Extremity Strength  (R) UE   (L) UE     Shoulder flexion: 2-/5 Shoulder flexion: 5/5   Shoulder Abduction: 2-/5 Shoulder abduction: 5/5   Shoulder ER 2-/5 Shoulder ER 5/5   Shoulder IR 2-/5 Shoulder IR 5/5   Rhomboids 2-/5 Rhomboids 5/5       Strength: (OSMANY Dynamometer in psi.)        10/9/2023 10/9/2023 12/11/23 1/3/24 1/8/24 1/22/24 2/7/24     Left Right Right Right Right Right Right   Rung II 60 N/a 50 63 65 65 70      Joint Mobility/End Feels: Hard     Palpation: Pt is tender at incision sites      Sensation: Pt denies any numbness or tingling        Scapular Control/Dyskinesis:     Normal / Subtle / Obvious  Comments    Left  N N/a    Right  N N/a         OUTCOME MEASURE:FOTO     Category:Self care       Current Score  = 37%   Goal at Discharge Score = 65%     11/20/23: 53%    Treatment     Julee participated in dynamic functional therapeutic activities to improve functional performance for 60  minutes, including:  -pulley's 3 min flexion/ 3 min abduction  - 6 minute carry with 12# DB   - Black theraband rows 2/15  - Black theraband pull downs 2/15  - Black theraband internal rotation 2/15  - Blue theraband external rotation 2/15  - UBE: level 5 for 10 minutes 5 min forward/ 5 minutes backwards  - black theraband pull aparts 2/15  - standing push press with 7# DB 3/10   -wall push ups 3/10    Home Exercises and Education  Provided     Education provided:   - Progress towards goals     Written Home Exercises Provided: Patient instructed to cont prior HEP.  Exercises were reviewed and Julee was able to demonstrate them prior to the end of the session.  Julee demonstrated good  understanding of the HEP provided.   .   See EMR under Patient Instructions for exercises provided prior visit.      Assessment     Pt would continue to benefit from skilled OT.  Increase in resistance today with therapeutic activities. Pt tolerated added therapeutic activities well without additional complaints of pain. Patient continues to progress in overall strength and range of motion.  Will progress as tolerated. Pt motivated.       Julee is progressing well towards her goals and there are no updates to goals at this time. Pt prognosis is Good.     Pt will continue to benefit from skilled outpatient occupational therapy to address the deficits listed in the problem list on initial evaluation provide pt/family education and to maximize pt's level of independence in the home and community environment.     Anticipated barriers to occupational therapy: slow bone healing    Pt's spiritual, cultural and educational needs considered and pt agreeable to plan of care and goals.    Goals:      Short Term (6 weeks ):  1)   Patient to be IND with HEP and modalities for pain management MET 12/11/23  2)   Increase ROM to 120 degrees in shoulder flexion to increase functional UE use for ADLs and IADLs MET 12/11/23  3)   Increase ROM to 90 degrees in shoulder abduction to increase functional UE use for ADLs and IADLs MET 12/11/23  4)   Increase ROM to 80 degrees in shoulder internal rotation to increase functional UE use for ADLs and IADLs MET 12/11/23  5)   Increase ROM to 50 degrees in shoulder external rotation to increase functional UE use for ADLs and IADLs MET 12/11/23  6)   Increase ROM to 130 degrees in elbow flexion to increase functional UE use for ADLs and IADLs  MET 10/30/23  7)   Increase ROM to 5 degrees in elbow extension to increase functional UE use for ADLs and IADLs MET 12/11/23  8)   Increase  strength to 30 lbs. to grasp for  ADLs and IADLs MET 12/11/23  9)   Improve muscle strength ing -2 to 3+ in order to participate in ADLs and IADLs with less assistance. MET 12/11/23     Long Term (by discharge):  1)   Pt will report 1 out of 10 pain at worst when completing ADLs and IADLs. Ongoing  2)   Increase ROM to 170 degrees in shoulder flexion to increase functional UE use for ADLs and IADLs Ongoing  3)   Increase ROM to 165 degrees in shoulder abduction to increase functional UE use for ADLs and IADLs Ongoing  4)   Increase ROM to 80 degrees in shoulder internal rotation to increase functional UE use for ADLs and IADLs MET 12/11/23  5)   Increase ROM to 80 degrees in shoulder external rotation to increase functional UE use for ADLs and IADLs Ongoing  6)   Increase ROM to 140 degrees in elbow flexion to increase functional UE use for ADLs and IADLs MET 12/11/23  7)   Increase ROM to 0 degrees in elbow extension to increase functional UE use for ADLs and IADLs MET 12/11/23  8)   Increase  strength to 50 lbs. to grasp for  ADLs and IADLs MET 12/11/23  9)   Improve muscle strength ing to 4+ in order to participate in ADLs and IADLs with less assistance.  Ongoing  10)   Patient to score at 65% or more on FOTO to demonstrate improved perception of functional shoulder use.Ongoing  11)  Pt will return to prior level of function for ADLs and household management. Ongoing     Plan      Pt to be treated by Occupational Therapy 2 times per week for 12 weeks during the certification period to achieve the established goals.      Treatment to include: Paraffin, Fluidotherapy, Manual therapy/joint mobilizations, Modalities for pain management, US 3 mhz, Therapeutic exercises/activities., Iontophoresis with 2.0 cc Dexamethasone, Strengthening, Orthotic Fabrication/Fit/Training,  Edema Control, Scar Management, Wound Care, Electrical Modalities, Joint Protection, and Energy Conservation, as well as any other treatments deemed necessary based on the patient's needs or progress.     Updates/Grading for session: Continue with current plan of care     FERNANDO Harman

## 2024-03-11 ENCOUNTER — CLINICAL SUPPORT (OUTPATIENT)
Dept: REHABILITATION | Facility: HOSPITAL | Age: 44
End: 2024-03-11
Payer: COMMERCIAL

## 2024-03-11 DIAGNOSIS — M25.621 ELBOW STIFFNESS, RIGHT: ICD-10-CM

## 2024-03-11 DIAGNOSIS — S42.351D CLOSED DISPLACED COMMINUTED FRACTURE OF SHAFT OF RIGHT HUMERUS WITH ROUTINE HEALING, SUBSEQUENT ENCOUNTER: Primary | ICD-10-CM

## 2024-03-11 DIAGNOSIS — R52 PAIN: ICD-10-CM

## 2024-03-11 DIAGNOSIS — M25.611 SHOULDER STIFFNESS, RIGHT: ICD-10-CM

## 2024-03-11 DIAGNOSIS — R53.1 WEAKNESS: Primary | ICD-10-CM

## 2024-03-11 PROCEDURE — 97530 THERAPEUTIC ACTIVITIES: CPT | Mod: PO

## 2024-03-11 NOTE — PROGRESS NOTES
Occupational Therapy Daily Treatment Note     Name: Tabby Veloz  Clinic Number: [id number]    Therapy Diagnosis:   Encounter Diagnoses   Name Primary?    Weakness Yes    Elbow stiffness, right     Shoulder stiffness, right     Pain        Physician: Luis Del Valle NP    Visit Date: 3/11/2024    Physician Orders: OT eval/ treat   Plan of Care Certification Date: 3/29/24  Authorization Period: 10/3/23 -10/2/24   Medical Diagnosis: S42.351D (ICD-10-CM) - Closed displaced comminuted fracture of shaft of right humerus with routine healing, subsequent   Surgical Procedure and Date: ORIF for her right humeral shaft fracture with IM nail by Dr. Stanley on 9/19/2023. ,   Evaluation Date: 10/9/23  Onset Date:9/15/23   Date of Return to MD: 3/6/24  Visit # / Visits authorized: 21/30 + 22  FOTO Completion: 3/3    Time In:0900  Time Out: 1000  Total Billable Time: 60 minutes    Note from MD 12/12/23 visit:    - Continue [facility] OT.  - Weight bearing 2-4 lbs x 4 weeks and increase by 2-4 lbs/month thereafter as she tolerates.  Ok for therapist to work with a pulley.  - Range of motion as tolerated.     Precautions: Standard and WB, 17 weeks 1/16/24   Subjective     Pt reports: She can tell she is getting stronger   she was compliant with home exercise program given last session.   Response to previous treatment:increased ROM  Functional change: increased ROM    Pain:1/10   Location: right arms     Objective       Observation: No abnormalities present      UE Range of Motion  Active Range of Motion:   Shoulder Right Right  10/30/23 Right  12/11/23 Right   1/3/24 Right  1/8/24 Right   1/22/24 Right  2/7/24 Right  3/11/24   Flexion 20 145 - supine 160 - supine 135 - standing 165 - supine 140- standing  165 - supine 150 - standing 175   Abduction 50 110 - supine 130 - supine 130 -standing 145 - supine 145 - standing   150 - supine 145 - standing  165   ER at 90 20 40 - supine 50 - supine 55 - supine 55 - supine  35  - standing   55 - supine 80 - standing  90   IR 80 80 - supine 80 - supine 80- supine 80 - supine 70 - standing   80 - supine  70 - standing  80      Passive Range of Motion:   Shoulder Left Right Right   10/30/23 Right  12/11/23   Flexion 170 70 160 170   Abduction 165 70 125 145   ER at 90 80 20 40 55   IR 80 80 80 80      Elbow Left Right Right   10/30/23 Right  12/11/23 Right   1/3/24 Right  1/8/24 Right  1/22/24   Flexion 140 120 130 140 140 140 140   Extension 0 -10 0 0 0 0 0      Painful Arc:   Patient demonstrates no painful arc in shoulder flexion or abduction     Upper Extremity Strength  (R) UE   (L) UE     Shoulder flexion: 2-/5 Shoulder flexion: 5/5   Shoulder Abduction: 2-/5 Shoulder abduction: 5/5   Shoulder ER 2-/5 Shoulder ER 5/5   Shoulder IR 2-/5 Shoulder IR 5/5   Rhomboids 2-/5 Rhomboids 5/5       Strength: (OSMANY Dynamometer in psi.)        10/9/2023 10/9/2023 12/11/23 1/3/24 1/8/24 1/22/24 2/7/24 3/11/24     Left Right Right Right Right Right Right Right   Rung II 60 N/a 50 63 65 65 70 70      Joint Mobility/End Feels: Hard     Palpation: Pt is tender at incision sites      Sensation: Pt denies any numbness or tingling        Scapular Control/Dyskinesis:     Normal / Subtle / Obvious  Comments    Left  N N/a    Right  N N/a         OUTCOME MEASURE:FOTO     Category:Self care       Current Score  = 37%   Goal at Discharge Score = 65%     11/20/23: 53%    Treatment     Julee participated in dynamic functional therapeutic activities to improve functional performance for 60  minutes, including:  -pulley's 3 min flexion/ 3 min abduction  - 8 minute carry with 12# DB   - Black theraband rows 2/15  - Black theraband pull downs 2/15  - Black theraband internal rotation 2/15  - Blue theraband external rotation 2/15  - UBE: level 5 for 10 minutes 5 min forward/ 5 minutes backwards  - black theraband pull aparts 2/15  - standing push press with 8# DB 3/10   -wall push ups 3/10  - reassessment of  objective measures     Home Exercises and Education Provided     Education provided:   - Progress towards goals     Written Home Exercises Provided: Patient instructed to cont prior HEP.  Exercises were reviewed and Julee was able to demonstrate them prior to the end of the session.  Julee demonstrated good  understanding of the HEP provided.   .   See EMR under Patient Instructions for exercises provided prior visit.      Assessment     Pt would continue to benefit from skilled OT.  Increase in resistance today with therapeutic activities. Pt tolerated added therapeutic activities well without additional complaints of pain. Patient continues to progress in overall strength and range of motion.  Reassessment of objective measures were taken and patient has MET all of her STGs and 10/11 LTGs. Will progress as tolerated. Pt motivated.       Julee is progressing well towards her goals and there are no updates to goals at this time. Pt prognosis is Good.     Pt will continue to benefit from skilled outpatient occupational therapy to address the deficits listed in the problem list on initial evaluation provide pt/family education and to maximize pt's level of independence in the home and community environment.     Anticipated barriers to occupational therapy: slow bone healing    Pt's spiritual, cultural and educational needs considered and pt agreeable to plan of care and goals.    Goals:      Short Term (6 weeks ):  1)   Patient to be IND with HEP and modalities for pain management MET 12/11/23  2)   Increase ROM to 120 degrees in shoulder flexion to increase functional UE use for ADLs and IADLs MET 12/11/23  3)   Increase ROM to 90 degrees in shoulder abduction to increase functional UE use for ADLs and IADLs MET 12/11/23  4)   Increase ROM to 80 degrees in shoulder internal rotation to increase functional UE use for ADLs and IADLs MET 12/11/23  5)   Increase ROM to 50 degrees in shoulder external rotation to increase  functional UE use for ADLs and IADLs MET 12/11/23  6)   Increase ROM to 130 degrees in elbow flexion to increase functional UE use for ADLs and IADLs MET 10/30/23  7)   Increase ROM to 5 degrees in elbow extension to increase functional UE use for ADLs and IADLs MET 12/11/23  8)   Increase  strength to 30 lbs. to grasp for  ADLs and IADLs MET 12/11/23  9)   Improve muscle strength ing -2 to 3+ in order to participate in ADLs and IADLs with less assistance. MET 12/11/23     Long Term (by discharge):  1)   Pt will report 1 out of 10 pain at worst when completing ADLs and IADLs. MET 3/11/24  2)   Increase ROM to 170 degrees in shoulder flexion to increase functional UE use for ADLs and IADLs MET 3/11/24  3)   Increase ROM to 165 degrees in shoulder abduction to increase functional UE use for ADLs and IADLs MET 3/11/24  4)   Increase ROM to 80 degrees in shoulder internal rotation to increase functional UE use for ADLs and IADLs MET 12/11/23  5)   Increase ROM to 80 degrees in shoulder external rotation to increase functional UE use for ADLs and IADLs MET 3/11/24  6)   Increase ROM to 140 degrees in elbow flexion to increase functional UE use for ADLs and IADLs MET 12/11/23  7)   Increase ROM to 0 degrees in elbow extension to increase functional UE use for ADLs and IADLs MET 12/11/23  8)   Increase  strength to 50 lbs. to grasp for  ADLs and IADLs MET 12/11/23  9)   Improve muscle strength ing to 4+ in order to participate in ADLs and IADLs with less assistance.  MET 3/11/24  10)   Patient to score at 65% or more on FOTO to demonstrate improved perception of functional shoulder use.Ongoing  11)  Pt will return to prior level of function for ADLs and household management. MET 3/11/24     Plan      Pt to be treated by Occupational Therapy 2 times per week for 12 weeks during the certification period to achieve the established goals.      Treatment to include: Paraffin, Fluidotherapy, Manual therapy/joint  mobilizations, Modalities for pain management, US 3 mhz, Therapeutic exercises/activities., Iontophoresis with 2.0 cc Dexamethasone, Strengthening, Orthotic Fabrication/Fit/Training, Edema Control, Scar Management, Wound Care, Electrical Modalities, Joint Protection, and Energy Conservation, as well as any other treatments deemed necessary based on the patient's needs or progress.     Updates/Grading for session: Continue with current plan of care     FERNANDO Harman     [FreeTextEntry1] : comprehensive physical exam \par dry tongue\par right ear itchiness and tinnitus\par chest pain\par lower abdominal pain [de-identified] : 51 y/o female with hx of HLD, asthma, mitral valve regurgitation, and thyroid disease who presents for comprehensive physical exam.\par \par Patient has several complaints today:\par -Dry tongue after using arm & hammer toothpaste and retainer\par -Right ear itchiness and chronic tinnitus for which she has seen ENT and no diagnosis made. Patient states she can hear her heartbeat in her ear and hears intermittent popping sensation. She denies any fever, chills, or hearing loss.\par -She c/o chest pain intermittently with no known aggravating or alleviating factors\par -LLQ abdominal pain x 2 months- no known aggravating or alleviating factors. She also c/o intermittent constipation.

## 2024-03-12 ENCOUNTER — OFFICE VISIT (OUTPATIENT)
Dept: ORTHOPEDICS | Facility: CLINIC | Age: 44
End: 2024-03-12
Payer: COMMERCIAL

## 2024-03-12 ENCOUNTER — HOSPITAL ENCOUNTER (OUTPATIENT)
Dept: RADIOLOGY | Facility: HOSPITAL | Age: 44
Discharge: HOME | End: 2024-03-12
Attending: ORTHOPAEDIC SURGERY
Payer: COMMERCIAL

## 2024-03-12 DIAGNOSIS — S42.351D CLOSED DISPLACED COMMINUTED FRACTURE OF SHAFT OF RIGHT HUMERUS WITH ROUTINE HEALING, SUBSEQUENT ENCOUNTER: ICD-10-CM

## 2024-03-12 DIAGNOSIS — S42.351G CLOSED DISPLACED COMMINUTED FRACTURE OF SHAFT OF RIGHT HUMERUS WITH DELAYED HEALING, SUBSEQUENT ENCOUNTER: Primary | ICD-10-CM

## 2024-03-12 PROCEDURE — 1159F MED LIST DOCD IN RCRD: CPT | Mod: CPTII,S$GLB,, | Performed by: ORTHOPAEDIC SURGERY

## 2024-03-12 PROCEDURE — 73060 X-RAY EXAM OF HUMERUS: CPT | Mod: 26,RT,, | Performed by: RADIOLOGY

## 2024-03-12 PROCEDURE — 99213 OFFICE O/P EST LOW 20 MIN: CPT | Mod: S$GLB,,, | Performed by: ORTHOPAEDIC SURGERY

## 2024-03-12 PROCEDURE — 99999 PR PBB SHADOW E&M-EST. PATIENT-LVL III: CPT | Mod: PBBFAC,,, | Performed by: ORTHOPAEDIC SURGERY

## 2024-03-12 PROCEDURE — 73060 X-RAY EXAM OF HUMERUS: CPT | Mod: TC,RT

## 2024-03-12 NOTE — PROGRESS NOTES
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  Notes some discomfort when externally rotating her arm trying to grab something quickly, this is just an occasional dull achy discomfort.  She also noticed some shoulder and arm soreness after physical therapy.  No numbness no tingling.    ROS:  Constitutional: Denies fever/chills  Neurological: Denies numbness/tingling (any exceptions noted in orthopaedic exam)   Psychiatric/Behavioral: Denies change in normal mood  Eyes: Denies change in vision  Cardiovascular: Denies chest pain  Respiratory: Denies shortness of breath  Hematologic/Lymphatic: Denies easy bleeding/bruising   Skin: Denies new rash or skin lesions   Gastrointestinal: Denies nausea/vomitting/diarrhea, change in bowel habits, abdominal pain   Allergic/Immunologic: Denies adverse reactions to current medications  Musculoskeletal: see HPI    PAST MEDICAL HISTORY:   Past Medical History:   Diagnosis Date    Abnormal Pap smear     Migraines     Pneumonia     as  a  child     Pseudotumor cerebri 2016     PAST SURGICAL HISTORY:   Past Surgical History:   Procedure Laterality Date    ABSCESS DRAINAGE      x 3     addenoids      ANKLE ARTHROSCOPY W/ OPEN REPAIR Right     CERVICAL BIOPSY  W/ LOOP ELECTRODE EXCISION      CHOLECYSTECTOMY      INCISION AND DRAINAGE OF ABSCESS N/A 8/10/2022    Procedure: INCISION AND DRAINAGE, ABSCESS VULVAR;  Surgeon: Melody Rothman MD;  Location: [facility] OR;  Service: OB/GYN;  Laterality: N/A;    INTRAMEDULLARY RODDING OF HUMERUS Right 9/19/2023    Procedure: INSERTION, INTRAMEDULLARY JAEL, HUMERUS;  Surgeon: Andrey Stanley MD;  Location: [facility] OR [address] [place] [address];   Service: Orthopedics;  Laterality: Right;    POSTPARTUM LIGATION OF FALLOPIAN TUBE Bilateral 6/12/2018    Procedure: LIGATION, FALLOPIAN TUBE, POSTPARTUM;  Surgeon: Melody Rothman MD;  Location: [facility] OR;  Service: OB/GYN;  Laterality: Bilateral;    tonsilectomy       FAMILY HISTORY:   Family History   Problem Relation Age of Onset    Fibromyalgia Mother     Cancer Maternal Grandmother         breast    Hyperlipidemia Sister     Hypertension Sister      SOCIAL HISTORY:   Social History     Socioeconomic History    Marital status:    Tobacco Use    Smoking status: Every Day     Current packs/day: 0.50     Average packs/day: 0.5 packs/day for 24.0 years (12.0 ttl pk-yrs)     Types: Cigarettes    Smokeless tobacco: Never   Substance and Sexual Activity    Alcohol use: Yes     Comment: occas.    Drug use: No    Sexual activity: Yes     Partners: Male     Birth control/protection: None, See Surgical Hx     Social Determinants of Health     Financial Resource Strain: Low Risk  (1/4/2024)    Overall Financial Resource Strain (CARDIA)     Difficulty of Paying Living Expenses: Not very hard   Food Insecurity: No Food Insecurity (1/4/2024)    Hunger Vital Sign     Worried About Running Out of Food in the Last Year: Never true     Ran Out of Food in the Last Year: Never true   Transportation Needs: No Transportation Needs (1/4/2024)    PRAPARE - Transportation     Lack of Transportation (Medical): No     Lack of Transportation (Non-Medical): No   Physical Activity: Inactive (1/4/2024)    Exercise Vital Sign     Days of Exercise per Week: 0 days     Minutes of Exercise per Session: 0 min   Stress: No Stress Concern Present (1/4/2024)    [employer] - Occupational Stress Questionnaire     Feeling of Stress : Only a little   Social Connections: Unknown (1/4/2024)    Social Connection and Isolation Panel [NHANES]     Frequency of Communication with Friends and Family: Three times a  week     Frequency of Social Gatherings with Friends and Family: Once a week     Active Member of Clubs or Organizations: Yes     Attends Club or Organization Meetings: More than 4 times per year     Marital Status:    Housing Stability: Low Risk  (1/4/2024)    Housing Stability Vital Sign     Unable to Pay for Housing in the Last Year: No     Number of Places Lived in the Last Year: 1     Unstable Housing in the Last Year: No     MEDICATIONS:   Current Outpatient Medications:     acetaminophen (TYLENOL) 650 MG TbSR, Take 1 tablet (650 mg total) by mouth every 8 (eight) hours., Disp: 42 tablet, Rfl: 0    acetaZOLAMIDE (DIAMOX) 250 MG tablet, Take 1 tablet (250 mg total) by mouth 2 (two) times daily., Disp: 60 tablet, Rfl: 3    acetaZOLAMIDE (DIAMOX) 500 mg CpSR, Take 1 capsule (500 mg total) by mouth 2 (two) times daily., Disp: 180 capsule, Rfl: 1    buPROPion (WELLBUTRIN) 100 MG tablet, Take 100 mg by mouth 2 (two) times daily., Disp: , Rfl:     cetirizine (ZYRTEC) 10 MG tablet, Take 10 mg by mouth once daily., Disp: , Rfl:     cranberry fruit extract (CRANBERRY ORAL), Take 2 tablets by mouth once daily., Disp: , Rfl:     gabapentin (NEURONTIN) 300 MG capsule, Take 1 capsule (300 mg total) by mouth 3 (three) times daily., Disp: 42 capsule, Rfl: 0    minocycline (MINOCIN,DYNACIN) 100 MG capsule, Take 100 mg by mouth once daily., Disp: , Rfl:     topiramate (TOPAMAX) 100 MG tablet, Take 1 tablet (100 mg total) by mouth 2 (two) times daily., Disp: 180 tablet, Rfl: 1  ALLERGIES: Review of patient's allergies indicates:  No Known Allergies      EXAM      VITAL SIGNS:   There were no vitals taken for this visit.      PE:  General:  no acute distress, appears stated age    Neuro: alert and oriented x3  Psych: normal mood  Head: normocephalic, atraumatic.   Eyes: no scleral icterus  Mouth: moist mucous membranes  Cardiovascular: extremities warm and well perfused  Lungs: breathing comfortably, equal chest rise  bilat  Skin: clean, dry, intact (any exceptions noted in below musculoskeletal exam)    Musculoskeletal:  Right upper extremity   Surgical incisions all well healed no signs of infection, no areas of tenderness, no instability   Shoulder range of motion 160 forward flexion both active and passive   Elbow range of motion 0-130 flexion, 80 pronation, 80 supination   Motor intact deltoid, biceps, triceps, wrist flexion/extension, interossei, finger flexion/extension  Sensation intact to light touch axillary, radial, ulnar, median nerves  Palpable radial pulse, black brisk cap refill        XRAYS:  X-ray right humerus demonstrate intramedullary nail in place, stable fixation, there was delayed healing at the distal 3rd aspect of this long spiral fracture, though there may be some interval callus formation since last imaging obtained  (I independently reviewed and interpreted the above imaging)    MEDICAL DECISION MAKING       Encounter Diagnosis   Name Primary?    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

## 2024-03-13 ENCOUNTER — CLINICAL SUPPORT (OUTPATIENT)
Dept: REHABILITATION | Facility: HOSPITAL | Age: 44
End: 2024-03-13
Payer: COMMERCIAL

## 2024-03-13 DIAGNOSIS — M25.611 SHOULDER STIFFNESS, RIGHT: ICD-10-CM

## 2024-03-13 DIAGNOSIS — M25.621 ELBOW STIFFNESS, RIGHT: ICD-10-CM

## 2024-03-13 DIAGNOSIS — R53.1 WEAKNESS: Primary | ICD-10-CM

## 2024-03-13 DIAGNOSIS — R52 PAIN: ICD-10-CM

## 2024-03-13 PROCEDURE — 97530 THERAPEUTIC ACTIVITIES: CPT | Mod: PO

## 2024-03-13 NOTE — PROGRESS NOTES

## 2024-03-20 ENCOUNTER — CLINICAL SUPPORT (OUTPATIENT)
Dept: REHABILITATION | Facility: HOSPITAL | Age: 44
End: 2024-03-20
Payer: COMMERCIAL

## 2024-03-20 DIAGNOSIS — R52 PAIN: ICD-10-CM

## 2024-03-20 DIAGNOSIS — M25.621 ELBOW STIFFNESS, RIGHT: ICD-10-CM

## 2024-03-20 DIAGNOSIS — R53.1 WEAKNESS: Primary | ICD-10-CM

## 2024-03-20 DIAGNOSIS — M25.611 SHOULDER STIFFNESS, RIGHT: ICD-10-CM

## 2024-03-20 PROCEDURE — 97530 THERAPEUTIC ACTIVITIES: CPT | Mod: PO

## 2024-03-20 NOTE — PROGRESS NOTES

## 2024-03-25 ENCOUNTER — CLINICAL SUPPORT (OUTPATIENT)
Dept: REHABILITATION | Facility: HOSPITAL | Age: 44
End: 2024-03-25
Payer: COMMERCIAL

## 2024-03-25 DIAGNOSIS — M25.621 ELBOW STIFFNESS, RIGHT: ICD-10-CM

## 2024-03-25 DIAGNOSIS — R53.1 WEAKNESS: Primary | ICD-10-CM

## 2024-03-25 DIAGNOSIS — M25.611 SHOULDER STIFFNESS, RIGHT: ICD-10-CM

## 2024-03-25 DIAGNOSIS — R52 PAIN: ICD-10-CM

## 2024-03-25 PROCEDURE — 97530 THERAPEUTIC ACTIVITIES: CPT | Mod: PO

## 2024-03-25 NOTE — PROGRESS NOTES

## 2024-04-01 ENCOUNTER — CLINICAL SUPPORT (OUTPATIENT)
Dept: REHABILITATION | Facility: HOSPITAL | Age: 44
End: 2024-04-01
Payer: COMMERCIAL

## 2024-04-01 DIAGNOSIS — R53.1 WEAKNESS: Primary | ICD-10-CM

## 2024-04-01 DIAGNOSIS — R52 PAIN: ICD-10-CM

## 2024-04-01 DIAGNOSIS — M25.611 SHOULDER STIFFNESS, RIGHT: ICD-10-CM

## 2024-04-01 DIAGNOSIS — M25.621 ELBOW STIFFNESS, RIGHT: ICD-10-CM

## 2024-04-01 PROCEDURE — 97530 THERAPEUTIC ACTIVITIES: CPT | Mod: PO

## 2024-04-01 NOTE — PROGRESS NOTES

## 2024-04-03 ENCOUNTER — CLINICAL SUPPORT (OUTPATIENT)
Dept: REHABILITATION | Facility: HOSPITAL | Age: 44
End: 2024-04-03
Payer: COMMERCIAL

## 2024-04-03 DIAGNOSIS — M25.611 SHOULDER STIFFNESS, RIGHT: ICD-10-CM

## 2024-04-03 DIAGNOSIS — R52 PAIN: ICD-10-CM

## 2024-04-03 DIAGNOSIS — R53.1 WEAKNESS: Primary | ICD-10-CM

## 2024-04-03 DIAGNOSIS — M25.621 ELBOW STIFFNESS, RIGHT: ICD-10-CM

## 2024-04-03 PROCEDURE — 97530 THERAPEUTIC ACTIVITIES: CPT | Mod: PO

## 2024-04-03 NOTE — PROGRESS NOTES
Occupational Therapy Daily Treatment Note     Name: Tabby Veloz  Clinic Number: [id number]    Therapy Diagnosis:   Encounter Diagnoses   Name Primary?    Weakness Yes    Elbow stiffness, right     Shoulder stiffness, right     Pain        Physician: Luis Del Valle NP    Visit Date: 4/3/2024    Physician Orders: OT eval/ treat   Plan of Care Certification Date: 3/29/24  Authorization Period: 10/3/23 -10/2/24   Medical Diagnosis: S42.351D (ICD-10-CM) - Closed displaced comminuted fracture of shaft of right humerus with routine healing, subsequent   Surgical Procedure and Date: ORIF for her right humeral shaft fracture with IM nail by Dr. Stanley on 9/19/2023. ,   Evaluation Date: 10/9/23  Onset Date:9/15/23   Date of Return to MD: 3/6/24  Visit # / Visits authorized: 26/30 + 22  FOTO Completion: 3/3    Time In:0900  Time Out: 1000  Total Billable Time: 60 minutes    Note from MD 12/12/23 visit:    - Continue [facility] OT.  - Weight bearing 2-4 lbs x 4 weeks and increase by 2-4 lbs/month thereafter as she tolerates.  Ok for therapist to work with a pulley.  - Range of motion as tolerated.     Precautions: Standard and WB, 17 weeks 1/16/24   Subjective     Pt reports: She states she is still a little achey, but it's tolerable  she was compliant with home exercise program given last session.   Response to previous treatment:increased ROM  Functional change: increased ROM    Pain:2/10   Location: right arms     Objective       Observation: No abnormalities present      UE Range of Motion  Active Range of Motion:   Shoulder Right Right  10/30/23 Right  12/11/23 Right   1/3/24 Right  1/8/24 Right   1/22/24 Right  2/7/24 Right  3/11/24   Flexion 20 145 - supine 160 - supine 135 - standing 165 - supine 140- standing  165 - supine 150 - standing 175   Abduction 50 110 - supine 130 - supine 130 -standing 145 - supine 145 - standing   150 - supine 145 - standing  165   ER at 90 20 40 - supine 50 - supine 55 -  supine 55 - supine  35 - standing   55 - supine 80 - standing  90   IR 80 80 - supine 80 - supine 80- supine 80 - supine 70 - standing   80 - supine  70 - standing  80      Passive Range of Motion:   Shoulder Left Right Right   10/30/23 Right  12/11/23   Flexion 170 70 160 170   Abduction 165 70 125 145   ER at 90 80 20 40 55   IR 80 80 80 80      Elbow Left Right Right   10/30/23 Right  12/11/23 Right   1/3/24 Right  1/8/24 Right  1/22/24   Flexion 140 120 130 140 140 140 140   Extension 0 -10 0 0 0 0 0      Painful Arc:   Patient demonstrates no painful arc in shoulder flexion or abduction     Upper Extremity Strength  (R) UE   (L) UE     Shoulder flexion: 2-/5 Shoulder flexion: 5/5   Shoulder Abduction: 2-/5 Shoulder abduction: 5/5   Shoulder ER 2-/5 Shoulder ER 5/5   Shoulder IR 2-/5 Shoulder IR 5/5   Rhomboids 2-/5 Rhomboids 5/5       Strength: (OSMANY Dynamometer in psi.)        10/9/2023 10/9/2023 12/11/23 1/3/24 1/8/24 1/22/24 2/7/24 3/11/24     Left Right Right Right Right Right Right Right   Rung II 60 N/a 50 63 65 65 70 70      Joint Mobility/End Feels: Hard     Palpation: Pt is tender at incision sites      Sensation: Pt denies any numbness or tingling        Scapular Control/Dyskinesis:     Normal / Subtle / Obvious  Comments    Left  N N/a    Right  N N/a         OUTCOME MEASURE:FOTO     Category:Self care       Current Score  = 37%   Goal at Discharge Score = 65%     11/20/23: 53%    Treatment     Julee participated in dynamic functional therapeutic activities to improve functional performance for 60  minutes, including:  -pulley's 3 min flexion/ 3 min abduction  - 10 minute carry with 12# DB   - Silver theraband rows 2/15  - Black theraband pull downs 2/15  - Silver theraband internal rotation 2/15  - Silver theraband external rotation 2/15  - UBE: level 5 for 10 minutes 5 min forward/ 5 minutes backwards  - silver theraband pull aparts 2/15  - standing push press with 10# DB 3/10   -wall push ups  3/10      Home Exercises and Education Provided     Education provided:   - Progress towards goals     Written Home Exercises Provided: Patient instructed to cont prior HEP.  Exercises were reviewed and Julee was able to demonstrate them prior to the end of the session.  Julee demonstrated good  understanding of the HEP provided.   .   See EMR under Patient Instructions for exercises provided prior visit.      Assessment     Pt would continue to benefit from skilled OT.  Increase in resistance today with therapeutic activities. Pt tolerated increased resisitve therapeutic activities well without additional complaints of pain. Patient continues to progress in overall strength and range of motion.  Will progress as tolerated. Pt motivated.       Julee is progressing well towards her goals and there are no updates to goals at this time. Pt prognosis is Good.     Pt will continue to benefit from skilled outpatient occupational therapy to address the deficits listed in the problem list on initial evaluation provide pt/family education and to maximize pt's level of independence in the home and community environment.     Anticipated barriers to occupational therapy: slow bone healing    Pt's spiritual, cultural and educational needs considered and pt agreeable to plan of care and goals.    Goals:      Short Term (6 weeks ):  1)   Patient to be IND with HEP and modalities for pain management MET 12/11/23  2)   Increase ROM to 120 degrees in shoulder flexion to increase functional UE use for ADLs and IADLs MET 12/11/23  3)   Increase ROM to 90 degrees in shoulder abduction to increase functional UE use for ADLs and IADLs MET 12/11/23  4)   Increase ROM to 80 degrees in shoulder internal rotation to increase functional UE use for ADLs and IADLs MET 12/11/23  5)   Increase ROM to 50 degrees in shoulder external rotation to increase functional UE use for ADLs and IADLs MET 12/11/23  6)   Increase ROM to 130 degrees in elbow  flexion to increase functional UE use for ADLs and IADLs MET 10/30/23  7)   Increase ROM to 5 degrees in elbow extension to increase functional UE use for ADLs and IADLs MET 12/11/23  8)   Increase  strength to 30 lbs. to grasp for  ADLs and IADLs MET 12/11/23  9)   Improve muscle strength ing -2 to 3+ in order to participate in ADLs and IADLs with less assistance. MET 12/11/23     Long Term (by discharge):  1)   Pt will report 1 out of 10 pain at worst when completing ADLs and IADLs. MET 3/11/24  2)   Increase ROM to 170 degrees in shoulder flexion to increase functional UE use for ADLs and IADLs MET 3/11/24  3)   Increase ROM to 165 degrees in shoulder abduction to increase functional UE use for ADLs and IADLs MET 3/11/24  4)   Increase ROM to 80 degrees in shoulder internal rotation to increase functional UE use for ADLs and IADLs MET 12/11/23  5)   Increase ROM to 80 degrees in shoulder external rotation to increase functional UE use for ADLs and IADLs MET 3/11/24  6)   Increase ROM to 140 degrees in elbow flexion to increase functional UE use for ADLs and IADLs MET 12/11/23  7)   Increase ROM to 0 degrees in elbow extension to increase functional UE use for ADLs and IADLs MET 12/11/23  8)   Increase  strength to 50 lbs. to grasp for  ADLs and IADLs MET 12/11/23  9)   Improve muscle strength ing to 4+ in order to participate in ADLs and IADLs with less assistance.  MET 3/11/24  10)   Patient to score at 65% or more on FOTO to demonstrate improved perception of functional shoulder use.Ongoing  11)  Pt will return to prior level of function for ADLs and household management. MET 3/11/24     Plan      Pt to be treated by Occupational Therapy 2 times per week for 12 weeks during the certification period to achieve the established goals.      Treatment to include: Paraffin, Fluidotherapy, Manual therapy/joint mobilizations, Modalities for pain management, US 3 mhz, Therapeutic exercises/activities.,  Iontophoresis with 2.0 cc Dexamethasone, Strengthening, Orthotic Fabrication/Fit/Training, Edema Control, Scar Management, Wound Care, Electrical Modalities, Joint Protection, and Energy Conservation, as well as any other treatments deemed necessary based on the patient's needs or progress.     Updates/Grading for session: Continue with current plan of care     FERNANDO Harman

## 2024-04-08 ENCOUNTER — CLINICAL SUPPORT (OUTPATIENT)
Dept: REHABILITATION | Facility: HOSPITAL | Age: 44
End: 2024-04-08
Payer: COMMERCIAL

## 2024-04-08 DIAGNOSIS — M25.611 SHOULDER STIFFNESS, RIGHT: ICD-10-CM

## 2024-04-08 DIAGNOSIS — R52 PAIN: ICD-10-CM

## 2024-04-08 DIAGNOSIS — R53.1 WEAKNESS: Primary | ICD-10-CM

## 2024-04-08 DIAGNOSIS — M25.621 ELBOW STIFFNESS, RIGHT: ICD-10-CM

## 2024-04-08 PROCEDURE — 97530 THERAPEUTIC ACTIVITIES: CPT | Mod: PO

## 2024-04-08 NOTE — PROGRESS NOTES

## 2024-04-15 ENCOUNTER — CLINICAL SUPPORT (OUTPATIENT)
Dept: REHABILITATION | Facility: HOSPITAL | Age: 44
End: 2024-04-15
Payer: COMMERCIAL

## 2024-04-15 DIAGNOSIS — M25.611 SHOULDER STIFFNESS, RIGHT: ICD-10-CM

## 2024-04-15 DIAGNOSIS — M25.621 ELBOW STIFFNESS, RIGHT: ICD-10-CM

## 2024-04-15 DIAGNOSIS — R53.1 WEAKNESS: Primary | ICD-10-CM

## 2024-04-15 DIAGNOSIS — R52 PAIN: ICD-10-CM

## 2024-04-15 PROCEDURE — 97530 THERAPEUTIC ACTIVITIES: CPT | Mod: PO

## 2024-04-15 NOTE — PROGRESS NOTES

## 2024-04-17 ENCOUNTER — CLINICAL SUPPORT (OUTPATIENT)
Dept: REHABILITATION | Facility: HOSPITAL | Age: 44
End: 2024-04-17
Payer: COMMERCIAL

## 2024-04-17 DIAGNOSIS — R53.1 WEAKNESS: Primary | ICD-10-CM

## 2024-04-17 DIAGNOSIS — R52 PAIN: ICD-10-CM

## 2024-04-17 DIAGNOSIS — M25.611 SHOULDER STIFFNESS, RIGHT: ICD-10-CM

## 2024-04-17 DIAGNOSIS — M25.621 ELBOW STIFFNESS, RIGHT: ICD-10-CM

## 2024-04-17 PROCEDURE — 97530 THERAPEUTIC ACTIVITIES: CPT | Mod: PO

## 2024-04-17 NOTE — PROGRESS NOTES
Occupational Therapy Daily Treatment Note     Name: Tabby Veloz  Clinic Number: [id number]    Therapy Diagnosis:   Encounter Diagnoses   Name Primary?    Weakness Yes    Elbow stiffness, right     Shoulder stiffness, right     Pain          Physician: Luis Del Valle NP    Visit Date: 4/17/2024    Physician Orders: OT eval/ treat   Plan of Care Certification Date: 6/7/24  Authorization Period: 10/3/23 -10/2/24   Medical Diagnosis: S42.351D (ICD-10-CM) - Closed displaced comminuted fracture of shaft of right humerus with routine healing, subsequent   Surgical Procedure and Date: ORIF for her right humeral shaft fracture with IM nail by Dr. Stanley on 9/19/2023. ,   Evaluation Date: 10/9/23  Onset Date:9/15/23   Date of Return to MD: 3/6/24  Visit # / Visits authorized: 29/30 + 22  FOTO Completion: 3/3    Time In:0900  Time Out: 1000  Total Billable Time: 60 minutes    Note from MD 12/12/23 visit:    - Continue [facility] OT.  - Weight bearing 2-4 lbs x 4 weeks and increase by 2-4 lbs/month thereafter as she tolerates.  Ok for therapist to work with a pulley.  - Range of motion as tolerated.     Precautions: Standard and WB, 17 weeks 1/16/24   Subjective     Pt reports: She states she is still a little achey, but it's tolerable  she was compliant with home exercise program given last session.   Response to previous treatment:increased ROM  Functional change: increased ROM    Pain:2/10   Location: right arms     Objective       Observation: No abnormalities present      UE Range of Motion  Active Range of Motion:   Shoulder Right Right  10/30/23 Right  12/11/23 Right   1/3/24 Right  1/8/24 Right   1/22/24 Right  2/7/24 Right  3/11/24 Right   4/17/24   Flexion 20 145 - supine 160 - supine 135 - standing 165 - supine 140- standing  165 - supine 150 - standing 175 175   Abduction 50 110 - supine 130 - supine 130 -standing 145 - supine 145 - standing   150 - supine 145 - standing  165 165   ER at 90 20 40 -  supine 50 - supine 55 - supine 55 - supine  35 - standing   55 - supine 80 - standing  90 90   IR 80 80 - supine 80 - supine 80- supine 80 - supine 70 - standing   80 - supine  70 - standing  80 80      Passive Range of Motion:   Shoulder Left Right Right   10/30/23 Right  12/11/23   Flexion 170 70 160 170   Abduction 165 70 125 145   ER at 90 80 20 40 55   IR 80 80 80 80      Elbow Left Right Right   10/30/23 Right  12/11/23 Right   1/3/24 Right  1/8/24 Right  1/22/24   Flexion 140 120 130 140 140 140 140   Extension 0 -10 0 0 0 0 0      Painful Arc:   Patient demonstrates no painful arc in shoulder flexion or abduction     Upper Extremity Strength  (R) UE   (L) UE     Shoulder flexion: 2-/5 Shoulder flexion: 5/5   Shoulder Abduction: 2-/5 Shoulder abduction: 5/5   Shoulder ER 2-/5 Shoulder ER 5/5   Shoulder IR 2-/5 Shoulder IR 5/5   Rhomboids 2-/5 Rhomboids 5/5       Strength: (OSMANY Dynamometer in psi.)        10/9/2023 10/9/2023 12/11/23 1/3/24 1/8/24 1/22/24 2/7/24 3/11/24 4/17/24     Left Right Right Right Right Right Right Right Right   Rung II 60 N/a 50 63 65 65 70 70 70      Joint Mobility/End Feels: Hard     Palpation: Pt is tender at incision sites      Sensation: Pt denies any numbness or tingling        Scapular Control/Dyskinesis:     Normal / Subtle / Obvious  Comments    Left  N N/a    Right  N N/a         OUTCOME MEASURE:FOTO     Category:Self care       Current Score  = 37%   Goal at Discharge Score = 65%     11/20/23: 53%    Treatment     Julee participated in dynamic functional therapeutic activities to improve functional performance for 60  minutes, including:  -pulley's 3 min flexion/ 3 min abduction  - 5 minute carry with 15# DB   - Silver theraband rows 2/15  - Silver theraband pull downs 2/15  - Silver theraband internal rotation 2/15  - Silver theraband external rotation 2/15  - UBE: level 6 for 10 minutes 5 min forward/ 5 minutes backwards  - silver theraband pull aparts 2/15  -  standing push press with 10# DB 3/10   -wall push ups 3/10  -reassessment of objective measures       Home Exercises and Education Provided     Education provided:   - Progress towards goals     Written Home Exercises Provided: Patient instructed to cont prior HEP.  Exercises were reviewed and Julee was able to demonstrate them prior to the end of the session.  Julee demonstrated good  understanding of the HEP provided.   .   See EMR under Patient Instructions for exercises provided prior visit.      Assessment     Pt would continue to benefit from skilled OT.  Increase in resistance today with therapeutic activities. Pt tolerated increased resisitve therapeutic activities well without additional complaints of pain. Patient continues to progress in overall strength and range of motion.  Will progress as tolerated. Pt motivated.       Julee is progressing well towards her goals and there are no updates to goals at this time. Pt prognosis is Good.     Pt will continue to benefit from skilled outpatient occupational therapy to address the deficits listed in the problem list on initial evaluation provide pt/family education and to maximize pt's level of independence in the home and community environment.     Anticipated barriers to occupational therapy: slow bone healing    Pt's spiritual, cultural and educational needs considered and pt agreeable to plan of care and goals.    UPDATED GOALS  1)   Improve muscle strength ing to 5/5 in order to participate in ADLs and IADLs with less assistance.  Ongoing  2)   Patient to score at 65% or more on FOTO to demonstrate improved perception of functional shoulder use.Ongoing        Plan      Pt to be treated by Occupational Therapy 2 times per week for 10 weeks during the certification period to achieve the established goals.      Treatment to include: Paraffin, Fluidotherapy, Manual therapy/joint mobilizations, Modalities for pain management, US 3 mhz, Therapeutic  exercises/activities., Iontophoresis with 2.0 cc Dexamethasone, Strengthening, Orthotic Fabrication/Fit/Training, Edema Control, Scar Management, Wound Care, Electrical Modalities, Joint Protection, and Energy Conservation, as well as any other treatments deemed necessary based on the patient's needs or progress.     Updates/Grading for session: Continue with current plan of care     FERNANDO Harman

## 2024-04-17 NOTE — PLAN OF CARE
Outpatient Therapy Updated Plan of Care     Visit Date: 4/17/2024  Name: Tabby Veloz  Clinic Number: [id number]    Therapy Diagnosis:   Encounter Diagnoses   Name Primary?    Weakness Yes    Elbow stiffness, right     Shoulder stiffness, right     Pain      Physician: Luis Del Valle NP    Physician Orders: OT eval/ treat   Medical Diagnosis: S42.351D (ICD-10-CM) - Closed displaced comminuted fracture of shaft of right humerus with routine healing, subsequent   Evaluation Date: 10/9/23    Total Visits Received: 51  Cancelled Visits: 0  No Show Visits: 0    Current Certification Period:  1/1/24 to 3/29/24  Precautions:  Standard  Visits from Evaluation Date:  50  Functional Level Prior to Evaluation:  Independent    Subjective     Update: Pt reports: She states she is still a little achey, but it's tolerable  she was compliant with home exercise program given last session.   Response to previous treatment:increased ROM  Functional change: increased ROM    Pain:2/10   Location: right arms     Objective     Update:   Observation: No abnormalities present      UE Range of Motion  Active Range of Motion:   Shoulder Right Right  10/30/23 Right  12/11/23 Right   1/3/24 Right  1/8/24 Right   1/22/24 Right  2/7/24 Right  3/11/24 Right   4/17/24   Flexion 20 145 - supine 160 - supine 135 - standing 165 - supine 140- standing  165 - supine 150 - standing 175 175   Abduction 50 110 - supine 130 - supine 130 -standing 145 - supine 145 - standing   150 - supine 145 - standing  165 165   ER at 90 20 40 - supine 50 - supine 55 - supine 55 - supine  35 - standing   55 - supine 80 - standing  90 90   IR 80 80 - supine 80 - supine 80- supine 80 - supine 70 - standing   80 - supine  70 - standing  80 80      Passive Range of Motion:   Shoulder Left Right Right   10/30/23 Right  12/11/23   Flexion 170 70 160 170   Abduction 165 70 125 145   ER at 90 80 20 40 55   IR 80 80 80 80      Elbow Left Right Right   10/30/23  Right  12/11/23 Right   1/3/24 Right  1/8/24 Right  1/22/24   Flexion 140 120 130 140 140 140 140   Extension 0 -10 0 0 0 0 0      Painful Arc:   Patient demonstrates no painful arc in shoulder flexion or abduction     Upper Extremity Strength  (R) UE   (L) UE     Shoulder flexion: 2-/5 Shoulder flexion: 5/5   Shoulder Abduction: 2-/5 Shoulder abduction: 5/5   Shoulder ER 2-/5 Shoulder ER 5/5   Shoulder IR 2-/5 Shoulder IR 5/5   Rhomboids 2-/5 Rhomboids 5/5       Strength: (OSMANY Dynamometer in psi.)        10/9/2023 10/9/2023 12/11/23 1/3/24 1/8/24 1/22/24 2/7/24 3/11/24 4/17/24     Left Right Right Right Right Right Right Right Right   Rung II 60 N/a 50 63 65 65 70 70 70      Joint Mobility/End Feels: Hard     Palpation: Pt is tender at incision sites      Sensation: Pt denies any numbness or tingling        Scapular Control/Dyskinesis:     Normal / Subtle / Obvious  Comments    Left  N N/a    Right  N N/a         OUTCOME MEASURE:FOTO     Category:Self care       Current Score  = 37%   Goal at Discharge Score = 65%     11/20/23: 53%    Assessment     Update: Pt would continue to benefit from skilled OT.  Increase in resistance today with therapeutic activities. Pt tolerated increased resisitve therapeutic activities well without additional complaints of pain. Patient continues to progress in overall strength and range of motion.  Will progress as tolerated. Pt motivated.        Previous Short Term Goals Status:   9/9  Long Term Goal Status:   continue per initial plan of care.    Goals set at original POC:      Short Term (6 weeks ):  1)   Patient to be IND with HEP and modalities for pain management MET 12/11/23  2)   Increase ROM to 120 degrees in shoulder flexion to increase functional UE use for ADLs and IADLs MET 12/11/23  3)   Increase ROM to 90 degrees in shoulder abduction to increase functional UE use for ADLs and IADLs MET 12/11/23  4)   Increase ROM to 80 degrees in shoulder internal rotation to  increase functional UE use for ADLs and IADLs MET 12/11/23  5)   Increase ROM to 50 degrees in shoulder external rotation to increase functional UE use for ADLs and IADLs MET 12/11/23  6)   Increase ROM to 130 degrees in elbow flexion to increase functional UE use for ADLs and IADLs MET 10/30/23  7)   Increase ROM to 5 degrees in elbow extension to increase functional UE use for ADLs and IADLs MET 12/11/23  8)   Increase  strength to 30 lbs. to grasp for  ADLs and IADLs MET 12/11/23  9)   Improve muscle strength ing -2 to 3+ in order to participate in ADLs and IADLs with less assistance. MET 12/11/23     Long Term (by discharge):  1)   Pt will report 1 out of 10 pain at worst when completing ADLs and IADLs. MET 3/11/24  2)   Increase ROM to 170 degrees in shoulder flexion to increase functional UE use for ADLs and IADLs MET 3/11/24  3)   Increase ROM to 165 degrees in shoulder abduction to increase functional UE use for ADLs and IADLs MET 3/11/24  4)   Increase ROM to 80 degrees in shoulder internal rotation to increase functional UE use for ADLs and IADLs MET 12/11/23  5)   Increase ROM to 80 degrees in shoulder external rotation to increase functional UE use for ADLs and IADLs MET 3/11/24  6)   Increase ROM to 140 degrees in elbow flexion to increase functional UE use for ADLs and IADLs MET 12/11/23  7)   Increase ROM to 0 degrees in elbow extension to increase functional UE use for ADLs and IADLs MET 12/11/23  8)   Increase  strength to 50 lbs. to grasp for  ADLs and IADLs MET 12/11/23  9)   Improve muscle strength ing to 4+ in order to participate in ADLs and IADLs with less assistance.  MET 3/11/24  10)   Patient to score at 65% or more on FOTO to demonstrate improved perception of functional shoulder use.Ongoing  11)  Pt will return to prior level of function for ADLs and household management. MET 3/11/24    UPDATED GOALS  1)   Improve muscle strength ing to 5/5 in order to participate in ADLs and  IADLs with less assistance.  Ongoing  2)   Patient to score at 65% or more on FOTO to demonstrate improved perception of functional shoulder use.Ongoing     Reasons for Recertification of Therapy:   Pt needs recertification to continue to progress towards LTGs in order for patient to increase overall QOL    Plan     Updated Certification Period: 4/17/24 to 6/7/24  Recommended Treatment Plan: 2 times per week for 10 weeks: Aquatic Therapy, Cervical/Lumbar Traction, Debridement (nonselective), Debridement (selective), Electrical Stimulation , Fluidotherapy, Gait Training, Iontophoresis (with ), Manual Therapy, Moist Heat/ Ice, Neuromuscular Re-ed, Orthotic Management and Training, Paraffin, Patient Education, Self Care, Therapeutic Activities, Therapeutic Exercise, and Ultrasound  Other Recommendations: none at this time    Montserrat Wood OT  4/17/2024      I CERTIFY THE NEED FOR THESE SERVICES FURNISHED UNDER THIS PLAN OF TREATMENT AND WHILE UNDER MY CARE    Physician's comments:        Physician's Signature: ___________________________________________________

## 2024-04-22 ENCOUNTER — CLINICAL SUPPORT (OUTPATIENT)
Dept: REHABILITATION | Facility: HOSPITAL | Age: 44
End: 2024-04-22
Payer: COMMERCIAL

## 2024-04-22 DIAGNOSIS — M25.621 ELBOW STIFFNESS, RIGHT: ICD-10-CM

## 2024-04-22 DIAGNOSIS — R52 PAIN: ICD-10-CM

## 2024-04-22 DIAGNOSIS — R53.1 WEAKNESS: Primary | ICD-10-CM

## 2024-04-22 DIAGNOSIS — M25.611 SHOULDER STIFFNESS, RIGHT: ICD-10-CM

## 2024-04-22 PROCEDURE — 97530 THERAPEUTIC ACTIVITIES: CPT | Mod: PO

## 2024-04-22 NOTE — PROGRESS NOTES
Occupational Therapy Daily Treatment Note     Name: Tabby Veloz  Clinic Number: [id number]    Therapy Diagnosis:   Encounter Diagnoses   Name Primary?    Weakness Yes    Elbow stiffness, right     Shoulder stiffness, right     Pain          Physician: Luis Del Valle NP    Visit Date: 4/22/2024    Physician Orders: OT eval/ treat   Plan of Care Certification Date: 6/7/24  Authorization Period: 10/3/23 -10/2/24   Medical Diagnosis: S42.351D (ICD-10-CM) - Closed displaced comminuted fracture of shaft of right humerus with routine healing, subsequent   Surgical Procedure and Date: ORIF for her right humeral shaft fracture with IM nail by Dr. Stanley on 9/19/2023. ,   Evaluation Date: 10/9/23  Onset Date:9/15/23   Date of Return to MD: 3/6/24  Visit # / Visits authorized: 30/30 + 22  FOTO Completion: 3/3    Time In:0900  Time Out: 1000  Total Billable Time: 60 minutes    Note from MD 12/12/23 visit:    - Continue [facility].  - Weight bearing 2-4 lbs x 4 weeks and increase by 2-4 lbs/month thereafter as she tolerates.  Ok for therapist to work with a pulley.  - Range of motion as tolerated.     Precautions: Standard and WB, 17 weeks 1/16/24   Subjective     Pt reports: She states she is still a little achey, but it's tolerable  she was compliant with home exercise program given last session.   Response to previous treatment:increased ROM  Functional change: increased ROM    Pain:2/10   Location: right arms     Objective       Observation: No abnormalities present      UE Range of Motion  Active Range of Motion:   Shoulder Right Right  10/30/23 Right  12/11/23 Right   1/3/24 Right  1/8/24 Right   1/22/24 Right  2/7/24 Right  3/11/24 Right   4/17/24   Flexion 20 145 - supine 160 - supine 135 - standing 165 - supine 140- standing  165 - supine 150 - standing 175 175   Abduction 50 110 - supine 130 - supine 130 -standing 145 - supine 145 - standing   150 - supine 145 - standing  165 165   ER at 90 20 40 -  supine 50 - supine 55 - supine 55 - supine  35 - standing   55 - supine 80 - standing  90 90   IR 80 80 - supine 80 - supine 80- supine 80 - supine 70 - standing   80 - supine  70 - standing  80 80      Passive Range of Motion:   Shoulder Left Right Right   10/30/23 Right  12/11/23   Flexion 170 70 160 170   Abduction 165 70 125 145   ER at 90 80 20 40 55   IR 80 80 80 80      Elbow Left Right Right   10/30/23 Right  12/11/23 Right   1/3/24 Right  1/8/24 Right  1/22/24   Flexion 140 120 130 140 140 140 140   Extension 0 -10 0 0 0 0 0      Painful Arc:   Patient demonstrates no painful arc in shoulder flexion or abduction     Upper Extremity Strength  (R) UE   (L) UE     Shoulder flexion: 2-/5 Shoulder flexion: 5/5   Shoulder Abduction: 2-/5 Shoulder abduction: 5/5   Shoulder ER 2-/5 Shoulder ER 5/5   Shoulder IR 2-/5 Shoulder IR 5/5   Rhomboids 2-/5 Rhomboids 5/5       Strength: (OSMANY Dynamometer in psi.)        10/9/2023 10/9/2023 12/11/23 1/3/24 1/8/24 1/22/24 2/7/24 3/11/24 4/17/24     Left Right Right Right Right Right Right Right Right   Rung II 60 N/a 50 63 65 65 70 70 70      Joint Mobility/End Feels: Hard     Palpation: Pt is tender at incision sites      Sensation: Pt denies any numbness or tingling        Scapular Control/Dyskinesis:     Normal / Subtle / Obvious  Comments    Left  N N/a    Right  N N/a         OUTCOME MEASURE:FOTO     Category:Self care       Current Score  = 37%   Goal at Discharge Score = 65%     11/20/23: 53%    Treatment     Julee participated in dynamic functional therapeutic activities to improve functional performance for 60  minutes, including:  -pulley's 3 min flexion/ 3 min abduction  - 5 minute carry with 15# DB   - Silver theraband rows 2/15  - Silver theraband pull downs 2/15  - Silver theraband internal rotation 2/15  - Silver theraband external rotation 2/15  - UBE: level 6 for 10 minutes 5 min forward/ 5 minutes backwards  - silver theraband pull aparts 2/15  -  standing push press with 10# DB 3/10   -wall push ups 3/10       Home Exercises and Education Provided     Education provided:   - Progress towards goals     Written Home Exercises Provided: Patient instructed to cont prior HEP.  Exercises were reviewed and Julee was able to demonstrate them prior to the end of the session.  Julee demonstrated good  understanding of the HEP provided.   .   See EMR under Patient Instructions for exercises provided prior visit.      Assessment     Pt would continue to benefit from skilled OT.  Increase in resistance today with therapeutic activities. Pt tolerated increased resisitve therapeutic activities well without additional complaints of pain. Patient continues to progress in overall strength and range of motion.  Will progress as tolerated. Pt motivated.       Julee is progressing well towards her goals and there are no updates to goals at this time. Pt prognosis is Good.     Pt will continue to benefit from skilled outpatient occupational therapy to address the deficits listed in the problem list on initial evaluation provide pt/family education and to maximize pt's level of independence in the home and community environment.     Anticipated barriers to occupational therapy: slow bone healing    Pt's spiritual, cultural and educational needs considered and pt agreeable to plan of care and goals.    UPDATED GOALS  1)   Improve muscle strength ing to 5/5 in order to participate in ADLs and IADLs with less assistance.  Ongoing  2)   Patient to score at 65% or more on FOTO to demonstrate improved perception of functional shoulder use.Ongoing        Plan      Pt to be treated by Occupational Therapy 2 times per week for 10 weeks during the certification period to achieve the established goals.      Treatment to include: Paraffin, Fluidotherapy, Manual therapy/joint mobilizations, Modalities for pain management, US 3 mhz, Therapeutic exercises/activities., Iontophoresis with 2.0 cc  Dexamethasone, Strengthening, Orthotic Fabrication/Fit/Training, Edema Control, Scar Management, Wound Care, Electrical Modalities, Joint Protection, and Energy Conservation, as well as any other treatments deemed necessary based on the patient's needs or progress.     Updates/Grading for session: Continue with current plan of care     FERNANDO Harman

## 2024-04-24 ENCOUNTER — CLINICAL SUPPORT (OUTPATIENT)
Dept: REHABILITATION | Facility: HOSPITAL | Age: 44
End: 2024-04-24
Payer: COMMERCIAL

## 2024-04-24 DIAGNOSIS — R53.1 WEAKNESS: Primary | ICD-10-CM

## 2024-04-24 DIAGNOSIS — M25.611 SHOULDER STIFFNESS, RIGHT: ICD-10-CM

## 2024-04-24 DIAGNOSIS — M25.621 ELBOW STIFFNESS, RIGHT: ICD-10-CM

## 2024-04-24 DIAGNOSIS — R52 PAIN: ICD-10-CM

## 2024-04-24 PROCEDURE — 97530 THERAPEUTIC ACTIVITIES: CPT | Mod: PO

## 2024-04-24 NOTE — PROGRESS NOTES
Occupational Therapy Daily Treatment Note     Name: Tabby Veloz  Clinic Number: [id number]    Therapy Diagnosis:   Encounter Diagnoses   Name Primary?    Weakness Yes    Elbow stiffness, right     Shoulder stiffness, right     Pain          Physician: Luis Del Valle NP    Visit Date: 4/24/2024    Physician Orders: OT eval/ treat   Plan of Care Certification Date: 6/7/24  Authorization Period: 10/3/23 -10/2/24   Medical Diagnosis: S42.351D (ICD-10-CM) - Closed displaced comminuted fracture of shaft of right humerus with routine healing, subsequent   Surgical Procedure and Date: ORIF for her right humeral shaft fracture with IM nail by Dr. Stanley on 9/19/2023. ,   Evaluation Date: 10/9/23  Onset Date:9/15/23   Date of Return to MD: 3/6/24  Visit # / Visits authorized: 31/42 + 22  FOTO Completion: 3/3    Time In:0900  Time Out: 1000  Total Billable Time: 60 minutes    Note from MD 12/12/23 visit:    - Continue [facility].  - Weight bearing 2-4 lbs x 4 weeks and increase by 2-4 lbs/month thereafter as she tolerates.  Ok for therapist to work with a pulley.  - Range of motion as tolerated.     Precautions: Standard and WB, 17 weeks 1/16/24   Subjective     Pt reports: She states she is still a little achey, but it's tolerable  she was compliant with home exercise program given last session.   Response to previous treatment:increased ROM  Functional change: increased ROM    Pain:2/10   Location: right arms     Objective       Observation: No abnormalities present      UE Range of Motion  Active Range of Motion:   Shoulder Right Right  10/30/23 Right  12/11/23 Right   1/3/24 Right  1/8/24 Right   1/22/24 Right  2/7/24 Right  3/11/24 Right   4/17/24   Flexion 20 145 - supine 160 - supine 135 - standing 165 - supine 140- standing  165 - supine 150 - standing 175 175   Abduction 50 110 - supine 130 - supine 130 -standing 145 - supine 145 - standing   150 - supine 145 - standing  165 165   ER at 90 20 40 -  supine 50 - supine 55 - supine 55 - supine  35 - standing   55 - supine 80 - standing  90 90   IR 80 80 - supine 80 - supine 80- supine 80 - supine 70 - standing   80 - supine  70 - standing  80 80      Passive Range of Motion:   Shoulder Left Right Right   10/30/23 Right  12/11/23   Flexion 170 70 160 170   Abduction 165 70 125 145   ER at 90 80 20 40 55   IR 80 80 80 80      Elbow Left Right Right   10/30/23 Right  12/11/23 Right   1/3/24 Right  1/8/24 Right  1/22/24   Flexion 140 120 130 140 140 140 140   Extension 0 -10 0 0 0 0 0      Painful Arc:   Patient demonstrates no painful arc in shoulder flexion or abduction     Upper Extremity Strength  (R) UE   (L) UE     Shoulder flexion: 2-/5 Shoulder flexion: 5/5   Shoulder Abduction: 2-/5 Shoulder abduction: 5/5   Shoulder ER 2-/5 Shoulder ER 5/5   Shoulder IR 2-/5 Shoulder IR 5/5   Rhomboids 2-/5 Rhomboids 5/5       Strength: (OSMANY Dynamometer in psi.)        10/9/2023 10/9/2023 12/11/23 1/3/24 1/8/24 1/22/24 2/7/24 3/11/24 4/17/24     Left Right Right Right Right Right Right Right Right   Rung II 60 N/a 50 63 65 65 70 70 70      Joint Mobility/End Feels: Hard     Palpation: Pt is tender at incision sites      Sensation: Pt denies any numbness or tingling        Scapular Control/Dyskinesis:     Normal / Subtle / Obvious  Comments    Left  N N/a    Right  N N/a         OUTCOME MEASURE:FOTO     Category:Self care       Current Score  = 37%   Goal at Discharge Score = 65%     11/20/23: 53%    Treatment     Julee participated in dynamic functional therapeutic activities to improve functional performance for 60  minutes, including:  -pulley's 3 min flexion/ 3 min abduction  - 8 minute carry with 15# DB   - Silver theraband rows 2/15  - Silver theraband pull downs 2/15  - Silver theraband internal rotation 2/15  - Silver theraband external rotation 2/15  - UBE: level 6 for 10 minutes 5 min forward/ 5 minutes backwards  - silver theraband pull aparts 2/15  -  standing push press with 10# DB 3/10   -wall push ups 3/10       Home Exercises and Education Provided     Education provided:   - Progress towards goals     Written Home Exercises Provided: Patient instructed to cont prior HEP.  Exercises were reviewed and Julee was able to demonstrate them prior to the end of the session.  Julee demonstrated good  understanding of the HEP provided.   .   See EMR under Patient Instructions for exercises provided prior visit.      Assessment     Pt would continue to benefit from skilled OT.  Increase in resistance today with therapeutic activities. Pt tolerated increased resisitve therapeutic activities well without additional complaints of pain. Patient continues to progress in overall strength and range of motion.  Will progress as tolerated. Pt motivated.       Julee is progressing well towards her goals and there are no updates to goals at this time. Pt prognosis is Good.     Pt will continue to benefit from skilled outpatient occupational therapy to address the deficits listed in the problem list on initial evaluation provide pt/family education and to maximize pt's level of independence in the home and community environment.     Anticipated barriers to occupational therapy: slow bone healing    Pt's spiritual, cultural and educational needs considered and pt agreeable to plan of care and goals.    UPDATED GOALS  1)   Improve muscle strength ing to 5/5 in order to participate in ADLs and IADLs with less assistance.  Ongoing  2)   Patient to score at 65% or more on FOTO to demonstrate improved perception of functional shoulder use.Ongoing        Plan      Pt to be treated by Occupational Therapy 2 times per week for 10 weeks during the certification period to achieve the established goals.      Treatment to include: Paraffin, Fluidotherapy, Manual therapy/joint mobilizations, Modalities for pain management, US 3 mhz, Therapeutic exercises/activities., Iontophoresis with 2.0 cc  Dexamethasone, Strengthening, Orthotic Fabrication/Fit/Training, Edema Control, Scar Management, Wound Care, Electrical Modalities, Joint Protection, and Energy Conservation, as well as any other treatments deemed necessary based on the patient's needs or progress.     Updates/Grading for session: Continue with current plan of care     FERNANDO Harman

## 2024-05-01 ENCOUNTER — CLINICAL SUPPORT (OUTPATIENT)
Dept: REHABILITATION | Facility: HOSPITAL | Age: 44
End: 2024-05-01
Payer: COMMERCIAL

## 2024-05-01 DIAGNOSIS — R52 PAIN: ICD-10-CM

## 2024-05-01 DIAGNOSIS — R53.1 WEAKNESS: Primary | ICD-10-CM

## 2024-05-01 DIAGNOSIS — M25.611 SHOULDER STIFFNESS, RIGHT: ICD-10-CM

## 2024-05-01 DIAGNOSIS — M25.621 ELBOW STIFFNESS, RIGHT: ICD-10-CM

## 2024-05-01 PROCEDURE — 97530 THERAPEUTIC ACTIVITIES: CPT | Mod: PO

## 2024-05-01 NOTE — PROGRESS NOTES
Occupational Therapy Daily Treatment Note     Name: Tabby Veloz  Clinic Number: [id number]    Therapy Diagnosis:   Encounter Diagnoses   Name Primary?    Weakness Yes    Elbow stiffness, right     Shoulder stiffness, right     Pain          Physician: Luis Del Valle NP    Visit Date: 5/1/2024    Physician Orders: OT eval/ treat   Plan of Care Certification Date: 6/7/24  Authorization Period: 10/3/23 -10/2/24   Medical Diagnosis: S42.351D (ICD-10-CM) - Closed displaced comminuted fracture of shaft of right humerus with routine healing, subsequent   Surgical Procedure and Date: ORIF for her right humeral shaft fracture with IM nail by Dr. Stanley on 9/19/2023. ,   Evaluation Date: 10/9/23  Onset Date:9/15/23   Date of Return to MD: 3/6/24  Visit # / Visits authorized: 32/42 + 22  FOTO Completion: 3/3    Time In:0900  Time Out: 1000  Total Billable Time: 60 minutes    Note from MD 12/12/23 visit:    - Continue [facility].  - Weight bearing 2-4 lbs x 4 weeks and increase by 2-4 lbs/month thereafter as she tolerates.  Ok for therapist to work with a pulley.  - Range of motion as tolerated.     Precautions: Standard and WB, 17 weeks 1/16/24   Subjective     Pt reports: She states she is still a little achey, but it's tolerable  she was compliant with home exercise program given last session.   Response to previous treatment:increased ROM  Functional change: increased ROM    Pain:2/10   Location: right arms     Objective       Observation: No abnormalities present      UE Range of Motion  Active Range of Motion:   Shoulder Right Right  10/30/23 Right  12/11/23 Right   1/3/24 Right  1/8/24 Right   1/22/24 Right  2/7/24 Right  3/11/24 Right   4/17/24   Flexion 20 145 - supine 160 - supine 135 - standing 165 - supine 140- standing  165 - supine 150 - standing 175 175   Abduction 50 110 - supine 130 - supine 130 -standing 145 - supine 145 - standing   150 - supine 145 - standing  165 165   ER at 90 20 40 -  supine 50 - supine 55 - supine 55 - supine  35 - standing   55 - supine 80 - standing  90 90   IR 80 80 - supine 80 - supine 80- supine 80 - supine 70 - standing   80 - supine  70 - standing  80 80      Passive Range of Motion:   Shoulder Left Right Right   10/30/23 Right  12/11/23   Flexion 170 70 160 170   Abduction 165 70 125 145   ER at 90 80 20 40 55   IR 80 80 80 80      Elbow Left Right Right   10/30/23 Right  12/11/23 Right   1/3/24 Right  1/8/24 Right  1/22/24   Flexion 140 120 130 140 140 140 140   Extension 0 -10 0 0 0 0 0      Painful Arc:   Patient demonstrates no painful arc in shoulder flexion or abduction     Upper Extremity Strength  (R) UE   (L) UE     Shoulder flexion: 2-/5 Shoulder flexion: 5/5   Shoulder Abduction: 2-/5 Shoulder abduction: 5/5   Shoulder ER 2-/5 Shoulder ER 5/5   Shoulder IR 2-/5 Shoulder IR 5/5   Rhomboids 2-/5 Rhomboids 5/5       Strength: (OSMANY Dynamometer in psi.)        10/9/2023 10/9/2023 12/11/23 1/3/24 1/8/24 1/22/24 2/7/24 3/11/24 4/17/24     Left Right Right Right Right Right Right Right Right   Rung II 60 N/a 50 63 65 65 70 70 70      Joint Mobility/End Feels: Hard     Palpation: Pt is tender at incision sites      Sensation: Pt denies any numbness or tingling        Scapular Control/Dyskinesis:     Normal / Subtle / Obvious  Comments    Left  N N/a    Right  N N/a         OUTCOME MEASURE:FOTO     Category:Self care       Current Score  = 37%   Goal at Discharge Score = 65%     11/20/23: 53%    Treatment     Julee participated in dynamic functional therapeutic activities to improve functional performance for 60  minutes, including:  -pulley's 3 min flexion/ 3 min abduction  - 8 minute carry with 15# DB   - Silver theraband rows 2/15  - Silver theraband pull downs 2/15  - Silver theraband internal rotation 2/15  - Silver theraband external rotation 2/15  - UBE: level 6 for 10 minutes 5 min forward/ 5 minutes backwards  - silver theraband pull aparts 2/15  -  standing push press with 10# DB 3/10   -wall push ups 3/10       Home Exercises and Education Provided     Education provided:   - Progress towards goals     Written Home Exercises Provided: Patient instructed to cont prior HEP.  Exercises were reviewed and Julee was able to demonstrate them prior to the end of the session.  Julee demonstrated good  understanding of the HEP provided.   .   See EMR under Patient Instructions for exercises provided prior visit.      Assessment     Pt would continue to benefit from skilled OT.  Increase in resistance today with therapeutic activities. Pt tolerated increased resisitve therapeutic activities well without additional complaints of pain. Patient continues to progress in overall strength and range of motion.  Will progress as tolerated. Pt motivated.       Julee is progressing well towards her goals and there are no updates to goals at this time. Pt prognosis is Good.     Pt will continue to benefit from skilled outpatient occupational therapy to address the deficits listed in the problem list on initial evaluation provide pt/family education and to maximize pt's level of independence in the home and community environment.     Anticipated barriers to occupational therapy: slow bone healing    Pt's spiritual, cultural and educational needs considered and pt agreeable to plan of care and goals.    UPDATED GOALS  1)   Improve muscle strength ing to 5/5 in order to participate in ADLs and IADLs with less assistance.  Ongoing  2)   Patient to score at 65% or more on FOTO to demonstrate improved perception of functional shoulder use.Ongoing        Plan      Pt to be treated by Occupational Therapy 2 times per week for 10 weeks during the certification period to achieve the established goals.      Treatment to include: Paraffin, Fluidotherapy, Manual therapy/joint mobilizations, Modalities for pain management, US 3 mhz, Therapeutic exercises/activities., Iontophoresis with 2.0 cc  Dexamethasone, Strengthening, Orthotic Fabrication/Fit/Training, Edema Control, Scar Management, Wound Care, Electrical Modalities, Joint Protection, and Energy Conservation, as well as any other treatments deemed necessary based on the patient's needs or progress.     Updates/Grading for session: Continue with current plan of care     FERNANDO Harman

## 2024-05-08 ENCOUNTER — CLINICAL SUPPORT (OUTPATIENT)
Dept: REHABILITATION | Facility: HOSPITAL | Age: 44
End: 2024-05-08
Payer: COMMERCIAL

## 2024-05-08 DIAGNOSIS — R52 PAIN: ICD-10-CM

## 2024-05-08 DIAGNOSIS — M25.621 ELBOW STIFFNESS, RIGHT: ICD-10-CM

## 2024-05-08 DIAGNOSIS — M25.611 SHOULDER STIFFNESS, RIGHT: ICD-10-CM

## 2024-05-08 DIAGNOSIS — R53.1 WEAKNESS: Primary | ICD-10-CM

## 2024-05-08 PROCEDURE — 97530 THERAPEUTIC ACTIVITIES: CPT | Mod: PO

## 2024-05-08 NOTE — PROGRESS NOTES
Occupational Therapy Daily Treatment Note     Name: Tabby Veloz  Clinic Number: [id number]    Therapy Diagnosis:   Encounter Diagnoses   Name Primary?    Weakness Yes    Elbow stiffness, right     Shoulder stiffness, right     Pain          Physician: Luis Del Valle NP    Visit Date: 5/8/2024    Physician Orders: OT eval/ treat   Plan of Care Certification Date: 6/7/24  Authorization Period: 10/3/23 -10/2/24   Medical Diagnosis: S42.351D (ICD-10-CM) - Closed displaced comminuted fracture of shaft of right humerus with routine healing, subsequent   Surgical Procedure and Date: ORIF for her right humeral shaft fracture with IM nail by Dr. Stanley on 9/19/2023. ,   Evaluation Date: 10/9/23  Onset Date:9/15/23   Date of Return to MD: 3/6/24  Visit # / Visits authorized: 33/42 + 22  FOTO Completion: 3/3    Time In:0900  Time Out: 1000  Total Billable Time: 60 minutes    Note from MD 12/12/23 visit:    - Continue [facility] OT.  - Weight bearing 2-4 lbs x 4 weeks and increase by 2-4 lbs/month thereafter as she tolerates.  Ok for therapist to work with a pulley.  - Range of motion as tolerated.     Precautions: Standard and WB, 17 weeks 1/16/24   Subjective     Pt reports: She states she is still a little achey, but it's tolerable  she was compliant with home exercise program given last session.   Response to previous treatment:increased ROM  Functional change: increased ROM    Pain:2/10   Location: right arms     Objective       Observation: No abnormalities present      UE Range of Motion  Active Range of Motion:   Shoulder Right Right  10/30/23 Right  12/11/23 Right   1/3/24 Right  1/8/24 Right   1/22/24 Right  2/7/24 Right  3/11/24 Right   4/17/24   Flexion 20 145 - supine 160 - supine 135 - standing 165 - supine 140- standing  165 - supine 150 - standing 175 175   Abduction 50 110 - supine 130 - supine 130 -standing 145 - supine 145 - standing   150 - supine 145 - standing  165 165   ER at 90 20 40 -  supine 50 - supine 55 - supine 55 - supine  35 - standing   55 - supine 80 - standing  90 90   IR 80 80 - supine 80 - supine 80- supine 80 - supine 70 - standing   80 - supine  70 - standing  80 80      Passive Range of Motion:   Shoulder Left Right Right   10/30/23 Right  12/11/23   Flexion 170 70 160 170   Abduction 165 70 125 145   ER at 90 80 20 40 55   IR 80 80 80 80      Elbow Left Right Right   10/30/23 Right  12/11/23 Right   1/3/24 Right  1/8/24 Right  1/22/24   Flexion 140 120 130 140 140 140 140   Extension 0 -10 0 0 0 0 0      Painful Arc:   Patient demonstrates no painful arc in shoulder flexion or abduction     Upper Extremity Strength  (R) UE   (L) UE     Shoulder flexion: 2-/5 Shoulder flexion: 5/5   Shoulder Abduction: 2-/5 Shoulder abduction: 5/5   Shoulder ER 2-/5 Shoulder ER 5/5   Shoulder IR 2-/5 Shoulder IR 5/5   Rhomboids 2-/5 Rhomboids 5/5       Strength: (OSMANY Dynamometer in psi.)        10/9/2023 10/9/2023 12/11/23 1/3/24 1/8/24 1/22/24 2/7/24 3/11/24 4/17/24     Left Right Right Right Right Right Right Right Right   Rung II 60 N/a 50 63 65 65 70 70 70      Joint Mobility/End Feels: Hard     Palpation: Pt is tender at incision sites      Sensation: Pt denies any numbness or tingling        Scapular Control/Dyskinesis:     Normal / Subtle / Obvious  Comments    Left  N N/a    Right  N N/a         OUTCOME MEASURE:FOTO     Category:Self care       Current Score  = 37%   Goal at Discharge Score = 65%     11/20/23: 53%    Treatment     Julee participated in dynamic functional therapeutic activities to improve functional performance for 60  minutes, including:  -pulley's 3 min flexion/ 3 min abduction  - 8 minute carry with 15# DB   - Silver theraband rows 2/15  - Silver theraband pull downs 2/15  - Silver theraband internal rotation 2/15  - Silver theraband external rotation 2/15  - UBE: level 6 for 10 minutes 5 min forward/ 5 minutes backwards  - silver theraband pull aparts 2/15  -  standing push press with 10# DB 3/10   -wall push ups 3/10       Home Exercises and Education Provided     Education provided:   - Progress towards goals     Written Home Exercises Provided: Patient instructed to cont prior HEP.  Exercises were reviewed and Julee was able to demonstrate them prior to the end of the session.  Julee demonstrated good  understanding of the HEP provided.   .   See EMR under Patient Instructions for exercises provided prior visit.      Assessment     Pt would continue to benefit from skilled OT.  Increase in resistance today with therapeutic activities. Pt tolerated increased resisitve therapeutic activities well without additional complaints of pain. Patient continues to progress in overall strength and range of motion.  Will progress as tolerated. Pt motivated.       Julee is progressing well towards her goals and there are no updates to goals at this time. Pt prognosis is Good.     Pt will continue to benefit from skilled outpatient occupational therapy to address the deficits listed in the problem list on initial evaluation provide pt/family education and to maximize pt's level of independence in the home and community environment.     Anticipated barriers to occupational therapy: slow bone healing    Pt's spiritual, cultural and educational needs considered and pt agreeable to plan of care and goals.    UPDATED GOALS  1)   Improve muscle strength ing to 5/5 in order to participate in ADLs and IADLs with less assistance.  Ongoing  2)   Patient to score at 65% or more on FOTO to demonstrate improved perception of functional shoulder use.Ongoing        Plan      Pt to be treated by Occupational Therapy 2 times per week for 10 weeks during the certification period to achieve the established goals.      Treatment to include: Paraffin, Fluidotherapy, Manual therapy/joint mobilizations, Modalities for pain management, US 3 mhz, Therapeutic exercises/activities., Iontophoresis with 2.0 cc  Dexamethasone, Strengthening, Orthotic Fabrication/Fit/Training, Edema Control, Scar Management, Wound Care, Electrical Modalities, Joint Protection, and Energy Conservation, as well as any other treatments deemed necessary based on the patient's needs or progress.     Updates/Grading for session: Continue with current plan of care     FERNANDO Harman

## 2024-05-13 ENCOUNTER — CLINICAL SUPPORT (OUTPATIENT)
Dept: REHABILITATION | Facility: HOSPITAL | Age: 44
End: 2024-05-13
Payer: COMMERCIAL

## 2024-05-13 DIAGNOSIS — M25.611 SHOULDER STIFFNESS, RIGHT: ICD-10-CM

## 2024-05-13 DIAGNOSIS — R53.1 WEAKNESS: Primary | ICD-10-CM

## 2024-05-13 DIAGNOSIS — M25.621 ELBOW STIFFNESS, RIGHT: ICD-10-CM

## 2024-05-13 DIAGNOSIS — R52 PAIN: ICD-10-CM

## 2024-05-13 PROCEDURE — 97530 THERAPEUTIC ACTIVITIES: CPT | Mod: PO

## 2024-05-13 NOTE — PROGRESS NOTES
Occupational Therapy Daily Treatment Note     Name: Tabby Veloz  Clinic Number: [id number]    Therapy Diagnosis:   Encounter Diagnoses   Name Primary?    Weakness Yes    Elbow stiffness, right     Shoulder stiffness, right     Pain          Physician: Luis Del Valle NP    Visit Date: 5/13/2024    Physician Orders: OT eval/ treat   Plan of Care Certification Date: 6/7/24  Authorization Period: 10/3/23 -10/2/24   Medical Diagnosis: S42.351D (ICD-10-CM) - Closed displaced comminuted fracture of shaft of right humerus with routine healing, subsequent   Surgical Procedure and Date: ORIF for her right humeral shaft fracture with IM nail by Dr. Stanley on 9/19/2023. ,   Evaluation Date: 10/9/23  Onset Date:9/15/23   Date of Return to MD: 3/6/24  Visit # / Visits authorized: 34/42 + 22  FOTO Completion: 3/3    Time In:0800  Time Out: 0900  Total Billable Time: 60 minutes    Note from MD 12/12/23 visit:    - Continue [facility].  - Weight bearing 2-4 lbs x 4 weeks and increase by 2-4 lbs/month thereafter as she tolerates.  Ok for therapist to work with a pulley.  - Range of motion as tolerated.     Precautions: Standard and WB, 17 weeks 1/16/24   Subjective     Pt reports: She states she is still a little achey, but it's tolerable  she was compliant with home exercise program given last session.   Response to previous treatment:increased ROM  Functional change: increased ROM    Pain:2/10   Location: right arms     Objective       Observation: No abnormalities present      UE Range of Motion  Active Range of Motion:   Shoulder Right Right  10/30/23 Right  12/11/23 Right   1/3/24 Right  1/8/24 Right   1/22/24 Right  2/7/24 Right  3/11/24 Right   4/17/24   Flexion 20 145 - supine 160 - supine 135 - standing 165 - supine 140- standing  165 - supine 150 - standing 175 175   Abduction 50 110 - supine 130 - supine 130 -standing 145 - supine 145 - standing   150 - supine 145 - standing  165 165   ER at 90 20 40 -  supine 50 - supine 55 - supine 55 - supine  35 - standing   55 - supine 80 - standing  90 90   IR 80 80 - supine 80 - supine 80- supine 80 - supine 70 - standing   80 - supine  70 - standing  80 80      Passive Range of Motion:   Shoulder Left Right Right   10/30/23 Right  12/11/23   Flexion 170 70 160 170   Abduction 165 70 125 145   ER at 90 80 20 40 55   IR 80 80 80 80      Elbow Left Right Right   10/30/23 Right  12/11/23 Right   1/3/24 Right  1/8/24 Right  1/22/24   Flexion 140 120 130 140 140 140 140   Extension 0 -10 0 0 0 0 0      Painful Arc:   Patient demonstrates no painful arc in shoulder flexion or abduction     Upper Extremity Strength  (R) UE   (L) UE     Shoulder flexion: 2-/5 Shoulder flexion: 5/5   Shoulder Abduction: 2-/5 Shoulder abduction: 5/5   Shoulder ER 2-/5 Shoulder ER 5/5   Shoulder IR 2-/5 Shoulder IR 5/5   Rhomboids 2-/5 Rhomboids 5/5       Strength: (OSMANY Dynamometer in psi.)        10/9/2023 10/9/2023 12/11/23 1/3/24 1/8/24 1/22/24 2/7/24 3/11/24 4/17/24     Left Right Right Right Right Right Right Right Right   Rung II 60 N/a 50 63 65 65 70 70 70      Joint Mobility/End Feels: Hard     Palpation: Pt is tender at incision sites      Sensation: Pt denies any numbness or tingling        Scapular Control/Dyskinesis:     Normal / Subtle / Obvious  Comments    Left  N N/a    Right  N N/a         OUTCOME MEASURE:FOTO     Category:Self care       Current Score  = 37%   Goal at Discharge Score = 65%     11/20/23: 53%    Treatment     Julee participated in dynamic functional therapeutic activities to improve functional performance for 60  minutes, including:  -pulley's 3 min flexion/ 3 min abduction  - 8 minute carry with 15# DB   - Silver theraband rows 2/15  - Silver theraband pull downs 2/15  - Silver theraband internal rotation 2/15  - Silver theraband external rotation 2/15  - UBE: level 6 for 10 minutes 5 min forward/ 5 minutes backwards  - silver theraband pull aparts 2/15  -  standing push press with 10# DB 3/10   -wall push ups 3/10       Home Exercises and Education Provided     Education provided:   - Progress towards goals     Written Home Exercises Provided: Patient instructed to cont prior HEP.  Exercises were reviewed and Julee was able to demonstrate them prior to the end of the session.  Julee demonstrated good  understanding of the HEP provided.   .   See EMR under Patient Instructions for exercises provided prior visit.      Assessment     Pt would continue to benefit from skilled OT.  Increase in resistance today with therapeutic activities. Pt tolerated increased resisitve therapeutic activities well without additional complaints of pain. Patient continues to progress in overall strength and range of motion.  Will progress as tolerated. Pt motivated.       Julee is progressing well towards her goals and there are no updates to goals at this time. Pt prognosis is Good.     Pt will continue to benefit from skilled outpatient occupational therapy to address the deficits listed in the problem list on initial evaluation provide pt/family education and to maximize pt's level of independence in the home and community environment.     Anticipated barriers to occupational therapy: slow bone healing    Pt's spiritual, cultural and educational needs considered and pt agreeable to plan of care and goals.    UPDATED GOALS  1)   Improve muscle strength ing to 5/5 in order to participate in ADLs and IADLs with less assistance.  Ongoing  2)   Patient to score at 65% or more on FOTO to demonstrate improved perception of functional shoulder use.Ongoing        Plan      Pt to be treated by Occupational Therapy 2 times per week for 10 weeks during the certification period to achieve the established goals.      Treatment to include: Paraffin, Fluidotherapy, Manual therapy/joint mobilizations, Modalities for pain management, US 3 mhz, Therapeutic exercises/activities., Iontophoresis with 2.0 cc  Dexamethasone, Strengthening, Orthotic Fabrication/Fit/Training, Edema Control, Scar Management, Wound Care, Electrical Modalities, Joint Protection, and Energy Conservation, as well as any other treatments deemed necessary based on the patient's needs or progress.     Updates/Grading for session: Continue with current plan of care     FERNANDO Harman

## 2024-05-22 ENCOUNTER — CLINICAL SUPPORT (OUTPATIENT)
Dept: REHABILITATION | Facility: HOSPITAL | Age: 44
End: 2024-05-22
Payer: COMMERCIAL

## 2024-05-22 DIAGNOSIS — M25.621 ELBOW STIFFNESS, RIGHT: ICD-10-CM

## 2024-05-22 DIAGNOSIS — R53.1 WEAKNESS: Primary | ICD-10-CM

## 2024-05-22 DIAGNOSIS — R52 PAIN: ICD-10-CM

## 2024-05-22 DIAGNOSIS — M25.611 SHOULDER STIFFNESS, RIGHT: ICD-10-CM

## 2024-05-22 PROCEDURE — 97530 THERAPEUTIC ACTIVITIES: CPT | Mod: PO

## 2024-05-22 NOTE — PROGRESS NOTES
Occupational Therapy Daily Treatment Note     Name: Tabby Veloz  Clinic Number: [id number]    Therapy Diagnosis:   Encounter Diagnoses   Name Primary?    Weakness Yes    Elbow stiffness, right     Shoulder stiffness, right     Pain          Physician: Luis Del Valle NP    Visit Date: 5/22/2024    Physician Orders: OT eval/ treat   Plan of Care Certification Date: 6/7/24  Authorization Period: 10/3/23 -10/2/24   Medical Diagnosis: S42.351D (ICD-10-CM) - Closed displaced comminuted fracture of shaft of right humerus with routine healing, subsequent   Surgical Procedure and Date: ORIF for her right humeral shaft fracture with IM nail by Dr. Stanley on 9/19/2023. ,   Evaluation Date: 10/9/23  Onset Date:9/15/23   Date of Return to MD: 3/6/24  Visit # / Visits authorized: 34/42 + 22  FOTO Completion: 3/3    Time In:0900  Time Out: 1000  Total Billable Time: 60 minutes    Note from MD 12/12/23 visit:    - Continue [facility].  - Weight bearing 2-4 lbs x 4 weeks and increase by 2-4 lbs/month thereafter as she tolerates.  Ok for therapist to work with a pulley.  - Range of motion as tolerated.     Precautions: Standard and WB, 17 weeks 1/16/24   Subjective     Pt reports: She states she is still a little achey, but it's tolerable  she was compliant with home exercise program given last session.   Response to previous treatment:increased ROM  Functional change: increased ROM    Pain:2/10   Location: right arms     Objective       Observation: No abnormalities present      UE Range of Motion  Active Range of Motion:   Shoulder Right Right  10/30/23 Right  12/11/23 Right   1/3/24 Right  1/8/24 Right   1/22/24 Right  2/7/24 Right  3/11/24 Right   4/17/24   Flexion 20 145 - supine 160 - supine 135 - standing 165 - supine 140- standing  165 - supine 150 - standing 175 175   Abduction 50 110 - supine 130 - supine 130 -standing 145 - supine 145 - standing   150 - supine 145 - standing  165 165   ER at 90 20 40 -  supine 50 - supine 55 - supine 55 - supine  35 - standing   55 - supine 80 - standing  90 90   IR 80 80 - supine 80 - supine 80- supine 80 - supine 70 - standing   80 - supine  70 - standing  80 80      Passive Range of Motion:   Shoulder Left Right Right   10/30/23 Right  12/11/23   Flexion 170 70 160 170   Abduction 165 70 125 145   ER at 90 80 20 40 55   IR 80 80 80 80      Elbow Left Right Right   10/30/23 Right  12/11/23 Right   1/3/24 Right  1/8/24 Right  1/22/24   Flexion 140 120 130 140 140 140 140   Extension 0 -10 0 0 0 0 0      Painful Arc:   Patient demonstrates no painful arc in shoulder flexion or abduction     Upper Extremity Strength  (R) UE   (L) UE     Shoulder flexion: 2-/5 Shoulder flexion: 5/5   Shoulder Abduction: 2-/5 Shoulder abduction: 5/5   Shoulder ER 2-/5 Shoulder ER 5/5   Shoulder IR 2-/5 Shoulder IR 5/5   Rhomboids 2-/5 Rhomboids 5/5       Strength: (OSMANY Dynamometer in psi.)        10/9/2023 10/9/2023 12/11/23 1/3/24 1/8/24 1/22/24 2/7/24 3/11/24 4/17/24     Left Right Right Right Right Right Right Right Right   Rung II 60 N/a 50 63 65 65 70 70 70      Joint Mobility/End Feels: Hard     Palpation: Pt is tender at incision sites      Sensation: Pt denies any numbness or tingling        Scapular Control/Dyskinesis:     Normal / Subtle / Obvious  Comments    Left  N N/a    Right  N N/a         OUTCOME MEASURE:FOTO     Category:Self care       Current Score  = 37%   Goal at Discharge Score = 65%     11/20/23: 53%    Treatment     Julee participated in dynamic functional therapeutic activities to improve functional performance for 60  minutes, including:  -pulley's 3 min flexion/ 3 min abduction  - 8 minute carry with 15# DB   - Silver theraband rows 2/15  - Silver theraband pull downs 2/15  - Silver theraband internal rotation 2/15  - Silver theraband external rotation 2/15  - UBE: level 6 for 10 minutes 5 min forward/ 5 minutes backwards  - silver theraband pull aparts 2/15  -  standing push press with 10# DB 3/10   -wall push ups 3/10  - KB waist to high pull bilateral arms 15# 3/10  - KB Romanian swings 15# 3/10       Home Exercises and Education Provided     Education provided:   - Progress towards goals     Written Home Exercises Provided: Patient instructed to cont prior HEP.  Exercises were reviewed and Julee was able to demonstrate them prior to the end of the session.  Julee demonstrated good  understanding of the HEP provided.   .   See EMR under Patient Instructions for exercises provided prior visit.      Assessment     Pt would continue to benefit from skilled OT.  Increase in resistance today with therapeutic activities. Pt tolerated increased resisitve therapeutic activities well without additional complaints of pain. Patient continues to progress in overall strength and range of motion.  Will progress as tolerated. Pt motivated.       Julee is progressing well towards her goals and there are no updates to goals at this time. Pt prognosis is Good.     Pt will continue to benefit from skilled outpatient occupational therapy to address the deficits listed in the problem list on initial evaluation provide pt/family education and to maximize pt's level of independence in the home and community environment.     Anticipated barriers to occupational therapy: slow bone healing    Pt's spiritual, cultural and educational needs considered and pt agreeable to plan of care and goals.    UPDATED GOALS  1)   Improve muscle strength ing to 5/5 in order to participate in ADLs and IADLs with less assistance.  Ongoing  2)   Patient to score at 65% or more on FOTO to demonstrate improved perception of functional shoulder use.Ongoing        Plan      Pt to be treated by Occupational Therapy 2 times per week for 10 weeks during the certification period to achieve the established goals.      Treatment to include: Paraffin, Fluidotherapy, Manual therapy/joint mobilizations, Modalities for pain  management, US 3 mhz, Therapeutic exercises/activities., Iontophoresis with 2.0 cc Dexamethasone, Strengthening, Orthotic Fabrication/Fit/Training, Edema Control, Scar Management, Wound Care, Electrical Modalities, Joint Protection, and Energy Conservation, as well as any other treatments deemed necessary based on the patient's needs or progress.     Updates/Grading for session: Continue with current plan of care     FERNANDO Harman

## 2024-05-27 ENCOUNTER — CLINICAL SUPPORT (OUTPATIENT)
Dept: REHABILITATION | Facility: HOSPITAL | Age: 44
End: 2024-05-27
Payer: COMMERCIAL

## 2024-05-27 DIAGNOSIS — R52 PAIN: ICD-10-CM

## 2024-05-27 DIAGNOSIS — R53.1 WEAKNESS: Primary | ICD-10-CM

## 2024-05-27 DIAGNOSIS — M25.611 SHOULDER STIFFNESS, RIGHT: ICD-10-CM

## 2024-05-27 DIAGNOSIS — M25.621 ELBOW STIFFNESS, RIGHT: ICD-10-CM

## 2024-05-27 PROCEDURE — 97530 THERAPEUTIC ACTIVITIES: CPT | Mod: PO

## 2024-05-27 NOTE — PROGRESS NOTES
Occupational Therapy Daily Treatment Note     Name: Tabby Veloz  Clinic Number: [id number]    Therapy Diagnosis:   Encounter Diagnoses   Name Primary?    Weakness Yes    Elbow stiffness, right     Shoulder stiffness, right     Pain          Physician: Luis Del Valle NP    Visit Date: 5/27/2024    Physician Orders: OT eval/ treat   Plan of Care Certification Date: 6/7/24  Authorization Period: 10/3/23 -10/2/24   Medical Diagnosis: S42.351D (ICD-10-CM) - Closed displaced comminuted fracture of shaft of right humerus with routine healing, subsequent   Surgical Procedure and Date: ORIF for her right humeral shaft fracture with IM nail by Dr. Stanley on 9/19/2023. ,   Evaluation Date: 10/9/23  Onset Date:9/15/23   Date of Return to MD: 3/6/24  Visit # / Visits authorized: 36/42 + 22  FOTO Completion: 3/3    Time In:0900  Time Out: 1000  Total Billable Time: 60 minutes    Note from MD 12/12/23 visit:    - Continue [facility].  - Weight bearing 2-4 lbs x 4 weeks and increase by 2-4 lbs/month thereafter as she tolerates.  Ok for therapist to work with a pulley.  - Range of motion as tolerated.     Precautions: Standard and WB, 17 weeks 1/16/24   Subjective     Pt reports: She states she is still a little achey, but it's tolerable  she was compliant with home exercise program given last session.   Response to previous treatment:increased ROM  Functional change: increased ROM    Pain:2/10   Location: right arms     Objective       Observation: No abnormalities present      UE Range of Motion  Active Range of Motion:   Shoulder Right Right  10/30/23 Right  12/11/23 Right   1/3/24 Right  1/8/24 Right   1/22/24 Right  2/7/24 Right  3/11/24 Right   4/17/24   Flexion 20 145 - supine 160 - supine 135 - standing 165 - supine 140- standing  165 - supine 150 - standing 175 175   Abduction 50 110 - supine 130 - supine 130 -standing 145 - supine 145 - standing   150 - supine 145 - standing  165 165   ER at 90 20 40 -  supine 50 - supine 55 - supine 55 - supine  35 - standing   55 - supine 80 - standing  90 90   IR 80 80 - supine 80 - supine 80- supine 80 - supine 70 - standing   80 - supine  70 - standing  80 80      Passive Range of Motion:   Shoulder Left Right Right   10/30/23 Right  12/11/23   Flexion 170 70 160 170   Abduction 165 70 125 145   ER at 90 80 20 40 55   IR 80 80 80 80      Elbow Left Right Right   10/30/23 Right  12/11/23 Right   1/3/24 Right  1/8/24 Right  1/22/24   Flexion 140 120 130 140 140 140 140   Extension 0 -10 0 0 0 0 0      Painful Arc:   Patient demonstrates no painful arc in shoulder flexion or abduction     Upper Extremity Strength  (R) UE   (L) UE     Shoulder flexion: 2-/5 Shoulder flexion: 5/5   Shoulder Abduction: 2-/5 Shoulder abduction: 5/5   Shoulder ER 2-/5 Shoulder ER 5/5   Shoulder IR 2-/5 Shoulder IR 5/5   Rhomboids 2-/5 Rhomboids 5/5       Strength: (OSMANY Dynamometer in psi.)        10/9/2023 10/9/2023 12/11/23 1/3/24 1/8/24 1/22/24 2/7/24 3/11/24 4/17/24     Left Right Right Right Right Right Right Right Right   Rung II 60 N/a 50 63 65 65 70 70 70      Joint Mobility/End Feels: Hard     Palpation: Pt is tender at incision sites      Sensation: Pt denies any numbness or tingling        Scapular Control/Dyskinesis:     Normal / Subtle / Obvious  Comments    Left  N N/a    Right  N N/a         OUTCOME MEASURE:FOTO     Category:Self care       Current Score  = 37%   Goal at Discharge Score = 65%     11/20/23: 53%    Treatment     Julee participated in dynamic functional therapeutic activities to improve functional performance for 60  minutes, including:  -pulley's 3 min flexion/ 3 min abduction  - 8 minute carry with 15# DB   - Silver theraband rows 2/15  - Silver theraband pull downs 2/15  - Silver theraband internal rotation 2/15  - Silver theraband external rotation 2/15  - UBE: level 6.5 for 10 minutes 5 min forward/ 5 minutes backwards  - silver theraband pull aparts 2/15  -  standing push press with 10# DB 3/10   -wall push ups 3/10  - KB waist to high pull bilateral arms 15# 3/10  - KB [place] swings 15# 3/10       Home Exercises and Education Provided     Education provided:   - Progress towards goals     Written Home Exercises Provided: Patient instructed to cont prior HEP.  Exercises were reviewed and Julee was able to demonstrate them prior to the end of the session.  Julee demonstrated good  understanding of the HEP provided.   .   See EMR under Patient Instructions for exercises provided prior visit.      Assessment     Pt would continue to benefit from skilled OT.  Increase in resistance today with therapeutic activities. Pt tolerated increased resisitve therapeutic activities well without additional complaints of pain. Patient continues to progress in overall strength and range of motion.  Will progress as tolerated. Pt motivated.       Julee is progressing well towards her goals and there are no updates to goals at this time. Pt prognosis is Good.     Pt will continue to benefit from skilled outpatient occupational therapy to address the deficits listed in the problem list on initial evaluation provide pt/family education and to maximize pt's level of independence in the home and community environment.     Anticipated barriers to occupational therapy: slow bone healing    Pt's spiritual, cultural and educational needs considered and pt agreeable to plan of care and goals.    UPDATED GOALS  1)   Improve muscle strength ing to 5/5 in order to participate in ADLs and IADLs with less assistance.  Ongoing  2)   Patient to score at 65% or more on FOTO to demonstrate improved perception of functional shoulder use.Ongoing        Plan      Pt to be treated by Occupational Therapy 2 times per week for 10 weeks during the certification period to achieve the established goals.      Treatment to include: Paraffin, Fluidotherapy, Manual therapy/joint mobilizations, Modalities for pain  management, US 3 mhz, Therapeutic exercises/activities., Iontophoresis with 2.0 cc Dexamethasone, Strengthening, Orthotic Fabrication/Fit/Training, Edema Control, Scar Management, Wound Care, Electrical Modalities, Joint Protection, and Energy Conservation, as well as any other treatments deemed necessary based on the patient's needs or progress.     Updates/Grading for session: Continue with current plan of care     FERNANDO Harman

## 2024-05-29 ENCOUNTER — CLINICAL SUPPORT (OUTPATIENT)
Dept: REHABILITATION | Facility: HOSPITAL | Age: 44
End: 2024-05-29
Payer: COMMERCIAL

## 2024-05-29 DIAGNOSIS — M25.621 ELBOW STIFFNESS, RIGHT: ICD-10-CM

## 2024-05-29 DIAGNOSIS — R52 PAIN: ICD-10-CM

## 2024-05-29 DIAGNOSIS — M25.611 SHOULDER STIFFNESS, RIGHT: ICD-10-CM

## 2024-05-29 DIAGNOSIS — R53.1 WEAKNESS: Primary | ICD-10-CM

## 2024-05-29 PROCEDURE — 97530 THERAPEUTIC ACTIVITIES: CPT | Mod: PO

## 2024-05-29 NOTE — PROGRESS NOTES
Occupational Therapy Daily Treatment Note     Name: Tabby Veloz  Clinic Number: [id number]    Therapy Diagnosis:   Encounter Diagnoses   Name Primary?    Weakness Yes    Elbow stiffness, right     Shoulder stiffness, right     Pain          Physician: Luis Del Valle NP    Visit Date: 5/29/2024    Physician Orders: OT eval/ treat   Plan of Care Certification Date: 6/7/24  Authorization Period: 10/3/23 -10/2/24   Medical Diagnosis: S42.351D (ICD-10-CM) - Closed displaced comminuted fracture of shaft of right humerus with routine healing, subsequent   Surgical Procedure and Date: ORIF for her right humeral shaft fracture with IM nail by Dr. Stanley on 9/19/2023. ,   Evaluation Date: 10/9/23  Onset Date:9/15/23   Date of Return to MD: 3/6/24  Visit # / Visits authorized: 37/42 + 22  FOTO Completion: 3/3    Time In:0900  Time Out: 1000  Total Billable Time: 60 minutes    Note from MD 12/12/23 visit:    - Continue [facility].  - Weight bearing 2-4 lbs x 4 weeks and increase by 2-4 lbs/month thereafter as she tolerates.  Ok for therapist to work with a pulley.  - Range of motion as tolerated.     Precautions: Standard and WB, 17 weeks 1/16/24   Subjective     Pt reports: She states she is getting stronger  she was compliant with home exercise program given last session.   Response to previous treatment:increased ROM  Functional change: increased ROM    Pain:1/10   Location: right arms     Objective       Observation: No abnormalities present      UE Range of Motion  Active Range of Motion:   Shoulder Right Right  10/30/23 Right  12/11/23 Right   1/3/24 Right  1/8/24 Right   1/22/24 Right  2/7/24 Right  3/11/24 Right   4/17/24   Flexion 20 145 - supine 160 - supine 135 - standing 165 - supine 140- standing  165 - supine 150 - standing 175 175   Abduction 50 110 - supine 130 - supine 130 -standing 145 - supine 145 - standing   150 - supine 145 - standing  165 165   ER at 90 20 40 - supine 50 - supine 55 -  supine 55 - supine  35 - standing   55 - supine 80 - standing  90 90   IR 80 80 - supine 80 - supine 80- supine 80 - supine 70 - standing   80 - supine  70 - standing  80 80      Passive Range of Motion:   Shoulder Left Right Right   10/30/23 Right  12/11/23   Flexion 170 70 160 170   Abduction 165 70 125 145   ER at 90 80 20 40 55   IR 80 80 80 80      Elbow Left Right Right   10/30/23 Right  12/11/23 Right   1/3/24 Right  1/8/24 Right  1/22/24   Flexion 140 120 130 140 140 140 140   Extension 0 -10 0 0 0 0 0      Painful Arc:   Patient demonstrates no painful arc in shoulder flexion or abduction     Upper Extremity Strength  (R) UE   (L) UE     Shoulder flexion: 2-/5 Shoulder flexion: 5/5   Shoulder Abduction: 2-/5 Shoulder abduction: 5/5   Shoulder ER 2-/5 Shoulder ER 5/5   Shoulder IR 2-/5 Shoulder IR 5/5   Rhomboids 2-/5 Rhomboids 5/5       Strength: (OSMANY Dynamometer in psi.)        10/9/2023 10/9/2023 12/11/23 1/3/24 1/8/24 1/22/24 2/7/24 3/11/24 4/17/24     Left Right Right Right Right Right Right Right Right   Rung II 60 N/a 50 63 65 65 70 70 70      Joint Mobility/End Feels: Hard     Palpation: Pt is tender at incision sites      Sensation: Pt denies any numbness or tingling        Scapular Control/Dyskinesis:     Normal / Subtle / Obvious  Comments    Left  N N/a    Right  N N/a         OUTCOME MEASURE:FOTO     Category:Self care       Current Score  = 37%   Goal at Discharge Score = 65%     11/20/23: 53%    Treatment     Julee participated in dynamic functional therapeutic activities to improve functional performance for 60  minutes, including:  -pulley's 3 min flexion/ 3 min abduction  - 8 minute carry with 15# DB   - Silver theraband rows 2/15  - Silver theraband pull downs 2/15  - Silver theraband internal rotation 2/15  - Silver theraband external rotation 2/15  - UBE: level 6.5 for 10 minutes 5 min forward/ 5 minutes backwards  - silver theraband pull aparts 2/15  - standing push press with 10#  DB 3/10   -wall push ups 3/10  - KB waist to high pull bilateral arms 15# 3/10  - KB [place] swings 15# 3/10       Home Exercises and Education Provided     Education provided:   - Progress towards goals     Written Home Exercises Provided: Patient instructed to cont prior HEP.  Exercises were reviewed and Julee was able to demonstrate them prior to the end of the session.  Julee demonstrated good  understanding of the HEP provided.   .   See EMR under Patient Instructions for exercises provided prior visit.      Assessment     Pt would continue to benefit from skilled OT.  Increase in resistance today with therapeutic activities. Pt tolerated increased resisitve therapeutic activities well without additional complaints of pain. Patient continues to progress in overall strength and range of motion.  Will progress as tolerated. Pt motivated.       Julee is progressing well towards her goals and there are no updates to goals at this time. Pt prognosis is Good.     Pt will continue to benefit from skilled outpatient occupational therapy to address the deficits listed in the problem list on initial evaluation provide pt/family education and to maximize pt's level of independence in the home and community environment.     Anticipated barriers to occupational therapy: slow bone healing    Pt's spiritual, cultural and educational needs considered and pt agreeable to plan of care and goals.    UPDATED GOALS  1)   Improve muscle strength ing to 5/5 in order to participate in ADLs and IADLs with less assistance.  Ongoing  2)   Patient to score at 65% or more on FOTO to demonstrate improved perception of functional shoulder use.Ongoing        Plan      Pt to be treated by Occupational Therapy 2 times per week for 10 weeks during the certification period to achieve the established goals.      Treatment to include: Paraffin, Fluidotherapy, Manual therapy/joint mobilizations, Modalities for pain management, US 3 mhz, Therapeutic  exercises/activities., Iontophoresis with 2.0 cc Dexamethasone, Strengthening, Orthotic Fabrication/Fit/Training, Edema Control, Scar Management, Wound Care, Electrical Modalities, Joint Protection, and Energy Conservation, as well as any other treatments deemed necessary based on the patient's needs or progress.     Updates/Grading for session: Continue with current plan of care     FERNANDO Harman

## 2024-06-05 ENCOUNTER — CLINICAL SUPPORT (OUTPATIENT)
Dept: REHABILITATION | Facility: HOSPITAL | Age: 44
End: 2024-06-05
Payer: COMMERCIAL

## 2024-06-05 DIAGNOSIS — M25.621 ELBOW STIFFNESS, RIGHT: ICD-10-CM

## 2024-06-05 DIAGNOSIS — M25.611 SHOULDER STIFFNESS, RIGHT: ICD-10-CM

## 2024-06-05 DIAGNOSIS — R53.1 WEAKNESS: Primary | ICD-10-CM

## 2024-06-05 DIAGNOSIS — R52 PAIN: ICD-10-CM

## 2024-06-05 PROCEDURE — 97530 THERAPEUTIC ACTIVITIES: CPT | Mod: PO

## 2024-06-05 NOTE — PROGRESS NOTES
Occupational Therapy Daily Treatment Note     Name: Tabby Veloz  Clinic Number: [id number]    Therapy Diagnosis:   Encounter Diagnoses   Name Primary?    Weakness Yes    Elbow stiffness, right     Shoulder stiffness, right     Pain          Physician: Luis Del Valle NP    Visit Date: 6/5/2024    Physician Orders: OT eval/ treat   Plan of Care Certification Date: 6/7/24  Authorization Period: 10/3/23 -10/2/24   Medical Diagnosis: S42.351D (ICD-10-CM) - Closed displaced comminuted fracture of shaft of right humerus with routine healing, subsequent   Surgical Procedure and Date: ORIF for her right humeral shaft fracture with IM nail by Dr. Stanley on 9/19/2023. ,   Evaluation Date: 10/9/23  Onset Date:9/15/23   Date of Return to MD: 3/6/24  Visit # / Visits authorized: 38/42 + 22  FOTO Completion: 3/3    Time In:0900  Time Out: 1000  Total Billable Time: 60 minutes    Note from MD 12/12/23 visit:    - Continue [facility].  - Weight bearing 2-4 lbs x 4 weeks and increase by 2-4 lbs/month thereafter as she tolerates.  Ok for therapist to work with a pulley.  - Range of motion as tolerated.     Precautions: Standard and WB, 17 weeks 1/16/24   Subjective     Pt reports: She states she is getting stronger  she was compliant with home exercise program given last session.   Response to previous treatment:increased ROM  Functional change: increased ROM    Pain:1/10   Location: right arms     Objective       Observation: No abnormalities present      UE Range of Motion  Active Range of Motion:   Shoulder Right Right  10/30/23 Right  12/11/23 Right   1/3/24 Right  1/8/24 Right   1/22/24 Right  2/7/24 Right  3/11/24 Right   4/17/24   Flexion 20 145 - supine 160 - supine 135 - standing 165 - supine 140- standing  165 - supine 150 - standing 175 175   Abduction 50 110 - supine 130 - supine 130 -standing 145 - supine 145 - standing   150 - supine 145 - standing  165 165   ER at 90 20 40 - supine 50 - supine 55 -  supine 55 - supine  35 - standing   55 - supine 80 - standing  90 90   IR 80 80 - supine 80 - supine 80- supine 80 - supine 70 - standing   80 - supine  70 - standing  80 80      Passive Range of Motion:   Shoulder Left Right Right   10/30/23 Right  12/11/23   Flexion 170 70 160 170   Abduction 165 70 125 145   ER at 90 80 20 40 55   IR 80 80 80 80      Elbow Left Right Right   10/30/23 Right  12/11/23 Right   1/3/24 Right  1/8/24 Right  1/22/24   Flexion 140 120 130 140 140 140 140   Extension 0 -10 0 0 0 0 0      Painful Arc:   Patient demonstrates no painful arc in shoulder flexion or abduction     Upper Extremity Strength  (R) UE   (L) UE     Shoulder flexion: 2-/5 Shoulder flexion: 5/5   Shoulder Abduction: 2-/5 Shoulder abduction: 5/5   Shoulder ER 2-/5 Shoulder ER 5/5   Shoulder IR 2-/5 Shoulder IR 5/5   Rhomboids 2-/5 Rhomboids 5/5       Strength: (OSMANY Dynamometer in psi.)        10/9/2023 10/9/2023 12/11/23 1/3/24 1/8/24 1/22/24 2/7/24 3/11/24 4/17/24     Left Right Right Right Right Right Right Right Right   Rung II 60 N/a 50 63 65 65 70 70 70      Joint Mobility/End Feels: Hard     Palpation: Pt is tender at incision sites      Sensation: Pt denies any numbness or tingling        Scapular Control/Dyskinesis:     Normal / Subtle / Obvious  Comments    Left  N N/a    Right  N N/a         OUTCOME MEASURE:FOTO     Category:Self care       Current Score  = 37%   Goal at Discharge Score = 65%     11/20/23: 53%    Treatment     Julee participated in dynamic functional therapeutic activities to improve functional performance for 60  minutes, including:  -pulley's 3 min flexion/ 3 min abduction  - 8 minute carry with 15# DB   - Silver theraband rows 2/15  - Silver theraband pull downs 2/15  - Silver theraband internal rotation 2/15  - Silver theraband external rotation 2/15  - UBE: level 6.5 for 10 minutes 5 min forward/ 5 minutes backwards  - silver theraband pull aparts 2/15  - standing push press with 10#  DB 3/10   -wall push ups 3/10  - KB waist to high pull bilateral arms 20# 3/10  - KB Citizen of the [place] swings 20 3/10       Home Exercises and Education Provided     Education provided:   - Progress towards goals     Written Home Exercises Provided: Patient instructed to cont prior HEP.  Exercises were reviewed and Julee was able to demonstrate them prior to the end of the session.  Julee demonstrated good  understanding of the HEP provided.   .   See EMR under Patient Instructions for exercises provided prior visit.      Assessment     Pt would continue to benefit from skilled OT.  Increase in resistance today with therapeutic activities. Pt tolerated increased resisitve therapeutic activities well without additional complaints of pain. Patient continues to progress in overall strength and range of motion.  Will progress as tolerated. Pt motivated.       Julee is progressing well towards her goals and there are no updates to goals at this time. Pt prognosis is Good.     Pt will continue to benefit from skilled outpatient occupational therapy to address the deficits listed in the problem list on initial evaluation provide pt/family education and to maximize pt's level of independence in the home and community environment.     Anticipated barriers to occupational therapy: slow bone healing    Pt's spiritual, cultural and educational needs considered and pt agreeable to plan of care and goals.    UPDATED GOALS  1)   Improve muscle strength ing to 5/5 in order to participate in ADLs and IADLs with less assistance.  Ongoing  2)   Patient to score at 65% or more on FOTO to demonstrate improved perception of functional shoulder use.Ongoing        Plan      Pt to be treated by Occupational Therapy 2 times per week for 10 weeks during the certification period to achieve the established goals.      Treatment to include: Paraffin, Fluidotherapy, Manual therapy/joint mobilizations, Modalities for pain management, US 3 mhz, Therapeutic  exercises/activities., Iontophoresis with 2.0 cc Dexamethasone, Strengthening, Orthotic Fabrication/Fit/Training, Edema Control, Scar Management, Wound Care, Electrical Modalities, Joint Protection, and Energy Conservation, as well as any other treatments deemed necessary based on the patient's needs or progress.     Updates/Grading for session: Continue with current plan of care     FERNANDO Harman

## 2024-06-12 ENCOUNTER — CLINICAL SUPPORT (OUTPATIENT)
Dept: REHABILITATION | Facility: HOSPITAL | Age: 44
End: 2024-06-12
Payer: COMMERCIAL

## 2024-06-12 DIAGNOSIS — R53.1 WEAKNESS: Primary | ICD-10-CM

## 2024-06-12 DIAGNOSIS — M25.611 SHOULDER STIFFNESS, RIGHT: ICD-10-CM

## 2024-06-12 DIAGNOSIS — M25.621 ELBOW STIFFNESS, RIGHT: ICD-10-CM

## 2024-06-12 DIAGNOSIS — R52 PAIN: ICD-10-CM

## 2024-06-12 PROCEDURE — 97530 THERAPEUTIC ACTIVITIES: CPT | Mod: PO

## 2024-06-12 NOTE — PROGRESS NOTES
Occupational Therapy Daily Treatment Note     Name: Tabby Veloz  Clinic Number: [id number]    Therapy Diagnosis:   Encounter Diagnoses   Name Primary?    Weakness Yes    Elbow stiffness, right     Shoulder stiffness, right     Pain          Physician: Luis Del Valle NP    Visit Date: 6/12/2024    Physician Orders: OT eval/ treat   Plan of Care Certification Date: 6/7/24  Authorization Period: 10/3/23 -10/2/24   Medical Diagnosis: S42.351D (ICD-10-CM) - Closed displaced comminuted fracture of shaft of right humerus with routine healing, subsequent   Surgical Procedure and Date: ORIF for her right humeral shaft fracture with IM nail by Dr. Stanley on 9/19/2023. ,   Evaluation Date: 10/9/23  Onset Date:9/15/23   Date of Return to MD: 3/6/24  Visit # / Visits authorized: 39/42 + 22  FOTO Completion: 3/3    Time In:0900  Time Out: 1000  Total Billable Time: 60 minutes    Note from MD 12/12/23 visit:    - Continue [facility] OT.  - Weight bearing 2-4 lbs x 4 weeks and increase by 2-4 lbs/month thereafter as she tolerates.  Ok for therapist to work with a pulley.  - Range of motion as tolerated.     Precautions: Standard and WB, 17 weeks 1/16/24   Subjective     Pt reports: She states she is getting stronger  she was compliant with home exercise program given last session.   Response to previous treatment:increased ROM  Functional change: increased ROM    Pain:1/10   Location: right arms     Objective       Observation: No abnormalities present      UE Range of Motion  Active Range of Motion:   Shoulder Right Right  10/30/23 Right  12/11/23 Right   1/3/24 Right  1/8/24 Right   1/22/24 Right  2/7/24 Right  3/11/24 Right   4/17/24   Flexion 20 145 - supine 160 - supine 135 - standing 165 - supine 140- standing  165 - supine 150 - standing 175 175   Abduction 50 110 - supine 130 - supine 130 -standing 145 - supine 145 - standing   150 - supine 145 - standing  165 165   ER at 90 20 40 - supine 50 - supine 55 -  supine 55 - supine  35 - standing   55 - supine 80 - standing  90 90   IR 80 80 - supine 80 - supine 80- supine 80 - supine 70 - standing   80 - supine  70 - standing  80 80      Passive Range of Motion:   Shoulder Left Right Right   10/30/23 Right  12/11/23   Flexion 170 70 160 170   Abduction 165 70 125 145   ER at 90 80 20 40 55   IR 80 80 80 80      Elbow Left Right Right   10/30/23 Right  12/11/23 Right   1/3/24 Right  1/8/24 Right  1/22/24   Flexion 140 120 130 140 140 140 140   Extension 0 -10 0 0 0 0 0      Painful Arc:   Patient demonstrates no painful arc in shoulder flexion or abduction     Upper Extremity Strength  (R) UE   (L) UE     Shoulder flexion: 2-/5 Shoulder flexion: 5/5   Shoulder Abduction: 2-/5 Shoulder abduction: 5/5   Shoulder ER 2-/5 Shoulder ER 5/5   Shoulder IR 2-/5 Shoulder IR 5/5   Rhomboids 2-/5 Rhomboids 5/5       Strength: (OSMANY Dynamometer in psi.)        10/9/2023 10/9/2023 12/11/23 1/3/24 1/8/24 1/22/24 2/7/24 3/11/24 4/17/24     Left Right Right Right Right Right Right Right Right   Rung II 60 N/a 50 63 65 65 70 70 70      Joint Mobility/End Feels: Hard     Palpation: Pt is tender at incision sites      Sensation: Pt denies any numbness or tingling        Scapular Control/Dyskinesis:     Normal / Subtle / Obvious  Comments    Left  N N/a    Right  N N/a         OUTCOME MEASURE:FOTO     Category:Self care       Current Score  = 37%   Goal at Discharge Score = 65%     11/20/23: 53%    Treatment     Julee participated in dynamic functional therapeutic activities to improve functional performance for 60  minutes, including:  -pulley's 3 min flexion/ 3 min abduction  - 8 minute carry with 15# DB   - Silver theraband rows 2/15  - Silver theraband pull downs 2/15  - Silver theraband internal rotation 2/15  - Silver theraband external rotation 2/15  - UBE: level 6.5 for 10 minutes 5 min forward/ 5 minutes backwards  - silver theraband pull aparts 2/15  - standing push press with 10#  DB 3/10   -wall push ups 3/10  - KB waist to high pull bilateral arms 20# 3/10  - KB Malaysian swings 20 3/10       Home Exercises and Education Provided     Education provided:   - Progress towards goals     Written Home Exercises Provided: Patient instructed to cont prior HEP.  Exercises were reviewed and Julee was able to demonstrate them prior to the end of the session.  Julee demonstrated good  understanding of the HEP provided.   .   See EMR under Patient Instructions for exercises provided prior visit.      Assessment     Pt would continue to benefit from skilled OT.  Increase in resistance today with therapeutic activities. Pt tolerated increased resisitve therapeutic activities well without additional complaints of pain. Patient continues to progress in overall strength and range of motion.  Will progress as tolerated. Pt motivated.       Julee is progressing well towards her goals and there are no updates to goals at this time. Pt prognosis is Good.     Pt will continue to benefit from skilled outpatient occupational therapy to address the deficits listed in the problem list on initial evaluation provide pt/family education and to maximize pt's level of independence in the home and community environment.     Anticipated barriers to occupational therapy: slow bone healing    Pt's spiritual, cultural and educational needs considered and pt agreeable to plan of care and goals.    UPDATED GOALS  1)   Improve muscle strength ing to 5/5 in order to participate in ADLs and IADLs with less assistance.  Ongoing  2)   Patient to score at 65% or more on FOTO to demonstrate improved perception of functional shoulder use.Ongoing        Plan      Pt to be treated by Occupational Therapy 2 times per week for 10 weeks during the certification period to achieve the established goals.      Treatment to include: Paraffin, Fluidotherapy, Manual therapy/joint mobilizations, Modalities for pain management, US 3 mhz, Therapeutic  exercises/activities., Iontophoresis with 2.0 cc Dexamethasone, Strengthening, Orthotic Fabrication/Fit/Training, Edema Control, Scar Management, Wound Care, Electrical Modalities, Joint Protection, and Energy Conservation, as well as any other treatments deemed necessary based on the patient's needs or progress.     Updates/Grading for session: Continue with current plan of care     FERNANDO Harman

## 2024-06-18 ENCOUNTER — OFFICE VISIT (OUTPATIENT)
Dept: NEUROLOGY | Facility: CLINIC | Age: 44
End: 2024-06-18
Payer: COMMERCIAL

## 2024-06-18 DIAGNOSIS — G43.709 CHRONIC MIGRAINE WITHOUT AURA WITHOUT STATUS MIGRAINOSUS, NOT INTRACTABLE: ICD-10-CM

## 2024-06-18 DIAGNOSIS — G93.2 PSEUDOTUMOR CEREBRI: ICD-10-CM

## 2024-06-18 PROCEDURE — 99214 OFFICE O/P EST MOD 30 MIN: CPT | Mod: 95,,, | Performed by: NURSE PRACTITIONER

## 2024-06-18 RX ORDER — ACETAZOLAMIDE 250 MG/1
250 TABLET ORAL 2 TIMES DAILY
Qty: 60 TABLET | Refills: 1 | Status: SHIPPED | OUTPATIENT
Start: 2024-06-18 | End: 2025-06-18

## 2024-06-18 RX ORDER — ACETAZOLAMIDE 500 MG/1
500 CAPSULE, EXTENDED RELEASE ORAL 2 TIMES DAILY
Qty: 180 CAPSULE | Refills: 1 | Status: SHIPPED | OUTPATIENT
Start: 2024-06-18

## 2024-06-18 RX ORDER — TOPIRAMATE 100 MG/1
100 TABLET, FILM COATED ORAL 2 TIMES DAILY
Qty: 180 TABLET | Refills: 1 | Status: SHIPPED | OUTPATIENT
Start: 2024-06-18 | End: 2025-06-18

## 2024-06-18 NOTE — PROGRESS NOTES
HPI: Tabby Veloz is a 43 y.o. female, referred by Dr. Sanjeev Cook, for evaluation of papilledema and headaches. Her medical history is overall unremarkable. She completed a lumbar puncture on 3/9/2017, which demonstrated an opening pressure of 46, consistent with a diagnosis of pseudotumor cerebri. Now with IUP and had  increased mild headaches and tinnitus. She is s/p ORIF right humerus.     She works in emergency management as the Emergency Preparedness .     The patient location is: home  The chief complaint leading to consultation is: follow up for abnormal eye exam    Visit type: audiovisual    20 minutes of total time spent on the encounter, which includes face to face time and non-face to face time preparing to see the patient (eg, review of tests), Obtaining and/or reviewing separately obtained history, Documenting clinical information in the electronic or other health record, Independently interpreting results (not separately reported) and communicating results to the patient/family/caregiver, or Care coordination (not separately reported).     Each patient to whom he or she provides medical services by telemedicine is:  (1) informed of the relationship between the physician and patient and the respective role of any other health care provider with respect to management of the patient; and (2) notified that he or she may decline to receive medical services by telemedicine and may withdraw from such care at any time.    Notes:   She presents today for a follow up visit. Her last eye exam in 11/2024 did show improvement of her papilledema. I reviewed photos from her eye exam, which showed ongoing mild papilledema on the left.     She continues on Diamox 750 mg bid.     Denies headaches or visual complaints.     She currently weighs 272.6. Weight is overall stable since her last visit.     She is no longer taking Gabapentin or Robaxin in 3/2024. She was taking this for arm pain, which is  improved. She is still in PT for strengthening post fracture.     Stress and cognitive complaints are improved.     Her lumbar pain is improved  still.     She is no longer working.     Review of Systems  Review of Systems   Constitutional:  Negative for fatigue.   HENT:  Negative for tinnitus.    Eyes:  Negative for visual disturbance.   Cardiovascular:  Negative for leg swelling.   Gastrointestinal:  Negative for blood in stool.   Genitourinary:  Negative for hematuria.   Musculoskeletal:  Positive for back pain. Negative for neck stiffness.   Skin:  Negative for rash.   Neurological:  Negative for headaches.   Psychiatric/Behavioral:  Negative for sleep disturbance. The patient is not nervous/anxious.        Objective:       There were no vitals filed for this visit.    Exam:  Gen Appearance, well developed/nourished in no apparent distress  CV: not evaluated  Neuro:  MS: Awake, alert, oriented to place, person, time, situation. Sustains attention. Recent/remote memory intact, Language is full to spontaneous speech/repetition/naming/comprehension. Fund of Knowledge is full  CN: Optic discs not observed, due to telemed, PERRL not done, Extraoccular movements and visual fields are full. Normal facial strength, Hearing adequate for telemed visit, Tongue is midline, palate not evaluated. Shoulder Shrug symmetric.  Motor: deferred due to telemed  Sensory:deferred due to telemed  Cerebellar: deferred due to telemed  Gait: deferred due to telemed    Imaging:   MRI Brain 3/1/2017:   Normal MRI brain specifically without evidence for acute infarction or enhancing lesion.    MRV 3/1/2017:  Unremarkable noncontrast MRV specifically without evidence for venous sinus thrombosis.    Labs: 2/2018 CMP with changes not unexpected for diamox use.  CSF studies unremarkable and culture negative    8/2016 CMP reviewed    LP 3/9/2017 opening pressure: 46    Labs:   8/2019 CMP WNL  6/2022 CMP unremarkable-was non-fasting  7/2023 CMP  "overall unremarkable  9/15/23 BMP unremarkable, CBC showed elevated WBC"s (recent fracture)  12/2023 normal CMP  Assessment:   Tabby Veloz is a 43 y.o. female with papilledema and headaches. Her medical history is overall unremarkable. She completed a lumbar puncture on 3/9/2017, which demonstrated an opening pressure of 46, consistent with a diagnosis of pseudotumor cerebri.   Plan:   I recommend:   1. 7/2023 eye exam showed papilledema. She did gain 10 pounds between her last two visits, 6 months apart, which may have triggered her papilledema to return.   -Weight loss is still needed, which was discussed. Weight loss could resolve her pseudotumor.     -updated eye exam from one month ago shows mild improvement, but not resolution of her papilledema.      -Given ongoing papilledema, I increased her Diamox to 750 mg bid at 10/5/23 visit. Monitor CMP over time on this medication.   -will consider the need for increase to 1000 mg bid pending results of her next eye exam next month, if papilledema is any worse.     No current visual complaints, and she has lost 10 pounds thus far, but then she regained 10 pounds. Weight loss is challenged currently, given arm mobility issues. She is currently in PT for strengthening.     -Her last therapeutic LP was during pregnancy and she is s/p tubal ligation.    2. Continue Topamax 100 mg bid for headache prevention and for pseudotumor, given papilledema on recent eye exam.   -needs a repeat eye exam in one month, two months from last exam, and 6 weeks from Topamax increase.   -advised to keep a headache log until her next visit.      -Advised to increase fluid intake with Topamax and Diamox use. Notify me with any development of renal stones.     TO ER with any acute visual changes or call clinic with any mild changes.     3. Offered PT for lumbar/sciatica complaints. She declines formal PT currently and will do home exercises for now. Lumbar pain improved with exercises. "     4. Her stress could be contributing to her cognitive complaints, which started prior to starting Topamax.   -stress reduction techniques discussed today. Wellbutrin helping with focus.     5. No longer on Gabapentin or Robaxin for post fracture pain.     FU 6 months or sooner with changes

## 2024-06-19 ENCOUNTER — CLINICAL SUPPORT (OUTPATIENT)
Dept: REHABILITATION | Facility: HOSPITAL | Age: 44
End: 2024-06-19
Payer: COMMERCIAL

## 2024-06-19 DIAGNOSIS — R52 PAIN: ICD-10-CM

## 2024-06-19 DIAGNOSIS — M25.611 SHOULDER STIFFNESS, RIGHT: ICD-10-CM

## 2024-06-19 DIAGNOSIS — M25.621 ELBOW STIFFNESS, RIGHT: ICD-10-CM

## 2024-06-19 DIAGNOSIS — R53.1 WEAKNESS: Primary | ICD-10-CM

## 2024-06-19 PROCEDURE — 97530 THERAPEUTIC ACTIVITIES: CPT | Mod: PO

## 2024-06-19 NOTE — PROGRESS NOTES
Occupational Therapy Daily Treatment Note     Name: Tabby Veloz  Clinic Number: [id number]    Therapy Diagnosis:   Encounter Diagnoses   Name Primary?    Weakness Yes    Elbow stiffness, right     Shoulder stiffness, right     Pain          Physician: Luis Del Valle NP    Visit Date: 6/19/2024    Physician Orders: OT eval/ treat   Plan of Care Certification Date: 6/7/24  Authorization Period: 10/3/23 -10/2/24   Medical Diagnosis: S42.351D (ICD-10-CM) - Closed displaced comminuted fracture of shaft of right humerus with routine healing, subsequent   Surgical Procedure and Date: ORIF for her right humeral shaft fracture with IM nail by Dr. Stanley on 9/19/2023. ,   Evaluation Date: 10/9/23  Onset Date:9/15/23   Date of Return to MD: 3/6/24  Visit # / Visits authorized: 39/42 + 22  FOTO Completion: 3/3    Time In:0900  Time Out: 1000  Total Billable Time: 60 minutes    Note from MD 12/12/23 visit:    - Continue [facility] OT.  - Weight bearing 2-4 lbs x 4 weeks and increase by 2-4 lbs/month thereafter as she tolerates.  Ok for therapist to work with a pulley.  - Range of motion as tolerated.     Precautions: Standard and WB, 17 weeks 1/16/24   Subjective     Pt reports: She states she is getting stronger  she was compliant with home exercise program given last session.   Response to previous treatment:increased ROM  Functional change: increased ROM    Pain:1/10   Location: right arms     Objective       Observation: No abnormalities present      UE Range of Motion  Active Range of Motion:   Shoulder Right Right  10/30/23 Right  12/11/23 Right   1/3/24 Right  1/8/24 Right   1/22/24 Right  2/7/24 Right  3/11/24 Right   4/17/24   Flexion 20 145 - supine 160 - supine 135 - standing 165 - supine 140- standing  165 - supine 150 - standing 175 175   Abduction 50 110 - supine 130 - supine 130 -standing 145 - supine 145 - standing   150 - supine 145 - standing  165 165   ER at 90 20 40 - supine 50 - supine 55 -  supine 55 - supine  35 - standing   55 - supine 80 - standing  90 90   IR 80 80 - supine 80 - supine 80- supine 80 - supine 70 - standing   80 - supine  70 - standing  80 80      Passive Range of Motion:   Shoulder Left Right Right   10/30/23 Right  12/11/23   Flexion 170 70 160 170   Abduction 165 70 125 145   ER at 90 80 20 40 55   IR 80 80 80 80      Elbow Left Right Right   10/30/23 Right  12/11/23 Right   1/3/24 Right  1/8/24 Right  1/22/24   Flexion 140 120 130 140 140 140 140   Extension 0 -10 0 0 0 0 0      Painful Arc:   Patient demonstrates no painful arc in shoulder flexion or abduction     Upper Extremity Strength  (R) UE   (L) UE     Shoulder flexion: 2-/5 Shoulder flexion: 5/5   Shoulder Abduction: 2-/5 Shoulder abduction: 5/5   Shoulder ER 2-/5 Shoulder ER 5/5   Shoulder IR 2-/5 Shoulder IR 5/5   Rhomboids 2-/5 Rhomboids 5/5       Strength: (OSMANY Dynamometer in psi.)        10/9/2023 10/9/2023 12/11/23 1/3/24 1/8/24 1/22/24 2/7/24 3/11/24 4/17/24     Left Right Right Right Right Right Right Right Right   Rung II 60 N/a 50 63 65 65 70 70 70      Joint Mobility/End Feels: Hard     Palpation: Pt is tender at incision sites      Sensation: Pt denies any numbness or tingling        Scapular Control/Dyskinesis:     Normal / Subtle / Obvious  Comments    Left  N N/a    Right  N N/a         OUTCOME MEASURE:FOTO     Category:Self care       Current Score  = 37%   Goal at Discharge Score = 65%     11/20/23: 53%    Treatment     Julee participated in dynamic functional therapeutic activities to improve functional performance for 60  minutes, including:  -pulley's 3 min flexion/ 3 min abduction  - 8 minute carry with 15# DB   - Silver theraband rows 2/15  - Silver theraband pull downs 2/15  - Silver theraband internal rotation 2/15  - Silver theraband external rotation 2/15  - UBE: level 6.5 for 10 minutes 5 min forward/ 5 minutes backwards  - silver theraband pull aparts 2/15  - standing push press with 10#  DB 3/10   -wall push ups 3/10  - KB waist to high pull bilateral arms 20# 3/10  - KB Slov[place] swings 20 3/10       Home Exercises and Education Provided     Education provided:   - Progress towards goals     Written Home Exercises Provided: Patient instructed to cont prior HEP.  Exercises were reviewed and Julee was able to demonstrate them prior to the end of the session.  Julee demonstrated good  understanding of the HEP provided.   .   See EMR under Patient Instructions for exercises provided prior visit.      Assessment     Pt would continue to benefit from skilled OT.  Increase in resistance today with therapeutic activities. Pt tolerated increased resisitve therapeutic activities well without additional complaints of pain. Patient continues to progress in overall strength and range of motion.  Will progress as tolerated. Pt motivated.       Julee is progressing well towards her goals and there are no updates to goals at this time. Pt prognosis is Good.     Pt will continue to benefit from skilled outpatient occupational therapy to address the deficits listed in the problem list on initial evaluation provide pt/family education and to maximize pt's level of independence in the home and community environment.     Anticipated barriers to occupational therapy: slow bone healing    Pt's spiritual, cultural and educational needs considered and pt agreeable to plan of care and goals.    UPDATED GOALS  1)   Improve muscle strength ing to 5/5 in order to participate in ADLs and IADLs with less assistance.  Ongoing  2)   Patient to score at 65% or more on FOTO to demonstrate improved perception of functional shoulder use.Ongoing        Plan      Pt to be treated by Occupational Therapy 2 times per week for 10 weeks during the certification period to achieve the established goals.      Treatment to include: Paraffin, Fluidotherapy, Manual therapy/joint mobilizations, Modalities for pain management, US 3 mhz, Therapeutic  exercises/activities., Iontophoresis with 2.0 cc Dexamethasone, Strengthening, Orthotic Fabrication/Fit/Training, Edema Control, Scar Management, Wound Care, Electrical Modalities, Joint Protection, and Energy Conservation, as well as any other treatments deemed necessary based on the patient's needs or progress.     Updates/Grading for session: Continue with current plan of care     FERNANDO Harman

## 2024-06-24 ENCOUNTER — HOSPITAL ENCOUNTER (OUTPATIENT)
Dept: RADIOLOGY | Facility: HOSPITAL | Age: 44
Discharge: HOME | End: 2024-06-24
Attending: ORTHOPAEDIC SURGERY
Payer: COMMERCIAL

## 2024-06-24 DIAGNOSIS — S42.351D CLOSED DISPLACED COMMINUTED FRACTURE OF SHAFT OF RIGHT HUMERUS WITH ROUTINE HEALING, SUBSEQUENT ENCOUNTER: ICD-10-CM

## 2024-06-24 PROCEDURE — 73060 X-RAY EXAM OF HUMERUS: CPT | Mod: 26,RT,, | Performed by: RADIOLOGY

## 2024-06-24 PROCEDURE — 73060 X-RAY EXAM OF HUMERUS: CPT | Mod: TC,FY,PN,RT

## 2024-06-26 ENCOUNTER — CLINICAL SUPPORT (OUTPATIENT)
Dept: REHABILITATION | Facility: HOSPITAL | Age: 44
End: 2024-06-26
Payer: COMMERCIAL

## 2024-06-26 ENCOUNTER — OFFICE VISIT (OUTPATIENT)
Dept: ORTHOPEDICS | Facility: CLINIC | Age: 44
End: 2024-06-26
Payer: COMMERCIAL

## 2024-06-26 DIAGNOSIS — M25.611 SHOULDER STIFFNESS, RIGHT: ICD-10-CM

## 2024-06-26 DIAGNOSIS — S42.351G CLOSED DISPLACED COMMINUTED FRACTURE OF SHAFT OF RIGHT HUMERUS WITH DELAYED HEALING, SUBSEQUENT ENCOUNTER: Primary | ICD-10-CM

## 2024-06-26 DIAGNOSIS — M25.621 ELBOW STIFFNESS, RIGHT: ICD-10-CM

## 2024-06-26 DIAGNOSIS — R52 PAIN: ICD-10-CM

## 2024-06-26 DIAGNOSIS — R53.1 WEAKNESS: Primary | ICD-10-CM

## 2024-06-26 PROCEDURE — 97530 THERAPEUTIC ACTIVITIES: CPT | Mod: PO

## 2024-06-26 NOTE — PROGRESS NOTES
Occupational Therapy Daily Treatment Note     Name: Tabby Veloz  Clinic Number: [id number]    Therapy Diagnosis:   Encounter Diagnoses   Name Primary?    Weakness Yes    Elbow stiffness, right     Shoulder stiffness, right     Pain        Physician: Luis Del Valle NP    Visit Date: 6/26/2024    Physician Orders: OT eval/ treat   Plan of Care Certification Date: 6/7/24  Authorization Period: 10/3/23 -10/2/24   Medical Diagnosis: S42.351D (ICD-10-CM) - Closed displaced comminuted fracture of shaft of right humerus with routine healing, subsequent   Surgical Procedure and Date: ORIF for her right humeral shaft fracture with IM nail by Dr. Stanley on 9/19/2023. ,   Evaluation Date: 10/9/23  Onset Date:9/15/23   Date of Return to MD: 3/6/24  Visit # / Visits authorized: 39/42 + 22  FOTO Completion: 3/3    Time In:0900  Time Out: 1000  Total Billable Time: 60 minutes    Note from MD 12/12/23 visit:    - Continue [facility] OT.  - Weight bearing 2-4 lbs x 4 weeks and increase by 2-4 lbs/month thereafter as she tolerates.  Ok for therapist to work with a pulley.  - Range of motion as tolerated.     Precautions: Standard and WB, 17 weeks 1/16/24   Subjective     Pt reports: She states she is getting stronger  she was compliant with home exercise program given last session.   Response to previous treatment:increased ROM  Functional change: increased ROM    Pain:1/10   Location: right arms     Objective       Observation: No abnormalities present      UE Range of Motion  Active Range of Motion:   Shoulder Right Right  10/30/23 Right  12/11/23 Right   1/3/24 Right  1/8/24 Right   1/22/24 Right  2/7/24 Right  3/11/24 Right   4/17/24 Right  6/26/24   Flexion 20 145 - supine 160 - supine 135 - standing 165 - supine 140- standing  165 - supine 150 - standing 175 175 175   Abduction 50 110 - supine 130 - supine 130 -standing 145 - supine 145 - standing   150 - supine 145 - standing  165 165 165   ER at 90 20 40 -  supine 50 - supine 55 - supine 55 - supine  35 - standing   55 - supine 80 - standing  90 90 90   IR 80 80 - supine 80 - supine 80- supine 80 - supine 70 - standing   80 - supine  70 - standing  80 80 80      Passive Range of Motion:   Shoulder Left Right Right   10/30/23 Right  12/11/23   Flexion 170 70 160 170   Abduction 165 70 125 145   ER at 90 80 20 40 55   IR 80 80 80 80      Elbow Left Right Right   10/30/23 Right  12/11/23 Right   1/3/24 Right  1/8/24 Right  1/22/24   Flexion 140 120 130 140 140 140 140   Extension 0 -10 0 0 0 0 0      Painful Arc:   Patient demonstrates no painful arc in shoulder flexion or abduction     Upper Extremity Strength  (R) UE   (L) UE     Shoulder flexion: 2-/5 Shoulder flexion: 5/5   Shoulder Abduction: 2-/5 Shoulder abduction: 5/5   Shoulder ER 2-/5 Shoulder ER 5/5   Shoulder IR 2-/5 Shoulder IR 5/5   Rhomboids 2-/5 Rhomboids 5/5       Strength: (OSMANY Dynamometer in psi.)        10/9/2023 10/9/2023 12/11/23 1/3/24 1/8/24 1/22/24 2/7/24 3/11/24 4/17/24 6/26/24     Left Right Right Right Right Right Right Right Right Right   Rung II 60 N/a 50 63 65 65 70 70 70 70      Joint Mobility/End Feels: Hard     Palpation: Pt is tender at incision sites      Sensation: Pt denies any numbness or tingling        Scapular Control/Dyskinesis:     Normal / Subtle / Obvious  Comments    Left  N N/a    Right  N N/a         OUTCOME MEASURE:FOTO     Category:Self care       Current Score  = 37%   Goal at Discharge Score = 65%     11/20/23: 53%    Treatment     Julee participated in dynamic functional therapeutic activities to improve functional performance for 60  minutes, including:  -pulley's 3 min flexion/ 3 min abduction  - 8 minute carry with 15# DB   - Silver theraband rows 2/15  - Silver theraband pull downs 2/15  - Silver theraband internal rotation 2/15  - Silver theraband external rotation 2/15  - UBE: level 6.5 for 10 minutes 5 min forward/ 5 minutes backwards  - silver theraband  pull aparts 2/15  - standing push press with 10# DB 3/10   -wall push ups 3/10  - KB waist to high pull bilateral arms 20# 3/10  - KB [place] swings 20 3/10  -reassessment of objective measures        Home Exercises and Education Provided     Education provided:   - Progress towards goals     Written Home Exercises Provided: Patient instructed to cont prior HEP.  Exercises were reviewed and Julee was able to demonstrate them prior to the end of the session.  Julee demonstrated good  understanding of the HEP provided.   .   See EMR under Patient Instructions for exercises provided prior visit.      Assessment     Pt would continue to benefit from skilled OT.  Increase in resistance today with therapeutic activities. Pt tolerated increased resisitve therapeutic activities well without additional complaints of pain. Patient continues to progress in overall strength and range of motion.  Will progress as tolerated. Pt motivated.       Julee is progressing well towards her goals and there are no updates to goals at this time. Pt prognosis is Good.     Pt will continue to benefit from skilled outpatient occupational therapy to address the deficits listed in the problem list on initial evaluation provide pt/family education and to maximize pt's level of independence in the home and community environment.     Anticipated barriers to occupational therapy: slow bone healing    Pt's spiritual, cultural and educational needs considered and pt agreeable to plan of care and goals.    UPDATED GOALS  1)   Improve muscle strength ing to 5/5 in order to participate in ADLs and IADLs with less assistance.  Ongoing  2)   Patient to score at 65% or more on FOTO to demonstrate improved perception of functional shoulder use.Ongoing        Plan      Pt to be treated by Occupational Therapy 2 times per week for 10 weeks during the certification period to achieve the established goals.      Treatment to include: Paraffin, Fluidotherapy,  Manual therapy/joint mobilizations, Modalities for pain management, US 3 mhz, Therapeutic exercises/activities., Iontophoresis with 2.0 cc Dexamethasone, Strengthening, Orthotic Fabrication/Fit/Training, Edema Control, Scar Management, Wound Care, Electrical Modalities, Joint Protection, and Energy Conservation, as well as any other treatments deemed necessary based on the patient's needs or progress.     Updates/Grading for session: Continue with current plan of care     FERNANDO Harman

## 2024-06-26 NOTE — PROGRESS NOTES
Established Patient - Audio Only Telehealth Visit     The patient location is:  [place], [place]  The chief complaint leading to consultation is:  ORIF right humeral shaft fracture  Visit type: Virtual visit with audio only (telephone)  Total time spent with patient:  5 minutes       The reason for the audio only service rather than synchronous audio and video virtual visit was related to technical difficulties or patient preference/necessity.     Each patient to whom I provide medical services by telemedicine is:  (1) informed of the relationship between the physician and patient and the respective role of any other health care provider with respect to management of the patient; and (2) notified that they may decline to receive medical services by telemedicine and may withdraw from such care at any time. Patient verbally consented to receive this service via voice-only telephone call.       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  She reports she is essentially back to normal, she is still progressing with lifting heavy things, and she is doing therapy.  She states she is up to 20 lbs.   pleased with the surgical result.  No significant pain    X-ray right humerus   Intramedullary nail in place   Stable hardware, stable alignment, interval callus formation.  Fracture was segmental, and fracture lines are still visible though there is interval healing noted     Assessment and plan:      43-year-old female, IM nail right humeral shaft fracture 09/1923   Doing well   X-rays demonstrate healing at all aspects of the fracture, though there still some fracture lines persistent  Returned to all activities  as tolerated  Weightbear as tolerated, range of motion as tolerated   Continue calcium and vitamin-D    Follow up p.r.n.                        This service was not originating from a related E/M service provided within the previous 7 days nor will  to an E/M service or procedure within the next 24 hours or my soonest available appointment.  Prevailing standard of care was able to be met in this audio-only visit.

## 2024-06-26 NOTE — PLAN OF CARE
Outpatient Therapy Updated Plan of Care     Visit Date: 6/26/2024  Name: Tabby Veloz  Clinic Number: [id number]    Therapy Diagnosis:   Encounter Diagnoses   Name Primary?    Weakness Yes    Elbow stiffness, right     Shoulder stiffness, right     Pain      Physician: Luis Del Valle NP    Physician Orders: OT eval/treat   Medical Diagnosis: S42.351D (ICD-10-CM) - Closed displaced comminuted fracture of shaft of right humerus with routine healing, subsequent   Evaluation Date: 10/9/24    Total Visits Received: 41  Cancelled Visits: 0  No Show Visits: 0    Current Certification Period:  3/29/24 to 6/7/24  Precautions:  Standard  Visits from Evaluation Date:  40  Functional Level Prior to Evaluation:  Independent    Subjective     Update:   Pt reports: She states she is getting stronger  she was compliant with home exercise program given last session.   Response to previous treatment:increased ROM  Functional change: increased ROM    Pain:1/10   Location: right arms       Objective     Update:   Observation: No abnormalities present      UE Range of Motion  Active Range of Motion:   Shoulder Right Right  10/30/23 Right  12/11/23 Right   1/3/24 Right  1/8/24 Right   1/22/24 Right  2/7/24 Right  3/11/24 Right   4/17/24 Right  6/26/24   Flexion 20 145 - supine 160 - supine 135 - standing 165 - supine 140- standing  165 - supine 150 - standing 175 175 175   Abduction 50 110 - supine 130 - supine 130 -standing 145 - supine 145 - standing   150 - supine 145 - standing  165 165 165   ER at 90 20 40 - supine 50 - supine 55 - supine 55 - supine  35 - standing   55 - supine 80 - standing  90 90 90   IR 80 80 - supine 80 - supine 80- supine 80 - supine 70 - standing   80 - supine  70 - standing  80 80 80      Passive Range of Motion:   Shoulder Left Right Right   10/30/23 Right  12/11/23   Flexion 170 70 160 170   Abduction 165 70 125 145   ER at 90 80 20 40 55   IR 80 80 80 80      Elbow Left Right Right   10/30/23  Right  12/11/23 Right   1/3/24 Right  1/8/24 Right  1/22/24   Flexion 140 120 130 140 140 140 140   Extension 0 -10 0 0 0 0 0      Painful Arc:   Patient demonstrates no painful arc in shoulder flexion or abduction     Upper Extremity Strength  (R) UE   (L) UE     Shoulder flexion: 2-/5 Shoulder flexion: 5/5   Shoulder Abduction: 2-/5 Shoulder abduction: 5/5   Shoulder ER 2-/5 Shoulder ER 5/5   Shoulder IR 2-/5 Shoulder IR 5/5   Rhomboids 2-/5 Rhomboids 5/5       Strength: (OSMANY Dynamometer in psi.)        10/9/2023 10/9/2023 12/11/23 1/3/24 1/8/24 1/22/24 2/7/24 3/11/24 4/17/24 6/26/24     Left Right Right Right Right Right Right Right Right Right   Rung II 60 N/a 50 63 65 65 70 70 70 70      Joint Mobility/End Feels: Hard     Palpation: Pt is tender at incision sites      Sensation: Pt denies any numbness or tingling        Scapular Control/Dyskinesis:     Normal / Subtle / Obvious  Comments    Left  N N/a    Right  N N/a         OUTCOME MEASURE:FOTO     Category:Self care       Current Score  = 37%   Goal at Discharge Score = 65%     11/20/23: 53%    Assessment     Update: Pt would continue to benefit from skilled OT.  Increase in resistance today with therapeutic activities. Pt tolerated increased resisitve therapeutic activities well without additional complaints of pain. Patient continues to progress in overall strength and range of motion.  Will progress as tolerated. Pt motivated.       Julee is progressing well towards her goals and there are no updates to goals at this time. Pt prognosis is Good.     Pt will continue to benefit from skilled outpatient occupational therapy to address the deficits listed in the problem list on initial evaluation provide pt/family education and to maximize pt's level of independence in the home and community environment.     Anticipated barriers to occupational therapy: slow bone healing    Pt's spiritual, cultural and educational needs considered and pt agreeable to  plan of care and goals.    UPDATED GOALS  1)   Improve muscle strength ing to 5/5 in order to participate in ADLs and IADLs with less assistance.  Ongoing  2)   Patient to score at 65% or more on FOTO to demonstrate improved perception of functional shoulder use.Ongoing    Long Term Goal Status:   modified:    Reasons for Recertification of Therapy:    Pt needs recertification to progress towards LTGs and enhance overall QOL.    Plan     Updated Certification Period: 6/7/24 to 8/30/24  Recommended Treatment Plan: 2 times per week for 10 weeks: Aquatic Therapy, Cervical/Lumbar Traction, Debridement (nonselective), Debridement (selective), Electrical Stimulation , Fluidotherapy, Gait Training, Iontophoresis (with ), Manual Therapy, Moist Heat/ Ice, Neuromuscular Re-ed, Orthotic Management and Training, Paraffin, Patient Education, Self Care, Therapeutic Activities, Therapeutic Exercise, and Ultrasound      Montserrat Wood, OT  6/26/2024      I CERTIFY THE NEED FOR THESE SERVICES FURNISHED UNDER THIS PLAN OF TREATMENT AND WHILE UNDER MY CARE    Physician's comments:        Physician's Signature: ___________________________________________________

## 2024-07-03 ENCOUNTER — CLINICAL SUPPORT (OUTPATIENT)
Dept: REHABILITATION | Facility: HOSPITAL | Age: 44
End: 2024-07-03
Payer: COMMERCIAL

## 2024-07-03 DIAGNOSIS — R53.1 WEAKNESS: Primary | ICD-10-CM

## 2024-07-03 DIAGNOSIS — R52 PAIN: ICD-10-CM

## 2024-07-03 DIAGNOSIS — M25.621 ELBOW STIFFNESS, RIGHT: ICD-10-CM

## 2024-07-03 DIAGNOSIS — M25.611 SHOULDER STIFFNESS, RIGHT: ICD-10-CM

## 2024-07-03 PROCEDURE — 97530 THERAPEUTIC ACTIVITIES: CPT | Mod: PO

## 2024-07-03 NOTE — PROGRESS NOTES
Occupational Therapy Daily Treatment Note     Name: Tabby Veloz  Clinic Number: [id number]    Therapy Diagnosis:   Encounter Diagnoses   Name Primary?    Weakness Yes    Elbow stiffness, right     Shoulder stiffness, right     Pain        Physician: Luis Del Valle NP    Visit Date: 7/3/2024    Physician Orders: OT eval/ treat   Plan of Care Certification Date: 6/7/24  Authorization Period: 10/3/23 -10/2/24   Medical Diagnosis: S42.351D (ICD-10-CM) - Closed displaced comminuted fracture of shaft of right humerus with routine healing, subsequent   Surgical Procedure and Date: ORIF for her right humeral shaft fracture with IM nail by Dr. Stanley on 9/19/2023. ,   Evaluation Date: 10/9/23  Onset Date:9/15/23   Date of Return to MD: 3/6/24  Visit # / Visits authorized: 42/42 + 22  FOTO Completion: 3/3    Time In:0900  Time Out: 1000  Total Billable Time: 60 minutes    Note from MD 12/12/23 visit:    - Continue [facility] OT.  - Weight bearing 2-4 lbs x 4 weeks and increase by 2-4 lbs/month thereafter as she tolerates.  Ok for therapist to work with a pulley.  - Range of motion as tolerated.     Precautions: Standard and WB, 17 weeks 1/16/24   Subjective     Pt reports: She states she is getting stronger  she was compliant with home exercise program given last session.   Response to previous treatment:increased ROM  Functional change: increased ROM    Pain:1/10   Location: right arms     Objective       Observation: No abnormalities present      UE Range of Motion  Active Range of Motion:   Shoulder Right Right  10/30/23 Right  12/11/23 Right   1/3/24 Right  1/8/24 Right   1/22/24 Right  2/7/24 Right  3/11/24 Right   4/17/24 Right  6/26/24   Flexion 20 145 - supine 160 - supine 135 - standing 165 - supine 140- standing  165 - supine 150 - standing 175 175 175   Abduction 50 110 - supine 130 - supine 130 -standing 145 - supine 145 - standing   150 - supine 145 - standing  165 165 165   ER at 90 20 40 -  supine 50 - supine 55 - supine 55 - supine  35 - standing   55 - supine 80 - standing  90 90 90   IR 80 80 - supine 80 - supine 80- supine 80 - supine 70 - standing   80 - supine  70 - standing  80 80 80      Passive Range of Motion:   Shoulder Left Right Right   10/30/23 Right  12/11/23   Flexion 170 70 160 170   Abduction 165 70 125 145   ER at 90 80 20 40 55   IR 80 80 80 80      Elbow Left Right Right   10/30/23 Right  12/11/23 Right   1/3/24 Right  1/8/24 Right  1/22/24   Flexion 140 120 130 140 140 140 140   Extension 0 -10 0 0 0 0 0      Painful Arc:   Patient demonstrates no painful arc in shoulder flexion or abduction     Upper Extremity Strength  (R) UE   (L) UE     Shoulder flexion: 2-/5 Shoulder flexion: 5/5   Shoulder Abduction: 2-/5 Shoulder abduction: 5/5   Shoulder ER 2-/5 Shoulder ER 5/5   Shoulder IR 2-/5 Shoulder IR 5/5   Rhomboids 2-/5 Rhomboids 5/5       Strength: (OSMANY Dynamometer in psi.)        10/9/2023 10/9/2023 12/11/23 1/3/24 1/8/24 1/22/24 2/7/24 3/11/24 4/17/24 6/26/24     Left Right Right Right Right Right Right Right Right Right   Rung II 60 N/a 50 63 65 65 70 70 70 70      Joint Mobility/End Feels: Hard     Palpation: Pt is tender at incision sites      Sensation: Pt denies any numbness or tingling        Scapular Control/Dyskinesis:     Normal / Subtle / Obvious  Comments    Left  N N/a    Right  N N/a         OUTCOME MEASURE:FOTO     Category:Self care       Current Score  = 37%   Goal at Discharge Score = 65%     11/20/23: 53%    Treatment     Julee participated in dynamic functional therapeutic activities to improve functional performance for 60  minutes, including:  -pulley's 3 min flexion/ 3 min abduction  - 8 minute carry with 15# DB   - Silver theraband rows 2/15  - Silver theraband pull downs 2/15  - Silver theraband internal rotation 2/15  - Silver theraband external rotation 2/15  - UBE: level 6.5 for 10 minutes 5 min forward/ 5 minutes backwards  - silver theraband  pull aparts 2/15  - standing push press with 10# DB 3/10   -wall push ups 3/10  - KB waist to high pull bilateral arms 20# 3/10  - KB [place] swings 20 3/10         Home Exercises and Education Provided     Education provided:   - Progress towards goals     Written Home Exercises Provided: Patient instructed to cont prior HEP.  Exercises were reviewed and Julee was able to demonstrate them prior to the end of the session.  Julee demonstrated good  understanding of the HEP provided.   .   See EMR under Patient Instructions for exercises provided prior visit.      Assessment     Pt would continue to benefit from skilled OT.  Increase in resistance today with therapeutic activities. Pt tolerated increased resisitve therapeutic activities well without additional complaints of pain. Patient continues to progress in overall strength and range of motion.  Will progress as tolerated. Pt motivated.       Julee is progressing well towards her goals and there are no updates to goals at this time. Pt prognosis is Good.     Pt will continue to benefit from skilled outpatient occupational therapy to address the deficits listed in the problem list on initial evaluation provide pt/family education and to maximize pt's level of independence in the home and community environment.     Anticipated barriers to occupational therapy: slow bone healing    Pt's spiritual, cultural and educational needs considered and pt agreeable to plan of care and goals.    UPDATED GOALS  1)   Improve muscle strength ing to 5/5 in order to participate in ADLs and IADLs with less assistance.  Ongoing  2)   Patient to score at 65% or more on FOTO to demonstrate improved perception of functional shoulder use.Ongoing        Plan      Pt to be treated by Occupational Therapy 2 times per week for 10 weeks during the certification period to achieve the established goals.      Treatment to include: Paraffin, Fluidotherapy, Manual therapy/joint mobilizations,  Modalities for pain management, US 3 mhz, Therapeutic exercises/activities., Iontophoresis with 2.0 cc Dexamethasone, Strengthening, Orthotic Fabrication/Fit/Training, Edema Control, Scar Management, Wound Care, Electrical Modalities, Joint Protection, and Energy Conservation, as well as any other treatments deemed necessary based on the patient's needs or progress.     Updates/Grading for session: Continue with current plan of care     FERNANDO Harman

## 2024-07-10 ENCOUNTER — PATIENT MESSAGE (OUTPATIENT)
Dept: NEUROLOGY | Facility: CLINIC | Age: 44
End: 2024-07-10
Payer: COMMERCIAL

## 2024-07-10 DIAGNOSIS — G93.2 PSEUDOTUMOR CEREBRI: ICD-10-CM

## 2024-07-11 RX ORDER — ACETAZOLAMIDE 250 MG/1
250 TABLET ORAL 2 TIMES DAILY
Qty: 180 TABLET | Refills: 1 | Status: SHIPPED | OUTPATIENT
Start: 2024-07-11 | End: 2025-07-11

## 2024-07-17 ENCOUNTER — CLINICAL SUPPORT (OUTPATIENT)
Dept: REHABILITATION | Facility: HOSPITAL | Age: 44
End: 2024-07-17
Payer: COMMERCIAL

## 2024-07-17 DIAGNOSIS — M25.621 ELBOW STIFFNESS, RIGHT: ICD-10-CM

## 2024-07-17 DIAGNOSIS — R52 PAIN: ICD-10-CM

## 2024-07-17 DIAGNOSIS — M25.611 SHOULDER STIFFNESS, RIGHT: ICD-10-CM

## 2024-07-17 DIAGNOSIS — R53.1 WEAKNESS: Primary | ICD-10-CM

## 2024-07-17 PROCEDURE — 97530 THERAPEUTIC ACTIVITIES: CPT | Mod: PO

## 2024-07-17 NOTE — PROGRESS NOTES
Occupational Therapy Daily Treatment Note     Name: Tabby Veloz  Clinic Number: [id number]    Therapy Diagnosis:   Encounter Diagnoses   Name Primary?    Weakness Yes    Elbow stiffness, right     Shoulder stiffness, right     Pain        Physician: Luis Del Valle NP    Visit Date: 7/17/2024    Physician Orders: OT eval/ treat   Plan of Care Certification Date: 8/30/24  Authorization Period: 10/3/23 -10/2/24   Medical Diagnosis: S42.351D (ICD-10-CM) - Closed displaced comminuted fracture of shaft of right humerus with routine healing, subsequent   Surgical Procedure and Date: ORIF for her right humeral shaft fracture with IM nail by Dr. Stanley on 9/19/2023. ,   Evaluation Date: 10/9/23  Onset Date:9/15/23   Date of Return to MD: 3/6/24  Visit # / Visits authorized: 42/42 + 22  FOTO Completion: 3/3    Time In:0900  Time Out: 1000  Total Billable Time: 60 minutes    Note from MD 12/12/23 visit:    - Continue [facility] OT.  - Weight bearing 2-4 lbs x 4 weeks and increase by 2-4 lbs/month thereafter as she tolerates.  Ok for therapist to work with a pulley.  - Range of motion as tolerated.     Precautions: Standard and WB, 17 weeks 1/16/24   Subjective     Pt reports: She states she is getting stronger  she was compliant with home exercise program given last session.   Response to previous treatment:increased ROM  Functional change: increased ROM    Pain:1/10   Location: right arms     Objective       Observation: No abnormalities present      UE Range of Motion  Active Range of Motion:   Shoulder Right Right  10/30/23 Right  12/11/23 Right   1/3/24 Right  1/8/24 Right   1/22/24 Right  2/7/24 Right  3/11/24 Right   4/17/24 Right  6/26/24   Flexion 20 145 - supine 160 - supine 135 - standing 165 - supine 140- standing  165 - supine 150 - standing 175 175 175   Abduction 50 110 - supine 130 - supine 130 -standing 145 - supine 145 - standing   150 - supine 145 - standing  165 165 165   ER at 90 20 40 -  supine 50 - supine 55 - supine 55 - supine  35 - standing   55 - supine 80 - standing  90 90 90   IR 80 80 - supine 80 - supine 80- supine 80 - supine 70 - standing   80 - supine  70 - standing  80 80 80      Passive Range of Motion:   Shoulder Left Right Right   10/30/23 Right  12/11/23   Flexion 170 70 160 170   Abduction 165 70 125 145   ER at 90 80 20 40 55   IR 80 80 80 80      Elbow Left Right Right   10/30/23 Right  12/11/23 Right   1/3/24 Right  1/8/24 Right  1/22/24   Flexion 140 120 130 140 140 140 140   Extension 0 -10 0 0 0 0 0      Painful Arc:   Patient demonstrates no painful arc in shoulder flexion or abduction     Upper Extremity Strength  (R) UE   (L) UE     Shoulder flexion: 2-/5 Shoulder flexion: 5/5   Shoulder Abduction: 2-/5 Shoulder abduction: 5/5   Shoulder ER 2-/5 Shoulder ER 5/5   Shoulder IR 2-/5 Shoulder IR 5/5   Rhomboids 2-/5 Rhomboids 5/5       Strength: (OSMANY Dynamometer in psi.)        10/9/2023 10/9/2023 12/11/23 1/3/24 1/8/24 1/22/24 2/7/24 3/11/24 4/17/24 6/26/24     Left Right Right Right Right Right Right Right Right Right   Rung II 60 N/a 50 63 65 65 70 70 70 70      Joint Mobility/End Feels: Hard     Palpation: Pt is tender at incision sites      Sensation: Pt denies any numbness or tingling        Scapular Control/Dyskinesis:     Normal / Subtle / Obvious  Comments    Left  N N/a    Right  N N/a         OUTCOME MEASURE:FOTO     Category:Self care       Current Score  = 37%   Goal at Discharge Score = 65%     11/20/23: 53%    Treatment     Julee participated in dynamic functional therapeutic activities to improve functional performance for 60  minutes, including:  -pulley's 3 min flexion/ 3 min abduction  - 8 minute carry with 15# DB   - Silver theraband rows 2/15  - Silver theraband pull downs 2/15  - Silver theraband internal rotation 2/15  - Silver theraband external rotation 2/15  - UBE: level 6.5 for 10 minutes 5 min forward/ 5 minutes backwards  - silver theraband  pull aparts 2/15  - standing push press with 10# DB 3/10   -wall push ups 3/10  - KB waist to high pull bilateral arms 20# 3/10  - KB [place] swings 20 3/10         Home Exercises and Education Provided     Education provided:   - Progress towards goals     Written Home Exercises Provided: Patient instructed to cont prior HEP.  Exercises were reviewed and Julee was able to demonstrate them prior to the end of the session.  Julee demonstrated good  understanding of the HEP provided.   .   See EMR under Patient Instructions for exercises provided prior visit.      Assessment     Pt would continue to benefit from skilled OT.  Increase in resistance today with therapeutic activities. Pt tolerated increased resisitve therapeutic activities well without additional complaints of pain. Patient continues to progress in overall strength and range of motion.  Pt is nearing discharge. Planned discharge on 7/31/24. Pt demonstrated good understanding. Will progress as tolerated. Pt motivated.       Julee is progressing well towards her goals and there are no updates to goals at this time. Pt prognosis is Good.     Pt will continue to benefit from skilled outpatient occupational therapy to address the deficits listed in the problem list on initial evaluation provide pt/family education and to maximize pt's level of independence in the home and community environment.     Anticipated barriers to occupational therapy: slow bone healing    Pt's spiritual, cultural and educational needs considered and pt agreeable to plan of care and goals.    UPDATED GOALS  1)   Improve muscle strength ing to 5/5 in order to participate in ADLs and IADLs with less assistance.  Ongoing  2)   Patient to score at 65% or more on FOTO to demonstrate improved perception of functional shoulder use.Ongoing        Plan      Pt to be treated by Occupational Therapy 2 times per week for 10 weeks during the certification period to achieve the established goals.       Treatment to include: Paraffin, Fluidotherapy, Manual therapy/joint mobilizations, Modalities for pain management, US 3 mhz, Therapeutic exercises/activities., Iontophoresis with 2.0 cc Dexamethasone, Strengthening, Orthotic Fabrication/Fit/Training, Edema Control, Scar Management, Wound Care, Electrical Modalities, Joint Protection, and Energy Conservation, as well as any other treatments deemed necessary based on the patient's needs or progress.     Updates/Grading for session: Continue with current plan of care     FERNANDO Harman

## 2024-07-24 ENCOUNTER — CLINICAL SUPPORT (OUTPATIENT)
Dept: REHABILITATION | Facility: HOSPITAL | Age: 44
End: 2024-07-24
Payer: COMMERCIAL

## 2024-07-24 DIAGNOSIS — M25.611 SHOULDER STIFFNESS, RIGHT: ICD-10-CM

## 2024-07-24 DIAGNOSIS — R52 PAIN: ICD-10-CM

## 2024-07-24 DIAGNOSIS — M25.621 ELBOW STIFFNESS, RIGHT: ICD-10-CM

## 2024-07-24 DIAGNOSIS — R53.1 WEAKNESS: Primary | ICD-10-CM

## 2024-07-24 PROCEDURE — 97530 THERAPEUTIC ACTIVITIES: CPT | Mod: PO

## 2024-07-24 NOTE — PROGRESS NOTES
Occupational Therapy Daily Treatment Note     Name: Tabby Veloz  Clinic Number: [id number]    Therapy Diagnosis:   Encounter Diagnoses   Name Primary?    Weakness Yes    Elbow stiffness, right     Shoulder stiffness, right     Pain        Physician: Luis Del Valle NP    Visit Date: 7/24/2024    Physician Orders: OT eval/ treat   Plan of Care Certification Date: 8/30/24  Authorization Period: 10/3/23 -10/2/24   Medical Diagnosis: S42.351D (ICD-10-CM) - Closed displaced comminuted fracture of shaft of right humerus with routine healing, subsequent   Surgical Procedure and Date: ORIF for her right humeral shaft fracture with IM nail by Dr. Stanley on 9/19/2023. ,   Evaluation Date: 10/9/23  Onset Date:9/15/23   Date of Return to MD: 3/6/24  Visit # / Visits authorized: 44/52 + 22  FOTO Completion: 3/3    Time In:0900  Time Out: 1000  Total Billable Time: 60 minutes    Note from MD 12/12/23 visit:    - Continue [facility] OT.  - Weight bearing 2-4 lbs x 4 weeks and increase by 2-4 lbs/month thereafter as she tolerates.  Ok for therapist to work with a pulley.  - Range of motion as tolerated.     Precautions: Standard and WB, 17 weeks 1/16/24   Subjective     Pt reports: She states she is getting stronger  she was compliant with home exercise program given last session.   Response to previous treatment:increased ROM  Functional change: increased ROM    Pain:1/10   Location: right arms     Objective       Observation: No abnormalities present      UE Range of Motion  Active Range of Motion:   Shoulder Right Right  10/30/23 Right  12/11/23 Right   1/3/24 Right  1/8/24 Right   1/22/24 Right  2/7/24 Right  3/11/24 Right   4/17/24 Right  6/26/24   Flexion 20 145 - supine 160 - supine 135 - standing 165 - supine 140- standing  165 - supine 150 - standing 175 175 175   Abduction 50 110 - supine 130 - supine 130 -standing 145 - supine 145 - standing   150 - supine 145 - standing  165 165 165   ER at 90 20 40 -  supine 50 - supine 55 - supine 55 - supine  35 - standing   55 - supine 80 - standing  90 90 90   IR 80 80 - supine 80 - supine 80- supine 80 - supine 70 - standing   80 - supine  70 - standing  80 80 80      Passive Range of Motion:   Shoulder Left Right Right   10/30/23 Right  12/11/23   Flexion 170 70 160 170   Abduction 165 70 125 145   ER at 90 80 20 40 55   IR 80 80 80 80      Elbow Left Right Right   10/30/23 Right  12/11/23 Right   1/3/24 Right  1/8/24 Right  1/22/24   Flexion 140 120 130 140 140 140 140   Extension 0 -10 0 0 0 0 0      Painful Arc:   Patient demonstrates no painful arc in shoulder flexion or abduction     Upper Extremity Strength  (R) UE   (L) UE     Shoulder flexion: 2-/5 Shoulder flexion: 5/5   Shoulder Abduction: 2-/5 Shoulder abduction: 5/5   Shoulder ER 2-/5 Shoulder ER 5/5   Shoulder IR 2-/5 Shoulder IR 5/5   Rhomboids 2-/5 Rhomboids 5/5       Strength: (OSMANY Dynamometer in psi.)        10/9/2023 10/9/2023 12/11/23 1/3/24 1/8/24 1/22/24 2/7/24 3/11/24 4/17/24 6/26/24     Left Right Right Right Right Right Right Right Right Right   Rung II 60 N/a 50 63 65 65 70 70 70 70      Joint Mobility/End Feels: Hard     Palpation: Pt is tender at incision sites      Sensation: Pt denies any numbness or tingling        Scapular Control/Dyskinesis:     Normal / Subtle / Obvious  Comments    Left  N N/a    Right  N N/a         OUTCOME MEASURE:FOTO     Category:Self care       Current Score  = 37%   Goal at Discharge Score = 65%     11/20/23: 53%    Treatment     Julee participated in dynamic functional therapeutic activities to improve functional performance for 60  minutes, including:  -pulley's 3 min flexion/ 3 min abduction  - 8 minute carry with 15# DB   - Silver theraband rows 2/15  - Silver theraband pull downs 2/15  - Silver theraband internal rotation 2/15  - Silver theraband external rotation 2/15  - UBE: level 6.5 for 10 minutes 5 min forward/ 5 minutes backwards  - silver theraband  pull aparts 2/15  - standing push press with 10# DB 3/10   -wall push ups 3/10  - KB waist to high pull bilateral arms 20# 3/10  - KB [place] swings 20 3/10         Home Exercises and Education Provided     Education provided:   - Progress towards goals     Written Home Exercises Provided: Patient instructed to cont prior HEP.  Exercises were reviewed and Julee was able to demonstrate them prior to the end of the session.  Julee demonstrated good  understanding of the HEP provided.   .   See EMR under Patient Instructions for exercises provided prior visit.      Assessment     Pt would continue to benefit from skilled OT.  Increase in resistance today with therapeutic activities. Pt tolerated increased resisitve therapeutic activities well without additional complaints of pain. Patient continues to progress in overall strength and range of motion.  Pt is nearing discharge. Planned discharge on 7/31/24. Pt demonstrated good understanding. Will progress as tolerated. Pt motivated.       Julee is progressing well towards her goals and there are no updates to goals at this time. Pt prognosis is Good.     Pt will continue to benefit from skilled outpatient occupational therapy to address the deficits listed in the problem list on initial evaluation provide pt/family education and to maximize pt's level of independence in the home and community environment.     Anticipated barriers to occupational therapy: slow bone healing    Pt's spiritual, cultural and educational needs considered and pt agreeable to plan of care and goals.    UPDATED GOALS  1)   Improve muscle strength ing to 5/5 in order to participate in ADLs and IADLs with less assistance.  Ongoing  2)   Patient to score at 65% or more on FOTO to demonstrate improved perception of functional shoulder use.Ongoing        Plan      Pt to be treated by Occupational Therapy 2 times per week for 10 weeks during the certification period to achieve the established goals.       Treatment to include: Paraffin, Fluidotherapy, Manual therapy/joint mobilizations, Modalities for pain management, US 3 mhz, Therapeutic exercises/activities., Iontophoresis with 2.0 cc Dexamethasone, Strengthening, Orthotic Fabrication/Fit/Training, Edema Control, Scar Management, Wound Care, Electrical Modalities, Joint Protection, and Energy Conservation, as well as any other treatments deemed necessary based on the patient's needs or progress.     Updates/Grading for session: Continue with current plan of care     FERNANDO Harman

## 2024-10-18 ENCOUNTER — PATIENT MESSAGE (OUTPATIENT)
Dept: NEUROLOGY | Facility: CLINIC | Age: 44
End: 2024-10-18
Payer: COMMERCIAL

## 2024-12-18 ENCOUNTER — OFFICE VISIT (OUTPATIENT)
Dept: NEUROLOGY | Facility: CLINIC | Age: 44
End: 2024-12-18
Payer: COMMERCIAL

## 2024-12-18 DIAGNOSIS — E66.01 CLASS 3 SEVERE OBESITY DUE TO EXCESS CALORIES WITH SERIOUS COMORBIDITY AND BODY MASS INDEX (BMI) OF 40.0 TO 44.9 IN ADULT: ICD-10-CM

## 2024-12-18 DIAGNOSIS — Z79.899 ENCOUNTER FOR LONG-TERM CURRENT USE OF MEDICATION: Primary | ICD-10-CM

## 2024-12-18 DIAGNOSIS — G43.709 CHRONIC MIGRAINE WITHOUT AURA WITHOUT STATUS MIGRAINOSUS, NOT INTRACTABLE: ICD-10-CM

## 2024-12-18 DIAGNOSIS — E66.813 CLASS 3 SEVERE OBESITY DUE TO EXCESS CALORIES WITH SERIOUS COMORBIDITY AND BODY MASS INDEX (BMI) OF 40.0 TO 44.9 IN ADULT: ICD-10-CM

## 2024-12-18 DIAGNOSIS — G93.2 PSEUDOTUMOR CEREBRI: ICD-10-CM

## 2024-12-18 PROCEDURE — 99214 OFFICE O/P EST MOD 30 MIN: CPT | Mod: 95,,, | Performed by: NURSE PRACTITIONER

## 2024-12-18 RX ORDER — ACETAZOLAMIDE 500 MG/1
500 CAPSULE, EXTENDED RELEASE ORAL 2 TIMES DAILY
Qty: 180 CAPSULE | Refills: 1 | Status: SHIPPED | OUTPATIENT
Start: 2024-12-18

## 2024-12-18 RX ORDER — ACETAZOLAMIDE 250 MG/1
250 TABLET ORAL 2 TIMES DAILY
Qty: 180 TABLET | Refills: 1 | Status: SHIPPED | OUTPATIENT
Start: 2024-12-18 | End: 2025-12-18

## 2024-12-18 RX ORDER — TOPIRAMATE 100 MG/1
100 TABLET, FILM COATED ORAL 2 TIMES DAILY
Qty: 180 TABLET | Refills: 1 | Status: SHIPPED | OUTPATIENT
Start: 2024-12-18 | End: 2025-12-18

## 2024-12-18 NOTE — PROGRESS NOTES
HPI: Tabby Veloz is a 44 y.o. female, referred by Dr. Sanjeev Cook, for evaluation of papilledema and headaches. Her medical history is overall unremarkable. She completed a lumbar puncture on 3/9/2017, which demonstrated an opening pressure of 46, consistent with a diagnosis of pseudotumor cerebri. Now with IUP and had  increased mild headaches and tinnitus. She is s/p ORIF right humerus.     She works in emergency management as the Emergency Preparedness .     The patient location is: home  The chief complaint leading to consultation is: follow up for abnormal eye exam    Visit type: audiovisual    20 minutes of total time spent on the encounter, which includes face to face time and non-face to face time preparing to see the patient (eg, review of tests), Obtaining and/or reviewing separately obtained history, Documenting clinical information in the electronic or other health record, Independently interpreting results (not separately reported) and communicating results to the patient/family/caregiver, or Care coordination (not separately reported).     Each patient to whom he or she provides medical services by telemedicine is:  (1) informed of the relationship between the physician and patient and the respective role of any other health care provider with respect to management of the patient; and (2) notified that he or she may decline to receive medical services by telemedicine and may withdraw from such care at any time.    Notes:   She presents today for a follow up visit. Her last eye exam in 7/2024 did show improvement of her papilledema. I reviewed photos from her eye exam, which showed ongoing mild papilledema on the left.     She continues on Diamox 750 mg bid.     Denies headaches or visual complaints.     She currently weighs 272.6. Weight is overall stable since her last visit.     She is no longer taking Gabapentin or Robaxin in 3/2024. She was taking this for arm pain, which is  improved. She is still in PT for strengthening post fracture.     Stress and cognitive complaints are improved.     Her lumbar pain is improved  still.     She is no longer working.     Review of Systems  Review of Systems   Constitutional:  Negative for fatigue.   HENT:  Negative for tinnitus.    Eyes:  Negative for visual disturbance.   Cardiovascular:  Negative for leg swelling.   Gastrointestinal:  Negative for blood in stool.   Genitourinary:  Negative for hematuria.   Musculoskeletal:  Positive for back pain. Negative for neck stiffness.   Skin:  Negative for rash.   Neurological:  Negative for headaches.   Psychiatric/Behavioral:  Negative for sleep disturbance. The patient is not nervous/anxious.        Objective:       There were no vitals filed for this visit.    Exam:  Gen Appearance, well developed/nourished in no apparent distress  CV: not evaluated  Neuro:  MS: Awake, alert, oriented to place, person, time, situation. Sustains attention. Recent/remote memory intact, Language is full to spontaneous speech/repetition/naming/comprehension. Fund of Knowledge is full  CN: Optic discs not observed, due to telemed, PERRL not done, Extraoccular movements and visual fields are full. Normal facial strength, Hearing adequate for telemed visit, Tongue is midline, palate not evaluated. Shoulder Shrug symmetric.  Motor: deferred due to telemed  Sensory:deferred due to telemed  Cerebellar: deferred due to telemed  Gait: deferred due to telemed    Imaging:   MRI Brain 3/1/2017:   Normal MRI brain specifically without evidence for acute infarction or enhancing lesion.    MRV 3/1/2017:  Unremarkable noncontrast MRV specifically without evidence for venous sinus thrombosis.    Labs: 2/2018 CMP with changes not unexpected for diamox use.  CSF studies unremarkable and culture negative    8/2016 CMP reviewed    LP 3/9/2017 opening pressure: 46    Labs:   8/2019 CMP WNL  6/2022 CMP unremarkable-was non-fasting  7/2023 CMP  "overall unremarkable  9/15/23 BMP unremarkable, CBC showed elevated WBC"s (recent fracture)  12/2023 normal CMP  Assessment:   Tabby Veloz is a 44 y.o. female with papilledema and headaches. Her medical history is overall unremarkable. She completed a lumbar puncture on 3/9/2017, which demonstrated an opening pressure of 46, consistent with a diagnosis of pseudotumor cerebri.   Plan:   I recommend:   1. 7/2023 eye exam showed papilledema. She did gain 10 pounds between her last two visits, 6 months apart, which may have triggered her papilledema to return.   -Weight loss is still needed, which was discussed. Weight loss could resolve her pseudotumor.     -updated eye exam from 7/2024 shows resolution of her papilledema.      -Given ongoing papilledema, I increased her Diamox to 750 mg bid at 10/5/23 visit. Monitor CMP over time on this medication. Check at this time.   -will consider the need for increase to 1000 mg bid pending results of her next eye exam next month, if papilledema is any worse.     No current visual complaints, and she has lost 10 pounds thus far, but then she regained 10 pounds. Weight loss is challenged currently, given arm mobility issues. She is currently in PT for strengthening.     -Her last therapeutic LP was during pregnancy and she is s/p tubal ligation.    2. Continue Topamax 100 mg bid for headache prevention and for pseudotumor, given papilledema on recent eye exam.   -needs yearly eye exams due in 7/2024, or sooner with visual complaints, headaches, or more weight gain.   -advised to keep a headache log until her next visit.      -Advised to increase fluid intake with Topamax and Diamox use. Notify me with any development of renal stones.     TO ER with any acute visual changes or call clinic with any mild changes.     3. Offered PT for lumbar/sciatica complaints. She declines formal PT currently and will do home exercises for now. Lumbar pain improved with exercises.     4. Her " stress could be contributing to her cognitive complaints, which started prior to starting Topamax.   -stress reduction techniques discussed today. Wellbutrin helping with focus.     5. No longer on Gabapentin or Robaxin for post fracture pain.     FU 6 months or sooner with changes

## 2024-12-20 ENCOUNTER — LAB VISIT (OUTPATIENT)
Dept: LAB | Facility: HOSPITAL | Age: 44
End: 2024-12-20
Attending: NURSE PRACTITIONER
Payer: COMMERCIAL

## 2024-12-20 DIAGNOSIS — Z79.899 ENCOUNTER FOR LONG-TERM CURRENT USE OF MEDICATION: ICD-10-CM

## 2024-12-20 LAB
ALBUMIN SERPL BCP-MCNC: 3.7 G/DL (ref 3.5–5.2)
ALP SERPL-CCNC: 89 U/L (ref 40–150)
ALT SERPL W/O P-5'-P-CCNC: 18 U/L (ref 10–44)
ANION GAP SERPL CALC-SCNC: 7 MMOL/L (ref 8–16)
AST SERPL-CCNC: 16 U/L (ref 10–40)
BILIRUB SERPL-MCNC: 0.2 MG/DL (ref 0.1–1)
BUN SERPL-MCNC: 19 MG/DL (ref 6–20)
CALCIUM SERPL-MCNC: 8.9 MG/DL (ref 8.7–10.5)
CHLORIDE SERPL-SCNC: 113 MMOL/L (ref 95–110)
CO2 SERPL-SCNC: 21 MMOL/L (ref 23–29)
CREAT SERPL-MCNC: 0.8 MG/DL (ref 0.5–1.4)
EST. GFR  (NO RACE VARIABLE): >60 ML/MIN/1.73 M^2
GLUCOSE SERPL-MCNC: 93 MG/DL (ref 70–110)
POTASSIUM SERPL-SCNC: 4.2 MMOL/L (ref 3.5–5.1)
PROT SERPL-MCNC: 6.7 G/DL (ref 6–8.4)
SODIUM SERPL-SCNC: 141 MMOL/L (ref 136–145)

## 2024-12-20 PROCEDURE — 80053 COMPREHEN METABOLIC PANEL: CPT | Performed by: NURSE PRACTITIONER

## 2024-12-20 PROCEDURE — 36415 COLL VENOUS BLD VENIPUNCTURE: CPT | Performed by: NURSE PRACTITIONER

## 2025-06-18 ENCOUNTER — OFFICE VISIT (OUTPATIENT)
Dept: NEUROLOGY | Facility: CLINIC | Age: 45
End: 2025-06-18
Payer: COMMERCIAL

## 2025-06-18 DIAGNOSIS — G93.2 PSEUDOTUMOR CEREBRI: ICD-10-CM

## 2025-06-18 DIAGNOSIS — G93.2 IIH (IDIOPATHIC INTRACRANIAL HYPERTENSION): ICD-10-CM

## 2025-06-18 DIAGNOSIS — E66.813 CLASS 3 SEVERE OBESITY DUE TO EXCESS CALORIES WITH SERIOUS COMORBIDITY AND BODY MASS INDEX (BMI) OF 40.0 TO 44.9 IN ADULT: ICD-10-CM

## 2025-06-18 DIAGNOSIS — E66.01 CLASS 3 SEVERE OBESITY DUE TO EXCESS CALORIES WITH SERIOUS COMORBIDITY AND BODY MASS INDEX (BMI) OF 40.0 TO 44.9 IN ADULT: ICD-10-CM

## 2025-06-18 DIAGNOSIS — G43.709 CHRONIC MIGRAINE WITHOUT AURA WITHOUT STATUS MIGRAINOSUS, NOT INTRACTABLE: Primary | ICD-10-CM

## 2025-06-18 PROCEDURE — 98004 SYNCH AUDIO-VIDEO EST SF 10: CPT | Mod: 95,,, | Performed by: NURSE PRACTITIONER

## 2025-06-18 RX ORDER — ACETAZOLAMIDE 250 MG/1
250 TABLET ORAL 2 TIMES DAILY
Qty: 180 TABLET | Refills: 1 | Status: SHIPPED | OUTPATIENT
Start: 2025-06-18 | End: 2026-06-18

## 2025-06-18 RX ORDER — ACETAZOLAMIDE 500 MG/1
500 CAPSULE, EXTENDED RELEASE ORAL 2 TIMES DAILY
Qty: 180 CAPSULE | Refills: 1 | Status: SHIPPED | OUTPATIENT
Start: 2025-06-18

## 2025-06-18 RX ORDER — TOPIRAMATE 100 MG/1
100 TABLET, FILM COATED ORAL 2 TIMES DAILY
Qty: 180 TABLET | Refills: 1 | Status: SHIPPED | OUTPATIENT
Start: 2025-06-18 | End: 2026-06-18

## 2025-06-18 NOTE — PROGRESS NOTES
HPI: Tabby Veloz is a 44 y.o. female, referred by Dr. Sanjeev Cook, for evaluation of papilledema and headaches. Her medical history is overall unremarkable. She completed a lumbar puncture on 3/9/2017, which demonstrated an opening pressure of 46, consistent with a diagnosis of pseudotumor cerebri. Now with IUP and had  increased mild headaches and tinnitus. She is s/p ORIF right humerus.     She live in [facility]. She does not work.     The patient location is: home  The chief complaint leading to consultation is: follow up for abnormal eye exam    Visit type: audiovisual    20 minutes of total time spent on the encounter, which includes face to face time and non-face to face time preparing to see the patient (eg, review of tests), Obtaining and/or reviewing separately obtained history, Documenting clinical information in the electronic or other health record, Independently interpreting results (not separately reported) and communicating results to the patient/family/caregiver, or Care coordination (not separately reported).     Each patient to whom he or she provides medical services by telemedicine is:  (1) informed of the relationship between the physician and patient and the respective role of any other health care provider with respect to management of the patient; and (2) notified that he or she may decline to receive medical services by telemedicine and may withdraw from such care at any time.    Notes:   She presents today for a follow up visit. She continues on Diamox 750 mg bid, as well as Topamax 100 mg bid.     Her last eye exam in 7/2024 did show improvement of her papilledema. I reviewed photos from her eye exam, which showed ongoing mild papilledema on the left.     She continues on Diamox 750 mg bid.     Denies headaches or visual complaints.     She currently weighs 264.8 labs. She lost 8 pounds since her last visit.     Her lumbar pain is improved still.       Review of Systems  Review of  "Systems   Constitutional:  Negative for fatigue.   HENT:  Negative for tinnitus.    Eyes:  Negative for visual disturbance.   Cardiovascular:  Negative for leg swelling.   Gastrointestinal:  Negative for blood in stool.   Genitourinary:  Negative for hematuria.   Musculoskeletal:  Positive for back pain. Negative for neck stiffness.   Skin:  Negative for rash.   Neurological:  Negative for headaches.   Psychiatric/Behavioral:  Negative for sleep disturbance. The patient is not nervous/anxious.        Objective:       There were no vitals filed for this visit.    Exam:  Gen Appearance, well developed/nourished in no apparent distress  CV: not evaluated  Neuro:  MS: Awake, alert, oriented to place, person, time, situation. Sustains attention. Recent/remote memory intact, Language is full to spontaneous speech/repetition/naming/comprehension. Fund of Knowledge is full  CN: Optic discs not observed, due to telemed, PERRL not done, Extraoccular movements and visual fields are full. Normal facial strength, Hearing adequate for telemed visit, Tongue is midline, palate not evaluated. Shoulder Shrug symmetric.  Motor: deferred due to telemed  Sensory:deferred due to telemed  Cerebellar: deferred due to telemed  Gait: deferred due to telemed    Imaging:   MRI Brain 3/1/2017:   Normal MRI brain specifically without evidence for acute infarction or enhancing lesion.    MRV 3/1/2017:  Unremarkable noncontrast MRV specifically without evidence for venous sinus thrombosis.    Labs: 2/2018 CMP with changes not unexpected for diamox use.  CSF studies unremarkable and culture negative    8/2016 CMP reviewed    LP 3/9/2017 opening pressure: 46    Labs:   8/2019 CMP WNL  6/2022 CMP unremarkable-was non-fasting  7/2023 CMP overall unremarkable  9/15/23 BMP unremarkable, CBC showed elevated WBC"s (recent fracture)  12/2023 normal CMP  12/2024 CMP normal overall  Assessment:   Tabby Veloz is a 44 y.o. female with papilledema and " headaches. Her medical history is overall unremarkable. She completed a lumbar puncture on 3/9/2017, which demonstrated an opening pressure of 46, consistent with a diagnosis of pseudotumor cerebri.   Plan:   I recommend:   1. 7/2023 eye exam showed papilledema. She did gain 10 pounds between her last two visits, 6 months apart, which may have triggered her papilledema to return.   -Weight loss is still needed, which was discussed. Weight loss could resolve her pseudotumor.     -updated eye exam from 7/2024 shows resolution of her papilledema. She is pending repeat eye exam soon, which she will send for review.      -Diamox increased to 750 mg bid for ongoing mild papilledema. Monitor CMP over time on this medication. Check at her next visit if not done with yearly physical/labs, which she will send.   -will consider the need for increase to 1000 mg bid pending results of her next eye exam, if papilledema is any worse.     No current visual complaints, and she is losing weight.     -Her last therapeutic LP was during pregnancy and she is s/p tubal ligation.    2. Continue Topamax 100 mg bid for headache prevention and for pseudotumor, given papilledema on recent eye exam.   -needs yearly eye exams due in 7/2024, or sooner with visual complaints, headaches, or more weight gain.   -advised to keep a headache log until her next visit.      -Advised to increase fluid intake with Topamax and Diamox use. Notify me with any development of renal stones.     TO ER with any acute visual changes or call clinic with any mild changes.     3. Offered PT for lumbar/sciatica complaints. She declines formal PT currently and will do home exercises for now. Lumbar pain improved with exercises.     4. Her stress could be contributing to her cognitive complaints, which started prior to starting Topamax.   -stress reduction techniques discussed today. Wellbutrin helping with focus.     5. No longer on Gabapentin or Robaxin for post  fracture pain.     FU 6 months or sooner with changes

## 2025-07-29 NOTE — PROGRESS NOTES
HPI: Tabby Veloz is a 43 y.o. female, referred by Dr. Sanjeev Cook, for evaluation of papilledema and headaches. Her medical history is overall unremarkable. She completed a lumbar puncture on 3/9/2017, which demonstrated an opening pressure of 46, consistent with a diagnosis of pseudotumor cerebri. Now with IUP and had  increased mild headaches and tinnitus. She works in emergency management as the Emergency Preparedness .     The patient location is: home  The chief complaint leading to consultation is: follow up for abnormal eye exam    Visit type: audiovisual    20 minutes of total time spent on the encounter, which includes face to face time and non-face to face time preparing to see the patient (eg, review of tests), Obtaining and/or reviewing separately obtained history, Documenting clinical information in the electronic or other health record, Independently interpreting results (not separately reported) and communicating results to the patient/family/caregiver, or Care coordination (not separately reported).     Each patient to whom he or she provides medical services by telemedicine is:  (1) informed of the relationship between the physician and patient and the respective role of any other health care provider with respect to management of the patient; and (2) notified that he or she may decline to receive medical services by telemedicine and may withdraw from such care at any time.    Notes:   She presents today for a follow up visit. She fractured her right humerus in 9/2023, due to mechanical fall, and she is s/p ORIF right humerus.     She had an updated fundoscopic exam on 9/30/23, which showed mild improvement bilaterally when comparing her exam from the month prior.     She is fully compliant with both Topamax and Diamox.     She has lost a total of 10 pounds thus far.     No migraines currently. No visual complaints.     Stress and cognitive complaints are improved.     Her lumbar pain  is improved  still.      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 She does experience episodic, occasional headaches with stress.     Review of Systems  Review of Systems   Constitutional:  Negative for fatigue.   HENT:  Negative for tinnitus.    Eyes:  Negative for visual disturbance.   Cardiovascular:  Negative for leg swelling.   Gastrointestinal:  Negative for blood in stool.   Genitourinary:  Negative for hematuria.   Musculoskeletal:  Positive for back pain. Negative for neck stiffness.   Skin:  Negative for rash.   Neurological:  Negative for headaches.   Psychiatric/Behavioral:  Negative for sleep disturbance. The patient is not nervous/anxious.        Objective:       There were no vitals filed for this visit.    Exam:  Gen Appearance, well developed/nourished in no apparent distress  CV: not evaluated  Neuro:  MS: Awake, alert, oriented to place, person, time, situation. Sustains attention. Recent/remote memory intact, Language is full to spontaneous speech/repetition/naming/comprehension. Fund of Knowledge is full  CN: Optic discs not observed, due to telemed, PERRL not done, Extraoccular movements and visual fields are full. Normal facial strength, Hearing adequate for telemed visit, Tongue is midline, palate not evaluated. Shoulder Shrug symmetric.  Motor: deferred due to telemed  Sensory:deferred due to telemed  Cerebellar: deferred due to telemed  Gait: deferred due to telemed    Imaging:   MRI Brain 3/1/2017:   Normal MRI brain specifically without evidence for acute infarction or enhancing lesion.    MRV 3/1/2017:  Unremarkable noncontrast MRV specifically without evidence for venous sinus thrombosis.    Labs: 2/2018 CMP with changes not unexpected for diamox use.  CSF studies unremarkable and culture negative    8/2016 CMP reviewed    LP 3/9/2017 opening pressure: 46    Labs:   8/2019 CMP WNL  6/2022 CMP unremarkable-was non-fasting  7/2023 CMP overall unremarkable  9/15/23  "BMP unremarkable, CBC showed elevated WBC"s (recent fracture)  Assessment:   Tabby Veloz is a 43 y.o. female with papilledema and headaches. Her medical history is overall unremarkable. She completed a lumbar puncture on 3/9/2017, which demonstrated an opening pressure of 46, consistent with a diagnosis of pseudotumor cerebri.   Plan:   I recommend:   1. 7/2023 eye exam showed papilledema. She did gain 10 pounds between her last two visits, 6 months apart, which may have triggered her papilledema to return.   -Weight loss is still needed, which was discussed. Weight loss could resolve her pseudotumor.     -updated eye exam from one month ago shows mild improvement, but not resolution of her papilledema.      -Given ongoing papilledema, I will increase her Diamox to 750 mg bid. She had some symptoms upon initiation, which resolved over time. Monitor CMP over time on this medication.     No current visual complaints, and she has lost 10 pounds thus far.     -Her last therapeutic LP was during pregnancy and she is s/p tubal ligation.    2. Continue Topamax 100 mg bid for headache prevention and for pseudotumor, given papilledema on recent eye exam.   -needs a repeat eye exam in one month, two months from last exam, and 6 weeks from Topamax increase.   -advised to keep a headache log until her next visit.      -Advised to increase fluid intake with Topamax and Diamox use. Notify me with any development of renal stones.     TO ER with any acute visual changes or call clinic with any mild changes.     3. Offered PT for lumbar/sciatica complaints. She declines formal PT currently and will do home exercises for now. Lumbar pain improved with exercises.     4. Her stress could be contributing to her cognitive complaints, which started prior to starting Topamax.   -stress reduction techniques discussed today. Wellbutrin helping with focus.     FU 4 weeks virtually. Notify me with any visual changes. To ER with any acute " visual loss.      Detail Level: Generalized

## (undated) DEVICE — BIT DRILL 3.5 X 130MM STERILE

## (undated) DEVICE — SUT VICRYL PLUS 3-0 SH 18IN

## (undated) DEVICE — ELECTRODE REM PLYHSV RETURN 9

## (undated) DEVICE — TIP YANKAUERS BULB NO VENT

## (undated) DEVICE — DRAPE STERI U-SHAPED 47X51IN

## (undated) DEVICE — GUIDE WIRE SMOOTH TIP 22X800MM
Type: IMPLANTABLE DEVICE | Site: HUMERUS | Status: NON-FUNCTIONAL
Removed: 2023-09-19

## (undated) DEVICE — DRAPE INCISE IOBAN 2 23X17IN

## (undated) DEVICE — SUT VICRYL PLUS 0 CT1 18IN

## (undated) DEVICE — WIRE KIRSCHNER T2 3X285MM SS
Type: IMPLANTABLE DEVICE | Site: HUMERUS | Status: NON-FUNCTIONAL
Removed: 2023-09-19

## (undated) DEVICE — SUT MONOCRYL 3-0 PS-2 UND

## (undated) DEVICE — GAUZE SPONGE 4X4 12PLY

## (undated) DEVICE — APPLICATOR CHLORAPREP ORN 26ML

## (undated) DEVICE — ADHESIVE DERMABOND ADVANCED

## (undated) DEVICE — SPONGE LAP 18X18 PREWASHED

## (undated) DEVICE — DRAPE U SPLIT SHEET 54X76IN

## (undated) DEVICE — DRAPE ORTH SPLIT 77X108IN

## (undated) DEVICE — DRESSING AQUACEL RIBBON 2X45CM

## (undated) DEVICE — TOWEL OR DISP STRL BLUE 4/PK

## (undated) DEVICE — DRAPE THREE-QTR REINF 53X77IN

## (undated) DEVICE — DRAPE C-ARM ELAS CLIP 42X120IN

## (undated) DEVICE — DRAPE STERI-DRAPE 1000 17X11IN

## (undated) DEVICE — CATH SUCTION 10FR

## (undated) DEVICE — TAPE SURG DURAPORE 2 X10YD

## (undated) DEVICE — Device

## (undated) DEVICE — DRAPE TOP 53X102IN

## (undated) DEVICE — TRAY MINOR ORTHO OMC

## (undated) DEVICE — REAMER MOD BIXCUT 8X48MM STER.

## (undated) DEVICE — SUT 0 VICRYL / UR6 (J603)

## (undated) DEVICE — TUBE EXCHANGE 8 MM TEFLON STRL

## (undated) DEVICE — DRESSING TRANS 4X4 TEGADERM

## (undated) DEVICE — BNDG COFLEX FOAM LF2 ST 4X5YD

## (undated) DEVICE — GUIDEWIRE BALL TIP 2.5X800MM
Type: IMPLANTABLE DEVICE | Site: HUMERUS | Status: NON-FUNCTIONAL
Removed: 2023-09-19

## (undated) DEVICE — SUT VICRYL PLUS 2-0 CT1 18

## (undated) DEVICE — SPONGE COTTON TRAY 4X4IN